# Patient Record
Sex: MALE | Race: WHITE | HISPANIC OR LATINO
[De-identification: names, ages, dates, MRNs, and addresses within clinical notes are randomized per-mention and may not be internally consistent; named-entity substitution may affect disease eponyms.]

---

## 2020-10-23 ENCOUNTER — TRANSCRIPTION ENCOUNTER (OUTPATIENT)
Age: 65
End: 2020-10-23

## 2021-04-21 ENCOUNTER — INPATIENT (INPATIENT)
Facility: HOSPITAL | Age: 66
LOS: 4 days | Discharge: HOME HEALTH | End: 2021-04-26
Attending: HOSPITALIST | Admitting: HOSPITALIST
Payer: MEDICARE

## 2021-04-21 VITALS
DIASTOLIC BLOOD PRESSURE: 75 MMHG | SYSTOLIC BLOOD PRESSURE: 166 MMHG | HEART RATE: 81 BPM | RESPIRATION RATE: 18 BRPM | TEMPERATURE: 98 F | WEIGHT: 149.91 LBS | OXYGEN SATURATION: 100 %

## 2021-04-21 DIAGNOSIS — Z95.1 PRESENCE OF AORTOCORONARY BYPASS GRAFT: Chronic | ICD-10-CM

## 2021-04-21 DIAGNOSIS — Z96.649 PRESENCE OF UNSPECIFIED ARTIFICIAL HIP JOINT: Chronic | ICD-10-CM

## 2021-04-21 LAB
ALBUMIN SERPL ELPH-MCNC: 4.6 G/DL — SIGNIFICANT CHANGE UP (ref 3.5–5.2)
ALP SERPL-CCNC: 71 U/L — SIGNIFICANT CHANGE UP (ref 30–115)
ALT FLD-CCNC: 13 U/L — SIGNIFICANT CHANGE UP (ref 0–41)
ANION GAP SERPL CALC-SCNC: 13 MMOL/L — SIGNIFICANT CHANGE UP (ref 7–14)
APPEARANCE UR: CLEAR — SIGNIFICANT CHANGE UP
APTT BLD: 28.3 SEC — SIGNIFICANT CHANGE UP (ref 27–39.2)
AST SERPL-CCNC: 20 U/L — SIGNIFICANT CHANGE UP (ref 0–41)
BACTERIA # UR AUTO: NEGATIVE — SIGNIFICANT CHANGE UP
BASOPHILS # BLD AUTO: 0.02 K/UL — SIGNIFICANT CHANGE UP (ref 0–0.2)
BASOPHILS NFR BLD AUTO: 0.4 % — SIGNIFICANT CHANGE UP (ref 0–1)
BILIRUB SERPL-MCNC: 0.5 MG/DL — SIGNIFICANT CHANGE UP (ref 0.2–1.2)
BILIRUB UR-MCNC: NEGATIVE — SIGNIFICANT CHANGE UP
BUN SERPL-MCNC: 27 MG/DL — HIGH (ref 10–20)
CALCIUM SERPL-MCNC: 9.6 MG/DL — SIGNIFICANT CHANGE UP (ref 8.5–10.1)
CHLORIDE SERPL-SCNC: 100 MMOL/L — SIGNIFICANT CHANGE UP (ref 98–110)
CO2 SERPL-SCNC: 25 MMOL/L — SIGNIFICANT CHANGE UP (ref 17–32)
COD CRY URNS QL: NEGATIVE — SIGNIFICANT CHANGE UP
COLOR SPEC: YELLOW — SIGNIFICANT CHANGE UP
CREAT SERPL-MCNC: 1.4 MG/DL — SIGNIFICANT CHANGE UP (ref 0.7–1.5)
D DIMER BLD IA.RAPID-MCNC: 280 NG/ML DDU — HIGH (ref 0–230)
DIFF PNL FLD: ABNORMAL
EOSINOPHIL # BLD AUTO: 0.16 K/UL — SIGNIFICANT CHANGE UP (ref 0–0.7)
EOSINOPHIL NFR BLD AUTO: 3.6 % — SIGNIFICANT CHANGE UP (ref 0–8)
EPI CELLS # UR: NEGATIVE — SIGNIFICANT CHANGE UP
GLUCOSE SERPL-MCNC: 84 MG/DL — SIGNIFICANT CHANGE UP (ref 70–99)
GLUCOSE UR QL: NEGATIVE MG/DL — SIGNIFICANT CHANGE UP
GRAN CASTS # UR COMP ASSIST: NEGATIVE — SIGNIFICANT CHANGE UP
HCT VFR BLD CALC: 44.6 % — SIGNIFICANT CHANGE UP (ref 42–52)
HGB BLD-MCNC: 14.6 G/DL — SIGNIFICANT CHANGE UP (ref 14–18)
HYALINE CASTS # UR AUTO: NEGATIVE — SIGNIFICANT CHANGE UP
IMM GRANULOCYTES NFR BLD AUTO: 0.4 % — HIGH (ref 0.1–0.3)
INR BLD: 1.01 RATIO — SIGNIFICANT CHANGE UP (ref 0.65–1.3)
KETONES UR-MCNC: NEGATIVE — SIGNIFICANT CHANGE UP
LEUKOCYTE ESTERASE UR-ACNC: NEGATIVE — SIGNIFICANT CHANGE UP
LYMPHOCYTES # BLD AUTO: 0.91 K/UL — LOW (ref 1.2–3.4)
LYMPHOCYTES # BLD AUTO: 20.4 % — LOW (ref 20.5–51.1)
MAGNESIUM SERPL-MCNC: 1.8 MG/DL — SIGNIFICANT CHANGE UP (ref 1.8–2.4)
MCHC RBC-ENTMCNC: 29.8 PG — SIGNIFICANT CHANGE UP (ref 27–31)
MCHC RBC-ENTMCNC: 32.7 G/DL — SIGNIFICANT CHANGE UP (ref 32–37)
MCV RBC AUTO: 91 FL — SIGNIFICANT CHANGE UP (ref 80–94)
MONOCYTES # BLD AUTO: 0.44 K/UL — SIGNIFICANT CHANGE UP (ref 0.1–0.6)
MONOCYTES NFR BLD AUTO: 9.9 % — HIGH (ref 1.7–9.3)
NEUTROPHILS # BLD AUTO: 2.91 K/UL — SIGNIFICANT CHANGE UP (ref 1.4–6.5)
NEUTROPHILS NFR BLD AUTO: 65.3 % — SIGNIFICANT CHANGE UP (ref 42.2–75.2)
NITRITE UR-MCNC: NEGATIVE — SIGNIFICANT CHANGE UP
NRBC # BLD: 0 /100 WBCS — SIGNIFICANT CHANGE UP (ref 0–0)
PH UR: 7 — SIGNIFICANT CHANGE UP (ref 5–8)
PLATELET # BLD AUTO: 125 K/UL — LOW (ref 130–400)
POTASSIUM SERPL-MCNC: 3.8 MMOL/L — SIGNIFICANT CHANGE UP (ref 3.5–5)
POTASSIUM SERPL-SCNC: 3.8 MMOL/L — SIGNIFICANT CHANGE UP (ref 3.5–5)
PROT SERPL-MCNC: 8.5 G/DL — HIGH (ref 6–8)
PROT UR-MCNC: 100 MG/DL
PROTHROM AB SERPL-ACNC: 11.6 SEC — SIGNIFICANT CHANGE UP (ref 9.95–12.87)
RAPID RVP RESULT: SIGNIFICANT CHANGE UP
RBC # BLD: 4.9 M/UL — SIGNIFICANT CHANGE UP (ref 4.7–6.1)
RBC # FLD: 13.3 % — SIGNIFICANT CHANGE UP (ref 11.5–14.5)
RBC CASTS # UR COMP ASSIST: SIGNIFICANT CHANGE UP /HPF
SARS-COV-2 RNA SPEC QL NAA+PROBE: SIGNIFICANT CHANGE UP
SODIUM SERPL-SCNC: 138 MMOL/L — SIGNIFICANT CHANGE UP (ref 135–146)
SP GR SPEC: 1.02 — SIGNIFICANT CHANGE UP (ref 1.01–1.03)
TRI-PHOS CRY UR QL COMP ASSIST: NEGATIVE — SIGNIFICANT CHANGE UP
TROPONIN T SERPL-MCNC: <0.01 NG/ML — SIGNIFICANT CHANGE UP
URATE CRY FLD QL MICRO: NEGATIVE — SIGNIFICANT CHANGE UP
UROBILINOGEN FLD QL: 0.2 MG/DL — SIGNIFICANT CHANGE UP (ref 0.2–0.2)
WBC # BLD: 4.46 K/UL — LOW (ref 4.8–10.8)
WBC # FLD AUTO: 4.46 K/UL — LOW (ref 4.8–10.8)
WBC UR QL: SIGNIFICANT CHANGE UP /HPF

## 2021-04-21 PROCEDURE — 71275 CT ANGIOGRAPHY CHEST: CPT | Mod: 26,MA

## 2021-04-21 PROCEDURE — 99223 1ST HOSP IP/OBS HIGH 75: CPT

## 2021-04-21 PROCEDURE — 71045 X-RAY EXAM CHEST 1 VIEW: CPT | Mod: 26

## 2021-04-21 PROCEDURE — 99285 EMERGENCY DEPT VISIT HI MDM: CPT

## 2021-04-21 RX ORDER — ALBUTEROL 90 UG/1
2 AEROSOL, METERED ORAL
Qty: 0 | Refills: 0 | DISCHARGE

## 2021-04-21 RX ORDER — ENOXAPARIN SODIUM 100 MG/ML
40 INJECTION SUBCUTANEOUS AT BEDTIME
Refills: 0 | Status: DISCONTINUED | OUTPATIENT
Start: 2021-04-21 | End: 2021-04-26

## 2021-04-21 RX ORDER — FOLIC ACID 0.8 MG
1 TABLET ORAL DAILY
Refills: 0 | Status: DISCONTINUED | OUTPATIENT
Start: 2021-04-21 | End: 2021-04-26

## 2021-04-21 RX ORDER — EZETIMIBE 10 MG/1
1 TABLET ORAL
Qty: 0 | Refills: 0 | DISCHARGE

## 2021-04-21 RX ORDER — METOPROLOL TARTRATE 50 MG
25 TABLET ORAL
Refills: 0 | Status: DISCONTINUED | OUTPATIENT
Start: 2021-04-21 | End: 2021-04-26

## 2021-04-21 RX ORDER — ASPIRIN/CALCIUM CARB/MAGNESIUM 324 MG
325 TABLET ORAL DAILY
Refills: 0 | Status: DISCONTINUED | OUTPATIENT
Start: 2021-04-21 | End: 2021-04-26

## 2021-04-21 RX ORDER — TAMSULOSIN HYDROCHLORIDE 0.4 MG/1
1 CAPSULE ORAL
Qty: 0 | Refills: 0 | DISCHARGE

## 2021-04-21 RX ORDER — TAMSULOSIN HYDROCHLORIDE 0.4 MG/1
0.4 CAPSULE ORAL AT BEDTIME
Refills: 0 | Status: DISCONTINUED | OUTPATIENT
Start: 2021-04-21 | End: 2021-04-26

## 2021-04-21 RX ORDER — BUDESONIDE AND FORMOTEROL FUMARATE DIHYDRATE 160; 4.5 UG/1; UG/1
2 AEROSOL RESPIRATORY (INHALATION)
Qty: 0 | Refills: 0 | DISCHARGE

## 2021-04-21 RX ORDER — FINASTERIDE 5 MG/1
1 TABLET, FILM COATED ORAL
Qty: 0 | Refills: 0 | DISCHARGE

## 2021-04-21 RX ORDER — BUDESONIDE AND FORMOTEROL FUMARATE DIHYDRATE 160; 4.5 UG/1; UG/1
2 AEROSOL RESPIRATORY (INHALATION)
Refills: 0 | Status: DISCONTINUED | OUTPATIENT
Start: 2021-04-21 | End: 2021-04-26

## 2021-04-21 RX ORDER — ALBUTEROL 90 UG/1
2.5 AEROSOL, METERED ORAL ONCE
Refills: 0 | Status: COMPLETED | OUTPATIENT
Start: 2021-04-21 | End: 2021-04-21

## 2021-04-21 RX ORDER — FOLIC ACID 0.8 MG
1 TABLET ORAL
Qty: 0 | Refills: 0 | DISCHARGE

## 2021-04-21 RX ORDER — ASPIRIN/CALCIUM CARB/MAGNESIUM 324 MG
1 TABLET ORAL
Qty: 0 | Refills: 0 | DISCHARGE

## 2021-04-21 RX ORDER — SODIUM CHLORIDE 9 MG/ML
1000 INJECTION INTRAMUSCULAR; INTRAVENOUS; SUBCUTANEOUS
Refills: 0 | Status: COMPLETED | OUTPATIENT
Start: 2021-04-21 | End: 2021-04-22

## 2021-04-21 RX ORDER — ALBUTEROL 90 UG/1
2 AEROSOL, METERED ORAL EVERY 6 HOURS
Refills: 0 | Status: DISCONTINUED | OUTPATIENT
Start: 2021-04-21 | End: 2021-04-23

## 2021-04-21 RX ORDER — SIMVASTATIN 20 MG/1
10 TABLET, FILM COATED ORAL AT BEDTIME
Refills: 0 | Status: DISCONTINUED | OUTPATIENT
Start: 2021-04-21 | End: 2021-04-26

## 2021-04-21 RX ORDER — HYDRALAZINE HCL 50 MG
10 TABLET ORAL ONCE
Refills: 0 | Status: COMPLETED | OUTPATIENT
Start: 2021-04-21 | End: 2021-04-21

## 2021-04-21 RX ORDER — METOPROLOL TARTRATE 50 MG
1 TABLET ORAL
Qty: 0 | Refills: 0 | DISCHARGE

## 2021-04-21 RX ORDER — AMLODIPINE BESYLATE 2.5 MG/1
10 TABLET ORAL DAILY
Refills: 0 | Status: DISCONTINUED | OUTPATIENT
Start: 2021-04-21 | End: 2021-04-26

## 2021-04-21 RX ORDER — FINASTERIDE 5 MG/1
5 TABLET, FILM COATED ORAL DAILY
Refills: 0 | Status: DISCONTINUED | OUTPATIENT
Start: 2021-04-21 | End: 2021-04-26

## 2021-04-21 RX ADMIN — ALBUTEROL 2.5 MILLIGRAM(S): 90 AEROSOL, METERED ORAL at 19:22

## 2021-04-21 RX ADMIN — Medication 25 MILLIGRAM(S): at 23:48

## 2021-04-21 RX ADMIN — TAMSULOSIN HYDROCHLORIDE 0.4 MILLIGRAM(S): 0.4 CAPSULE ORAL at 23:47

## 2021-04-21 RX ADMIN — ENOXAPARIN SODIUM 40 MILLIGRAM(S): 100 INJECTION SUBCUTANEOUS at 23:48

## 2021-04-21 RX ADMIN — SODIUM CHLORIDE 75 MILLILITER(S): 9 INJECTION INTRAMUSCULAR; INTRAVENOUS; SUBCUTANEOUS at 23:48
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
## 2021-04-21 NOTE — ED PROVIDER NOTE - OBJECTIVE STATEMENT
Patient BIBA, C/o intermittent SOB with back pain today , H/o CAD, PE, CVA in past, no fever, + cough, no abdominal pain,

## 2021-04-21 NOTE — ED PROVIDER NOTE - CARE PLAN
Principal Discharge DX:	Shortness of breath  Secondary Diagnosis:	Chest pain  Secondary Diagnosis:	Back pain

## 2021-04-21 NOTE — ED ADULT NURSE NOTE - NSIMPLEMENTINTERV_GEN_ALL_ED
Implemented All Fall Risk Interventions:  [place] to call system. Call bell, personal items and telephone within reach. Instruct patient to call for assistance. Room bathroom lighting operational. Non-slip footwear when patient is off stretcher. Physically safe environment: no spills, clutter or unnecessary equipment. Stretcher in lowest position, wheels locked, appropriate side rails in place. Provide visual cue, wrist band, yellow gown, etc. Monitor gait and stability. Monitor for mental status changes and reorient to person, place, and time. Review medications for side effects contributing to fall risk. Reinforce activity limits and safety measures with patient and family.

## 2021-04-21 NOTE — H&P ADULT - NSHPSOCIALHISTORY_GEN_ALL_CORE
Substance Use (street drugs): ( x ) never used  (  ) other:  Tobacco Usage:  ( x  ) never smoked   (   ) former smoker   (   ) current smoker  (     ) pack year  Alcohol Usage: None Substance Use (street drugs): ( x ) never used  (  ) other:  Tobacco Usage:  (  ) never smoked   ( x  ) former smoker- quit 23 years ago  (   ) current smoker  (     ) pack year  Alcohol Usage: socially Lives home with GF. Ambulates with cane.    Substance Use (street drugs): ( x ) never used  (  ) other:  Tobacco Usage:  (  ) never smoked   ( x  ) former smoker- quit 23 years ago  (   ) current smoker  (     ) pack year  Alcohol Usage: socially
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
## 2021-04-21 NOTE — ED PROVIDER NOTE - PROGRESS NOTE DETAILS
Dr Wolfe accepts to non CCU tele pt here with shortness of breath, pulze ox normal, ct angio wnl. initial trop negative. due to patients extensive comorbidities admitted to tele for further eval pt here with shortness of breath, pulse ox normal, ct angio wnl. initial trop negative. due to patients extensive comorbidities admitted to tele for further eval

## 2021-04-21 NOTE — ED ADULT TRIAGE NOTE - CHIEF COMPLAINT QUOTE
pt reports feeling pain in his back and then began have shortness of breath on exertion that began today. denies chest pain

## 2021-04-21 NOTE — H&P ADULT - NSHPPHYSICALEXAM_GEN_ALL_CORE
T(C): 36.4 (04-21-21 @ 16:33), Max: 36.4 (04-21-21 @ 16:33)  HR: 75 (04-21-21 @ 20:29) (75 - 81)  BP: 151/83 (04-21-21 @ 20:29) (151/83 - 166/75)  RR: 18 (04-21-21 @ 20:29) (18 - 18)  SpO2: 98% (04-21-21 @ 20:29) (98% - 100%)        GENERAL: NAD, well-developed  HEAD:  Atraumatic, Normocephalic  EYES: EOMI, PERRLA, conjunctiva and sclera clear  ENT: Normal tympanic membrane. No nasal obstruction or discharge. No tonsillar exudate, swelling or erythema.  NECK: Supple, No JVD  CHEST/LUNG: Clear to auscultation bilaterally; No wheeze  HEART: Regular rate and rhythm; No murmurs, rubs, or gallops  ABDOMEN: Soft, Nontender, Nondistended; Bowel sounds present  EXTREMITIES:  2+ Peripheral Pulses, No clubbing, cyanosis, or edema  PSYCH: AAOx3  NEUROLOGY: non-focal  SKIN: No rashes or lesions T(C): 36.4 (04-21-21 @ 16:33), Max: 36.4 (04-21-21 @ 16:33)  HR: 75 (04-21-21 @ 20:29) (75 - 81)  BP: 151/83 (04-21-21 @ 20:29) (151/83 - 166/75)  RR: 18 (04-21-21 @ 20:29) (18 - 18)  SpO2: 98% (04-21-21 @ 20:29) (98% - 100%)    GENERAL: NAD, well-developed  HEAD:  Atraumatic, Normocephalic  EYES: EOMI, PERRLA, conjunctiva and sclera clear  ENT: Normal tympanic membrane. No nasal obstruction or discharge. No tonsillar exudate, swelling or erythema.  NECK: Supple, No JVD  CHEST/LUNG: Clear to auscultation bilaterally; No wheeze  HEART: Regular rate and rhythm; No murmurs, rubs, or gallops  ABDOMEN: Soft, Nontender, Nondistended; Bowel sounds present  EXTREMITIES:  2+ Peripheral Pulses, No clubbing, cyanosis, or edema  PSYCH: AAOx3  NEUROLOGY: non-focal, left side 4/5 strength, dec sensation on the left   SKIN: No rashes or lesions

## 2021-04-21 NOTE — ED PROVIDER NOTE - CLINICAL SUMMARY MEDICAL DECISION MAKING FREE TEXT BOX
pt here with shortness of breath, pulse ox normal, ct angio wnl. initial trop negative. due to patients extensive comorbidities admitted to tele for further eval

## 2021-04-21 NOTE — ED PROVIDER NOTE - ATTENDING CONTRIBUTION TO CARE
66yr old male hx of previous stroke, cad, single vessel bypass, lung ca, PE 20 years ago, Today here c/o of episode of sob, that is worse with exertion. pt has no chest pain, does endorse left sided neck pain. The patient also has noleg swelling, fever, chills, cough. received covid vaccine. CON: ao x 3, HENMT:  neck supple,  CV: rrr, equal pulses b/l, RESP: cta b/l, GI:  soft, nontender, no rebound, no guarding, SKIN: no rash, MSK: no calf swelling  Psychiatric: appropriate mood, appropriate affect

## 2021-04-21 NOTE — H&P ADULT - NSICDXPASTSURGICALHX_GEN_ALL_CORE_FT
PAST SURGICAL HISTORY:  No significant past surgical history      PAST SURGICAL HISTORY:  History of coronary artery bypass, single     History of hip replacement

## 2021-04-21 NOTE — H&P ADULT - NSICDXPASTMEDICALHX_GEN_ALL_CORE_FT
PAST MEDICAL HISTORY:  CAD (coronary artery disease)     Pulmonary embolism     Stroke      PAST MEDICAL HISTORY:  CAD (coronary artery disease)     COPD, mild     Degeneration of spine     History of BPH     HTN (hypertension)     Kidney stone     Pulmonary embolism     Stroke

## 2021-04-22 LAB
ANION GAP SERPL CALC-SCNC: 7 MMOL/L — SIGNIFICANT CHANGE UP (ref 7–14)
BASOPHILS # BLD AUTO: 0.03 K/UL — SIGNIFICANT CHANGE UP (ref 0–0.2)
BASOPHILS NFR BLD AUTO: 0.8 % — SIGNIFICANT CHANGE UP (ref 0–1)
BUN SERPL-MCNC: 21 MG/DL — HIGH (ref 10–20)
CALCIUM SERPL-MCNC: 9.5 MG/DL — SIGNIFICANT CHANGE UP (ref 8.5–10.1)
CHLORIDE SERPL-SCNC: 105 MMOL/L — SIGNIFICANT CHANGE UP (ref 98–110)
CO2 SERPL-SCNC: 29 MMOL/L — SIGNIFICANT CHANGE UP (ref 17–32)
COVID-19 SPIKE DOMAIN AB INTERP: POSITIVE
COVID-19 SPIKE DOMAIN ANTIBODY RESULT: >250 U/ML — HIGH
CREAT SERPL-MCNC: 1.5 MG/DL — SIGNIFICANT CHANGE UP (ref 0.7–1.5)
EOSINOPHIL # BLD AUTO: 0.22 K/UL — SIGNIFICANT CHANGE UP (ref 0–0.7)
EOSINOPHIL NFR BLD AUTO: 5.8 % — SIGNIFICANT CHANGE UP (ref 0–8)
GLUCOSE SERPL-MCNC: 82 MG/DL — SIGNIFICANT CHANGE UP (ref 70–99)
HCT VFR BLD CALC: 41.5 % — LOW (ref 42–52)
HCV AB S/CO SERPL IA: 0.04 COI — SIGNIFICANT CHANGE UP
HCV AB SERPL-IMP: SIGNIFICANT CHANGE UP
HGB BLD-MCNC: 13.5 G/DL — LOW (ref 14–18)
IMM GRANULOCYTES NFR BLD AUTO: 0.5 % — HIGH (ref 0.1–0.3)
LYMPHOCYTES # BLD AUTO: 1.06 K/UL — LOW (ref 1.2–3.4)
LYMPHOCYTES # BLD AUTO: 27.8 % — SIGNIFICANT CHANGE UP (ref 20.5–51.1)
MAGNESIUM SERPL-MCNC: 1.8 MG/DL — SIGNIFICANT CHANGE UP (ref 1.8–2.4)
MCHC RBC-ENTMCNC: 30.1 PG — SIGNIFICANT CHANGE UP (ref 27–31)
MCHC RBC-ENTMCNC: 32.5 G/DL — SIGNIFICANT CHANGE UP (ref 32–37)
MCV RBC AUTO: 92.4 FL — SIGNIFICANT CHANGE UP (ref 80–94)
MONOCYTES # BLD AUTO: 0.54 K/UL — SIGNIFICANT CHANGE UP (ref 0.1–0.6)
MONOCYTES NFR BLD AUTO: 14.2 % — HIGH (ref 1.7–9.3)
NEUTROPHILS # BLD AUTO: 1.94 K/UL — SIGNIFICANT CHANGE UP (ref 1.4–6.5)
NEUTROPHILS NFR BLD AUTO: 50.9 % — SIGNIFICANT CHANGE UP (ref 42.2–75.2)
NRBC # BLD: 0 /100 WBCS — SIGNIFICANT CHANGE UP (ref 0–0)
PLATELET # BLD AUTO: 108 K/UL — LOW (ref 130–400)
POTASSIUM SERPL-MCNC: 4.9 MMOL/L — SIGNIFICANT CHANGE UP (ref 3.5–5)
POTASSIUM SERPL-SCNC: 4.9 MMOL/L — SIGNIFICANT CHANGE UP (ref 3.5–5)
RBC # BLD: 4.49 M/UL — LOW (ref 4.7–6.1)
RBC # FLD: 13.4 % — SIGNIFICANT CHANGE UP (ref 11.5–14.5)
SARS-COV-2 IGG+IGM SERPL QL IA: >250 U/ML — HIGH
SARS-COV-2 IGG+IGM SERPL QL IA: POSITIVE
SODIUM SERPL-SCNC: 141 MMOL/L — SIGNIFICANT CHANGE UP (ref 135–146)
TROPONIN T SERPL-MCNC: 0.01 NG/ML — SIGNIFICANT CHANGE UP
TROPONIN T SERPL-MCNC: <0.01 NG/ML — SIGNIFICANT CHANGE UP
WBC # BLD: 3.81 K/UL — LOW (ref 4.8–10.8)
WBC # FLD AUTO: 3.81 K/UL — LOW (ref 4.8–10.8)

## 2021-04-22 PROCEDURE — 99232 SBSQ HOSP IP/OBS MODERATE 35: CPT

## 2021-04-22 RX ADMIN — BUDESONIDE AND FORMOTEROL FUMARATE DIHYDRATE 2 PUFF(S): 160; 4.5 AEROSOL RESPIRATORY (INHALATION) at 08:38

## 2021-04-22 RX ADMIN — SODIUM CHLORIDE 75 MILLILITER(S): 9 INJECTION INTRAMUSCULAR; INTRAVENOUS; SUBCUTANEOUS at 05:40

## 2021-04-22 RX ADMIN — ENOXAPARIN SODIUM 40 MILLIGRAM(S): 100 INJECTION SUBCUTANEOUS at 21:24

## 2021-04-22 RX ADMIN — ALBUTEROL 2 PUFF(S): 90 AEROSOL, METERED ORAL at 13:14

## 2021-04-22 RX ADMIN — SIMVASTATIN 10 MILLIGRAM(S): 20 TABLET, FILM COATED ORAL at 21:24

## 2021-04-22 RX ADMIN — Medication 325 MILLIGRAM(S): at 12:26

## 2021-04-22 RX ADMIN — AMLODIPINE BESYLATE 10 MILLIGRAM(S): 2.5 TABLET ORAL at 05:41

## 2021-04-22 RX ADMIN — Medication 1 MILLIGRAM(S): at 12:26

## 2021-04-22 RX ADMIN — TAMSULOSIN HYDROCHLORIDE 0.4 MILLIGRAM(S): 0.4 CAPSULE ORAL at 21:24

## 2021-04-22 RX ADMIN — ALBUTEROL 2 PUFF(S): 90 AEROSOL, METERED ORAL at 08:38

## 2021-04-22 RX ADMIN — FINASTERIDE 5 MILLIGRAM(S): 5 TABLET, FILM COATED ORAL at 12:26

## 2021-04-22 RX ADMIN — Medication 25 MILLIGRAM(S): at 05:41

## 2021-04-22 NOTE — PHYSICAL THERAPY INITIAL EVALUATION ADULT - SPECIFY REASON(S)
Will hold PT eval pending MRI of Lumbar and Thoracic Spine for  acute back pain. Will follow up and complete eval when indicated.

## 2021-04-23 LAB
ANION GAP SERPL CALC-SCNC: 8 MMOL/L — SIGNIFICANT CHANGE UP (ref 7–14)
BUN SERPL-MCNC: 23 MG/DL — HIGH (ref 10–20)
CALCIUM SERPL-MCNC: 9.2 MG/DL — SIGNIFICANT CHANGE UP (ref 8.5–10.1)
CHLORIDE SERPL-SCNC: 105 MMOL/L — SIGNIFICANT CHANGE UP (ref 98–110)
CO2 SERPL-SCNC: 27 MMOL/L — SIGNIFICANT CHANGE UP (ref 17–32)
CREAT SERPL-MCNC: 1.7 MG/DL — HIGH (ref 0.7–1.5)
GLUCOSE BLDC GLUCOMTR-MCNC: 114 MG/DL — HIGH (ref 70–99)
GLUCOSE BLDC GLUCOMTR-MCNC: 71 MG/DL — SIGNIFICANT CHANGE UP (ref 70–99)
GLUCOSE BLDC GLUCOMTR-MCNC: 73 MG/DL — SIGNIFICANT CHANGE UP (ref 70–99)
GLUCOSE BLDC GLUCOMTR-MCNC: 81 MG/DL — SIGNIFICANT CHANGE UP (ref 70–99)
GLUCOSE SERPL-MCNC: 81 MG/DL — SIGNIFICANT CHANGE UP (ref 70–99)
HCT VFR BLD CALC: 42.1 % — SIGNIFICANT CHANGE UP (ref 42–52)
HGB BLD-MCNC: 13.6 G/DL — LOW (ref 14–18)
MCHC RBC-ENTMCNC: 29.8 PG — SIGNIFICANT CHANGE UP (ref 27–31)
MCHC RBC-ENTMCNC: 32.3 G/DL — SIGNIFICANT CHANGE UP (ref 32–37)
MCV RBC AUTO: 92.3 FL — SIGNIFICANT CHANGE UP (ref 80–94)
NRBC # BLD: 0 /100 WBCS — SIGNIFICANT CHANGE UP (ref 0–0)
PLATELET # BLD AUTO: 105 K/UL — LOW (ref 130–400)
POTASSIUM SERPL-MCNC: 4.3 MMOL/L — SIGNIFICANT CHANGE UP (ref 3.5–5)
POTASSIUM SERPL-SCNC: 4.3 MMOL/L — SIGNIFICANT CHANGE UP (ref 3.5–5)
RBC # BLD: 4.56 M/UL — LOW (ref 4.7–6.1)
RBC # FLD: 13.4 % — SIGNIFICANT CHANGE UP (ref 11.5–14.5)
SODIUM SERPL-SCNC: 140 MMOL/L — SIGNIFICANT CHANGE UP (ref 135–146)
WBC # BLD: 3.5 K/UL — LOW (ref 4.8–10.8)
WBC # FLD AUTO: 3.5 K/UL — LOW (ref 4.8–10.8)

## 2021-04-23 PROCEDURE — 72148 MRI LUMBAR SPINE W/O DYE: CPT | Mod: 26

## 2021-04-23 PROCEDURE — 72146 MRI CHEST SPINE W/O DYE: CPT | Mod: 26

## 2021-04-23 PROCEDURE — 99232 SBSQ HOSP IP/OBS MODERATE 35: CPT

## 2021-04-23 RX ORDER — IPRATROPIUM/ALBUTEROL SULFATE 18-103MCG
3 AEROSOL WITH ADAPTER (GRAM) INHALATION EVERY 4 HOURS
Refills: 0 | Status: DISCONTINUED | OUTPATIENT
Start: 2021-04-23 | End: 2021-04-26

## 2021-04-23 RX ADMIN — Medication 1 MILLIGRAM(S): at 11:09

## 2021-04-23 RX ADMIN — BUDESONIDE AND FORMOTEROL FUMARATE DIHYDRATE 2 PUFF(S): 160; 4.5 AEROSOL RESPIRATORY (INHALATION) at 08:17

## 2021-04-23 RX ADMIN — Medication 40 MILLIGRAM(S): at 17:11

## 2021-04-23 RX ADMIN — Medication 25 MILLIGRAM(S): at 17:11

## 2021-04-23 RX ADMIN — Medication 325 MILLIGRAM(S): at 11:09

## 2021-04-23 RX ADMIN — BUDESONIDE AND FORMOTEROL FUMARATE DIHYDRATE 2 PUFF(S): 160; 4.5 AEROSOL RESPIRATORY (INHALATION) at 17:11

## 2021-04-23 RX ADMIN — SIMVASTATIN 10 MILLIGRAM(S): 20 TABLET, FILM COATED ORAL at 21:27

## 2021-04-23 RX ADMIN — FINASTERIDE 5 MILLIGRAM(S): 5 TABLET, FILM COATED ORAL at 11:09

## 2021-04-23 RX ADMIN — AMLODIPINE BESYLATE 10 MILLIGRAM(S): 2.5 TABLET ORAL at 05:52

## 2021-04-23 RX ADMIN — TAMSULOSIN HYDROCHLORIDE 0.4 MILLIGRAM(S): 0.4 CAPSULE ORAL at 21:27

## 2021-04-23 NOTE — PHYSICAL THERAPY INITIAL EVALUATION ADULT - SPECIFY REASON(S)
Chart reviewed. Continue to hold PT evaluation at this time pending MRI of thoracic and lumbar spine. Will follow up as appropriate.

## 2021-04-24 ENCOUNTER — TRANSCRIPTION ENCOUNTER (OUTPATIENT)
Age: 66
End: 2021-04-24

## 2021-04-24 LAB
ANION GAP SERPL CALC-SCNC: 10 MMOL/L — SIGNIFICANT CHANGE UP (ref 7–14)
BUN SERPL-MCNC: 27 MG/DL — HIGH (ref 10–20)
CALCIUM SERPL-MCNC: 9 MG/DL — SIGNIFICANT CHANGE UP (ref 8.5–10.1)
CHLORIDE SERPL-SCNC: 104 MMOL/L — SIGNIFICANT CHANGE UP (ref 98–110)
CO2 SERPL-SCNC: 24 MMOL/L — SIGNIFICANT CHANGE UP (ref 17–32)
CREAT SERPL-MCNC: 1.8 MG/DL — HIGH (ref 0.7–1.5)
GLUCOSE BLDC GLUCOMTR-MCNC: 138 MG/DL — HIGH (ref 70–99)
GLUCOSE BLDC GLUCOMTR-MCNC: 155 MG/DL — HIGH (ref 70–99)
GLUCOSE SERPL-MCNC: 106 MG/DL — HIGH (ref 70–99)
HCT VFR BLD CALC: 42.9 % — SIGNIFICANT CHANGE UP (ref 42–52)
HGB BLD-MCNC: 14.2 G/DL — SIGNIFICANT CHANGE UP (ref 14–18)
MCHC RBC-ENTMCNC: 30 PG — SIGNIFICANT CHANGE UP (ref 27–31)
MCHC RBC-ENTMCNC: 33.1 G/DL — SIGNIFICANT CHANGE UP (ref 32–37)
MCV RBC AUTO: 90.7 FL — SIGNIFICANT CHANGE UP (ref 80–94)
NRBC # BLD: 0 /100 WBCS — SIGNIFICANT CHANGE UP (ref 0–0)
PLATELET # BLD AUTO: 129 K/UL — LOW (ref 130–400)
POTASSIUM SERPL-MCNC: 5.2 MMOL/L — HIGH (ref 3.5–5)
POTASSIUM SERPL-SCNC: 5.2 MMOL/L — HIGH (ref 3.5–5)
RBC # BLD: 4.73 M/UL — SIGNIFICANT CHANGE UP (ref 4.7–6.1)
RBC # FLD: 12.9 % — SIGNIFICANT CHANGE UP (ref 11.5–14.5)
SODIUM SERPL-SCNC: 138 MMOL/L — SIGNIFICANT CHANGE UP (ref 135–146)
WBC # BLD: 6.53 K/UL — SIGNIFICANT CHANGE UP (ref 4.8–10.8)
WBC # FLD AUTO: 6.53 K/UL — SIGNIFICANT CHANGE UP (ref 4.8–10.8)

## 2021-04-24 PROCEDURE — 99232 SBSQ HOSP IP/OBS MODERATE 35: CPT

## 2021-04-24 PROCEDURE — 76770 US EXAM ABDO BACK WALL COMP: CPT | Mod: 26

## 2021-04-24 RX ORDER — SIMVASTATIN 20 MG/1
1 TABLET, FILM COATED ORAL
Qty: 0 | Refills: 0 | DISCHARGE
Start: 2021-04-24

## 2021-04-24 RX ADMIN — Medication 1 MILLIGRAM(S): at 10:54

## 2021-04-24 RX ADMIN — Medication 325 MILLIGRAM(S): at 10:54

## 2021-04-24 RX ADMIN — BUDESONIDE AND FORMOTEROL FUMARATE DIHYDRATE 2 PUFF(S): 160; 4.5 AEROSOL RESPIRATORY (INHALATION) at 10:59

## 2021-04-24 RX ADMIN — Medication 25 MILLIGRAM(S): at 17:06

## 2021-04-24 RX ADMIN — Medication 40 MILLIGRAM(S): at 10:54

## 2021-04-24 RX ADMIN — Medication 25 MILLIGRAM(S): at 05:12

## 2021-04-24 RX ADMIN — FINASTERIDE 5 MILLIGRAM(S): 5 TABLET, FILM COATED ORAL at 10:54

## 2021-04-24 RX ADMIN — SIMVASTATIN 10 MILLIGRAM(S): 20 TABLET, FILM COATED ORAL at 21:07

## 2021-04-24 RX ADMIN — TAMSULOSIN HYDROCHLORIDE 0.4 MILLIGRAM(S): 0.4 CAPSULE ORAL at 21:07

## 2021-04-24 RX ADMIN — AMLODIPINE BESYLATE 10 MILLIGRAM(S): 2.5 TABLET ORAL at 05:12

## 2021-04-24 NOTE — PHYSICAL THERAPY INITIAL EVALUATION ADULT - ADDITIONAL COMMENTS
Pt says he lives with girlfriend in [facility], 1 step to enter, and 14 steps to bedroom with rail on right when ascending. Pt uses st cane for ambulation PTA.

## 2021-04-24 NOTE — DISCHARGE NOTE PROVIDER - CARE PROVIDER_API CALL
Pritesh Childers  INTERNAL MEDICINE  [address] [place] [address]  Phone: [phone]  Fax: [phone]  Follow Up Time: 1 week
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
## 2021-04-24 NOTE — DISCHARGE NOTE PROVIDER - NSDCCPCAREPLAN_GEN_ALL_CORE_FT
PRINCIPAL DISCHARGE DIAGNOSIS  Diagnosis: Shortness of breath  Assessment and Plan of Treatment: Felt to be due to underlying COPD. Given nebulizer treatment here. Also given Prenisone. Now shortness of breath is better and lungs clear to auscultation. Can complete prednisone taper on discharge.      SECONDARY DISCHARGE DIAGNOSES  Diagnosis: Chest pain  Assessment and Plan of Treatment:     Diagnosis: Back pain  Assessment and Plan of Treatment: Patient with no new focal neurological deficits, just residual deficits from prior hemorrhagic CVA. He ambulated well with PT. MRI done showing disc bulging in thoracic spine but no spincal cord signal abnormality. Will need follow-up with his spinal surgeon upon discharge within a week.

## 2021-04-24 NOTE — DISCHARGE NOTE PROVIDER - NSDCFUADDINST_GEN_ALL_CORE_FT
Repeat CBC and BMP within a week. Follow-up with your spinal surgeon who performed your L4-L5 surgery within a week. Repeat CBC and BMP within a week. Follow-up with your spinal surgeon who performed your L4-L5 surgery within a week. Discuss nephrology referral with your PCP given creatinine of 1.8 for long term management.

## 2021-04-25 LAB
ANION GAP SERPL CALC-SCNC: 10 MMOL/L — SIGNIFICANT CHANGE UP (ref 7–14)
BUN SERPL-MCNC: 39 MG/DL — HIGH (ref 10–20)
CALCIUM SERPL-MCNC: 9.1 MG/DL — SIGNIFICANT CHANGE UP (ref 8.5–10.1)
CHLORIDE SERPL-SCNC: 103 MMOL/L — SIGNIFICANT CHANGE UP (ref 98–110)
CO2 SERPL-SCNC: 25 MMOL/L — SIGNIFICANT CHANGE UP (ref 17–32)
CREAT SERPL-MCNC: 1.8 MG/DL — HIGH (ref 0.7–1.5)
GLUCOSE BLDC GLUCOMTR-MCNC: 104 MG/DL — HIGH (ref 70–99)
GLUCOSE BLDC GLUCOMTR-MCNC: 78 MG/DL — SIGNIFICANT CHANGE UP (ref 70–99)
GLUCOSE SERPL-MCNC: 82 MG/DL — SIGNIFICANT CHANGE UP (ref 70–99)
HCT VFR BLD CALC: 43.5 % — SIGNIFICANT CHANGE UP (ref 42–52)
HGB BLD-MCNC: 14.3 G/DL — SIGNIFICANT CHANGE UP (ref 14–18)
MCHC RBC-ENTMCNC: 30 PG — SIGNIFICANT CHANGE UP (ref 27–31)
MCHC RBC-ENTMCNC: 32.9 G/DL — SIGNIFICANT CHANGE UP (ref 32–37)
MCV RBC AUTO: 91.2 FL — SIGNIFICANT CHANGE UP (ref 80–94)
NRBC # BLD: 0 /100 WBCS — SIGNIFICANT CHANGE UP (ref 0–0)
PLATELET # BLD AUTO: 137 K/UL — SIGNIFICANT CHANGE UP (ref 130–400)
POTASSIUM SERPL-MCNC: 4.7 MMOL/L — SIGNIFICANT CHANGE UP (ref 3.5–5)
POTASSIUM SERPL-SCNC: 4.7 MMOL/L — SIGNIFICANT CHANGE UP (ref 3.5–5)
RBC # BLD: 4.77 M/UL — SIGNIFICANT CHANGE UP (ref 4.7–6.1)
RBC # FLD: 13 % — SIGNIFICANT CHANGE UP (ref 11.5–14.5)
SODIUM SERPL-SCNC: 138 MMOL/L — SIGNIFICANT CHANGE UP (ref 135–146)
WBC # BLD: 9.96 K/UL — SIGNIFICANT CHANGE UP (ref 4.8–10.8)
WBC # FLD AUTO: 9.96 K/UL — SIGNIFICANT CHANGE UP (ref 4.8–10.8)

## 2021-04-25 PROCEDURE — 99232 SBSQ HOSP IP/OBS MODERATE 35: CPT

## 2021-04-25 RX ADMIN — TAMSULOSIN HYDROCHLORIDE 0.4 MILLIGRAM(S): 0.4 CAPSULE ORAL at 21:31

## 2021-04-25 RX ADMIN — Medication 1 MILLIGRAM(S): at 10:29

## 2021-04-25 RX ADMIN — AMLODIPINE BESYLATE 10 MILLIGRAM(S): 2.5 TABLET ORAL at 05:17

## 2021-04-25 RX ADMIN — FINASTERIDE 5 MILLIGRAM(S): 5 TABLET, FILM COATED ORAL at 10:29

## 2021-04-25 RX ADMIN — BUDESONIDE AND FORMOTEROL FUMARATE DIHYDRATE 2 PUFF(S): 160; 4.5 AEROSOL RESPIRATORY (INHALATION) at 07:52

## 2021-04-25 RX ADMIN — Medication 25 MILLIGRAM(S): at 05:17

## 2021-04-25 RX ADMIN — Medication 25 MILLIGRAM(S): at 17:12

## 2021-04-25 RX ADMIN — Medication 30 MILLIGRAM(S): at 05:17

## 2021-04-25 RX ADMIN — Medication 325 MILLIGRAM(S): at 10:29

## 2021-04-25 RX ADMIN — SIMVASTATIN 10 MILLIGRAM(S): 20 TABLET, FILM COATED ORAL at 21:30

## 2021-04-25 NOTE — PROGRESS NOTE ADULT - REASON FOR ADMISSION
Dyspnea, Low back pain
sob, DARYN, low back pain
COPD, DARYN, back pain
Dyspnea, acute low back pain

## 2021-04-25 NOTE — PROGRESS NOTE ADULT - SUBJECTIVE AND OBJECTIVE BOX
Medicine Progress Note    SUBJECTIVE / OVERNIGHT EVENTS:    Patient states that yesterday he was leaning over to clean something off the floor when he felt a sharp, acute pinching pain in his lower back. He states that he has a "plate" at his L5 area and that he has RA that he states has affected his spine. The patient has chronic decreased strength and decreased sensation due to prior stroke.  He states that after the back pain started he started to feel short of breath. States that the dyspnea improved after receiving an albuterol nebulizer treatment. He denies any chest pain or palpitations to me. States he hasn't been up to ambulate yet.     ADDITIONAL REVIEW OF SYSTEMS:    GEN: Denies fevers or chills.  NEURO: Denies headaches. Denies vertigo. States he has chronic LLE weakness and diminished sensation due to prior hemorrhagic CVA  PULM: Denies cough this morning. States dyspnea improved after nebulizer treatment in ER  ABD: Denies abdominal pain. Denies nausea, vomiting. Denies BPR  : Denies dysuria or hematuria  CV: Denies chest pain. Denies palpitations. Denies chest pressure.    MEDICATIONS  (STANDING):  ALBUTerol    90 MICROgram(s) HFA Inhaler 2 Puff(s) Inhalation every 6 hours  amLODIPine   Tablet 10 milliGRAM(s) Oral daily  aspirin enteric coated 325 milliGRAM(s) Oral daily  budesonide 160 MICROgram(s)/formoterol 4.5 MICROgram(s) Inhaler 2 Puff(s) Inhalation two times a day  enoxaparin Injectable 40 milliGRAM(s) SubCutaneous at bedtime  finasteride 5 milliGRAM(s) Oral daily  folic acid 1 milliGRAM(s) Oral daily  metoprolol tartrate 25 milliGRAM(s) Oral two times a day  simvastatin 10 milliGRAM(s) Oral at bedtime  tamsulosin 0.4 milliGRAM(s) Oral at bedtime      PHYSICAL EXAM:  Vital Signs Last 24 Hrs  T(C): 35.3 (2021 05:50), Max: 36.4 (2021 16:33)  T(F): 95.6 (2021 05:50), Max: 97.5 (2021 16:33)  HR: 52 (2021 05:50) (52 - 81)  BP: 132/67 (2021 05:50) (128/76 - 169/88)  BP(mean): --  RR: 16 (2021 05:50) (16 - 18)  SpO2: 98% (2021 20:29) (98% - 100%)  CONSTITUTIONAL: Alert, in no acute distress  ENMT: Moist oral mucosa, no pharyngeal injection or exudates;  RESPIRATORY: Normal respiratory effort; lungs are clear to auscultation bilaterally  CARDIOVASCULAR: Regular rate and rhythm, normal S1 and S2, no murmur/rub/gallop; No lower extremity edema; Peripheral pulses are 2+ bilaterally  ABDOMEN: Nontender to palpation, normoactive bowel sounds, no rebound/guarding; No hepatosplenomegaly  PSYCH: A+O to person, place, and time; affect appropriate  NEUROLOGY: CN 2-12 are intact and symmetric; strength in LLE is 4/5. Decreased sensation in LLE to light touch compared to right.  SKIN: No rashes; no palpable lesions    LABS:                        13.5   3.81  )-----------( 108      ( 2021 07:22 )             41.5     04-    141  |  105  |  21<H>  ----------------------------<  82  4.9   |  29  |  1.5    Ca    9.5      2021 07:22  Mg     1.8         TPro  8.5<H>  /  Alb  4.6  /  TBili  0.5  /  DBili  x   /  AST  20  /  ALT  13  /  AlkPhos  71  04-21    PT/INR - ( 2021 17:10 )   PT: 11.60 sec;   INR: 1.01 ratio         PTT - ( 2021 17:10 )  PTT:28.3 sec  CARDIAC MARKERS ( 2021 07:22 )  x     / 0.01 ng/mL / x     / x     / x      CARDIAC MARKERS ( 2021 17:10 )  x     / <0.01 ng/mL / x     / x     / x          Urinalysis Basic - ( 2021 17:10 )    Color: Yellow / Appearance: Clear / S.020 / pH: x  Gluc: x / Ketone: Negative  / Bili: Negative / Urobili: 0.2 mg/dL   Blood: x / Protein: 100 mg/dL / Nitrite: Negative   Leuk Esterase: Negative / RBC: 1-2 /HPF / WBC 3-5 /HPF   Sq Epi: x / Non Sq Epi: Negative / Bacteria: Negative        SARS-CoV-2: NotDetec (2021 17:30)      RADIOLOGY & ADDITIONAL TESTS:  Imaging from Last 24 Hours:    Electrocardiogram/QTc Interval:    COORDINATION OF CARE:  Care Discussed with Consultants/Other Providers:  
Medicine Progress Note    SUBJECTIVE / OVERNIGHT EVENTS:    Had renal US done yesterday; results still pending. States he's trying to drink a little more water as he admits he didn't drink much water the last couple days. Back pain improved though he reports occasional spasms with movement.    ADDITIONAL REVIEW OF SYSTEMS:    GEN: no fevers/chills  CV: Denies chest pain or palpitations  RESP: Dyspnea better. Wheezes subsided  GI: No vomiting or nausea or abdominal pain. back pain better  : denies hematuria    MEDICATIONS  (STANDING):  amLODIPine   Tablet 10 milliGRAM(s) Oral daily  aspirin enteric coated 325 milliGRAM(s) Oral daily  budesonide 160 MICROgram(s)/formoterol 4.5 MICROgram(s) Inhaler 2 Puff(s) Inhalation two times a day  enoxaparin Injectable 40 milliGRAM(s) SubCutaneous at bedtime  finasteride 5 milliGRAM(s) Oral daily  folic acid 1 milliGRAM(s) Oral daily  metoprolol tartrate 25 milliGRAM(s) Oral two times a day  predniSONE   Tablet 30 milliGRAM(s) Oral daily  simvastatin 10 milliGRAM(s) Oral at bedtime  tamsulosin 0.4 milliGRAM(s) Oral at bedtime    MEDICATIONS  (PRN):  albuterol/ipratropium for Nebulization 3 milliLiter(s) Nebulizer every 4 hours PRN Bronchospasm    CAPILLARY BLOOD GLUCOSE  POCT Blood Glucose.: 78 mg/dL (25 Apr 2021 07:34)  POCT Blood Glucose.: 138 mg/dL (24 Apr 2021 21:02)  POCT Blood Glucose.: 155 mg/dL (24 Apr 2021 17:09)    PHYSICAL EXAM:  Vital Signs Last 24 Hrs  T(C): 36 (25 Apr 2021 05:00), Max: 36.4 (24 Apr 2021 20:57)  T(F): 96.8 (25 Apr 2021 05:00), Max: 97.5 (24 Apr 2021 20:57)  HR: 60 (24 Apr 2021 20:57) (60 - 64)  BP: 132/67 (24 Apr 2021 20:57) (132/67 - 136/80)  BP(mean): --  RR: 18 (25 Apr 2021 05:00) (16 - 18)  SpO2: --  CONSTITUTIONAL: Alert, in no acute distress. Receiving a nebulizer treatment during encounter.  ENMT: Moist oral mucosa, no pharyngeal injection or exudates;  RESPIRATORY: Normal respiratory effort; lungs with slight bilateral wheezes.  CARDIOVASCULAR: Regular rate and rhythm, normal S1 and S2, no murmur/rub/gallop; No lower extremity edema; Peripheral pulses are 2+ bilaterally  ABDOMEN: Nontender to palpation, normoactive bowel sounds, no rebound/guarding; No hepatosplenomegaly  PSYCH: A+O to person, place, and time; affect appropriate  NEUROLOGY: CN 2-12 are intact and symmetric; strength in LLE is 4/5. Decreased sensation in LLE to light touch compared to right.  SKIN: No rashes; no palpable lesions      LABS:                        14.3   9.96  )-----------( 137      ( 25 Apr 2021 05:43 )             43.5     04-25    138  |  103  |  39<H>  ----------------------------<  82  4.7   |  25  |  1.8<H>    Ca    9.1      25 Apr 2021 05:43                SARS-CoV-2: NotDetec (21 Apr 2021 17:30)      RADIOLOGY & ADDITIONAL TESTS:  Imaging from Last 24 Hours:    Electrocardiogram/QTc Interval:    COORDINATION OF CARE:  Care Discussed with Consultants/Other Providers:  
Medicine Progress Note    SUBJECTIVE / OVERNIGHT EVENTS:    Patient states that he felt more short of breath this morning. He also states he felt some wheezes this morning as well. Denies chest pain. Did report a mild cough with no sputum production. He is scheduled to get MRI today. Troponins negative and telemetry can be discontinued.     ADDITIONAL REVIEW OF SYSTEMS:    GEN: Denies fevers or chills.  NEURO: Denies headaches. Denies vertigo. States he has chronic LLE weakness and diminished sensation due to prior hemorrhagic CVA  PULM: Reports cough, slight wheezes, and mild dyspnea this morning.  ABD: Denies abdominal pain. Denies nausea, vomiting. Denies BPR  : Denies dysuria or hematuria  CV: Denies chest pain. Denies palpitations. Denies chest pressure.    MEDICATIONS  (STANDING):  amLODIPine   Tablet 10 milliGRAM(s) Oral daily  aspirin enteric coated 325 milliGRAM(s) Oral daily  budesonide 160 MICROgram(s)/formoterol 4.5 MICROgram(s) Inhaler 2 Puff(s) Inhalation two times a day  enoxaparin Injectable 40 milliGRAM(s) SubCutaneous at bedtime  finasteride 5 milliGRAM(s) Oral daily  folic acid 1 milliGRAM(s) Oral daily  metoprolol tartrate 25 milliGRAM(s) Oral two times a day  simvastatin 10 milliGRAM(s) Oral at bedtime  tamsulosin 0.4 milliGRAM(s) Oral at bedtime    MEDICATIONS  (PRN):  albuterol/ipratropium for Nebulization 3 milliLiter(s) Nebulizer every 4 hours PRN Bronchospasm    CAPILLARY BLOOD GLUCOSE      POCT Blood Glucose.: 81 mg/dL (2021 07:32)    I&O's Summary    2021 07:01  -  2021 07:00  --------------------------------------------------------  IN: 0 mL / OUT: 300 mL / NET: -300 mL    PHYSICAL EXAM:  Vital Signs Last 24 Hrs  T(C): 36.2 (2021 05:25), Max: 36.2 (2021 05:25)  T(F): 97.2 (2021 05:25), Max: 97.2 (2021 05:25)  HR: 96 (2021 05:25) (54 - 96)  BP: 124/69 (2021 05:25) (115/73 - 124/69)  BP(mean): --  RR: 18 (2021 05:25) (18 - 18)  SpO2: --  CONSTITUTIONAL: Alert, in no acute distress. Receiving a nebulizer treatment during encounter.  ENMT: Moist oral mucosa, no pharyngeal injection or exudates;  RESPIRATORY: Normal respiratory effort; lungs with slight bilateral wheezes.  CARDIOVASCULAR: Regular rate and rhythm, normal S1 and S2, no murmur/rub/gallop; No lower extremity edema; Peripheral pulses are 2+ bilaterally  ABDOMEN: Nontender to palpation, normoactive bowel sounds, no rebound/guarding; No hepatosplenomegaly  PSYCH: A+O to person, place, and time; affect appropriate  NEUROLOGY: CN 2-12 are intact and symmetric; strength in LLE is 4/5. Decreased sensation in LLE to light touch compared to right.  SKIN: No rashes; no palpable lesions    LABS:                        13.6   3.50  )-----------( 105      ( 2021 07:22 )             42.1     -    140  |  105  |  23<H>  ----------------------------<  81  4.3   |  27  |  1.7<H>    Ca    9.2      2021 07:22  Mg     1.8         TPro  8.5<H>  /  Alb  4.6  /  TBili  0.5  /  DBili  x   /  AST  20  /  ALT  13  /  AlkPhos  71  04-21    PT/INR - ( 2021 17:10 )   PT: 11.60 sec;   INR: 1.01 ratio         PTT - ( 2021 17:10 )  PTT:28.3 sec  CARDIAC MARKERS ( 2021 15:31 )  x     / <0.01 ng/mL / x     / x     / x      CARDIAC MARKERS ( 2021 07:22 )  x     / 0.01 ng/mL / x     / x     / x      CARDIAC MARKERS ( 2021 17:10 )  x     / <0.01 ng/mL / x     / x     / x          Urinalysis Basic - ( 2021 17:10 )    Color: Yellow / Appearance: Clear / S.020 / pH: x  Gluc: x / Ketone: Negative  / Bili: Negative / Urobili: 0.2 mg/dL   Blood: x / Protein: 100 mg/dL / Nitrite: Negative   Leuk Esterase: Negative / RBC: 1-2 /HPF / WBC 3-5 /HPF   Sq Epi: x / Non Sq Epi: Negative / Bacteria: Negative        SARS-CoV-2: NotDetec (2021 17:30)      RADIOLOGY & ADDITIONAL TESTS:  Imaging from Last 24 Hours:    Electrocardiogram/QTc Interval:    COORDINATION OF CARE:  Care Discussed with Consultants/Other Providers:  
Medicine Progress Note    SUBJECTIVE / OVERNIGHT EVENTS:    Creatinine noted to have gone up from 1.4 to 1.8. Back pain better. Dyspnea better.    ADDITIONAL REVIEW OF SYSTEMS:    GEN: no fevers/chills  CV: Denies chest pain or palpitations  RESP: Dyspnea better. Wheezes subsided  GI: No vomiting or nausea or abdominal pain. back pain better  : denies hematuria    MEDICATIONS  (STANDING):  amLODIPine   Tablet 10 milliGRAM(s) Oral daily  aspirin enteric coated 325 milliGRAM(s) Oral daily  budesonide 160 MICROgram(s)/formoterol 4.5 MICROgram(s) Inhaler 2 Puff(s) Inhalation two times a day  enoxaparin Injectable 40 milliGRAM(s) SubCutaneous at bedtime  finasteride 5 milliGRAM(s) Oral daily  folic acid 1 milliGRAM(s) Oral daily  metoprolol tartrate 25 milliGRAM(s) Oral two times a day  predniSONE   Tablet 30 milliGRAM(s) Oral daily  simvastatin 10 milliGRAM(s) Oral at bedtime  tamsulosin 0.4 milliGRAM(s) Oral at bedtime    MEDICATIONS  (PRN):  albuterol/ipratropium for Nebulization 3 milliLiter(s) Nebulizer every 4 hours PRN Bronchospasm    CAPILLARY BLOOD GLUCOSE      POCT Blood Glucose.: 114 mg/dL (23 Apr 2021 20:29)  POCT Blood Glucose.: 73 mg/dL (23 Apr 2021 16:24)    I&O's Summary      PHYSICAL EXAM:  Vital Signs Last 24 Hrs  T(C): 36.2 (24 Apr 2021 14:09), Max: 36.5 (23 Apr 2021 21:00)  T(F): 97.2 (24 Apr 2021 14:09), Max: 97.7 (23 Apr 2021 21:00)  HR: 64 (24 Apr 2021 14:09) (59 - 90)  BP: 136/80 (24 Apr 2021 14:09) (133/80 - 153/83)  BP(mean): --  RR: 16 (24 Apr 2021 14:09) (16 - 16)  SpO2: --  CONSTITUTIONAL: Alert, in no acute distress. Receiving a nebulizer treatment during encounter.  ENMT: Moist oral mucosa, no pharyngeal injection or exudates;  RESPIRATORY: Normal respiratory effort; lungs with slight bilateral wheezes.  CARDIOVASCULAR: Regular rate and rhythm, normal S1 and S2, no murmur/rub/gallop; No lower extremity edema; Peripheral pulses are 2+ bilaterally  ABDOMEN: Nontender to palpation, normoactive bowel sounds, no rebound/guarding; No hepatosplenomegaly  PSYCH: A+O to person, place, and time; affect appropriate  NEUROLOGY: CN 2-12 are intact and symmetric; strength in LLE is 4/5. Decreased sensation in LLE to light touch compared to right.  SKIN: No rashes; no palpable lesions    LABS:                        14.2   6.53  )-----------( 129      ( 24 Apr 2021 05:51 )             42.9     04-24    138  |  104  |  27<H>  ----------------------------<  106<H>  5.2<H>   |  24  |  1.8<H>    Ca    9.0      24 Apr 2021 05:51        CARDIAC MARKERS ( 22 Apr 2021 15:31 )  x     / <0.01 ng/mL / x     / x     / x              SARS-CoV-2: NotDetec (21 Apr 2021 17:30)      RADIOLOGY & ADDITIONAL TESTS:  Imaging from Last 24 Hours:    Electrocardiogram/QTc Interval:    COORDINATION OF CARE:  Care Discussed with Consultants/Other Providers:
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
## 2021-04-26 ENCOUNTER — TRANSCRIPTION ENCOUNTER (OUTPATIENT)
Age: 66
End: 2021-04-26

## 2021-04-26 VITALS
HEART RATE: 54 BPM | TEMPERATURE: 96 F | RESPIRATION RATE: 16 BRPM | SYSTOLIC BLOOD PRESSURE: 137 MMHG | DIASTOLIC BLOOD PRESSURE: 69 MMHG

## 2021-04-26 LAB
ANION GAP SERPL CALC-SCNC: 9 MMOL/L — SIGNIFICANT CHANGE UP (ref 7–14)
BUN SERPL-MCNC: 42 MG/DL — HIGH (ref 10–20)
CALCIUM SERPL-MCNC: 9 MG/DL — SIGNIFICANT CHANGE UP (ref 8.5–10.1)
CHLORIDE SERPL-SCNC: 107 MMOL/L — SIGNIFICANT CHANGE UP (ref 98–110)
CO2 SERPL-SCNC: 25 MMOL/L — SIGNIFICANT CHANGE UP (ref 17–32)
CREAT SERPL-MCNC: 1.8 MG/DL — HIGH (ref 0.7–1.5)
GLUCOSE BLDC GLUCOMTR-MCNC: 145 MG/DL — HIGH (ref 70–99)
GLUCOSE BLDC GLUCOMTR-MCNC: 85 MG/DL — SIGNIFICANT CHANGE UP (ref 70–99)
GLUCOSE SERPL-MCNC: 78 MG/DL — SIGNIFICANT CHANGE UP (ref 70–99)
HCT VFR BLD CALC: 42.5 % — SIGNIFICANT CHANGE UP (ref 42–52)
HGB BLD-MCNC: 13.7 G/DL — LOW (ref 14–18)
MCHC RBC-ENTMCNC: 29.7 PG — SIGNIFICANT CHANGE UP (ref 27–31)
MCHC RBC-ENTMCNC: 32.2 G/DL — SIGNIFICANT CHANGE UP (ref 32–37)
MCV RBC AUTO: 92.2 FL — SIGNIFICANT CHANGE UP (ref 80–94)
NRBC # BLD: 0 /100 WBCS — SIGNIFICANT CHANGE UP (ref 0–0)
PLATELET # BLD AUTO: 124 K/UL — LOW (ref 130–400)
POTASSIUM SERPL-MCNC: 4.9 MMOL/L — SIGNIFICANT CHANGE UP (ref 3.5–5)
POTASSIUM SERPL-SCNC: 4.9 MMOL/L — SIGNIFICANT CHANGE UP (ref 3.5–5)
RBC # BLD: 4.61 M/UL — LOW (ref 4.7–6.1)
RBC # FLD: 13.1 % — SIGNIFICANT CHANGE UP (ref 11.5–14.5)
SODIUM SERPL-SCNC: 141 MMOL/L — SIGNIFICANT CHANGE UP (ref 135–146)
WBC # BLD: 7.86 K/UL — SIGNIFICANT CHANGE UP (ref 4.8–10.8)
WBC # FLD AUTO: 7.86 K/UL — SIGNIFICANT CHANGE UP (ref 4.8–10.8)

## 2021-04-26 PROCEDURE — 99238 HOSP IP/OBS DSCHRG MGMT 30/<: CPT

## 2021-04-26 RX ADMIN — Medication 325 MILLIGRAM(S): at 12:04

## 2021-04-26 RX ADMIN — Medication 1 MILLIGRAM(S): at 12:04

## 2021-04-26 RX ADMIN — AMLODIPINE BESYLATE 10 MILLIGRAM(S): 2.5 TABLET ORAL at 05:39

## 2021-04-26 RX ADMIN — FINASTERIDE 5 MILLIGRAM(S): 5 TABLET, FILM COATED ORAL at 12:04

## 2021-04-26 RX ADMIN — Medication 30 MILLIGRAM(S): at 05:39

## 2021-04-26 RX ADMIN — Medication 25 MILLIGRAM(S): at 05:39

## 2021-04-26 NOTE — DISCHARGE NOTE NURSING/CASE MANAGEMENT/SOCIAL WORK - PATIENT PORTAL LINK FT
You can access the FollowMyHealth Patient Portal offered by [facility] by registering at the following website: [email or web address]. By joining [facility]’s FollowMyHealth portal, you will also be able to view your health information using other applications (apps) compatible with our system.

## 2021-04-27 ENCOUNTER — APPOINTMENT (OUTPATIENT)
Dept: CARE COORDINATION | Facility: HOME HEALTH | Age: 66
End: 2021-04-27
Payer: MEDICARE

## 2021-04-27 DIAGNOSIS — I25.10 ATHEROSCLEROTIC HEART DISEASE OF NATIVE CORONARY ARTERY W/OUT ANGINA PECTORIS: ICD-10-CM

## 2021-04-27 DIAGNOSIS — N40.0 BENIGN PROSTATIC HYPERPLASIA WITHOUT LOWER URINARY TRACT SYMPMS: ICD-10-CM

## 2021-04-27 PROBLEM — M47.9 SPONDYLOSIS, UNSPECIFIED: Chronic | Status: ACTIVE | Noted: 2021-04-21

## 2021-04-27 PROBLEM — I63.9 CEREBRAL INFARCTION, UNSPECIFIED: Chronic | Status: ACTIVE | Noted: 2021-04-21

## 2021-04-27 PROBLEM — N20.0 CALCULUS OF KIDNEY: Chronic | Status: ACTIVE | Noted: 2021-04-21

## 2021-04-27 PROBLEM — I26.99 OTHER PULMONARY EMBOLISM WITHOUT ACUTE COR PULMONALE: Chronic | Status: ACTIVE | Noted: 2021-04-21

## 2021-04-27 PROBLEM — I10 ESSENTIAL (PRIMARY) HYPERTENSION: Chronic | Status: ACTIVE | Noted: 2021-04-21

## 2021-04-27 PROBLEM — Z87.438 PERSONAL HISTORY OF OTHER DISEASES OF MALE GENITAL ORGANS: Chronic | Status: ACTIVE | Noted: 2021-04-21

## 2021-04-27 PROBLEM — J44.9 CHRONIC OBSTRUCTIVE PULMONARY DISEASE, UNSPECIFIED: Chronic | Status: ACTIVE | Noted: 2021-04-21

## 2021-04-27 PROBLEM — Z00.00 ENCOUNTER FOR PREVENTIVE HEALTH EXAMINATION: Status: ACTIVE | Noted: 2021-04-27

## 2021-04-27 PROCEDURE — 99496 TRANSJ CARE MGMT HIGH F2F 7D: CPT | Mod: 95

## 2021-04-27 RX ORDER — AMLODIPINE BESYLATE 10 MG/1
10 TABLET ORAL DAILY
Qty: 30 | Refills: 3 | Status: ACTIVE | COMMUNITY

## 2021-04-27 RX ORDER — METOPROLOL TARTRATE 25 MG/1
25 TABLET, FILM COATED ORAL TWICE DAILY
Qty: 120 | Refills: 3 | Status: ACTIVE | COMMUNITY

## 2021-04-27 RX ORDER — SIMVASTATIN 10 MG/1
10 TABLET, FILM COATED ORAL DAILY
Refills: 0 | Status: ACTIVE | COMMUNITY

## 2021-04-27 RX ORDER — PREDNISONE 10 MG/1
10 TABLET ORAL
Refills: 0 | Status: ACTIVE | COMMUNITY

## 2021-04-27 RX ORDER — TAMSULOSIN HYDROCHLORIDE 0.4 MG/1
0.4 CAPSULE ORAL
Refills: 0 | Status: ACTIVE | COMMUNITY

## 2021-04-27 NOTE — PLAN
[FreeTextEntry1] : COPD-c/w Symbicort , Albuterol , prednisone, pulmonary follow up\par CAD- c/w ASA, statin and BB\par BPH- c/w flomax , finasteride\par HTN- c/w home regimen\par PCP/ pulmonary follow up

## 2021-04-27 NOTE — HISTORY OF PRESENT ILLNESS
[Home] : at home, [unfilled] , at the time of the visit. [Other Location: e.g. Home (Enter Location, City,State)___] : at [unfilled] [Verbal consent obtained from patient] : the patient, [unfilled] [FreeTextEntry1] : follow up discharge copd [de-identified] : The patient is a 66 yr old male enrolled with the HF TCM  program  observed via tele health with past medical history of CAD , CABG , COPD (no 02) , CVA with residual left sided deficits, HTN, Iatrial fibrillation s/p ablation,  and  spinal surgery  L4-L5. patient presented with SOB admitted for COPD exacerbation 4/21/21 - 4/26 , treated with steroid and duonebs , improved clinically Patient was found stable for dc home with NW. Patient observed via tele health alert in NAD denies c/p, sob, cough, fever, c/p and palpitations. Patient was contacted by RN from TCM and medication reconciliation was done with in 48 hours of discharge\par

## 2021-04-27 NOTE — COUNSELING
[de-identified] : Pt was informed about CN’s role/ STARS program and overview of transitional care reviewed with patient. Pt educated on topics of importance such as compliance with prescribed medication regimen, COPD action plan and escalation process, adequate hydration, and proper diet. Pt encouraged calling CN with any issues, concerns or questions, also educated to notify CN if experiencing CP, SOB, cough, increased mucus production, increased use of rescue inhaler, fever, chills, fatigue, “chest cold symptoms” dizziness, lightheadedness, n/v/d/c, swelling to extremities and/or any signs of COPD exacerbation/flare as reviewed. Reassurance provided. Will continue to monitor\par \par

## 2021-04-29 DIAGNOSIS — I69.398 OTHER SEQUELAE OF CEREBRAL INFARCTION: ICD-10-CM

## 2021-04-29 DIAGNOSIS — R73.03 PREDIABETES: ICD-10-CM

## 2021-04-29 DIAGNOSIS — Z98.890 OTHER SPECIFIED POSTPROCEDURAL STATES: ICD-10-CM

## 2021-04-29 DIAGNOSIS — I12.9 HYPERTENSIVE CHRONIC KIDNEY DISEASE WITH STAGE 1 THROUGH STAGE 4 CHRONIC KIDNEY DISEASE, OR UNSPECIFIED CHRONIC KIDNEY DISEASE: ICD-10-CM

## 2021-04-29 DIAGNOSIS — Z90.2 ACQUIRED ABSENCE OF LUNG [PART OF]: ICD-10-CM

## 2021-04-29 DIAGNOSIS — M51.86 OTHER INTERVERTEBRAL DISC DISORDERS, LUMBAR REGION: ICD-10-CM

## 2021-04-29 DIAGNOSIS — N40.0 BENIGN PROSTATIC HYPERPLASIA WITHOUT LOWER URINARY TRACT SYMPTOMS: ICD-10-CM

## 2021-04-29 DIAGNOSIS — Z95.1 PRESENCE OF AORTOCORONARY BYPASS GRAFT: ICD-10-CM

## 2021-04-29 DIAGNOSIS — J44.1 CHRONIC OBSTRUCTIVE PULMONARY DISEASE WITH (ACUTE) EXACERBATION: ICD-10-CM

## 2021-04-29 DIAGNOSIS — I25.10 ATHEROSCLEROTIC HEART DISEASE OF NATIVE CORONARY ARTERY WITHOUT ANGINA PECTORIS: ICD-10-CM

## 2021-04-29 DIAGNOSIS — Z85.118 PERSONAL HISTORY OF OTHER MALIGNANT NEOPLASM OF BRONCHUS AND LUNG: ICD-10-CM

## 2021-04-29 DIAGNOSIS — N17.9 ACUTE KIDNEY FAILURE, UNSPECIFIED: ICD-10-CM

## 2021-04-29 DIAGNOSIS — M54.5 LOW BACK PAIN: ICD-10-CM

## 2021-04-29 DIAGNOSIS — Z86.711 PERSONAL HISTORY OF PULMONARY EMBOLISM: ICD-10-CM

## 2021-04-29 DIAGNOSIS — M51.84 OTHER INTERVERTEBRAL DISC DISORDERS, THORACIC REGION: ICD-10-CM

## 2021-04-29 DIAGNOSIS — Z96.649 PRESENCE OF UNSPECIFIED ARTIFICIAL HIP JOINT: ICD-10-CM

## 2021-04-29 DIAGNOSIS — N18.30 CHRONIC KIDNEY DISEASE, STAGE 3 UNSPECIFIED: ICD-10-CM

## 2021-04-29 DIAGNOSIS — Z87.891 PERSONAL HISTORY OF NICOTINE DEPENDENCE: ICD-10-CM

## 2021-06-11 ENCOUNTER — TRANSCRIPTION ENCOUNTER (OUTPATIENT)
Age: 66
End: 2021-06-11

## 2021-10-06 ENCOUNTER — OUTPATIENT (OUTPATIENT)
Dept: OUTPATIENT SERVICES | Facility: HOSPITAL | Age: 66
LOS: 1 days | Discharge: HOME | End: 2021-10-06
Payer: MEDICARE

## 2021-10-06 DIAGNOSIS — Z95.1 PRESENCE OF AORTOCORONARY BYPASS GRAFT: Chronic | ICD-10-CM

## 2021-10-06 DIAGNOSIS — K56.609 UNSPECIFIED INTESTINAL OBSTRUCTION, UNSPECIFIED AS TO PARTIAL VERSUS COMPLETE OBSTRUCTION: ICD-10-CM

## 2021-10-06 DIAGNOSIS — Z96.649 PRESENCE OF UNSPECIFIED ARTIFICIAL HIP JOINT: Chronic | ICD-10-CM

## 2021-10-06 PROCEDURE — 76770 US EXAM ABDO BACK WALL COMP: CPT | Mod: 26

## 2021-10-23 NOTE — ED PROVIDER NOTE - WR ORDER DATE AND TIME 1
21-Apr-2021 16:57 Vascular & Interventional Radiology Brief Consult Note    Evaluate for Procedure: Percutaneous drainage of abdominal wall collection    HPI: 72 yo Mandarin speaking female with metastatic ovarian cancer now with infiltrative pelvic mass and enterovesical fistula    Allergies: carboplatin (Flushing; Rash)  penicillin (Unknown)    Medications (Abx/Cardiac/Anticoagulation/Blood Products)  enoxaparin Injectable: 40 milliGRAM(s) SubCutaneous (10-23 @ 11:18)  meropenem  IVPB: 100 mL/Hr IV Intermittent (10-23 @ 06:24)    Data:    T(C): 36.8  HR: 69  BP: 105/65  RR: 17  SpO2: 100%    -WBC 21.14 / HgB 7.7 / Hct 23.9 / Plt 309  -Na 138 / Cl 101 / BUN 16 / Glucose 109  -K 4.2 / CO2 25 / Cr 0.41  -ALT -- / Alk Phos -- / T.Bili --  -INR1.25    Imaging: Reviewed    Assessment/Plan:   -72 yo Mandarin speaking female with metastatic ovarian cancer now with infiltrative pelvic mass and enterovesical fistula  -Percutaneous drainage is not recommended at this time. Superficial collection likely communicates with internal fistula and percutaneous drainage will cause fistulization to skin and external drainage which will worsen QOL.  -Agree with surgery and urology recommendations for goals of care discussion.  -Questions or concerns do not hesitate to reach out to IR. [phone]   Vascular & Interventional Radiology Brief Consult Note    Evaluate for Procedure: Percutaneous drainage of abdominal wall collection    HPI: 72 yo Mandarin speaking female with metastatic ovarian cancer now with infiltrative pelvic mass and enterovesical fistula    Allergies: carboplatin (Flushing; Rash)  penicillin (Unknown)    Medications (Abx/Cardiac/Anticoagulation/Blood Products)  enoxaparin Injectable: 40 milliGRAM(s) SubCutaneous (10-23 @ 11:18)  meropenem  IVPB: 100 mL/Hr IV Intermittent (10-23 @ 06:24)    Data:    T(C): 36.8  HR: 69  BP: 105/65  RR: 17  SpO2: 100%    -WBC 21.14 / HgB 7.7 / Hct 23.9 / Plt 309  -Na 138 / Cl 101 / BUN 16 / Glucose 109  -K 4.2 / CO2 25 / Cr 0.41  -ALT -- / Alk Phos -- / T.Bili --  -INR1.25    Imaging: Reviewed    Assessment/Plan:   -72 yo Mandarin speaking female with metastatic ovarian cancer now with infiltrative pelvic mass and enterovesical fistula, with extension of mass into anterior abdominal wall.  -Percutaneous drainage is not recommended at this time. Superficial collection likely communicates with internal fistula and percutaneous drainage will cause fistulization to skin and external drainage which will worsen QOL.  -Given collection is immediately under the skin, consider surgical evaluation for possible I&D.  -Agree with surgery and urology recommendations for goals of care discussion.  -Please call IR with any questions/concerns. [phone]

## 2022-07-20 NOTE — PHYSICAL THERAPY INITIAL EVALUATION ADULT - GENERAL OBSERVATIONS, REHAB EVAL
Andrew Hooker was seen and treated in our emergency department on 7/20/2022. She may return to work on 07/21/2022. If you have any questions or concerns, please don't hesitate to call.       Henry Milton MD 9:10-9:35 Chart reviewed. Patient available to be seen for physical therapy, denies pain, confirmed with RN.  Pt rec'd sitting at [facility]

## 2022-08-14 NOTE — DISCHARGE NOTE PROVIDER - NSDCMRMEDTOKEN_GEN_ALL_CORE_FT
Albuterol (Eqv-ProAir HFA) 90 mcg/inh inhalation aerosol: 2 puff(s) inhaled every 6 hours  amLODIPine 10 mg oral tablet: 1 tab(s) orally once a day  aspirin 325 mg oral delayed release tablet: 1 tab(s) orally once a day  finasteride 5 mg oral tablet: 1 tab(s) orally once a day  Flomax 0.4 mg oral capsule: 1 cap(s) orally once a day  folic acid 1 mg oral tablet: 1 tab(s) orally once a day  metoprolol tartrate 25 mg oral tablet: 1 tab(s) orally 2 times a day  predniSONE 10 mg oral tablet: 3 tab(s) orally once a day for 2 days, then take 2 tabs once a day for 2 days, then take 1 tab orally once a day for 2 days, Total: 6 days  simvastatin 10 mg oral tablet: 1 tab(s) orally once a day (at bedtime)  Symbicort 160 mcg-4.5 mcg/inh inhalation aerosol: 2 puff(s) inhaled 2 times a day  Zetia 10 mg oral tablet: 1 tab(s) orally once a day  
Oriented - self; Oriented - place; Oriented - time
Yes

## 2022-12-02 ENCOUNTER — APPOINTMENT (OUTPATIENT)
Age: 67
End: 2022-12-02

## 2022-12-02 VITALS
HEIGHT: 69 IN | HEART RATE: 77 BPM | DIASTOLIC BLOOD PRESSURE: 80 MMHG | BODY MASS INDEX: 20.73 KG/M2 | WEIGHT: 140 LBS | OXYGEN SATURATION: 99 % | RESPIRATION RATE: 14 BRPM | SYSTOLIC BLOOD PRESSURE: 122 MMHG

## 2022-12-02 DIAGNOSIS — Z86.79 PERSONAL HISTORY OF OTHER DISEASES OF THE CIRCULATORY SYSTEM: ICD-10-CM

## 2022-12-02 DIAGNOSIS — Z86.39 PERSONAL HISTORY OF OTHER ENDOCRINE, NUTRITIONAL AND METABOLIC DISEASE: ICD-10-CM

## 2022-12-02 PROCEDURE — 99203 OFFICE O/P NEW LOW 30 MIN: CPT

## 2022-12-02 NOTE — PHYSICAL EXAM
[No Acute Distress] : no acute distress [Normal Oropharynx] : normal oropharynx [Normal Appearance] : normal appearance [No Neck Mass] : no neck mass [Normal Rate/Rhythm] : normal rate/rhythm [Normal S1, S2] : normal s1, s2 [No Murmurs] : no murmurs [No Resp Distress] : no resp distress [Clear to Auscultation Bilaterally] : clear to auscultation bilaterally [No Abnormalities] : no abnormalities [Benign] : benign [Normal Gait] : normal gait [No Clubbing] : no clubbing [No Cyanosis] : no cyanosis [No Edema] : no edema [FROM] : FROM [Normal Color/ Pigmentation] : normal color/ pigmentation [No Focal Deficits] : no focal deficits [Oriented x3] : oriented x3 [Normal Affect] : normal affect [TextBox_68] : Moderate decreased breath sounds

## 2022-12-02 NOTE — ASSESSMENT
[FreeTextEntry1] : Assessment:\par COPD \par hx  lung cancer with right upper lobe resection\par \par plan:\par Stress compliance with inhalers. Renewed today.\par cont PRN albuterol\par cont ICS/LABA\par needs PFT \par \par CT chest follow-up will be continued as per their program\par Next visit\par F/U 6 months\par

## 2022-12-02 NOTE — REASON FOR VISIT
[Initial] : an initial visit [Lung Cancer] : lung cancer [COPD] : COPD [TextBox_44] : The patient presents for the long-term follow-up of severe COPD.  Patient was followed at another facility for many years.  He had been lost to follow-up.  He had a right upper lobe lung resection for cancer.  This was conducted at [facility] in 2019.  He has been followed there every 6 months with CAT scans since then.  He has plans to continue with ongoing monitoring at that facility.\par \par The patient was recently admitted to the hospital.  He states that he always has shortness of breath but it dramatically worsened and he started developing significant congestion.  He had a chest x-ray at the hospital and he was told that nothing was going on.  He was discharged.  He states that his breathing got worse and he went to his primary doctor who put him on Augmentin.  He soon started to feel better.  Currently he feels he is back to normal.

## 2023-03-06 NOTE — PATIENT PROFILE ADULT - HAVE YOU EXPERIENCED VIOLENCE OR A TRAUMATIC EVENT?
Azelaic Acid Counseling: Patient counseled that medicine may cause skin irritation and to avoid applying near the eyes.  In the event of skin irritation, the patient was advised to reduce the amount of the drug applied or use it less frequently.   The patient verbalized understanding of the proper use and possible adverse effects of azelaic acid.  All of the patient's questions and concerns were addressed. no

## 2023-04-04 ENCOUNTER — APPOINTMENT (OUTPATIENT)
Dept: VASCULAR SURGERY | Facility: CLINIC | Age: 68
End: 2023-04-04
Payer: MEDICARE

## 2023-04-04 VITALS — DIASTOLIC BLOOD PRESSURE: 79 MMHG | HEART RATE: 64 BPM | SYSTOLIC BLOOD PRESSURE: 135 MMHG

## 2023-04-04 VITALS — BODY MASS INDEX: 21.62 KG/M2 | WEIGHT: 146 LBS | HEIGHT: 69 IN

## 2023-04-04 DIAGNOSIS — Z86.79 PERSONAL HISTORY OF OTHER DISEASES OF THE CIRCULATORY SYSTEM: ICD-10-CM

## 2023-04-04 DIAGNOSIS — M06.9 RHEUMATOID ARTHRITIS, UNSPECIFIED: ICD-10-CM

## 2023-04-04 DIAGNOSIS — K21.9 GASTRO-ESOPHAGEAL REFLUX DISEASE W/OUT ESOPHAGITIS: ICD-10-CM

## 2023-04-04 DIAGNOSIS — K92.2 GASTROINTESTINAL HEMORRHAGE, UNSPECIFIED: ICD-10-CM

## 2023-04-04 DIAGNOSIS — Z87.891 PERSONAL HISTORY OF NICOTINE DEPENDENCE: ICD-10-CM

## 2023-04-04 PROCEDURE — 99203 OFFICE O/P NEW LOW 30 MIN: CPT

## 2023-04-04 PROCEDURE — 93978 VASCULAR STUDY: CPT

## 2023-04-04 NOTE — HISTORY OF PRESENT ILLNESS
Refills were sent yesteday at 1:30 pm.   
vm-needs a refill (?med) approved. Rod contacted our office.   
[FreeTextEntry1] : 67 y/o gentleman who was admitted to [facility] in March 2023 for abdominal pain, had CT scan that showed colitis and incidental finding of infrarenal AAA 3.4 cm. Has h/o intracranial bleed in the past with some residual weakness on left side. Has chronic wounds on bilateral feet for which he follows up at [facility].

## 2023-04-04 NOTE — ASSESSMENT
[FreeTextEntry1] : 69 y/o gentleman who was admitted to [facility] in March 2023 for abdominal pain, had CT scan that showed colitis and incidental finding of infrarenal AAA 3.4 cm.\par \par He is asymptomatic. Duplex in the office confirmed the size. He will follow in 1 year for repeat duplex. I advised him that if he should have abdominal pain or back pain then he should go to the ED for evaluation of possible rupture.

## 2023-04-04 NOTE — DATA REVIEWED
[FreeTextEntry1] : I performed an aortic duplex which was medically necessary to evaluate for AAA. It showed AAA measuring 3.4 cm with dissection.\par

## 2023-04-20 ENCOUNTER — INPATIENT (INPATIENT)
Facility: HOSPITAL | Age: 68
LOS: 3 days | Discharge: HOME HEALTH | DRG: 563 | End: 2023-04-24
Attending: FAMILY MEDICINE | Admitting: FAMILY MEDICINE
Payer: MEDICARE

## 2023-04-20 VITALS
WEIGHT: 149.91 LBS | SYSTOLIC BLOOD PRESSURE: 150 MMHG | OXYGEN SATURATION: 99 % | HEART RATE: 78 BPM | DIASTOLIC BLOOD PRESSURE: 79 MMHG | RESPIRATION RATE: 18 BRPM | TEMPERATURE: 98 F

## 2023-04-20 DIAGNOSIS — M54.9 DORSALGIA, UNSPECIFIED: ICD-10-CM

## 2023-04-20 DIAGNOSIS — Z95.1 PRESENCE OF AORTOCORONARY BYPASS GRAFT: Chronic | ICD-10-CM

## 2023-04-20 DIAGNOSIS — Z96.649 PRESENCE OF UNSPECIFIED ARTIFICIAL HIP JOINT: Chronic | ICD-10-CM

## 2023-04-20 LAB
ALBUMIN SERPL ELPH-MCNC: 3.6 G/DL — SIGNIFICANT CHANGE UP (ref 3.5–5.2)
ALP SERPL-CCNC: 58 U/L — SIGNIFICANT CHANGE UP (ref 30–115)
ALT FLD-CCNC: 6 U/L — SIGNIFICANT CHANGE UP (ref 0–41)
ANION GAP SERPL CALC-SCNC: 11 MMOL/L — SIGNIFICANT CHANGE UP (ref 7–14)
AST SERPL-CCNC: 12 U/L — SIGNIFICANT CHANGE UP (ref 0–41)
BASOPHILS # BLD AUTO: 0.04 K/UL — SIGNIFICANT CHANGE UP (ref 0–0.2)
BASOPHILS NFR BLD AUTO: 0.5 % — SIGNIFICANT CHANGE UP (ref 0–1)
BILIRUB SERPL-MCNC: 0.5 MG/DL — SIGNIFICANT CHANGE UP (ref 0.2–1.2)
BUN SERPL-MCNC: 28 MG/DL — HIGH (ref 10–20)
CALCIUM SERPL-MCNC: 9.1 MG/DL — SIGNIFICANT CHANGE UP (ref 8.4–10.5)
CHLORIDE SERPL-SCNC: 97 MMOL/L — LOW (ref 98–110)
CO2 SERPL-SCNC: 29 MMOL/L — SIGNIFICANT CHANGE UP (ref 17–32)
CREAT SERPL-MCNC: 1.5 MG/DL — SIGNIFICANT CHANGE UP (ref 0.7–1.5)
CRP SERPL-MCNC: 115.7 MG/L — HIGH
EGFR: 50 ML/MIN/1.73M2 — LOW
EOSINOPHIL # BLD AUTO: 0.03 K/UL — SIGNIFICANT CHANGE UP (ref 0–0.7)
EOSINOPHIL NFR BLD AUTO: 0.4 % — SIGNIFICANT CHANGE UP (ref 0–8)
ERYTHROCYTE [SEDIMENTATION RATE] IN BLOOD: 69 MM/HR — HIGH (ref 0–10)
GLUCOSE SERPL-MCNC: 94 MG/DL — SIGNIFICANT CHANGE UP (ref 70–99)
HCT VFR BLD CALC: 35.2 % — LOW (ref 42–52)
HGB BLD-MCNC: 11.9 G/DL — LOW (ref 14–18)
IMM GRANULOCYTES NFR BLD AUTO: 1.5 % — HIGH (ref 0.1–0.3)
LYMPHOCYTES # BLD AUTO: 0.91 K/UL — LOW (ref 1.2–3.4)
LYMPHOCYTES # BLD AUTO: 11.2 % — LOW (ref 20.5–51.1)
MCHC RBC-ENTMCNC: 32.1 PG — HIGH (ref 27–31)
MCHC RBC-ENTMCNC: 33.8 G/DL — SIGNIFICANT CHANGE UP (ref 32–37)
MCV RBC AUTO: 94.9 FL — HIGH (ref 80–94)
MONOCYTES # BLD AUTO: 0.92 K/UL — HIGH (ref 0.1–0.6)
MONOCYTES NFR BLD AUTO: 11.4 % — HIGH (ref 1.7–9.3)
NEUTROPHILS # BLD AUTO: 6.07 K/UL — SIGNIFICANT CHANGE UP (ref 1.4–6.5)
NEUTROPHILS NFR BLD AUTO: 75 % — SIGNIFICANT CHANGE UP (ref 42.2–75.2)
NRBC # BLD: 0 /100 WBCS — SIGNIFICANT CHANGE UP (ref 0–0)
PLATELET # BLD AUTO: 110 K/UL — LOW (ref 130–400)
PMV BLD: 9.4 FL — SIGNIFICANT CHANGE UP (ref 7.4–10.4)
POTASSIUM SERPL-MCNC: 4.3 MMOL/L — SIGNIFICANT CHANGE UP (ref 3.5–5)
POTASSIUM SERPL-SCNC: 4.3 MMOL/L — SIGNIFICANT CHANGE UP (ref 3.5–5)
PROT SERPL-MCNC: 6.6 G/DL — SIGNIFICANT CHANGE UP (ref 6–8)
RBC # BLD: 3.71 M/UL — LOW (ref 4.7–6.1)
RBC # FLD: 13.2 % — SIGNIFICANT CHANGE UP (ref 11.5–14.5)
SODIUM SERPL-SCNC: 137 MMOL/L — SIGNIFICANT CHANGE UP (ref 135–146)
WBC # BLD: 8.09 K/UL — SIGNIFICANT CHANGE UP (ref 4.8–10.8)
WBC # FLD AUTO: 8.09 K/UL — SIGNIFICANT CHANGE UP (ref 4.8–10.8)

## 2023-04-20 PROCEDURE — 72131 CT LUMBAR SPINE W/O DYE: CPT

## 2023-04-20 PROCEDURE — 97162 PT EVAL MOD COMPLEX 30 MIN: CPT | Mod: GP

## 2023-04-20 PROCEDURE — 73700 CT LOWER EXTREMITY W/O DYE: CPT | Mod: 26,LT

## 2023-04-20 PROCEDURE — 73562 X-RAY EXAM OF KNEE 3: CPT | Mod: LT

## 2023-04-20 PROCEDURE — 73721 MRI JNT OF LWR EXTRE W/O DYE: CPT | Mod: LT

## 2023-04-20 PROCEDURE — 99285 EMERGENCY DEPT VISIT HI MDM: CPT

## 2023-04-20 PROCEDURE — 80053 COMPREHEN METABOLIC PANEL: CPT

## 2023-04-20 PROCEDURE — 74177 CT ABD & PELVIS W/CONTRAST: CPT | Mod: 26,MA

## 2023-04-20 PROCEDURE — 36415 COLL VENOUS BLD VENIPUNCTURE: CPT

## 2023-04-20 PROCEDURE — 93010 ELECTROCARDIOGRAM REPORT: CPT

## 2023-04-20 PROCEDURE — 72131 CT LUMBAR SPINE W/O DYE: CPT | Mod: 26

## 2023-04-20 PROCEDURE — 73700 CT LOWER EXTREMITY W/O DYE: CPT | Mod: LT

## 2023-04-20 PROCEDURE — 99221 1ST HOSP IP/OBS SF/LOW 40: CPT | Mod: GC

## 2023-04-20 PROCEDURE — 85027 COMPLETE CBC AUTOMATED: CPT

## 2023-04-20 PROCEDURE — 99222 1ST HOSP IP/OBS MODERATE 55: CPT

## 2023-04-20 RX ORDER — ENOXAPARIN SODIUM 100 MG/ML
40 INJECTION SUBCUTANEOUS EVERY 24 HOURS
Refills: 0 | Status: DISCONTINUED | OUTPATIENT
Start: 2023-04-20 | End: 2023-04-24

## 2023-04-20 RX ORDER — AMLODIPINE BESYLATE 2.5 MG/1
10 TABLET ORAL DAILY
Refills: 0 | Status: DISCONTINUED | OUTPATIENT
Start: 2023-04-20 | End: 2023-04-24

## 2023-04-20 RX ORDER — SULFASALAZINE 500 MG
500 TABLET ORAL
Refills: 0 | Status: DISCONTINUED | OUTPATIENT
Start: 2023-04-20 | End: 2023-04-20

## 2023-04-20 RX ORDER — ASPIRIN/CALCIUM CARB/MAGNESIUM 324 MG
325 TABLET ORAL DAILY
Refills: 0 | Status: DISCONTINUED | OUTPATIENT
Start: 2023-04-20 | End: 2023-04-24

## 2023-04-20 RX ORDER — MORPHINE SULFATE 50 MG/1
4 CAPSULE, EXTENDED RELEASE ORAL ONCE
Refills: 0 | Status: DISCONTINUED | OUTPATIENT
Start: 2023-04-20 | End: 2023-04-20

## 2023-04-20 RX ORDER — SODIUM CHLORIDE 9 MG/ML
1000 INJECTION INTRAMUSCULAR; INTRAVENOUS; SUBCUTANEOUS ONCE
Refills: 0 | Status: COMPLETED | OUTPATIENT
Start: 2023-04-20 | End: 2023-04-20

## 2023-04-20 RX ORDER — BUDESONIDE AND FORMOTEROL FUMARATE DIHYDRATE 160; 4.5 UG/1; UG/1
2 AEROSOL RESPIRATORY (INHALATION)
Refills: 0 | Status: DISCONTINUED | OUTPATIENT
Start: 2023-04-20 | End: 2023-04-24

## 2023-04-20 RX ORDER — TAMSULOSIN HYDROCHLORIDE 0.4 MG/1
0.4 CAPSULE ORAL AT BEDTIME
Refills: 0 | Status: DISCONTINUED | OUTPATIENT
Start: 2023-04-20 | End: 2023-04-24

## 2023-04-20 RX ORDER — SULFASALAZINE 500 MG
1 TABLET ORAL
Refills: 0 | DISCHARGE

## 2023-04-20 RX ORDER — GABAPENTIN 400 MG/1
300 CAPSULE ORAL
Refills: 0 | Status: DISCONTINUED | OUTPATIENT
Start: 2023-04-20 | End: 2023-04-22

## 2023-04-20 RX ORDER — METOPROLOL TARTRATE 50 MG
25 TABLET ORAL
Refills: 0 | Status: DISCONTINUED | OUTPATIENT
Start: 2023-04-20 | End: 2023-04-24

## 2023-04-20 RX ORDER — SULFASALAZINE 500 MG
500 TABLET ORAL EVERY 12 HOURS
Refills: 0 | Status: DISCONTINUED | OUTPATIENT
Start: 2023-04-20 | End: 2023-04-24

## 2023-04-20 RX ORDER — SENNA PLUS 8.6 MG/1
2 TABLET ORAL AT BEDTIME
Refills: 0 | Status: DISCONTINUED | OUTPATIENT
Start: 2023-04-20 | End: 2023-04-24

## 2023-04-20 RX ORDER — ACETAMINOPHEN 500 MG
650 TABLET ORAL EVERY 6 HOURS
Refills: 0 | Status: DISCONTINUED | OUTPATIENT
Start: 2023-04-20 | End: 2023-04-24

## 2023-04-20 RX ORDER — ONDANSETRON 8 MG/1
4 TABLET, FILM COATED ORAL EVERY 8 HOURS
Refills: 0 | Status: DISCONTINUED | OUTPATIENT
Start: 2023-04-20 | End: 2023-04-24

## 2023-04-20 RX ORDER — ALBUTEROL 90 UG/1
2 AEROSOL, METERED ORAL EVERY 8 HOURS
Refills: 0 | Status: DISCONTINUED | OUTPATIENT
Start: 2023-04-20 | End: 2023-04-24

## 2023-04-20 RX ORDER — FOLIC ACID 0.8 MG
1 TABLET ORAL DAILY
Refills: 0 | Status: DISCONTINUED | OUTPATIENT
Start: 2023-04-20 | End: 2023-04-24

## 2023-04-20 RX ORDER — FINASTERIDE 5 MG/1
5 TABLET, FILM COATED ORAL DAILY
Refills: 0 | Status: DISCONTINUED | OUTPATIENT
Start: 2023-04-20 | End: 2023-04-24

## 2023-04-20 RX ORDER — PANTOPRAZOLE SODIUM 20 MG/1
40 TABLET, DELAYED RELEASE ORAL
Refills: 0 | Status: DISCONTINUED | OUTPATIENT
Start: 2023-04-20 | End: 2023-04-24

## 2023-04-20 RX ORDER — AMLODIPINE BESYLATE 2.5 MG/1
1 TABLET ORAL
Qty: 0 | Refills: 0 | DISCHARGE

## 2023-04-20 RX ADMIN — Medication 325 MILLIGRAM(S): at 12:16

## 2023-04-20 RX ADMIN — Medication 1 MILLIGRAM(S): at 12:12

## 2023-04-20 RX ADMIN — MORPHINE SULFATE 4 MILLIGRAM(S): 50 CAPSULE, EXTENDED RELEASE ORAL at 02:34

## 2023-04-20 RX ADMIN — GABAPENTIN 300 MILLIGRAM(S): 400 CAPSULE ORAL at 18:11

## 2023-04-20 RX ADMIN — MORPHINE SULFATE 4 MILLIGRAM(S): 50 CAPSULE, EXTENDED RELEASE ORAL at 07:00

## 2023-04-20 RX ADMIN — Medication 5 MILLIGRAM(S): at 18:11

## 2023-04-20 RX ADMIN — Medication 25 MILLIGRAM(S): at 18:11

## 2023-04-20 RX ADMIN — BUDESONIDE AND FORMOTEROL FUMARATE DIHYDRATE 2 PUFF(S): 160; 4.5 AEROSOL RESPIRATORY (INHALATION) at 18:16

## 2023-04-20 RX ADMIN — TAMSULOSIN HYDROCHLORIDE 0.4 MILLIGRAM(S): 0.4 CAPSULE ORAL at 21:45

## 2023-04-20 RX ADMIN — Medication 15 MILLIGRAM(S): at 18:11

## 2023-04-20 RX ADMIN — SENNA PLUS 2 TABLET(S): 8.6 TABLET ORAL at 21:45

## 2023-04-20 RX ADMIN — ENOXAPARIN SODIUM 40 MILLIGRAM(S): 100 INJECTION SUBCUTANEOUS at 18:27

## 2023-04-20 RX ADMIN — Medication 500 MILLIGRAM(S): at 18:28

## 2023-04-20 RX ADMIN — SODIUM CHLORIDE 2000 MILLILITER(S): 9 INJECTION INTRAMUSCULAR; INTRAVENOUS; SUBCUTANEOUS at 02:36

## 2023-04-20 RX ADMIN — FINASTERIDE 5 MILLIGRAM(S): 5 TABLET, FILM COATED ORAL at 12:12

## 2023-04-20 NOTE — ED ADULT NURSE NOTE - NSICDXPASTSURGICALHX_GEN_ALL_CORE_FT
PAST SURGICAL HISTORY:  History of coronary artery bypass, single     History of hip replacement

## 2023-04-20 NOTE — ED PROVIDER NOTE - PHYSICAL EXAMINATION
Physical Exam    Vital Signs: I have reviewed the initial vital signs.  Constitutional: no acute distress  Eyes: Conjunctiva pink, Sclera clear, PERRLA, EOMI.  Cardiovascular: S1 and S2, regular rate, regular rhythm, well-perfused extremities, radial pulses equal and 2+  Respiratory: unlabored respiratory effort, clear to auscultation bilaterally no wheezing, rales and rhonchi  Gastrointestinal: soft, non-tender abdomen, no pulsatile mass, normal bowl sounds  Musculoskeletal: supple neck, no lower extremity edema, no midline tenderness + left para lumbar tenderness extending down to left hip. no knee or distal femur tenderness, PT with FROM of left hip with pain. unable to stand due to pain.   Integumentary: warm, dry, no rash  Neurologic: awake, alert, cranial nerves II-XII grossly intact, extremities’ motor and sensory functions grossly intact  Psychiatric: appropriate mood, appropriate affect

## 2023-04-20 NOTE — ED PROVIDER NOTE - OBJECTIVE STATEMENT
68 year old male past medical history of Coronary Artery Disease, COPD, Stroke, AAA, PE, b/l hip replacements comes to emergency room for left lower back going down his left hip and cannot walk due to pain and called 911. patient denies trauma. no fever/chills.

## 2023-04-20 NOTE — H&P ADULT - ASSESSMENT
68 year old male past medical history of Coronary Artery Disease, COPD, Stroke, AAA, PE, b/l hip replacements comes to emergency room for left lower back going down his left hip and cannot walk due to pain  68 year old male past medical history of Coronary Artery Disease, COPD, Stroke, AAA, PE, b/l hip replacements comes to emergency room for left lower back going down his left hip and cannot walk due to pain     # LEFT HIP PAIN / weakness ? nerve pain   # CVA with left side deficits   unlikely septic joint, elevated esr    CT scan reviewed with no fx   - ortho eval ,   - CT hip left side no contrast   - pain control with perc, no IV pains   - bowl regimen with senna and dulcolax    - PT eval   - initiate SIENNA   - dvt / gi ppx  - labs in am      # ulceration of left foot toes   - podiatry eval   - LWC and dsd  68 year old male past medical history of Coronary Artery Disease, COPD, Stroke, AAA, PE, b/l hip replacements comes to emergency room for left lower back going down his left hip and cannot walk due to pain     # ACUTE ON CHRONIC LEFT HIP PAIN / weakness ? nerve pain   # CVA with left side deficits   unlikely septic joint, elevated esr    CT scan reviewed with no fx   - ortho eval ,   - CT hip left side no contrast   - pain control with perc, no IV pains   - bowl regimen with senna and dulcolax    - PT eval   - initiate SIENNA   - dvt / gi ppx  - labs in am      # ulceration of left foot toes   - podiatry eval   - LWC and dsd     # DARYN on CKD   -creatinine 1.5 and BUN 28  - IVF given in er   - labs in am     # lung cancer s/p partial right lung resection  # PE (not anticoagulated due to history of ICH and GIB)  - on ASA    # CAD with cabg  # HTN  -continue ASA, CCB, and beta-blocker  - zetia non-form , may c/w upon d/c     # BPH:  -continue finasteride and Flomax    # COPD   - c/w symbicort and albuterol     d/w DR TORRES       68 year old male past medical history of Coronary Artery Disease, COPD, Stroke, AAA, PE, b/l hip replacements comes to emergency room for left lower back going down his left hip and cannot walk due to pain     # ACUTE ON CHRONIC LEFT HIP PAIN / weakness ? nerve pain   # CVA with left side deficits   unlikely septic joint, elevated esr    CT scan reviewed with no fx   - ortho eval pending  - CT hip left side no contrast pending  - pain control with percocet   - bowl regimen with senna and dulcolax    - PT eval   - Start SIENNA     # ulceration of left foot toes   - podiatry eval   - LWC and dsd     # DARYN on CKD IIIa  -creatinine 1.5 and BUN 28  - S/p IVF    # lung cancer s/p partial right lung resection  # PE (not anticoagulated due to history of ICH and GIB)  - on ASA    # CAD with single vessel CABG after failed stent placement  # HTN  -continue ASA, CCB, and beta-blocker  - zetia non-form , may c/w upon d/c     # BPH:  -continue finasteride and Flomax    # COPD   - c/w symbicort and albuterol     d/w DR TORRES

## 2023-04-20 NOTE — ED ADULT NURSE NOTE - NSICDXPASTMEDICALHX_GEN_ALL_CORE_FT
PAST MEDICAL HISTORY:  CAD (coronary artery disease)     COPD, mild     Degeneration of spine     History of BPH     HTN (hypertension)     Kidney stone     Pulmonary embolism     Stroke

## 2023-04-20 NOTE — H&P ADULT - HISTORY OF PRESENT ILLNESS
68 year old male past medical history of Coronary Artery Disease, COPD, Stroke, AAA, PE, b/l hip replacements comes to emergency room for left lower back going down his left hip and cannot walk due to pain and called 911. patient denies trauma. no fever/chills.    pt with CVA history  with residual L sided deficit, facial droop, ambulates with cane, rollator at baseline here for assessment of L hip pain -- pain begins in L hip, radiates down leg, only with ambulating/ROM of leg. No fever, chills. 68 year old male past medical history of Coronary Artery Disease, COPD, Stroke, AAA, PE, b/l hip replacements comes to emergency room for left lower back going down his left hip and cannot walk due to pain and called 911. patient denies trauma. no fever/chills.    pt with CVA history  with residual L sided deficit, facial droop, ambulates with cane, rollator at baseline here for assessment of L hip pain -- pain begins in L hip, radiates down leg, only with ambulating/ROM of leg. No fever, chills.    left hip replacement : approx 10 years ago   right hip replacement : approx 3 years ago   cva with left residual weakness: 2017 68 year old male past medical history of Coronary Artery Disease, COPD, Stroke with residual L sided deficit, facial droop, AAA, PE, b/l hip replacements comes to emergency room for left lower back going down his left hip and cannot walk due to pain and called 911. He states that the pain started yesterday without any enticing trauma. No fever/chills. He ambulates with a cane and rollator at baseline. The pain radiates down L leg down to knee. ROM is limited to his baseline weakness as well as pain.    left hip replacement : approx 10 years ago   right hip replacement : approx 3 years ago   cva with left residual weakness: 2017

## 2023-04-20 NOTE — ED PROVIDER NOTE - CLINICAL SUMMARY MEDICAL DECISION MAKING FREE TEXT BOX
69 yo M, hx of CAD, COPD, CVA with residual L sided deficit, facial droop, ambulates with cane, rollator at baseline here for assessment of L hip pain -- pain begins in L hip, radiates down leg, only with ambulating/ROM of leg. No fever, chills.    On arrival VS notable for mildly elevated BP -- patient with normal appearing hip joint, no warmth or redness but significant pain with rom actively and passively, good pulses.    Initially considered septic joint given prosthesis and pain with ROM however after analgesia pain improved that patient is able to range but not bear weight.    CT scan and labs were done to assess for alternative etiology such as intra-abdominal pathology. WBC is normal, ESR equivocally elevated. CT without acute findings.     Patient unable to ambulate due to pain, given previous CVA ambulatory dysfunction,  may benefit from admission for PT eval.

## 2023-04-20 NOTE — CONSULT NOTE ADULT - SUBJECTIVE AND OBJECTIVE BOX
Podiatry Consult Note    Subjective:  TIERRA BAKER is a 68y old  Male who presents with a chief complaint of left hip pain (20 Apr 2023 08:14)    HPI:  68 year old male past medical history of Coronary Artery Disease, COPD, Stroke, AAA, PE, b/l hip replacements comes to emergency room for left lower back going down his left hip and cannot walk due to pain and called 911. patient denies trauma. no fever/chills.    pt with CVA history  with residual L sided deficit, facial droop, ambulates with cane, rollator at baseline here for assessment of L hip pain -- pain begins in L hip, radiates down leg, only with ambulating/ROM of leg. No fever, chills.    left hip replacement : approx 10 years ago   right hip replacement : approx 3 years ago   cva with left residual weakness: 2017 (20 Apr 2023 08:14)    Seen by ED bedside; pt came into hospital due to pain on L hip; seeing Dr. Herrera in [place] for podiatric foot care, today was sx date for BL feet debridement for hyperkeratotic soft tissue on R foot possible osteomyelitis; pt wants to continue to be cared by Dr. Herrera once discharge; eval BL feet;     PAST MEDICAL & SURGICAL HISTORY:  Stroke  CAD (coronary artery disease)  Pulmonary embolism  COPD, mild  History of BPH  HTN (hypertension)  Degeneration of spine  Kidney stone  History of coronary artery bypass, single  History of hip replacement    Objective:  Vital Signs Last 24 Hrs  T(C): 35.8 (20 Apr 2023 06:38), Max: 36.7 (20 Apr 2023 00:21)  T(F): 96.5 (20 Apr 2023 06:38), Max: 98 (20 Apr 2023 00:21)  HR: 64 (20 Apr 2023 06:38) (64 - 78)  BP: 120/75 (20 Apr 2023 06:38) (120/75 - 150/79)  BP(mean): --  RR: 18 (20 Apr 2023 06:38) (18 - 18)  SpO2: 98% (20 Apr 2023 06:38) (98% - 99%)    Parameters below as of 20 Apr 2023 00:21  Patient On (Oxygen Delivery Method): room air                        11.9   8.09  )-----------( 110      ( 20 Apr 2023 01:50 )             35.2                 04-20    137  |  97<L>  |  28<H>  ----------------------------<  94  4.3   |  29  |  1.5    Ca    9.1      20 Apr 2023 01:50    TPro  6.6  /  Alb  3.6  /  TBili  0.5  /  DBili  x   /  AST  12  /  ALT  6   /  AlkPhos  58  04-20    Physical Exam - Lower Extremity Focused:   Derm:   L dorsum 4th toe superficial wound; stable, no pus noted;  R lateral midfoot bony prominence w/ callus noted; no pus, no sign of infection;   Vascular: DP and PT Pulses Diminished;   Neuro: Protective Sensation Diminished;  MSK: bony prominence on R lateral midfoot;     Assessment:  Stable superficial ulcer on BL feet;   Possible osteomyelitis on right foot;     Plan:  Chart reviewed and Patient evaluated. All Questions and Concerns Addressed and Answered  Discussed diagnosis and treatment with patient  BL feet dressed w/ betadine / DSD / Kerlix; q24  No sx intervention on this admission; pt has scheduled sx with Dr. Herrera in [place];   No urgent sign of infection on BL feet; pt can follow up with Dr. Herrera for scheduled sx;   Weight bearing status; WBAT BL feet;   Discussed Plan w/ Dr. Alfonso;     Podiatry [id number]       Podiatry Consult Note    Subjective:  TIERRA BAKER is a 68y old  Male who presents with a chief complaint of left hip pain (20 Apr 2023 08:14)    HPI:  68 year old male past medical history of Coronary Artery Disease, COPD, Stroke, AAA, PE, b/l hip replacements comes to emergency room for left lower back going down his left hip and cannot walk due to pain and called 911. patient denies trauma. no fever/chills.    pt with CVA history  with residual L sided deficit, facial droop, ambulates with cane, rollator at baseline here for assessment of L hip pain -- pain begins in L hip, radiates down leg, only with ambulating/ROM of leg. No fever, chills.    left hip replacement : approx 10 years ago   right hip replacement : approx 3 years ago   cva with left residual weakness: 2017 (20 Apr 2023 08:14)    Seen by ED bedside; pt came into hospital due to pain on L hip; seeing Dr. Herrera in [place] for podiatric foot care, today was sx date for BL feet debridement for hyperkeratotic soft tissue on R foot possible osteomyelitis; pt wants to continue to be cared by Dr. Herrera once discharge; eval BL feet;     PAST MEDICAL & SURGICAL HISTORY:  Stroke  CAD (coronary artery disease)  Pulmonary embolism  COPD, mild  History of BPH  HTN (hypertension)  Degeneration of spine  Kidney stone  History of coronary artery bypass, single  History of hip replacement    Objective:  Vital Signs Last 24 Hrs  T(C): 35.8 (20 Apr 2023 06:38), Max: 36.7 (20 Apr 2023 00:21)  T(F): 96.5 (20 Apr 2023 06:38), Max: 98 (20 Apr 2023 00:21)  HR: 64 (20 Apr 2023 06:38) (64 - 78)  BP: 120/75 (20 Apr 2023 06:38) (120/75 - 150/79)  BP(mean): --  RR: 18 (20 Apr 2023 06:38) (18 - 18)  SpO2: 98% (20 Apr 2023 06:38) (98% - 99%)    Parameters below as of 20 Apr 2023 00:21  Patient On (Oxygen Delivery Method): room air                        11.9   8.09  )-----------( 110      ( 20 Apr 2023 01:50 )             35.2                 04-20    137  |  97<L>  |  28<H>  ----------------------------<  94  4.3   |  29  |  1.5    Ca    9.1      20 Apr 2023 01:50    TPro  6.6  /  Alb  3.6  /  TBili  0.5  /  DBili  x   /  AST  12  /  ALT  6   /  AlkPhos  58  04-20    Physical Exam - Lower Extremity Focused:   Derm:   L dorsum 4th toe superficial wound; stable, no pus noted;  R lateral plantar midfoot bony prominence w/ callus noted; no pus, no sign of infection;   Vascular: DP and PT Pulses Diminished;   Neuro: Protective Sensation Diminished;  MSK: bony prominence on R lateral midfoot;     Assessment:  Stable superficial ulcer on BL feet;     Plan:  Chart reviewed and Patient evaluated. All Questions and Concerns Addressed and Answered  Discussed diagnosis and treatment with patient  BL feet dressed w/ betadine / DSD / Kerlix; q24  No sx intervention on this admission; pt has scheduled sx with Dr. Herrera in [place];   No urgent sign of infection on BL feet; pt can follow up with Dr. Herrera for scheduled sx;   Weight bearing status; WBAT BL feet;   Discussed Plan w/ Dr. Alfonso;     Podiatry

## 2023-04-20 NOTE — PATIENT PROFILE ADULT - FALL HARM RISK - HARM RISK INTERVENTIONS

## 2023-04-20 NOTE — ED PROVIDER NOTE - ATTENDING APP SHARED VISIT CONTRIBUTION OF CARE
69 yo M, hx of CAD, COPD, CVA with residual L sided deficit, facial droop, ambulates with cane, rollator at baseline here for assessment of L hip pain -- pain begins in L hip, radiates down leg, only with ambulating/ROM of leg. No fever, chills.    On arrival VS notable for mildly elevated BP -- patient with normal appearing see MDM

## 2023-04-20 NOTE — CONSULT NOTE ADULT - ATTENDING COMMENTS
Agree with above plan  Stable calluses and superficial wounds  No podiatry intervention   F/u as o/p

## 2023-04-20 NOTE — H&P ADULT - NSHPPHYSICALEXAM_GEN_ALL_CORE
Vital Signs Last 24 Hrs  T(C): 35.8 (20 Apr 2023 06:38), Max: 36.7 (20 Apr 2023 00:21)  T(F): 96.5 (20 Apr 2023 06:38), Max: 98 (20 Apr 2023 00:21)  HR: 64 (20 Apr 2023 06:38) (64 - 78)  BP: 120/75 (20 Apr 2023 06:38) (120/75 - 150/79)  RR: 18 (20 Apr 2023 06:38) (18 - 18)  SpO2: 98% (20 Apr 2023 06:38) (98% - 99%)    Parameters below as of 20 Apr 2023 00:21  Patient On (Oxygen Delivery Method): room air        constitutional: no acute distress  Eyes: Conjunctiva pink, Sclera clear,  EOMI.  Cardiovascular: S1 and S2, regular rate, regular rhythm, well-perfused extremities, radial pulses equal and 2+  Respiratory: unlabored respiratory effort, clear to auscultation bilaterally no wheezing, rales and rhonchi  Gastrointestinal: soft, non-tender abdomen, no pulsatile mass, normal bowl sounds  Musculoskeletal: supple neck, no lower extremity edema, no midline tenderness + left para lumbar tenderness extending down to left hip. no knee or distal femur tenderness, PT with FROM of left hip with pain. unable to stand due to pain.   Integumentary: warm, dry, no rash  Neurologic: awake, alert, cranial nerves II-XII grossly intact, extremities’ motor and sensory functions grossly intact  Psychiatric: appropriate mood, appropriate affect Vital Signs Last 24 Hrs  T(C): 35.8 (20 Apr 2023 06:38), Max: 36.7 (20 Apr 2023 00:21)  T(F): 96.5 (20 Apr 2023 06:38), Max: 98 (20 Apr 2023 00:21)  HR: 64 (20 Apr 2023 06:38) (64 - 78)  BP: 120/75 (20 Apr 2023 06:38) (120/75 - 150/79)  RR: 18 (20 Apr 2023 06:38) (18 - 18)  SpO2: 98% (20 Apr 2023 06:38) (98% - 99%)    Parameters below as of 20 Apr 2023 00:21  Patient On (Oxygen Delivery Method): room air    constitutional: no acute distress  Eyes: Conjunctiva pink, Sclera clear,  EOMI.  Cardiovascular: S1 and S2, regular rate, regular rhythm, well-perfused extremities, radial pulses equal and 2+  Respiratory: unlabored respiratory effort, clear to auscultation bilaterally no wheezing, rales and rhonchi  Gastrointestinal: soft, non-tender abdomen, no pulsatile mass, normal bowl sounds  Musculoskeletal: supple neck, no lower extremity edema, no midline tenderness + left para lumbar tenderness . no knee or distal femur tenderness, PT with FROM of left hip with pain. unable to stand due to pain.   Integumentary: warm, dry, no rash left foot , toes with ulceration , right foot base callus   Neurologic: awake, alert, cranial nerves II-XII grossly intact, extremities’ motor and sensory functions grossly intact  Psychiatric: appropriate mood, appropriate affect

## 2023-04-20 NOTE — H&P ADULT - NSHPLABSRESULTS_GEN_ALL_CORE
04-20    137  |  97<L>  |  28<H>  ----------------------------<  94  4.3   |  29  |  1.5    Ca    9.1      20 Apr 2023 01:50    TPro  6.6  /  Alb  3.6  /  TBili  0.5  /  DBili  x   /  AST  12  /  ALT  6   /  AlkPhos  58  04-20                            11.9   8.09  )-----------( 110      ( 20 Apr 2023 01:50 )             35.2       < from: CT Abdomen and Pelvis w/ IV Cont (04.20.23 @ 03:31) >      IMPRESSION: No evidence of acute abdominal pelvic pathology.    < end of copied text >    < from: Xray Pelvis AP only (04.20.23 @ 00:59) >    IMPRESSION:    No acute fracture or dislocation.    Status post total bilateral hip arthroplasty and partially visualized   lumbar spinal fixation with interbody disc spacer.    Degenerative changes.    < end of copied text >

## 2023-04-21 LAB
ALBUMIN SERPL ELPH-MCNC: 3.3 G/DL — LOW (ref 3.5–5.2)
ALP SERPL-CCNC: 63 U/L — SIGNIFICANT CHANGE UP (ref 30–115)
ALT FLD-CCNC: 7 U/L — SIGNIFICANT CHANGE UP (ref 0–41)
ANION GAP SERPL CALC-SCNC: 13 MMOL/L — SIGNIFICANT CHANGE UP (ref 7–14)
AST SERPL-CCNC: 11 U/L — SIGNIFICANT CHANGE UP (ref 0–41)
BILIRUB SERPL-MCNC: 0.9 MG/DL — SIGNIFICANT CHANGE UP (ref 0.2–1.2)
BUN SERPL-MCNC: 25 MG/DL — HIGH (ref 10–20)
CALCIUM SERPL-MCNC: 8.9 MG/DL — SIGNIFICANT CHANGE UP (ref 8.4–10.5)
CHLORIDE SERPL-SCNC: 99 MMOL/L — SIGNIFICANT CHANGE UP (ref 98–110)
CO2 SERPL-SCNC: 26 MMOL/L — SIGNIFICANT CHANGE UP (ref 17–32)
CREAT SERPL-MCNC: 1.5 MG/DL — SIGNIFICANT CHANGE UP (ref 0.7–1.5)
EGFR: 50 ML/MIN/1.73M2 — LOW
GLUCOSE SERPL-MCNC: 97 MG/DL — SIGNIFICANT CHANGE UP (ref 70–99)
HCT VFR BLD CALC: 36.6 % — LOW (ref 42–52)
HGB BLD-MCNC: 12.2 G/DL — LOW (ref 14–18)
MCHC RBC-ENTMCNC: 31.6 PG — HIGH (ref 27–31)
MCHC RBC-ENTMCNC: 33.3 G/DL — SIGNIFICANT CHANGE UP (ref 32–37)
MCV RBC AUTO: 94.8 FL — HIGH (ref 80–94)
NRBC # BLD: 0 /100 WBCS — SIGNIFICANT CHANGE UP (ref 0–0)
PLATELET # BLD AUTO: 118 K/UL — LOW (ref 130–400)
PMV BLD: 9.8 FL — SIGNIFICANT CHANGE UP (ref 7.4–10.4)
POTASSIUM SERPL-MCNC: 5 MMOL/L — SIGNIFICANT CHANGE UP (ref 3.5–5)
POTASSIUM SERPL-SCNC: 5 MMOL/L — SIGNIFICANT CHANGE UP (ref 3.5–5)
PROT SERPL-MCNC: 6.5 G/DL — SIGNIFICANT CHANGE UP (ref 6–8)
RBC # BLD: 3.86 M/UL — LOW (ref 4.7–6.1)
RBC # FLD: 13.2 % — SIGNIFICANT CHANGE UP (ref 11.5–14.5)
SODIUM SERPL-SCNC: 138 MMOL/L — SIGNIFICANT CHANGE UP (ref 135–146)
WBC # BLD: 7.91 K/UL — SIGNIFICANT CHANGE UP (ref 4.8–10.8)
WBC # FLD AUTO: 7.91 K/UL — SIGNIFICANT CHANGE UP (ref 4.8–10.8)

## 2023-04-21 PROCEDURE — 99231 SBSQ HOSP IP/OBS SF/LOW 25: CPT | Mod: 25

## 2023-04-21 PROCEDURE — 11721 DEBRIDE NAIL 6 OR MORE: CPT

## 2023-04-21 PROCEDURE — 73562 X-RAY EXAM OF KNEE 3: CPT | Mod: 26,LT

## 2023-04-21 PROCEDURE — 99232 SBSQ HOSP IP/OBS MODERATE 35: CPT

## 2023-04-21 PROCEDURE — 99222 1ST HOSP IP/OBS MODERATE 55: CPT

## 2023-04-21 RX ORDER — GUAIFENESIN/DEXTROMETHORPHAN 600MG-30MG
10 TABLET, EXTENDED RELEASE 12 HR ORAL EVERY 6 HOURS
Refills: 0 | Status: DISCONTINUED | OUTPATIENT
Start: 2023-04-21 | End: 2023-04-24

## 2023-04-21 RX ADMIN — Medication 500 MILLIGRAM(S): at 17:38

## 2023-04-21 RX ADMIN — GABAPENTIN 300 MILLIGRAM(S): 400 CAPSULE ORAL at 05:14

## 2023-04-21 RX ADMIN — BUDESONIDE AND FORMOTEROL FUMARATE DIHYDRATE 2 PUFF(S): 160; 4.5 AEROSOL RESPIRATORY (INHALATION) at 21:09

## 2023-04-21 RX ADMIN — Medication 25 MILLIGRAM(S): at 05:12

## 2023-04-21 RX ADMIN — PANTOPRAZOLE SODIUM 40 MILLIGRAM(S): 20 TABLET, DELAYED RELEASE ORAL at 05:15

## 2023-04-21 RX ADMIN — Medication 500 MILLIGRAM(S): at 05:16

## 2023-04-21 RX ADMIN — Medication 15 MILLIGRAM(S): at 05:16

## 2023-04-21 RX ADMIN — GABAPENTIN 300 MILLIGRAM(S): 400 CAPSULE ORAL at 17:22

## 2023-04-21 RX ADMIN — AMLODIPINE BESYLATE 10 MILLIGRAM(S): 2.5 TABLET ORAL at 05:16

## 2023-04-21 RX ADMIN — Medication 5 MILLIGRAM(S): at 17:21

## 2023-04-21 RX ADMIN — Medication 5 MILLIGRAM(S): at 05:16

## 2023-04-21 RX ADMIN — Medication 1 MILLIGRAM(S): at 11:41

## 2023-04-21 RX ADMIN — FINASTERIDE 5 MILLIGRAM(S): 5 TABLET, FILM COATED ORAL at 11:41

## 2023-04-21 RX ADMIN — Medication 325 MILLIGRAM(S): at 11:41

## 2023-04-21 RX ADMIN — ENOXAPARIN SODIUM 40 MILLIGRAM(S): 100 INJECTION SUBCUTANEOUS at 17:38

## 2023-04-21 RX ADMIN — Medication 25 MILLIGRAM(S): at 17:38

## 2023-04-21 RX ADMIN — BUDESONIDE AND FORMOTEROL FUMARATE DIHYDRATE 2 PUFF(S): 160; 4.5 AEROSOL RESPIRATORY (INHALATION) at 07:45

## 2023-04-21 RX ADMIN — Medication 10 MILLILITER(S): at 23:43

## 2023-04-21 RX ADMIN — TAMSULOSIN HYDROCHLORIDE 0.4 MILLIGRAM(S): 0.4 CAPSULE ORAL at 21:09

## 2023-04-21 NOTE — PROGRESS NOTE ADULT - SUBJECTIVE AND OBJECTIVE BOX
Progress note    INTERVAL HPI/OVERNIGHT EVENTS:    Patient seen and examined at bedside. He admits to L sided knee and hip pain Similar to yesterday. Pain is worse with ROM. He is able to bend leg ~45 degree but does have some weakness from residual CVA.      REVIEW OF SYSTEMS:  All other 13 Review of systems were reviewed and are negative    FAMILY HISTORY:  FH: breast cancer    FH: lupus    FH: heart disease      T(C): 36.5 (04-21-23 @ 14:39), Max: 37.5 (04-20-23 @ 20:49)  HR: 70 (04-21-23 @ 14:39) (70 - 78)  BP: 118/60 (04-21-23 @ 14:39) (112/70 - 118/60)  RR: 18 (04-21-23 @ 14:39) (18 - 18)  SpO2: 99% (04-20-23 @ 20:49) (99% - 99%)  Wt(kg): --Vital Signs Last 24 Hrs  T(C): 36.5 (21 Apr 2023 14:39), Max: 37.5 (20 Apr 2023 20:49)  T(F): 97.7 (21 Apr 2023 14:39), Max: 99.5 (20 Apr 2023 20:49)  HR: 70 (21 Apr 2023 14:39) (70 - 78)  BP: 118/60 (21 Apr 2023 14:39) (112/70 - 118/60)  BP(mean): --  RR: 18 (21 Apr 2023 14:39) (18 - 18)  SpO2: 99% (20 Apr 2023 20:49) (99% - 99%)      lactose (Diarrhea)  baclofen (Unknown)  enalapril (Unknown)      PHYSICAL EXAM:  GENERAL: NAD, well-groomed, well-developed  HEAD:  Atraumatic, Normocephalic  EYES: EOMI, PERRLA, conjunctiva and sclera clear  ENMT: No tonsillar erythema, exudates, or enlargement; Moist mucous membranes, Good dentition, No lesions  NECK: Supple, No JVD, Normal thyroid  NERVOUS SYSTEM:  Alert & Oriented X3, Good concentration; Motor Strength 5/5 B/L upper and lower extremities; DTRs 2+ intact and symmetric  CHEST/LUNG: Clear to percussion bilaterally; No rales, rhonchi, wheezing, or rubs  HEART: Regular rate and rhythm; No murmurs, rubs, or gallops  ABDOMEN: Soft, Nontender, Nondistended; Bowel sounds present  EXTREMITIES:  L knee is not edematous nor erythematous. Beds ~45 degrees. L hip flexion ~ 20degrees. no TTP.  LYMPH: No lymphadenopathy noted  SKIN: No rashes or lesions    Consultant(s) Notes Reviewed:  [x ] YES  [ ] NO  Care Discussed with Consultants/Other Providers [ x] YES  [ ] NO    LABS:      RADIOLOGY & ADDITIONAL TESTS:    Imaging Personally Reviewed:  [ ] YES  [ ] NO  acetaminophen     Tablet .. 650 milliGRAM(s) Oral every 6 hours PRN  albuterol    90 MICROgram(s) HFA Inhaler 2 Puff(s) Inhalation every 8 hours PRN  amLODIPine   Tablet 10 milliGRAM(s) Oral daily  aspirin enteric coated 325 milliGRAM(s) Oral daily  bisacodyl 5 milliGRAM(s) Oral every 12 hours  budesonide 160 MICROgram(s)/formoterol 4.5 MICROgram(s) Inhaler 2 Puff(s) Inhalation two times a day  enoxaparin Injectable 40 milliGRAM(s) SubCutaneous every 24 hours  finasteride 5 milliGRAM(s) Oral daily  folic acid 1 milliGRAM(s) Oral daily  gabapentin 300 milliGRAM(s) Oral two times a day  metoprolol tartrate 25 milliGRAM(s) Oral two times a day  ondansetron Injectable 4 milliGRAM(s) IV Push every 8 hours PRN  oxycodone    5 mG/acetaminophen 325 mG 2 Tablet(s) Oral every 6 hours PRN  pantoprazole    Tablet 40 milliGRAM(s) Oral before breakfast  predniSONE   Tablet 15 milliGRAM(s) Oral daily  senna 2 Tablet(s) Oral at bedtime  sulfaSALAzine 500 milliGRAM(s) Oral every 12 hours  tamsulosin 0.4 milliGRAM(s) Oral at bedtime      HEALTH ISSUES - PROBLEM Dx:    68 year old male past medical history of Coronary Artery Disease, COPD, Stroke, AAA, PE, b/l hip replacements comes to emergency room for left lower back going down his left hip and cannot walk due to pain     # ACUTE ON CHRONIC LEFT HIP PAIN / weakness ? nerve pain   # CVA with left side deficits   unlikely septic joint, elevated esr    - ortho eval recs appreciated  - CT hip left side no contrast neg for fx  - pain control with percocet   - bowl regimen with senna and dulcolax    - PT eval   - Start SIENNA     # ulceration of left foot toes   - podiatry eval   - LWC and dsd     # CKD IIIa  -creatinine 1.5 appears at baseline  - S/p IVF    # lung cancer s/p partial right lung resection  # PE (not anticoagulated due to history of ICH and GIB)  - on ASA    # CAD with single vessel CABG after failed stent placement  # HTN  - continue ASA, CCB, and beta-blocker  - zetia non-form , may c/w upon d/c     # BPH:  -continue finasteride and Flomax    # COPD   - c/w symbicort and albuterol     Labs reviewed  D/w ortho    Total time spent to complete patient's bedside assessment, review medical chart, discuss medical plan of care with covering medical team was ____35____ with > 50% of time spent face to face w/ patient, discussion with patient/family and/or coordination of care

## 2023-04-21 NOTE — CONSULT NOTE ADULT - ASSESSMENT
68 y o M with left hip , left knee pain radiating to left hip, occasional locking of left knee, left LB pain. S/P b/l joy. Left hip CT - neg for fx.    - Please obtain left knee Xray   - Pt may need left knee MRI if X ray is negative  - pain control

## 2023-04-21 NOTE — DIETITIAN INITIAL EVALUATION ADULT - ORAL INTAKE PTA/DIET HISTORY
as per pt consumes a lactose free heart healthy diet PTA with good po intake, Pt denies any recent weight changes, as per EMR weight has been stable since 4/2021. NKFA or intolerances.     presently on a DASH/TLC diet tolerating well consumes >50% of meals

## 2023-04-21 NOTE — DIETITIAN INITIAL EVALUATION ADULT - PERTINENT MEDS FT
MEDICATIONS  (STANDING):  amLODIPine   Tablet 10 milliGRAM(s) Oral daily  aspirin enteric coated 325 milliGRAM(s) Oral daily  bisacodyl 5 milliGRAM(s) Oral every 12 hours  budesonide 160 MICROgram(s)/formoterol 4.5 MICROgram(s) Inhaler 2 Puff(s) Inhalation two times a day  enoxaparin Injectable 40 milliGRAM(s) SubCutaneous every 24 hours  finasteride 5 milliGRAM(s) Oral daily  folic acid 1 milliGRAM(s) Oral daily  gabapentin 300 milliGRAM(s) Oral two times a day  metoprolol tartrate 25 milliGRAM(s) Oral two times a day  pantoprazole    Tablet 40 milliGRAM(s) Oral before breakfast  predniSONE   Tablet 15 milliGRAM(s) Oral daily  senna 2 Tablet(s) Oral at bedtime  sulfaSALAzine 500 milliGRAM(s) Oral every 12 hours  tamsulosin 0.4 milliGRAM(s) Oral at bedtime    MEDICATIONS  (PRN):  acetaminophen     Tablet .. 650 milliGRAM(s) Oral every 6 hours PRN Temp greater or equal to 38C (100.4F), Mild Pain (1 - 3)  albuterol    90 MICROgram(s) HFA Inhaler 2 Puff(s) Inhalation every 8 hours PRN Bronchospasm  ondansetron Injectable 4 milliGRAM(s) IV Push every 8 hours PRN Nausea and/or Vomiting  oxycodone    5 mG/acetaminophen 325 mG 2 Tablet(s) Oral every 6 hours PRN Severe Pain (7 - 10)

## 2023-04-21 NOTE — PROGRESS NOTE ADULT - SUBJECTIVE AND OBJECTIVE BOX
PROGRESS NOTE   Pt seen @ bedside during AM rounds.      Vital Signs Last 24 Hrs  T(C): 36.8 (21 Apr 2023 04:30), Max: 37.5 (20 Apr 2023 20:49)  T(F): 98.2 (21 Apr 2023 04:30), Max: 99.5 (20 Apr 2023 20:49)  HR: 73 (21 Apr 2023 04:30) (57 - 78)  BP: 115/72 (21 Apr 2023 04:30) (112/70 - 120/77)  BP(mean): --  RR: 18 (21 Apr 2023 04:30) (18 - 18)  SpO2: 99% (20 Apr 2023 20:49) (99% - 100%)    Parameters below as of 20 Apr 2023 14:45  Patient On (Oxygen Delivery Method): nasal cannula                              12.2   7.91  )-----------( 118      ( 21 Apr 2023 05:51 )             36.6               04-21    138  |  99  |  25<H>  ----------------------------<  97  5.0   |  26  |  1.5    Ca    8.9      21 Apr 2023 05:51    TPro  6.5  /  Alb  3.3<L>  /  TBili  0.9  /  DBili  x   /  AST  11  /  ALT  7   /  AlkPhos  63  04-21        Physical Exam - Lower Extremity Focused:   Derm:   L dorsum 4th toe superficial wound with callus present ; stable, no pus noted;  R lateral plantar midfoot bony prominence w/ callus noted; no pus, no sign of infection;   Elongated thick painful nails x10   Vascular: DP and PT Pulses Diminished;   Neuro: Protective Sensation Diminished;  MSK: bony prominence on R lateral midfoot;     Assessment:  Stable superficial ulcer on BL feet with callus present   Elongated mycotic nails x10, painful  Plan:  Chart reviewed and Patient evaluated. All Questions and Concerns Addressed and Answered  Discussed diagnosis and treatment with patient  Dbx of nails x10   No intervention from podiatry   F/u as o/p

## 2023-04-21 NOTE — DIETITIAN INITIAL EVALUATION ADULT - OTHER INFO
pt is 68 year old male with hx of lung CA s/p partial lung resection, CAD, COPD, stroke with L sided deficit + facial droop, AAA, PE, B/L hip replacements, presents with radiating LL back pain to LE and inability to ambulate. pt admitted with chronic hip pain.

## 2023-04-21 NOTE — DIETITIAN INITIAL EVALUATION ADULT - PERTINENT LABORATORY DATA
04-21    138  |  99  |  25<H>  ----------------------------<  97  5.0   |  26  |  1.5    Ca    8.9      21 Apr 2023 05:51    TPro  6.5  /  Alb  3.3<L>  /  TBili  0.9  /  DBili  x   /  AST  11  /  ALT  7   /  AlkPhos  63  04-21                          12.2   7.91  )-----------( 118      ( 21 Apr 2023 05:51 )             36.6
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
## 2023-04-22 PROCEDURE — 99233 SBSQ HOSP IP/OBS HIGH 50: CPT

## 2023-04-22 RX ORDER — PSEUDOEPHEDRINE HCL 30 MG
30 TABLET ORAL DAILY
Refills: 0 | Status: DISCONTINUED | OUTPATIENT
Start: 2023-04-22 | End: 2023-04-24

## 2023-04-22 RX ORDER — LORATADINE 10 MG/1
10 TABLET ORAL DAILY
Refills: 0 | Status: DISCONTINUED | OUTPATIENT
Start: 2023-04-22 | End: 2023-04-24

## 2023-04-22 RX ORDER — GABAPENTIN 400 MG/1
300 CAPSULE ORAL THREE TIMES A DAY
Refills: 0 | Status: DISCONTINUED | OUTPATIENT
Start: 2023-04-23 | End: 2023-04-24

## 2023-04-22 RX ADMIN — Medication 500 MILLIGRAM(S): at 06:24

## 2023-04-22 RX ADMIN — ENOXAPARIN SODIUM 40 MILLIGRAM(S): 100 INJECTION SUBCUTANEOUS at 17:27

## 2023-04-22 RX ADMIN — FINASTERIDE 5 MILLIGRAM(S): 5 TABLET, FILM COATED ORAL at 11:32

## 2023-04-22 RX ADMIN — Medication 500 MILLIGRAM(S): at 17:19

## 2023-04-22 RX ADMIN — PANTOPRAZOLE SODIUM 40 MILLIGRAM(S): 20 TABLET, DELAYED RELEASE ORAL at 06:24

## 2023-04-22 RX ADMIN — Medication 5 MILLIGRAM(S): at 17:18

## 2023-04-22 RX ADMIN — TAMSULOSIN HYDROCHLORIDE 0.4 MILLIGRAM(S): 0.4 CAPSULE ORAL at 21:31

## 2023-04-22 RX ADMIN — Medication 15 MILLIGRAM(S): at 06:23

## 2023-04-22 RX ADMIN — LORATADINE 10 MILLIGRAM(S): 10 TABLET ORAL at 11:33

## 2023-04-22 RX ADMIN — Medication 1 MILLIGRAM(S): at 11:33

## 2023-04-22 RX ADMIN — GABAPENTIN 300 MILLIGRAM(S): 400 CAPSULE ORAL at 06:24

## 2023-04-22 RX ADMIN — Medication 30 MILLIGRAM(S): at 11:33

## 2023-04-22 RX ADMIN — BUDESONIDE AND FORMOTEROL FUMARATE DIHYDRATE 2 PUFF(S): 160; 4.5 AEROSOL RESPIRATORY (INHALATION) at 08:13

## 2023-04-22 RX ADMIN — Medication 25 MILLIGRAM(S): at 06:24

## 2023-04-22 RX ADMIN — Medication 25 MILLIGRAM(S): at 17:18

## 2023-04-22 RX ADMIN — Medication 10 MILLILITER(S): at 06:23

## 2023-04-22 RX ADMIN — AMLODIPINE BESYLATE 10 MILLIGRAM(S): 2.5 TABLET ORAL at 06:24

## 2023-04-22 RX ADMIN — SENNA PLUS 2 TABLET(S): 8.6 TABLET ORAL at 21:31

## 2023-04-22 RX ADMIN — Medication 10 MILLILITER(S): at 11:32

## 2023-04-22 RX ADMIN — Medication 10 MILLILITER(S): at 23:26

## 2023-04-22 RX ADMIN — Medication 10 MILLILITER(S): at 17:18

## 2023-04-22 RX ADMIN — Medication 325 MILLIGRAM(S): at 11:32

## 2023-04-22 NOTE — PHYSICAL THERAPY INITIAL EVALUATION ADULT - SPECIFY REASON(S)
Chart Reviewed , Attempted to see the PT this Am for Dieudonne PTDENISE notified to defer the PT at this time , pending Ortho consult, Will F/u after ortho consult and as appropriated.
Attempted to see patient for bedside PT, as per Dr. Pryor, hold PT at this time, running more tests.  Will continue to follow.

## 2023-04-22 NOTE — PROGRESS NOTE ADULT - SUBJECTIVE AND OBJECTIVE BOX
Progress note    INTERVAL HPI/OVERNIGHT EVENTS:    Patient seen and examined at bedside. Patient admits to L knee pain, admits to chronic back pain and continuous L hip pain.      REVIEW OF SYSTEMS:  All other 13 Review of systems were reviewed and are negative    FAMILY HISTORY:  FH: breast cancer    FH: lupus    FH: heart disease      T(C): 36 (04-22-23 @ 13:06), Max: 36.5 (04-21-23 @ 14:39)  HR: 65 (04-22-23 @ 13:06) (59 - 70)  BP: 109/76 (04-22-23 @ 13:06) (109/76 - 137/64)  RR: 16 (04-22-23 @ 13:06) (16 - 18)  SpO2: --  Wt(kg): --Vital Signs Last 24 Hrs  T(C): 36 (22 Apr 2023 13:06), Max: 36.5 (21 Apr 2023 14:39)  T(F): 96.8 (22 Apr 2023 13:06), Max: 97.7 (21 Apr 2023 14:39)  HR: 65 (22 Apr 2023 13:06) (59 - 70)  BP: 109/76 (22 Apr 2023 13:06) (109/76 - 137/64)  BP(mean): --  RR: 16 (22 Apr 2023 13:06) (16 - 18)  SpO2: --      lactose (Diarrhea)  baclofen (Unknown)  enalapril (Unknown)      PHYSICAL EXAM:  GENERAL: NAD, well-groomed, well-developed  HEAD:  Atraumatic, Normocephalic  EYES: EOMI, PERRLA, conjunctiva and sclera clear  ENMT: No tonsillar erythema, exudates, or enlargement; Moist mucous membranes, Good dentition, No lesions  NECK: Supple, No JVD, Normal thyroid  NERVOUS SYSTEM:  Alert & Oriented X3, Good concentration; Motor Strength 5/5 B/L upper and lower extremities; DTRs 2+ intact and symmetric  CHEST/LUNG: Clear to percussion bilaterally; No rales, rhonchi, wheezing, or rubs  HEART: Regular rate and rhythm; No murmurs, rubs, or gallops  ABDOMEN: Soft, Nontender, Nondistended; Bowel sounds present  EXTREMITIES:  2+ Peripheral Pulses, No clubbing, cyanosis, or edema  LYMPH: No lymphadenopathy noted  SKIN: No rashes or lesions    Consultant(s) Notes Reviewed:  [x ] YES  [ ] NO  Care Discussed with Consultants/Other Providers [ x] YES  [ ] NO    LABS:      RADIOLOGY & ADDITIONAL TESTS:    Imaging Personally Reviewed:  [ ] YES  [ ] NO  acetaminophen     Tablet .. 650 milliGRAM(s) Oral every 6 hours PRN  albuterol    90 MICROgram(s) HFA Inhaler 2 Puff(s) Inhalation every 8 hours PRN  amLODIPine   Tablet 10 milliGRAM(s) Oral daily  aspirin enteric coated 325 milliGRAM(s) Oral daily  bisacodyl 5 milliGRAM(s) Oral every 12 hours  budesonide 160 MICROgram(s)/formoterol 4.5 MICROgram(s) Inhaler 2 Puff(s) Inhalation two times a day  enoxaparin Injectable 40 milliGRAM(s) SubCutaneous every 24 hours  finasteride 5 milliGRAM(s) Oral daily  folic acid 1 milliGRAM(s) Oral daily  gabapentin 300 milliGRAM(s) Oral two times a day  guaifenesin/dextromethorphan Oral Liquid 10 milliLiter(s) Oral every 6 hours  loratadine 10 milliGRAM(s) Oral daily  metoprolol tartrate 25 milliGRAM(s) Oral two times a day  ondansetron Injectable 4 milliGRAM(s) IV Push every 8 hours PRN  oxycodone    5 mG/acetaminophen 325 mG 2 Tablet(s) Oral every 6 hours PRN  pantoprazole    Tablet 40 milliGRAM(s) Oral before breakfast  predniSONE   Tablet 15 milliGRAM(s) Oral daily  pseudoephedrine 30 milliGRAM(s) Oral daily  senna 2 Tablet(s) Oral at bedtime  sulfaSALAzine 500 milliGRAM(s) Oral every 12 hours  tamsulosin 0.4 milliGRAM(s) Oral at bedtime      HEALTH ISSUES - PROBLEM Dx:    68 year old male past medical history of Coronary Artery Disease, COPD, Stroke, AAA, PE, b/l hip replacements comes to emergency room for left lower back going down his left hip and cannot walk due to pain     # ACUTE ON CHRONIC LEFT HIP PAIN / weakness ? nerve pain   # CVA with left side deficits   unlikely septic joint, elevated esr    - ortho eval recs appreciated  - CT hip left side non contrast neg for fx  - pain control with percocet   - bowl regimen with senna and dulcolax    - PT eval   - Attempted to obtain records for hip replacement to determine if prosthesis is MRI safe, medical records office is closed at [facility] ([phone]) where patient got the hip replacement.   - Also attempted to obtain records from [facility] for lumbar prosthesis w/ Dr. Jacques >5 years ago but unable MR is closed  - Increase GABApentin to TID    # ulceration of left foot toes   - podiatry eval   - LWC and dsd     # CKD IIIa  -creatinine 1.5 appears at baseline  - S/p IVF    # lung cancer s/p partial right lung resection  # PE (not anticoagulated due to history of ICH and GIB)  - on ASA    # CAD with single vessel CABG after failed stent placement  # HTN  - continue ASA, CCB, and beta-blocker  - zetia non-form , may c/w upon d/c     # BPH:  -continue finasteride and Flomax    # COPD   - c/w symbicort and albuterol     Dispo: obtain records from [facility] for prosthesis.      Total time spent to complete patient's bedside assessment, review medical chart, discuss medical plan of care with covering medical team was ____55____ with > 50% of time spent face to face w/ patient, discussion with patient/family and/or coordination of care

## 2023-04-23 PROCEDURE — 73721 MRI JNT OF LWR EXTRE W/O DYE: CPT | Mod: 26,LT,76

## 2023-04-23 PROCEDURE — 99232 SBSQ HOSP IP/OBS MODERATE 35: CPT

## 2023-04-23 RX ADMIN — Medication 325 MILLIGRAM(S): at 11:12

## 2023-04-23 RX ADMIN — Medication 10 MILLILITER(S): at 05:30

## 2023-04-23 RX ADMIN — TAMSULOSIN HYDROCHLORIDE 0.4 MILLIGRAM(S): 0.4 CAPSULE ORAL at 22:17

## 2023-04-23 RX ADMIN — Medication 500 MILLIGRAM(S): at 18:46

## 2023-04-23 RX ADMIN — Medication 30 MILLIGRAM(S): at 11:13

## 2023-04-23 RX ADMIN — Medication 500 MILLIGRAM(S): at 05:30

## 2023-04-23 RX ADMIN — Medication 25 MILLIGRAM(S): at 18:45

## 2023-04-23 RX ADMIN — ENOXAPARIN SODIUM 40 MILLIGRAM(S): 100 INJECTION SUBCUTANEOUS at 18:46

## 2023-04-23 RX ADMIN — Medication 5 MILLIGRAM(S): at 18:45

## 2023-04-23 RX ADMIN — GABAPENTIN 300 MILLIGRAM(S): 400 CAPSULE ORAL at 05:27

## 2023-04-23 RX ADMIN — AMLODIPINE BESYLATE 10 MILLIGRAM(S): 2.5 TABLET ORAL at 05:25

## 2023-04-23 RX ADMIN — Medication 15 MILLIGRAM(S): at 05:28

## 2023-04-23 RX ADMIN — Medication 10 MILLILITER(S): at 18:45

## 2023-04-23 RX ADMIN — LORATADINE 10 MILLIGRAM(S): 10 TABLET ORAL at 11:12

## 2023-04-23 RX ADMIN — FINASTERIDE 5 MILLIGRAM(S): 5 TABLET, FILM COATED ORAL at 11:12

## 2023-04-23 RX ADMIN — Medication 5 MILLIGRAM(S): at 05:26

## 2023-04-23 RX ADMIN — PANTOPRAZOLE SODIUM 40 MILLIGRAM(S): 20 TABLET, DELAYED RELEASE ORAL at 08:03

## 2023-04-23 RX ADMIN — GABAPENTIN 300 MILLIGRAM(S): 400 CAPSULE ORAL at 13:40

## 2023-04-23 RX ADMIN — Medication 25 MILLIGRAM(S): at 05:27

## 2023-04-23 RX ADMIN — Medication 10 MILLILITER(S): at 11:12

## 2023-04-23 RX ADMIN — Medication 1 MILLIGRAM(S): at 11:12

## 2023-04-23 RX ADMIN — SENNA PLUS 2 TABLET(S): 8.6 TABLET ORAL at 22:13

## 2023-04-23 RX ADMIN — GABAPENTIN 300 MILLIGRAM(S): 400 CAPSULE ORAL at 22:13

## 2023-04-23 RX ADMIN — BUDESONIDE AND FORMOTEROL FUMARATE DIHYDRATE 2 PUFF(S): 160; 4.5 AEROSOL RESPIRATORY (INHALATION) at 08:06

## 2023-04-23 NOTE — PHYSICAL THERAPY INITIAL EVALUATION ADULT - GENERAL OBSERVATIONS, REHAB EVAL
8:45-9:10 Chart reviewed. Patient available to be seen for physical therapy, denies pain, confirmed with RN.  Pt rec'd in bed in NAD.

## 2023-04-23 NOTE — PHYSICAL THERAPY INITIAL EVALUATION ADULT - PERTINENT HX OF CURRENT PROBLEM, REHAB EVAL
Reason for Admission: left hip pain  History of Present Illness:   68 year old male past medical history of Coronary Artery Disease, COPD, Stroke with residual L sided deficit, facial droop, AAA, PE, b/l hip replacements comes to emergency room for left lower back going down his left hip and cannot walk due to pain and called 911. He states that the pain started yesterday without any enticing trauma. No fever/chills. He ambulates with a cane and rollator at baseline. The pain radiates down L leg down to knee. ROM is limited to his baseline weakness as well as pain.

## 2023-04-23 NOTE — PROGRESS NOTE ADULT - SUBJECTIVE AND OBJECTIVE BOX
Progress note    INTERVAL HPI/OVERNIGHT EVENTS:    Patient seen and examined at bedside. He states his pain is slowly improving.      REVIEW OF SYSTEMS:  All other 13 Review of systems were reviewed and are negative    FAMILY HISTORY:  FH: breast cancer    FH: lupus    FH: heart disease      T(C): 36 (04-23-23 @ 13:38), Max: 36.4 (04-23-23 @ 05:00)  HR: 66 (04-23-23 @ 13:38) (66 - 76)  BP: 101/58 (04-23-23 @ 13:38) (101/58 - 147/71)  RR: 16 (04-23-23 @ 13:38) (16 - 18)  SpO2: 100% (04-23-23 @ 05:00) (100% - 100%)  Wt(kg): --Vital Signs Last 24 Hrs  T(C): 36 (23 Apr 2023 13:38), Max: 36.4 (23 Apr 2023 05:00)  T(F): 96.8 (23 Apr 2023 13:38), Max: 97.6 (23 Apr 2023 05:00)  HR: 66 (23 Apr 2023 13:38) (66 - 76)  BP: 101/58 (23 Apr 2023 13:38) (101/58 - 147/71)  BP(mean): --  RR: 16 (23 Apr 2023 13:38) (16 - 18)  SpO2: 100% (23 Apr 2023 05:00) (100% - 100%)      lactose (Diarrhea)  baclofen (Unknown)  enalapril (Unknown)      PHYSICAL EXAM:  GENERAL: NAD, well-groomed, well-developed  HEAD:  Atraumatic, Normocephalic  EYES: EOMI, PERRLA, conjunctiva and sclera clear  ENMT: No tonsillar erythema, exudates, or enlargement; Moist mucous membranes, Good dentition, No lesions  NECK: Supple, No JVD, Normal thyroid  NERVOUS SYSTEM:  Alert & Oriented X3, Good concentration; Motor Strength 5/5 B/L upper and lower extremities; DTRs 2+ intact and symmetric  CHEST/LUNG: Clear to percussion bilaterally; No rales, rhonchi, wheezing, or rubs  HEART: Regular rate and rhythm; No murmurs, rubs, or gallops  ABDOMEN: Soft, Nontender, Nondistended; Bowel sounds present  EXTREMITIES:  Knee flexion ~ 60degrees  LYMPH: No lymphadenopathy noted  SKIN: No rashes or lesions    Consultant(s) Notes Reviewed:  [x ] YES  [ ] NO  Care Discussed with Consultants/Other Providers [ x] YES  [ ] NO    LABS:      RADIOLOGY & ADDITIONAL TESTS:    Imaging Personally Reviewed:  [ ] YES  [ ] NO  acetaminophen     Tablet .. 650 milliGRAM(s) Oral every 6 hours PRN  albuterol    90 MICROgram(s) HFA Inhaler 2 Puff(s) Inhalation every 8 hours PRN  amLODIPine   Tablet 10 milliGRAM(s) Oral daily  aspirin enteric coated 325 milliGRAM(s) Oral daily  bisacodyl 5 milliGRAM(s) Oral every 12 hours  budesonide 160 MICROgram(s)/formoterol 4.5 MICROgram(s) Inhaler 2 Puff(s) Inhalation two times a day  enoxaparin Injectable 40 milliGRAM(s) SubCutaneous every 24 hours  finasteride 5 milliGRAM(s) Oral daily  folic acid 1 milliGRAM(s) Oral daily  gabapentin 300 milliGRAM(s) Oral three times a day  guaifenesin/dextromethorphan Oral Liquid 10 milliLiter(s) Oral every 6 hours  loratadine 10 milliGRAM(s) Oral daily  metoprolol tartrate 25 milliGRAM(s) Oral two times a day  ondansetron Injectable 4 milliGRAM(s) IV Push every 8 hours PRN  oxycodone    5 mG/acetaminophen 325 mG 2 Tablet(s) Oral every 6 hours PRN  pantoprazole    Tablet 40 milliGRAM(s) Oral before breakfast  predniSONE   Tablet 15 milliGRAM(s) Oral daily  pseudoephedrine 30 milliGRAM(s) Oral daily  senna 2 Tablet(s) Oral at bedtime  sulfaSALAzine 500 milliGRAM(s) Oral every 12 hours  tamsulosin 0.4 milliGRAM(s) Oral at bedtime      HEALTH ISSUES - PROBLEM Dx:      68 year old male past medical history of Coronary Artery Disease, COPD, Stroke, AAA, PE, b/l hip replacements comes to emergency room for left lower back going down his left hip and cannot walk due to pain     # ACUTE ON CHRONIC LEFT HIP PAIN / weakness ? nerve pain   # CVA with left side deficits   unlikely septic joint, elevated esr    - ortho eval recs appreciated  - CT hip left side non contrast neg for fx  - pain control with percocet   - bowl regimen with senna and dulcolax    - PT eval   - D/w ortho and neurosurgery. Hip replacements are MRI compatible. d/w Radiology dept. Per pt, rods in spine are titanium which are also MRI compatible per radiology department  - MRI L knee and hip pending  - C/w GABApentin to TID    # ulceration of left foot toes   - podiatry eval   - LWC and dsd     # CKD IIIa  -creatinine 1.5 appears at baseline  - S/p IVF    # lung cancer s/p partial right lung resection  # PE (not anticoagulated due to history of ICH and GIB)  - on ASA    # CAD with single vessel CABG after failed stent placement  # HTN  - continue ASA, CCB, and beta-blocker  - zetia non-form , may c/w upon d/c     # BPH:  -continue finasteride and Flomax    # COPD   - c/w symbicort and albuterol     Dispo: Acute pending MRI    Total time spent to complete patient's bedside assessment, review medical chart, discuss medical plan of care with covering medical team was ____35____ with > 50% of time spent face to face w/ patient, discussion with patient/family and/or coordination of care

## 2023-04-24 ENCOUNTER — TRANSCRIPTION ENCOUNTER (OUTPATIENT)
Age: 68
End: 2023-04-24

## 2023-04-24 VITALS
SYSTOLIC BLOOD PRESSURE: 122 MMHG | DIASTOLIC BLOOD PRESSURE: 65 MMHG | TEMPERATURE: 98 F | RESPIRATION RATE: 18 BRPM | HEART RATE: 60 BPM

## 2023-04-24 PROCEDURE — 99239 HOSP IP/OBS DSCHRG MGMT >30: CPT

## 2023-04-24 RX ORDER — OXYCODONE HYDROCHLORIDE 5 MG/1
1 TABLET ORAL
Qty: 10 | Refills: 0
Start: 2023-04-24 | End: 2023-04-28

## 2023-04-24 RX ORDER — AMLODIPINE BESYLATE 2.5 MG/1
1 TABLET ORAL
Qty: 0 | Refills: 0 | DISCHARGE
Start: 2023-04-24

## 2023-04-24 RX ORDER — GABAPENTIN 400 MG/1
1 CAPSULE ORAL
Qty: 90 | Refills: 0
Start: 2023-04-24 | End: 2023-05-23

## 2023-04-24 RX ORDER — ACETAMINOPHEN 500 MG
2 TABLET ORAL
Qty: 60 | Refills: 0
Start: 2023-04-24 | End: 2023-05-03

## 2023-04-24 RX ADMIN — Medication 10 MILLILITER(S): at 01:09

## 2023-04-24 RX ADMIN — AMLODIPINE BESYLATE 10 MILLIGRAM(S): 2.5 TABLET ORAL at 06:15

## 2023-04-24 RX ADMIN — Medication 10 MILLILITER(S): at 11:07

## 2023-04-24 RX ADMIN — FINASTERIDE 5 MILLIGRAM(S): 5 TABLET, FILM COATED ORAL at 11:07

## 2023-04-24 RX ADMIN — PANTOPRAZOLE SODIUM 40 MILLIGRAM(S): 20 TABLET, DELAYED RELEASE ORAL at 06:15

## 2023-04-24 RX ADMIN — Medication 25 MILLIGRAM(S): at 06:15

## 2023-04-24 RX ADMIN — Medication 30 MILLIGRAM(S): at 11:07

## 2023-04-24 RX ADMIN — Medication 10 MILLILITER(S): at 06:13

## 2023-04-24 RX ADMIN — LORATADINE 10 MILLIGRAM(S): 10 TABLET ORAL at 11:07

## 2023-04-24 RX ADMIN — Medication 500 MILLIGRAM(S): at 06:20

## 2023-04-24 RX ADMIN — Medication 5 MILLIGRAM(S): at 06:15

## 2023-04-24 RX ADMIN — Medication 15 MILLIGRAM(S): at 06:14

## 2023-04-24 RX ADMIN — Medication 1 MILLIGRAM(S): at 11:07

## 2023-04-24 RX ADMIN — GABAPENTIN 300 MILLIGRAM(S): 400 CAPSULE ORAL at 06:16

## 2023-04-24 RX ADMIN — Medication 325 MILLIGRAM(S): at 11:07

## 2023-04-24 RX ADMIN — BUDESONIDE AND FORMOTEROL FUMARATE DIHYDRATE 2 PUFF(S): 160; 4.5 AEROSOL RESPIRATORY (INHALATION) at 11:08

## 2023-04-24 NOTE — DISCHARGE NOTE NURSING/CASE MANAGEMENT/SOCIAL WORK - NSDCPEFALRISK_GEN_ALL_CORE
For information on Fall & Injury Prevention, visit: [email or web address] OR  [email or web address] OR  https://www.cdc.gov/steadi/patient.html

## 2023-04-24 NOTE — DISCHARGE NOTE PROVIDER - NSDCMRMEDTOKEN_GEN_ALL_CORE_FT
acetaminophen 325 mg oral tablet: 2 tab(s) orally every 8 hours Maximum daily dose 1,950mg  Albuterol (Eqv-ProAir HFA) 90 mcg/inh inhalation aerosol: 2 puff(s) inhaled every 6 hours  amLODIPine 10 mg oral tablet: 1 tab(s) orally once a day  aspirin 325 mg oral delayed release tablet: 1 tab(s) orally once a day  finasteride 5 mg oral tablet: 1 tab(s) orally once a day  Flomax 0.4 mg oral capsule: 1 cap(s) orally once a day  folic acid 1 mg oral tablet: 1 tab(s) orally once a day  gabapentin 300 mg oral capsule: 1 cap(s) orally 3 times a day  metoprolol tartrate 25 mg oral tablet: 1 tab(s) orally 2 times a day  oxyCODONE 5 mg oral tablet: 1 tab(s) orally every 12 hours as needed for  severe pain MDD: 10mg  predniSONE 10 mg oral tablet: 3 tab(s) orally once a day for 2 days, then take 2 tabs once a day for 2 days, then take 1 tab orally once a day for 2 days, Total: 6 days  sulfaSALAzine 500 mg oral tablet: 1 orally 2 times a day  Symbicort 160 mcg-4.5 mcg/inh inhalation aerosol: 2 puff(s) inhaled 2 times a day  Zetia 10 mg oral tablet: 1 tab(s) orally once a day

## 2023-04-24 NOTE — DISCHARGE NOTE PROVIDER - NSDCFUSCHEDAPPT_GEN_ALL_CORE_FT
Unknown, Doctor  [facility] PreAdmits  Scheduled Appointment: 05/16/2023    [facility]  DIAGR SI [address]  Scheduled Appointment: 05/16/2023    [facility]  PULED [address]  Scheduled Appointment: 06/13/2023    Darren Murphy  [facility]  PULED [address]  Scheduled Appointment: 06/13/2023

## 2023-04-24 NOTE — DISCHARGE NOTE PROVIDER - CARE PROVIDER_API CALL
Dusty Mack [facility]  [address]  [place] [address]  Phone: [phone]  Fax: [phone]  Follow Up Time:     Maco Prieto)  Orthopaedic Surgery  [address] [place] [address]  Phone: [phone]  Fax: [phone]  Follow Up Time:

## 2023-04-24 NOTE — DISCHARGE NOTE PROVIDER - HOSPITAL COURSE
68 year old male past medical history of Coronary Artery Disease, COPD, Stroke with residual L sided deficit, facial droop, AAA, PE, b/l hip replacements comes to emergency room for left lower back going down his left hip and cannot walk due to pain and called 911. He states that the pain started yesterday without any enticing trauma. No fever/chills. He ambulates with a cane and rollator at baseline. The pain radiates down L leg down to knee. ROM is limited to his baseline weakness as well as pain. MRI showed Hip showed nonspecific fluid around hip joint. MRI knee showed Questionable nondisplaced medial meniscal tear w/ Mild cartilage loss in the medial and patellofemoral compartments. A 2 cm ganglion cyst near the medial gastrocnemius origin and Trace muscular edema of the flexor hallucis longus and medial   gastrocnemius, of uncertain significance. D/w ortho, outpatient management. C/w Gabapentin for pain management. All other PMHx were stable.

## 2023-04-24 NOTE — DISCHARGE NOTE NURSING/CASE MANAGEMENT/SOCIAL WORK - PATIENT PORTAL LINK FT
You can access the FollowMyHealth Patient Portal offered by [facility] by registering at the following website: [email or web address]. By joining [employer]’s FollowMyHealth portal, you will also be able to view your health information using other applications (apps) compatible with our system.

## 2023-04-24 NOTE — DISCHARGE NOTE PROVIDER - NSDCCPCAREPLAN_GEN_ALL_CORE_FT
PRINCIPAL DISCHARGE DIAGNOSIS  Diagnosis: Left knee pain  Assessment and Plan of Treatment: While you were here, you were MRI showed you had no acute fractures in your knee however, you had a possible nondisplaced medial meniscal tear. Other findings included mild cartilage loss in the medial and patellofemoral compartments as well as a 2 cm ganglion cyst near the medial gastrocnemius origin. Lastly there was trace muscular edema of the flexor hallucis longus and medial gastrocnemius, of uncertain significance. Please follow up with your PCP regarding this hospitalization. Please follow up with orthopedics, a referral has been given to you. Please continue w/ gabapentin, tylenol and oxycodone for pain control. Please do not take more than 2 tablets of oxycodone per day.

## 2023-04-25 LAB
CULTURE RESULTS: SIGNIFICANT CHANGE UP
CULTURE RESULTS: SIGNIFICANT CHANGE UP
SPECIMEN SOURCE: SIGNIFICANT CHANGE UP
SPECIMEN SOURCE: SIGNIFICANT CHANGE UP

## 2023-04-27 DIAGNOSIS — J44.9 CHRONIC OBSTRUCTIVE PULMONARY DISEASE, UNSPECIFIED: ICD-10-CM

## 2023-04-27 DIAGNOSIS — R60.0 LOCALIZED EDEMA: ICD-10-CM

## 2023-04-27 DIAGNOSIS — R26.0 ATAXIC GAIT: ICD-10-CM

## 2023-04-27 DIAGNOSIS — S83.242A OTHER TEAR OF MEDIAL MENISCUS, CURRENT INJURY, LEFT KNEE, INITIAL ENCOUNTER: ICD-10-CM

## 2023-04-27 DIAGNOSIS — Z95.1 PRESENCE OF AORTOCORONARY BYPASS GRAFT: ICD-10-CM

## 2023-04-27 DIAGNOSIS — L97.529 NON-PRESSURE CHRONIC ULCER OF OTHER PART OF LEFT FOOT WITH UNSPECIFIED SEVERITY: ICD-10-CM

## 2023-04-27 DIAGNOSIS — C34.91 MALIGNANT NEOPLASM OF UNSPECIFIED PART OF RIGHT BRONCHUS OR LUNG: ICD-10-CM

## 2023-04-27 DIAGNOSIS — L84 CORNS AND CALLOSITIES: ICD-10-CM

## 2023-04-27 DIAGNOSIS — I71.40 ABDOMINAL AORTIC ANEURYSM, WITHOUT RUPTURE, UNSPECIFIED: ICD-10-CM

## 2023-04-27 DIAGNOSIS — Z88.8 ALLERGY STATUS TO OTHER DRUGS, MEDICAMENTS AND BIOLOGICAL SUBSTANCES: ICD-10-CM
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
## 2023-04-27 DIAGNOSIS — Z79.52 LONG TERM (CURRENT) USE OF SYSTEMIC STEROIDS: ICD-10-CM

## 2023-04-27 DIAGNOSIS — Z79.51 LONG TERM (CURRENT) USE OF INHALED STEROIDS: ICD-10-CM

## 2023-04-27 DIAGNOSIS — M25.452 EFFUSION, LEFT HIP: ICD-10-CM

## 2023-04-27 DIAGNOSIS — I69.354 HEMIPLEGIA AND HEMIPARESIS FOLLOWING CEREBRAL INFARCTION AFFECTING LEFT NON-DOMINANT SIDE: ICD-10-CM

## 2023-04-27 DIAGNOSIS — M25.552 PAIN IN LEFT HIP: ICD-10-CM

## 2023-04-27 DIAGNOSIS — L97.519 NON-PRESSURE CHRONIC ULCER OF OTHER PART OF RIGHT FOOT WITH UNSPECIFIED SEVERITY: ICD-10-CM

## 2023-04-27 DIAGNOSIS — M94.8X8 OTHER SPECIFIED DISORDERS OF CARTILAGE, OTHER SITE: ICD-10-CM

## 2023-04-27 DIAGNOSIS — I12.9 HYPERTENSIVE CHRONIC KIDNEY DISEASE WITH STAGE 1 THROUGH STAGE 4 CHRONIC KIDNEY DISEASE, OR UNSPECIFIED CHRONIC KIDNEY DISEASE: ICD-10-CM

## 2023-04-27 DIAGNOSIS — Z79.82 LONG TERM (CURRENT) USE OF ASPIRIN: ICD-10-CM

## 2023-04-27 DIAGNOSIS — Z79.899 OTHER LONG TERM (CURRENT) DRUG THERAPY: ICD-10-CM

## 2023-04-27 DIAGNOSIS — I25.10 ATHEROSCLEROTIC HEART DISEASE OF NATIVE CORONARY ARTERY WITHOUT ANGINA PECTORIS: ICD-10-CM
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
## 2023-04-27 DIAGNOSIS — Z86.711 PERSONAL HISTORY OF PULMONARY EMBOLISM: ICD-10-CM

## 2023-04-27 DIAGNOSIS — N40.0 BENIGN PROSTATIC HYPERPLASIA WITHOUT LOWER URINARY TRACT SYMPTOMS: ICD-10-CM

## 2023-04-27 DIAGNOSIS — G89.29 OTHER CHRONIC PAIN: ICD-10-CM

## 2023-04-27 DIAGNOSIS — B35.1 TINEA UNGUIUM: ICD-10-CM

## 2023-04-27 DIAGNOSIS — I69.392 FACIAL WEAKNESS FOLLOWING CEREBRAL INFARCTION: ICD-10-CM

## 2023-04-27 DIAGNOSIS — N18.31 CHRONIC KIDNEY DISEASE, STAGE 3A: ICD-10-CM

## 2023-04-27 DIAGNOSIS — N17.9 ACUTE KIDNEY FAILURE, UNSPECIFIED: ICD-10-CM

## 2023-04-27 DIAGNOSIS — M67.462 GANGLION, LEFT KNEE: ICD-10-CM

## 2023-04-27 DIAGNOSIS — M54.50 LOW BACK PAIN, UNSPECIFIED: ICD-10-CM

## 2023-04-27 DIAGNOSIS — Z20.822 CONTACT WITH AND (SUSPECTED) EXPOSURE TO COVID-19: ICD-10-CM

## 2023-04-27 DIAGNOSIS — Z96.643 PRESENCE OF ARTIFICIAL HIP JOINT, BILATERAL: ICD-10-CM

## 2023-05-09 ENCOUNTER — APPOINTMENT (OUTPATIENT)
Dept: ORTHOPEDIC SURGERY | Facility: CLINIC | Age: 68
End: 2023-05-09
Payer: MEDICARE

## 2023-05-09 DIAGNOSIS — M25.562 PAIN IN LEFT KNEE: ICD-10-CM

## 2023-05-09 PROCEDURE — 99203 OFFICE O/P NEW LOW 30 MIN: CPT

## 2023-05-09 NOTE — HISTORY OF PRESENT ILLNESS
[de-identified] : Patient is here for evaluation of left shoulder pain and weakness.  He has a history of stroke which has left him with left-sided weakness.  He has a walker.  He does home exercises..\par Denies any mechanical symptoms.\par \par NAD\par Left knee:\par No skin breakdown\par Tricompartmental TTP\par Positive patella grind\par PF crepitus\par rom 0-90\par Pain with forced extension and flexion\par NVI\par Compartments soft and NT\par \par Xray reviewed and significant for left knee arthritis, mild, from [facility]\par \par mri left knee.\par IMPRESSION:\par 1. No acute fracture.\par 2. Question nondisplaced medial meniscal tear.\par 3. Mild cartilage loss in the medial and patellofemoral compartments.\par 4. 2 cm ganglion cyst near the medial gastrocnemius origin.\par 5. Trace muscular edema of the flexor hallucis longus and medial gastrocnemius, of uncertain significance.\par \par plan\par I went over fines with the patient.  I explained to him that his weakness is likely secondary to his stroke.  I explained that his knee is mechanically stable.  I am recommending physical therapy but patient denies and states that he will do his home home exercises.  I recommended a hinged knee brace for stability.  Also recommending him to follow-up with his neurologist who he has not seen in a while.

## 2023-06-29 ENCOUNTER — APPOINTMENT (OUTPATIENT)
Dept: PULMONOLOGY | Facility: CLINIC | Age: 68
End: 2023-06-29

## 2023-09-05 ENCOUNTER — EMERGENCY (EMERGENCY)
Facility: HOSPITAL | Age: 68
LOS: 0 days | Discharge: HOME | End: 2023-09-06
Attending: EMERGENCY MEDICINE
Payer: MEDICARE

## 2023-09-05 VITALS
DIASTOLIC BLOOD PRESSURE: 94 MMHG | WEIGHT: 149.91 LBS | HEART RATE: 72 BPM | TEMPERATURE: 97 F | SYSTOLIC BLOOD PRESSURE: 176 MMHG | OXYGEN SATURATION: 95 % | RESPIRATION RATE: 20 BRPM

## 2023-09-05 VITALS
OXYGEN SATURATION: 98 % | HEART RATE: 70 BPM | DIASTOLIC BLOOD PRESSURE: 89 MMHG | SYSTOLIC BLOOD PRESSURE: 155 MMHG | RESPIRATION RATE: 18 BRPM

## 2023-09-05 DIAGNOSIS — K57.90 DIVERTICULOSIS OF INTESTINE, PART UNSPECIFIED, WITHOUT PERFORATION OR ABSCESS WITHOUT BLEEDING: ICD-10-CM

## 2023-09-05 DIAGNOSIS — Z85.118 PERSONAL HISTORY OF OTHER MALIGNANT NEOPLASM OF BRONCHUS AND LUNG: ICD-10-CM

## 2023-09-05 DIAGNOSIS — Z86.73 PERSONAL HISTORY OF TRANSIENT ISCHEMIC ATTACK (TIA), AND CEREBRAL INFARCTION WITHOUT RESIDUAL DEFICITS: ICD-10-CM

## 2023-09-05 DIAGNOSIS — K59.00 CONSTIPATION, UNSPECIFIED: ICD-10-CM

## 2023-09-05 DIAGNOSIS — E73.9 LACTOSE INTOLERANCE, UNSPECIFIED: ICD-10-CM

## 2023-09-05 DIAGNOSIS — Z87.891 PERSONAL HISTORY OF NICOTINE DEPENDENCE: ICD-10-CM

## 2023-09-05 DIAGNOSIS — I25.10 ATHEROSCLEROTIC HEART DISEASE OF NATIVE CORONARY ARTERY WITHOUT ANGINA PECTORIS: ICD-10-CM

## 2023-09-05 DIAGNOSIS — I10 ESSENTIAL (PRIMARY) HYPERTENSION: ICD-10-CM

## 2023-09-05 DIAGNOSIS — J44.9 CHRONIC OBSTRUCTIVE PULMONARY DISEASE, UNSPECIFIED: ICD-10-CM

## 2023-09-05 DIAGNOSIS — R10.32 LEFT LOWER QUADRANT PAIN: ICD-10-CM

## 2023-09-05 DIAGNOSIS — Z79.82 LONG TERM (CURRENT) USE OF ASPIRIN: ICD-10-CM

## 2023-09-05 DIAGNOSIS — Z87.438 PERSONAL HISTORY OF OTHER DISEASES OF MALE GENITAL ORGANS: ICD-10-CM

## 2023-09-05 DIAGNOSIS — Z86.711 PERSONAL HISTORY OF PULMONARY EMBOLISM: ICD-10-CM

## 2023-09-05 DIAGNOSIS — Z88.8 ALLERGY STATUS TO OTHER DRUGS, MEDICAMENTS AND BIOLOGICAL SUBSTANCES: ICD-10-CM

## 2023-09-05 DIAGNOSIS — Z90.2 ACQUIRED ABSENCE OF LUNG [PART OF]: ICD-10-CM

## 2023-09-05 DIAGNOSIS — M06.9 RHEUMATOID ARTHRITIS, UNSPECIFIED: ICD-10-CM

## 2023-09-05 DIAGNOSIS — E78.00 PURE HYPERCHOLESTEROLEMIA, UNSPECIFIED: ICD-10-CM

## 2023-09-05 DIAGNOSIS — Z96.649 PRESENCE OF UNSPECIFIED ARTIFICIAL HIP JOINT: Chronic | ICD-10-CM

## 2023-09-05 DIAGNOSIS — Z95.1 PRESENCE OF AORTOCORONARY BYPASS GRAFT: ICD-10-CM

## 2023-09-05 DIAGNOSIS — Z90.2 ACQUIRED ABSENCE OF LUNG [PART OF]: Chronic | ICD-10-CM

## 2023-09-05 DIAGNOSIS — Z95.1 PRESENCE OF AORTOCORONARY BYPASS GRAFT: Chronic | ICD-10-CM

## 2023-09-05 LAB
ALBUMIN SERPL ELPH-MCNC: 4.2 G/DL — SIGNIFICANT CHANGE UP (ref 3.5–5.2)
ALP SERPL-CCNC: 59 U/L — SIGNIFICANT CHANGE UP (ref 30–115)
ALT FLD-CCNC: 13 U/L — SIGNIFICANT CHANGE UP (ref 0–41)
ANION GAP SERPL CALC-SCNC: 13 MMOL/L — SIGNIFICANT CHANGE UP (ref 7–14)
AST SERPL-CCNC: 21 U/L — SIGNIFICANT CHANGE UP (ref 0–41)
BASOPHILS # BLD AUTO: 0.05 K/UL — SIGNIFICANT CHANGE UP (ref 0–0.2)
BASOPHILS NFR BLD AUTO: 0.8 % — SIGNIFICANT CHANGE UP (ref 0–1)
BILIRUB SERPL-MCNC: 0.4 MG/DL — SIGNIFICANT CHANGE UP (ref 0.2–1.2)
BUN SERPL-MCNC: 36 MG/DL — HIGH (ref 10–20)
CALCIUM SERPL-MCNC: 9 MG/DL — SIGNIFICANT CHANGE UP (ref 8.4–10.5)
CHLORIDE SERPL-SCNC: 103 MMOL/L — SIGNIFICANT CHANGE UP (ref 98–110)
CO2 SERPL-SCNC: 22 MMOL/L — SIGNIFICANT CHANGE UP (ref 17–32)
CREAT SERPL-MCNC: 1.2 MG/DL — SIGNIFICANT CHANGE UP (ref 0.7–1.5)
EGFR: 66 ML/MIN/1.73M2 — SIGNIFICANT CHANGE UP
EOSINOPHIL # BLD AUTO: 0.02 K/UL — SIGNIFICANT CHANGE UP (ref 0–0.7)
EOSINOPHIL NFR BLD AUTO: 0.3 % — SIGNIFICANT CHANGE UP (ref 0–8)
GLUCOSE SERPL-MCNC: 107 MG/DL — HIGH (ref 70–99)
HCT VFR BLD CALC: 35.7 % — LOW (ref 42–52)
HGB BLD-MCNC: 11.4 G/DL — LOW (ref 14–18)
IMM GRANULOCYTES NFR BLD AUTO: 1.3 % — HIGH (ref 0.1–0.3)
LACTATE SERPL-SCNC: 1.2 MMOL/L — SIGNIFICANT CHANGE UP (ref 0.7–2)
LYMPHOCYTES # BLD AUTO: 0.77 K/UL — LOW (ref 1.2–3.4)
LYMPHOCYTES # BLD AUTO: 12.2 % — LOW (ref 20.5–51.1)
MCHC RBC-ENTMCNC: 28.9 PG — SIGNIFICANT CHANGE UP (ref 27–31)
MCHC RBC-ENTMCNC: 31.9 G/DL — LOW (ref 32–37)
MCV RBC AUTO: 90.4 FL — SIGNIFICANT CHANGE UP (ref 80–94)
MONOCYTES # BLD AUTO: 0.64 K/UL — HIGH (ref 0.1–0.6)
MONOCYTES NFR BLD AUTO: 10.1 % — HIGH (ref 1.7–9.3)
NEUTROPHILS # BLD AUTO: 4.75 K/UL — SIGNIFICANT CHANGE UP (ref 1.4–6.5)
NEUTROPHILS NFR BLD AUTO: 75.3 % — HIGH (ref 42.2–75.2)
NRBC # BLD: 0 /100 WBCS — SIGNIFICANT CHANGE UP (ref 0–0)
PLATELET # BLD AUTO: 106 K/UL — LOW (ref 130–400)
PMV BLD: 10.8 FL — HIGH (ref 7.4–10.4)
POTASSIUM SERPL-MCNC: 5 MMOL/L — SIGNIFICANT CHANGE UP (ref 3.5–5)
POTASSIUM SERPL-SCNC: 5 MMOL/L — SIGNIFICANT CHANGE UP (ref 3.5–5)
PROT SERPL-MCNC: 8.1 G/DL — HIGH (ref 6–8)
RBC # BLD: 3.95 M/UL — LOW (ref 4.7–6.1)
RBC # FLD: 14.4 % — SIGNIFICANT CHANGE UP (ref 11.5–14.5)
SODIUM SERPL-SCNC: 138 MMOL/L — SIGNIFICANT CHANGE UP (ref 135–146)
WBC # BLD: 6.31 K/UL — SIGNIFICANT CHANGE UP (ref 4.8–10.8)
WBC # FLD AUTO: 6.31 K/UL — SIGNIFICANT CHANGE UP (ref 4.8–10.8)

## 2023-09-05 PROCEDURE — 99285 EMERGENCY DEPT VISIT HI MDM: CPT

## 2023-09-05 PROCEDURE — 85025 COMPLETE CBC W/AUTO DIFF WBC: CPT

## 2023-09-05 PROCEDURE — 87389 HIV-1 AG W/HIV-1&-2 AB AG IA: CPT

## 2023-09-05 PROCEDURE — 93010 ELECTROCARDIOGRAM REPORT: CPT

## 2023-09-05 PROCEDURE — 74176 CT ABD & PELVIS W/O CONTRAST: CPT | Mod: MA

## 2023-09-05 PROCEDURE — 80053 COMPREHEN METABOLIC PANEL: CPT

## 2023-09-05 PROCEDURE — 74176 CT ABD & PELVIS W/O CONTRAST: CPT | Mod: 26,MA

## 2023-09-05 PROCEDURE — 36415 COLL VENOUS BLD VENIPUNCTURE: CPT

## 2023-09-05 PROCEDURE — 83605 ASSAY OF LACTIC ACID: CPT

## 2023-09-05 PROCEDURE — 93005 ELECTROCARDIOGRAM TRACING: CPT

## 2023-09-05 PROCEDURE — 99285 EMERGENCY DEPT VISIT HI MDM: CPT | Mod: 25

## 2023-09-05 RX ORDER — SODIUM CHLORIDE 9 MG/ML
1000 INJECTION, SOLUTION INTRAVENOUS ONCE
Refills: 0 | Status: COMPLETED | OUTPATIENT
Start: 2023-09-05 | End: 2023-09-05

## 2023-09-05 RX ORDER — MULTIVIT WITH MIN/MFOLATE/K2 340-15/3 G
296 POWDER (GRAM) ORAL ONCE
Refills: 0 | Status: COMPLETED | OUTPATIENT
Start: 2023-09-05 | End: 2023-09-05

## 2023-09-05 RX ORDER — DIATRIZOATE MEGLUMINE 180 MG/ML
30 INJECTION, SOLUTION INTRAVESICAL ONCE
Refills: 0 | Status: COMPLETED | OUTPATIENT
Start: 2023-09-05 | End: 2023-09-05

## 2023-09-05 RX ORDER — IOHEXOL 300 MG/ML
30 INJECTION, SOLUTION INTRAVENOUS ONCE
Refills: 0 | Status: COMPLETED | OUTPATIENT
Start: 2023-09-05 | End: 2023-09-05

## 2023-09-05 RX ADMIN — SODIUM CHLORIDE 1000 MILLILITER(S): 9 INJECTION, SOLUTION INTRAVENOUS at 14:05

## 2023-09-05 RX ADMIN — DIATRIZOATE MEGLUMINE 30 MILLILITER(S): 180 INJECTION, SOLUTION INTRAVESICAL at 14:05

## 2023-09-05 RX ADMIN — Medication 296 MILLILITER(S): at 19:35

## 2023-09-05 RX ADMIN — Medication 1 ENEMA: at 19:35

## 2023-09-05 NOTE — ED PROVIDER NOTE - NSFOLLOWUPINSTRUCTIONS_ED_ALL_ED_FT
Follow up with your primary care doctor and your GI doctor in 1-2 days    Acute Abdominal Pain    WHAT YOU NEED TO KNOW:    The cause of your abdominal pain may not be found. If a cause is found, treatment will depend on what the cause is.     DISCHARGE INSTRUCTIONS:    Return to the emergency department if:     You vomit blood or cannot stop vomiting.      You have blood in your bowel movement or it looks like tar.       You have bleeding from your rectum.       Your abdomen is larger than usual, more painful, and hard.       You have severe pain in your abdomen.       You stop passing gas and having bowel movements.       You feel weak, dizzy, or faint.    Contact your healthcare provider if:     You have a fever.      You have new signs and symptoms.      Your symptoms do not get better with treatment.       You have questions or concerns about your condition or care.    Medicines may be given to decrease pain, treat an infection, and manage your symptoms. Take your medicine as directed. Call your healthcare provider if you think your medicine is not helping or if you have side effects. Tell him if you are allergic to any medicine. Keep a list of the medicines, vitamins, and herbs you take. Include the amounts, and when and why you take them. Bring the list or the pill bottles to follow-up visits. Carry your medicine list with you in case of an emergency.    Manage your symptoms:     Apply heat on your abdomen for 20 to 30 minutes every 2 hours for as many days as directed. Heat helps decrease pain and muscle spasms.       Manage your stress. Stress may cause abdominal pain. Your healthcare provider may recommend relaxation techniques and deep breathing exercises to help decrease your stress. Your healthcare provider may recommend you talk to someone about your stress or anxiety, such as a counselor or a trusted friend. Get plenty of sleep and exercise regularly.       Limit or do not drink alcohol. Alcohol can make your abdominal pain worse. Ask your healthcare provider if it is safe for you to drink alcohol. Also ask how much is safe for you to drink.       Do not smoke. Nicotine and other chemicals in cigarettes can damage your esophagus and stomach. Ask your healthcare provider for information if you currently smoke and need help to quit. E-cigarettes or smokeless tobacco still contain nicotine. Talk to your healthcare provider before you use these products.     Make changes to the food you eat as directed: Do not eat foods that cause abdominal pain or other symptoms. Eat small meals more often.     Eat more high-fiber foods if you are constipated. High-fiber foods include fruits, vegetables, whole-grain foods, and legumes.       Do not eat foods that cause gas if you have bloating. Examples include broccoli, cabbage, and cauliflower. Do not drink soda or carbonated drinks, because these may also cause gas.       Do not eat foods or drinks that contain sorbitol or fructose if you have diarrhea and bloating. Some examples are fruit juices, candy, jelly, and sugar-free gum.       Do not eat high-fat foods, such as fried foods, cheeseburgers, hot dogs, and desserts.      Limit or do not drink caffeine. Caffeine may make symptoms, such as heart burn or nausea, worse.       Drink plenty of liquids to prevent dehydration from diarrhea or vomiting. Ask your healthcare provider how much liquid to drink each day and which liquids are best for you.     Follow up with your healthcare provider as directed: Write down your questions so you remember to ask them during your visits.       © Copyright [employer] 2019 All illustrations and images included in CareNotes are the copyrighted property of [employer] or [employer]       Constipation    WHAT YOU NEED TO KNOW:    Constipation is when you have hard, dry bowel movements, or you go longer than usual between bowel movements.     DISCHARGE INSTRUCTIONS:    Call your doctor if:     You have blood in your bowel movements.      You have a fever and abdominal pain with the constipation.      Your constipation gets worse.       You start to vomit.      You have questions or concerns about your condition or care.    Medicines:     Medicine such as a laxative may help relax and loosen your intestines to help you have a bowel movement. Your provider may recommend you only use laxatives for a short time. Long-term use may make your bowels dependent on the medicine.      Take your medicine as directed. Contact your healthcare provider if you think your medicine is not helping or if you have side effects. Tell him of her if you are allergic to any medicine. Keep a list of the medicines, vitamins, and herbs you take. Include the amounts, and when and why you take them. Bring the list or the pill bottles to follow-up visits. Carry your medicine list with you in case of an emergency.    Relieve constipation:     A suppository may be used to help soften your bowel movements. This may make them easier to pass. A suppository is guided into your rectum through your anus.Suppository for Constipation           An enema is liquid medicine used to clear bowel movement from your rectum. The medicine is put into your rectum through your anus.Enemas         Prevent constipation:     Drink liquids as directed. You may need to drink extra liquids to help soften and move your bowels. Ask how much liquid to drink each day and which liquids are best for you.       Eat high-fiber foods. This may help decrease constipation by adding bulk to your bowel movements. High-fiber foods include fruit, vegetables, whole-grain breads and cereals, and beans. Your healthcare provider or dietitian can help you create a high-fiber meal plan. Your provider may also recommend a fiber supplement if you cannot get enough fiber from food.            Exercise regularly. Regular physical activity can help stimulate your intestines. Walking is a good exercise to prevent or relieve constipation. Ask which exercises are best for you.Walking for Exercise           Schedule a time each day to have a bowel movement. This may help train your body to have regular bowel movements. Bend forward while you are on the toilet to help move the bowel movement out. Sit on the toilet for at least 10 minutes, even if you do not have a bowel movement.       Talk to your healthcare provider about your medicines. Certain medicines, such as opioids, can cause constipation. Your provider may be able to make medicine changes. For example, he or she may change the kind of medicine, or change when you take it.    Follow up with your healthcare provider as directed: Write down your questions so you remember to ask them during your visits.        © Copyright [employer] 2019 All illustrations and images included in CareNotes are the copyrighted property of [employer] or [employer].

## 2023-09-05 NOTE — ED PROVIDER NOTE - NSICDXPASTSURGICALHX_GEN_ALL_CORE_FT
PAST SURGICAL HISTORY:  History of coronary artery bypass, single     History of hip replacement     S/P lobectomy of lung

## 2023-09-05 NOTE — ED ADULT NURSE NOTE - NSICDXPASTMEDICALHX_GEN_ALL_CORE_FT
PAST MEDICAL HISTORY:  CAD (coronary artery disease)     COPD, mild     Degeneration of spine     High cholesterol     History of BPH     HTN (hypertension)     Kidney stone     Pulmonary embolism     RA (rheumatoid arthritis)     Stroke

## 2023-09-05 NOTE — ED PROVIDER NOTE - NSICDXPASTMEDICALHX_GEN_ALL_CORE_FT
PAST MEDICAL HISTORY:  CAD (coronary artery disease)     Colon polyp     COPD, mild     Degeneration of spine     Diverticulosis     High cholesterol     History of BPH     History of GI bleed     HTN (hypertension)     Infrarenal abdominal aortic aneurysm (AAA) without rupture     Kidney stone     Lung cancer     Pulmonary embolism     RA (rheumatoid arthritis)     Stroke

## 2023-09-05 NOTE — ED PROVIDER NOTE - OBJECTIVE STATEMENT
68-year-old male w/ HTN, BPH, DL, COPD, AAA 3.4 cm infrarenal, CAD s/p CABG, lung cancer s/p RUL lobectomy in remission, RA (on 7.5 mg QD prednisone), hx intracranial hemorrhage w/ residual mild left UE & LE weakness and numbness (uses stroller), hx GI bleed, diverticulosis, colon polyps. Patient is presenting w/ 2 weeks of constipation, was initially passing small amount of watery BMs w/ inadequate relief. He hasn't been passing flatus since yesterday. He triad taking colace and suppositories w/o relief. He complains of dull LLQ & lower abdominal pain. Recent surgery 2 weeks ago for release of hammer toe in LLE & debridement of RLE (heel). Patient reports he received morphine during his hospital stay, but no opioids afterwards. He took Antibiotics during his hospital stay as post op prophylaxis (unsure of name, possibly Linezolid based on SureScripts) 68-year-old male w/ HTN, BPH, DL, COPD, AAA 3.4 cm infrarenal, CAD s/p CABG, lung cancer s/p RUL lobectomy in remission, RA (on 7.5 mg QD prednisone), hx intracranial hemorrhage w/ residual mild left UE & LE weakness and numbness (uses stroller), hx GI bleed, diverticulosis, colon polyps. Patient is presenting w/ 2 weeks of constipation, was initially passing small amount of watery BMs w/ inadequate relief. He hasn't been passing flatus since yesterday. He triad taking colace and suppositories w/o relief. He complains of dull LLQ & lower abdominal pain. Recent surgery 2 weeks ago for release of hammer toe in LLE & debridement of RLE (heel). Patient reports he received morphine during his hospital stay, but no opioids afterwards. He took Antibiotics during his hospital stay as post op prophylaxis (unsure of name, possibly Linezolid based on SureScripts)    BP was 176/94, HR 72. Denies headache, chest pain, decreased urine output, blurry vision. Took Amlodipine 10 mg & metoprolol 25 mg in am

## 2023-09-05 NOTE — ED PROVIDER NOTE - CLINICAL SUMMARY MEDICAL DECISION MAKING FREE TEXT BOX
done patient has improved now asymptomatic at this time he has had multiple bowel movements here in the ED.  I will discharge patient is aware of known aneurysm.

## 2023-09-05 NOTE — ED PROVIDER NOTE - PHYSICAL EXAMINATION
VITAL SIGNS: AFebrile, vital signs stable  CONSTITUTIONAL: thin; in no acute distress.  SKIN: Skin exam is dry, no acute rash  HEAD: Normocephalic; atraumatic.  EYES: Pupils equal round reactive to light, Extraocular movements intact; conjunctiva and sclera clear.  ENT: No nasal discharge; airway clear. mildly dry mucus membranes.  NECK: Supple; non tender. No rigidity  CARD: Regular rate and rhythm. Normal S1, S2;  RESP: no respiratory distress  ABD: Abdomen soft; non-tender; non-distended;  No costovertebral angle tenderness.   EXT: Normal ROM. No clubbing, cyanosis or edema. No calf tenderness to palpation.  NEURO: Alert and oriented x 3. Mild weakness of LUE & LLE  PSYCH: Cooperative, appropriate.   SHEA: empty rectal vault, Chaperone Dr Brooke

## 2023-09-05 NOTE — ED PROVIDER NOTE - PATIENT PORTAL LINK FT
You can access the FollowMyHealth Patient Portal offered by [facility] by registering at the following website: [email or web address]. By joining [employer]’s FollowMyHealth portal, you will also be able to view your health information using other applications (apps) compatible with our system.

## 2023-09-06 LAB — HIV 1+2 AB+HIV1 P24 AG SERPL QL IA: SIGNIFICANT CHANGE UP

## 2023-11-28 ENCOUNTER — APPOINTMENT (OUTPATIENT)
Dept: PULMONOLOGY | Facility: CLINIC | Age: 68
End: 2023-11-28
Payer: MEDICARE

## 2023-11-28 VITALS
HEIGHT: 69 IN | WEIGHT: 145 LBS | HEART RATE: 68 BPM | BODY MASS INDEX: 21.48 KG/M2 | DIASTOLIC BLOOD PRESSURE: 80 MMHG | SYSTOLIC BLOOD PRESSURE: 130 MMHG | OXYGEN SATURATION: 96 % | RESPIRATION RATE: 12 BRPM

## 2023-11-28 DIAGNOSIS — Z85.118 PERSONAL HISTORY OF OTHER MALIGNANT NEOPLASM OF BRONCHUS AND LUNG: ICD-10-CM

## 2023-11-28 DIAGNOSIS — J44.9 CHRONIC OBSTRUCTIVE PULMONARY DISEASE, UNSPECIFIED: ICD-10-CM

## 2023-11-28 PROBLEM — K57.90 DIVERTICULOSIS OF INTESTINE, PART UNSPECIFIED, WITHOUT PERFORATION OR ABSCESS WITHOUT BLEEDING: Chronic | Status: ACTIVE | Noted: 2023-09-05

## 2023-11-28 PROBLEM — Z87.19 PERSONAL HISTORY OF OTHER DISEASES OF THE DIGESTIVE SYSTEM: Chronic | Status: ACTIVE | Noted: 2023-09-05

## 2023-11-28 PROBLEM — I71.43 INFRARENAL ABDOMINAL AORTIC ANEURYSM, WITHOUT RUPTURE: Chronic | Status: ACTIVE | Noted: 2023-09-05

## 2023-11-28 PROBLEM — M06.9 RHEUMATOID ARTHRITIS, UNSPECIFIED: Chronic | Status: ACTIVE | Noted: 2023-09-05

## 2023-11-28 PROBLEM — E78.00 PURE HYPERCHOLESTEROLEMIA, UNSPECIFIED: Chronic | Status: ACTIVE | Noted: 2023-09-05

## 2023-11-28 PROBLEM — C34.90 MALIGNANT NEOPLASM OF UNSPECIFIED PART OF UNSPECIFIED BRONCHUS OR LUNG: Chronic | Status: ACTIVE | Noted: 2023-09-05

## 2023-11-28 PROBLEM — K63.5 POLYP OF COLON: Chronic | Status: ACTIVE | Noted: 2023-09-05

## 2023-11-28 PROCEDURE — 99213 OFFICE O/P EST LOW 20 MIN: CPT

## 2023-11-28 RX ORDER — ALBUTEROL SULFATE 90 UG/1
108 (90 BASE) INHALANT RESPIRATORY (INHALATION)
Qty: 1 | Refills: 3 | Status: ACTIVE | COMMUNITY
Start: 1900-01-01 | End: 1900-01-01

## 2023-11-28 RX ORDER — BUDESONIDE AND FORMOTEROL FUMARATE DIHYDRATE 160; 4.5 UG/1; UG/1
160-4.5 AEROSOL RESPIRATORY (INHALATION) TWICE DAILY
Qty: 1 | Refills: 5 | Status: ACTIVE | COMMUNITY
Start: 1900-01-01 | End: 1900-01-01

## 2024-01-23 ENCOUNTER — OUTPATIENT (OUTPATIENT)
Dept: OUTPATIENT SERVICES | Facility: HOSPITAL | Age: 69
LOS: 1 days | End: 2024-01-23
Payer: MEDICARE

## 2024-01-23 DIAGNOSIS — Z00.8 ENCOUNTER FOR OTHER GENERAL EXAMINATION: ICD-10-CM

## 2024-01-23 DIAGNOSIS — Z96.649 PRESENCE OF UNSPECIFIED ARTIFICIAL HIP JOINT: Chronic | ICD-10-CM

## 2024-01-23 DIAGNOSIS — Z95.1 PRESENCE OF AORTOCORONARY BYPASS GRAFT: Chronic | ICD-10-CM

## 2024-01-23 DIAGNOSIS — N20.0 CALCULUS OF KIDNEY: ICD-10-CM

## 2024-01-23 DIAGNOSIS — Z90.2 ACQUIRED ABSENCE OF LUNG [PART OF]: Chronic | ICD-10-CM

## 2024-01-23 PROCEDURE — 76770 US EXAM ABDO BACK WALL COMP: CPT

## 2024-01-23 PROCEDURE — 76770 US EXAM ABDO BACK WALL COMP: CPT | Mod: 26

## 2024-01-24 DIAGNOSIS — N20.0 CALCULUS OF KIDNEY: ICD-10-CM

## 2024-02-04 ENCOUNTER — INPATIENT (INPATIENT)
Facility: HOSPITAL | Age: 69
LOS: 2 days | Discharge: HOME | DRG: 313 | End: 2024-02-07
Attending: STUDENT IN AN ORGANIZED HEALTH CARE EDUCATION/TRAINING PROGRAM | Admitting: INTERNAL MEDICINE
Payer: MEDICARE

## 2024-02-04 VITALS
OXYGEN SATURATION: 99 % | HEART RATE: 62 BPM | SYSTOLIC BLOOD PRESSURE: 170 MMHG | WEIGHT: 152.12 LBS | DIASTOLIC BLOOD PRESSURE: 80 MMHG | RESPIRATION RATE: 18 BRPM | TEMPERATURE: 98 F

## 2024-02-04 DIAGNOSIS — R07.9 CHEST PAIN, UNSPECIFIED: ICD-10-CM

## 2024-02-04 DIAGNOSIS — Z96.649 PRESENCE OF UNSPECIFIED ARTIFICIAL HIP JOINT: Chronic | ICD-10-CM

## 2024-02-04 DIAGNOSIS — Z95.1 PRESENCE OF AORTOCORONARY BYPASS GRAFT: Chronic | ICD-10-CM

## 2024-02-04 DIAGNOSIS — Z90.2 ACQUIRED ABSENCE OF LUNG [PART OF]: Chronic | ICD-10-CM

## 2024-02-04 LAB
ALBUMIN SERPL ELPH-MCNC: 3.6 G/DL — SIGNIFICANT CHANGE UP (ref 3.5–5.2)
ALBUMIN SERPL ELPH-MCNC: 3.8 G/DL — SIGNIFICANT CHANGE UP (ref 3.5–5.2)
ALP SERPL-CCNC: 64 U/L — SIGNIFICANT CHANGE UP (ref 30–115)
ALP SERPL-CCNC: 65 U/L — SIGNIFICANT CHANGE UP (ref 30–115)
ALT FLD-CCNC: 14 U/L — SIGNIFICANT CHANGE UP (ref 0–41)
ALT FLD-CCNC: 15 U/L — SIGNIFICANT CHANGE UP (ref 0–41)
ANION GAP SERPL CALC-SCNC: 12 MMOL/L — SIGNIFICANT CHANGE UP (ref 7–14)
ANION GAP SERPL CALC-SCNC: 9 MMOL/L — SIGNIFICANT CHANGE UP (ref 7–14)
AST SERPL-CCNC: 21 U/L — SIGNIFICANT CHANGE UP (ref 0–41)
AST SERPL-CCNC: 24 U/L — SIGNIFICANT CHANGE UP (ref 0–41)
BASOPHILS # BLD AUTO: 0.05 K/UL — SIGNIFICANT CHANGE UP (ref 0–0.2)
BASOPHILS NFR BLD AUTO: 1 % — SIGNIFICANT CHANGE UP (ref 0–1)
BILIRUB SERPL-MCNC: 0.3 MG/DL — SIGNIFICANT CHANGE UP (ref 0.2–1.2)
BILIRUB SERPL-MCNC: 0.4 MG/DL — SIGNIFICANT CHANGE UP (ref 0.2–1.2)
BUN SERPL-MCNC: 24 MG/DL — HIGH (ref 10–20)
BUN SERPL-MCNC: 29 MG/DL — HIGH (ref 10–20)
CALCIUM SERPL-MCNC: 8.8 MG/DL — SIGNIFICANT CHANGE UP (ref 8.4–10.5)
CALCIUM SERPL-MCNC: 9 MG/DL — SIGNIFICANT CHANGE UP (ref 8.4–10.5)
CHLORIDE SERPL-SCNC: 100 MMOL/L — SIGNIFICANT CHANGE UP (ref 98–110)
CHLORIDE SERPL-SCNC: 101 MMOL/L — SIGNIFICANT CHANGE UP (ref 98–110)
CO2 SERPL-SCNC: 22 MMOL/L — SIGNIFICANT CHANGE UP (ref 17–32)
CO2 SERPL-SCNC: 27 MMOL/L — SIGNIFICANT CHANGE UP (ref 17–32)
CREAT SERPL-MCNC: 1.2 MG/DL — SIGNIFICANT CHANGE UP (ref 0.7–1.5)
CREAT SERPL-MCNC: 1.3 MG/DL — SIGNIFICANT CHANGE UP (ref 0.7–1.5)
D DIMER BLD IA.RAPID-MCNC: 472 NG/ML DDU — HIGH
EGFR: 59 ML/MIN/1.73M2 — LOW
EGFR: 65 ML/MIN/1.73M2 — SIGNIFICANT CHANGE UP
EOSINOPHIL # BLD AUTO: 0.08 K/UL — SIGNIFICANT CHANGE UP (ref 0–0.7)
EOSINOPHIL NFR BLD AUTO: 1.6 % — SIGNIFICANT CHANGE UP (ref 0–8)
GLUCOSE SERPL-MCNC: 103 MG/DL — HIGH (ref 70–99)
GLUCOSE SERPL-MCNC: 94 MG/DL — SIGNIFICANT CHANGE UP (ref 70–99)
HCT VFR BLD CALC: 37.3 % — LOW (ref 42–52)
HCT VFR BLD CALC: 38.3 % — LOW (ref 42–52)
HGB BLD-MCNC: 12.5 G/DL — LOW (ref 14–18)
HGB BLD-MCNC: 13 G/DL — LOW (ref 14–18)
IMM GRANULOCYTES NFR BLD AUTO: 1 % — HIGH (ref 0.1–0.3)
LYMPHOCYTES # BLD AUTO: 1.04 K/UL — LOW (ref 1.2–3.4)
LYMPHOCYTES # BLD AUTO: 21 % — SIGNIFICANT CHANGE UP (ref 20.5–51.1)
MCHC RBC-ENTMCNC: 31.7 PG — HIGH (ref 27–31)
MCHC RBC-ENTMCNC: 32.1 PG — HIGH (ref 27–31)
MCHC RBC-ENTMCNC: 33.5 G/DL — SIGNIFICANT CHANGE UP (ref 32–37)
MCHC RBC-ENTMCNC: 33.9 G/DL — SIGNIFICANT CHANGE UP (ref 32–37)
MCV RBC AUTO: 94.6 FL — HIGH (ref 80–94)
MCV RBC AUTO: 94.7 FL — HIGH (ref 80–94)
MONOCYTES # BLD AUTO: 0.66 K/UL — HIGH (ref 0.1–0.6)
MONOCYTES NFR BLD AUTO: 13.3 % — HIGH (ref 1.7–9.3)
NEUTROPHILS # BLD AUTO: 3.08 K/UL — SIGNIFICANT CHANGE UP (ref 1.4–6.5)
NEUTROPHILS NFR BLD AUTO: 62.1 % — SIGNIFICANT CHANGE UP (ref 42.2–75.2)
NRBC # BLD: 0 /100 WBCS — SIGNIFICANT CHANGE UP (ref 0–0)
NRBC # BLD: 0 /100 WBCS — SIGNIFICANT CHANGE UP (ref 0–0)
NT-PROBNP SERPL-SCNC: 1486 PG/ML — HIGH (ref 0–300)
NT-PROBNP SERPL-SCNC: 962 PG/ML — HIGH (ref 0–300)
PLATELET # BLD AUTO: 119 K/UL — LOW (ref 130–400)
PLATELET # BLD AUTO: 131 K/UL — SIGNIFICANT CHANGE UP (ref 130–400)
PMV BLD: 10.1 FL — SIGNIFICANT CHANGE UP (ref 7.4–10.4)
PMV BLD: 10.2 FL — SIGNIFICANT CHANGE UP (ref 7.4–10.4)
POTASSIUM SERPL-MCNC: 4 MMOL/L — SIGNIFICANT CHANGE UP (ref 3.5–5)
POTASSIUM SERPL-MCNC: 4.3 MMOL/L — SIGNIFICANT CHANGE UP (ref 3.5–5)
POTASSIUM SERPL-SCNC: 4 MMOL/L — SIGNIFICANT CHANGE UP (ref 3.5–5)
POTASSIUM SERPL-SCNC: 4.3 MMOL/L — SIGNIFICANT CHANGE UP (ref 3.5–5)
PROT SERPL-MCNC: 7.1 G/DL — SIGNIFICANT CHANGE UP (ref 6–8)
PROT SERPL-MCNC: 7.4 G/DL — SIGNIFICANT CHANGE UP (ref 6–8)
RBC # BLD: 3.94 M/UL — LOW (ref 4.7–6.1)
RBC # BLD: 4.05 M/UL — LOW (ref 4.7–6.1)
RBC # FLD: 12.5 % — SIGNIFICANT CHANGE UP (ref 11.5–14.5)
RBC # FLD: 12.6 % — SIGNIFICANT CHANGE UP (ref 11.5–14.5)
SODIUM SERPL-SCNC: 135 MMOL/L — SIGNIFICANT CHANGE UP (ref 135–146)
SODIUM SERPL-SCNC: 136 MMOL/L — SIGNIFICANT CHANGE UP (ref 135–146)
TROPONIN SAMPLING TIME: SIGNIFICANT CHANGE UP
TROPONIN SAMPLING TIME: SIGNIFICANT CHANGE UP
TROPONIN T, HIGH SENSITIVITY RESULT: 28 NG/L — HIGH (ref 6–21)
TROPONIN T, HIGH SENSITIVITY RESULT: 29 NG/L — HIGH (ref 6–21)
WBC # BLD: 3.43 K/UL — LOW (ref 4.8–10.8)
WBC # BLD: 4.96 K/UL — SIGNIFICANT CHANGE UP (ref 4.8–10.8)
WBC # FLD AUTO: 3.43 K/UL — LOW (ref 4.8–10.8)
WBC # FLD AUTO: 4.96 K/UL — SIGNIFICANT CHANGE UP (ref 4.8–10.8)

## 2024-02-04 PROCEDURE — 99221 1ST HOSP IP/OBS SF/LOW 40: CPT

## 2024-02-04 PROCEDURE — 80053 COMPREHEN METABOLIC PANEL: CPT

## 2024-02-04 PROCEDURE — 71045 X-RAY EXAM CHEST 1 VIEW: CPT | Mod: 26

## 2024-02-04 PROCEDURE — 97110 THERAPEUTIC EXERCISES: CPT | Mod: GP

## 2024-02-04 PROCEDURE — 83880 ASSAY OF NATRIURETIC PEPTIDE: CPT

## 2024-02-04 PROCEDURE — 97116 GAIT TRAINING THERAPY: CPT | Mod: GP

## 2024-02-04 PROCEDURE — 93005 ELECTROCARDIOGRAM TRACING: CPT

## 2024-02-04 PROCEDURE — 83735 ASSAY OF MAGNESIUM: CPT

## 2024-02-04 PROCEDURE — 36415 COLL VENOUS BLD VENIPUNCTURE: CPT

## 2024-02-04 PROCEDURE — 71275 CT ANGIOGRAPHY CHEST: CPT | Mod: 26,MA

## 2024-02-04 PROCEDURE — 78452 HT MUSCLE IMAGE SPECT MULT: CPT

## 2024-02-04 PROCEDURE — 93010 ELECTROCARDIOGRAM REPORT: CPT | Mod: 76

## 2024-02-04 PROCEDURE — 85379 FIBRIN DEGRADATION QUANT: CPT

## 2024-02-04 PROCEDURE — 94760 N-INVAS EAR/PLS OXIMETRY 1: CPT

## 2024-02-04 PROCEDURE — 85027 COMPLETE CBC AUTOMATED: CPT

## 2024-02-04 PROCEDURE — 93017 CV STRESS TEST TRACING ONLY: CPT

## 2024-02-04 PROCEDURE — 99285 EMERGENCY DEPT VISIT HI MDM: CPT | Mod: 25

## 2024-02-04 PROCEDURE — 71275 CT ANGIOGRAPHY CHEST: CPT

## 2024-02-04 PROCEDURE — 97162 PT EVAL MOD COMPLEX 30 MIN: CPT | Mod: GP

## 2024-02-04 PROCEDURE — 74174 CTA ABD&PLVS W/CONTRAST: CPT | Mod: 26,MA

## 2024-02-04 PROCEDURE — A9500: CPT

## 2024-02-04 PROCEDURE — 93306 TTE W/DOPPLER COMPLETE: CPT

## 2024-02-04 PROCEDURE — C9113: CPT

## 2024-02-04 PROCEDURE — 99222 1ST HOSP IP/OBS MODERATE 55: CPT

## 2024-02-04 PROCEDURE — 85025 COMPLETE CBC W/AUTO DIFF WBC: CPT

## 2024-02-04 PROCEDURE — G0378: CPT

## 2024-02-04 RX ORDER — ACETAMINOPHEN 500 MG
975 TABLET ORAL ONCE
Refills: 0 | Status: COMPLETED | OUTPATIENT
Start: 2024-02-04 | End: 2024-02-04

## 2024-02-04 RX ORDER — GABAPENTIN 400 MG/1
300 CAPSULE ORAL THREE TIMES A DAY
Refills: 0 | Status: DISCONTINUED | OUTPATIENT
Start: 2024-02-04 | End: 2024-02-07

## 2024-02-04 RX ORDER — PANTOPRAZOLE SODIUM 20 MG/1
40 TABLET, DELAYED RELEASE ORAL ONCE
Refills: 0 | Status: COMPLETED | OUTPATIENT
Start: 2024-02-04 | End: 2024-02-04

## 2024-02-04 RX ORDER — BUDESONIDE AND FORMOTEROL FUMARATE DIHYDRATE 160; 4.5 UG/1; UG/1
2 AEROSOL RESPIRATORY (INHALATION)
Refills: 0 | Status: DISCONTINUED | OUTPATIENT
Start: 2024-02-04 | End: 2024-02-07

## 2024-02-04 RX ORDER — ASPIRIN/CALCIUM CARB/MAGNESIUM 324 MG
325 TABLET ORAL DAILY
Refills: 0 | Status: DISCONTINUED | OUTPATIENT
Start: 2024-02-04 | End: 2024-02-07

## 2024-02-04 RX ORDER — ACETAMINOPHEN 500 MG
650 TABLET ORAL EVERY 6 HOURS
Refills: 0 | Status: DISCONTINUED | OUTPATIENT
Start: 2024-02-04 | End: 2024-02-07

## 2024-02-04 RX ORDER — AMLODIPINE BESYLATE 2.5 MG/1
10 TABLET ORAL DAILY
Refills: 0 | Status: DISCONTINUED | OUTPATIENT
Start: 2024-02-04 | End: 2024-02-07

## 2024-02-04 RX ORDER — FOLIC ACID 0.8 MG
1 TABLET ORAL DAILY
Refills: 0 | Status: DISCONTINUED | OUTPATIENT
Start: 2024-02-04 | End: 2024-02-07

## 2024-02-04 RX ORDER — SULFASALAZINE 500 MG
500 TABLET ORAL
Refills: 0 | Status: DISCONTINUED | OUTPATIENT
Start: 2024-02-04 | End: 2024-02-05

## 2024-02-04 RX ORDER — FINASTERIDE 5 MG/1
5 TABLET, FILM COATED ORAL DAILY
Refills: 0 | Status: DISCONTINUED | OUTPATIENT
Start: 2024-02-04 | End: 2024-02-07

## 2024-02-04 RX ORDER — METOPROLOL TARTRATE 50 MG
25 TABLET ORAL
Refills: 0 | Status: DISCONTINUED | OUTPATIENT
Start: 2024-02-04 | End: 2024-02-07

## 2024-02-04 RX ORDER — TAMSULOSIN HYDROCHLORIDE 0.4 MG/1
0.4 CAPSULE ORAL AT BEDTIME
Refills: 0 | Status: DISCONTINUED | OUTPATIENT
Start: 2024-02-04 | End: 2024-02-07

## 2024-02-04 RX ORDER — REGADENOSON 0.08 MG/ML
0.4 INJECTION, SOLUTION INTRAVENOUS ONCE
Refills: 0 | Status: DISCONTINUED | OUTPATIENT
Start: 2024-02-04 | End: 2024-02-07

## 2024-02-04 RX ORDER — ALBUTEROL 90 UG/1
1 AEROSOL, METERED ORAL EVERY 6 HOURS
Refills: 0 | Status: DISCONTINUED | OUTPATIENT
Start: 2024-02-04 | End: 2024-02-07

## 2024-02-04 RX ORDER — ONDANSETRON 8 MG/1
4 TABLET, FILM COATED ORAL EVERY 8 HOURS
Refills: 0 | Status: DISCONTINUED | OUTPATIENT
Start: 2024-02-04 | End: 2024-02-07

## 2024-02-04 RX ADMIN — BUDESONIDE AND FORMOTEROL FUMARATE DIHYDRATE 2 PUFF(S): 160; 4.5 AEROSOL RESPIRATORY (INHALATION) at 09:31

## 2024-02-04 RX ADMIN — TAMSULOSIN HYDROCHLORIDE 0.4 MILLIGRAM(S): 0.4 CAPSULE ORAL at 21:36

## 2024-02-04 RX ADMIN — Medication 975 MILLIGRAM(S): at 04:04

## 2024-02-04 RX ADMIN — ALBUTEROL 1 PUFF(S): 90 AEROSOL, METERED ORAL at 09:30

## 2024-02-04 RX ADMIN — Medication 30 MILLILITER(S): at 12:57

## 2024-02-04 RX ADMIN — Medication 25 MILLIGRAM(S): at 18:12

## 2024-02-04 RX ADMIN — ALBUTEROL 1 PUFF(S): 90 AEROSOL, METERED ORAL at 13:43

## 2024-02-04 RX ADMIN — Medication 1 MILLIGRAM(S): at 13:42

## 2024-02-04 RX ADMIN — Medication 500 MILLIGRAM(S): at 18:12

## 2024-02-04 RX ADMIN — Medication 325 MILLIGRAM(S): at 13:42

## 2024-02-04 RX ADMIN — PANTOPRAZOLE SODIUM 40 MILLIGRAM(S): 20 TABLET, DELAYED RELEASE ORAL at 12:57

## 2024-02-04 NOTE — H&P ADULT - NSHPLABSRESULTS_GEN_ALL_CORE
LABS:  cret                        13.0   4.96  )-----------( 131      ( 04 Feb 2024 03:53 )             38.3     02-04    135  |  101  |  29<H>  ----------------------------<  94  4.0   |  22  |  1.3    Ca    9.0      04 Feb 2024 03:53    TPro  7.4  /  Alb  3.8  /  TBili  0.3  /  DBili  x   /  AST  24  /  ALT  14  /  AlkPhos  65  02-04      RADIOLOGY:    CT Angio Abdomen and Pelvis w/ IV Cont (02.04.24 @ 04:52)     IMPRESSION:  1.  No evidence ofacute aortic dissection or acute aortic syndrome.  2.  No definite evidence of thoracic or abdominopelvic pathology given   limitations of streak artifact from lumbar and bilateral hip prosthesis   artifact noted.  3.  Stable abdominal aortic aneurysm measuring up to 3.2 cm

## 2024-02-04 NOTE — ED ADULT NURSE NOTE - NSFALLHARMRISKINTERV_ED_ALL_ED
Assistance OOB with selected safe patient handling equipment if applicable/Assistance with ambulation/Communicate risk of Fall with Harm to all staff, patient, and family/Monitor gait and stability/Provide visual cue: red socks, yellow wristband, yellow gown, etc/Reinforce activity limits and safety measures with patient and family/Bed in lowest position, wheels locked, appropriate side rails in place/Call bell, personal items and telephone in reach/Instruct patient to call for assistance before getting out of bed/chair/stretcher/Non-slip footwear applied when patient is off stretcher/[place] to call system/Physically safe environment - no spills, clutter or unnecessary equipment/Purposeful Proactive Rounding/Room/bathroom lighting operational, light cord in reach

## 2024-02-04 NOTE — H&P ADULT - HISTORY OF PRESENT ILLNESS
68-year-old male, history of HTN, BPH, COPD, AAA, CAD s/p CABG x1, intracranial hemorrhage with residual left hemiparesis states he was woken up around 1am out of his sleep with left-sided chest and left arm pain. States since then, he has not been able to make a fist with his left hand which he can normally do. Denies any trauma to hand or left chest wall. Denies mid sternal CP< SOB, HA, dizziness, diaphoresis abd pain, n/v/d.

## 2024-02-04 NOTE — ED PROVIDER NOTE - OBJECTIVE STATEMENT
69-year-old male with a past medical history of CAD with 1 stent and single cardiac bypass, COPD, history of hemorrhagic stroke with left-sided deficits, history of abdominal aortic aneurysm, hypertension, hyperlipidemia, BPH history of lung cancer status post partial right lung resection, and atrial fibrillation status post resection presents to the ED for evaluation of left rib pain radiating to the left upper back that woke him up out of his sleep.  Patient reports left arm pain and feeling like he cannot move his left fingers.  Patient denies headache, dizziness, visual changes, neck pain, shortness of breath, abdominal pain, nausea, vomiting, diarrhea, constipation, chills or recent trauma.

## 2024-02-04 NOTE — H&P ADULT - NSHPPHYSICALEXAM_GEN_ALL_CORE
VITALS:   T(C): 36.6 (02-04-24 @ 03:40), Max: 36.6 (02-04-24 @ 03:40)  HR: 62 (02-04-24 @ 03:40) (62 - 62)  BP: 170/80 (02-04-24 @ 03:40) (170/80 - 170/80)  RR: 18 (02-04-24 @ 03:40) (18 - 18)  SpO2: 99% (02-04-24 @ 03:40) (99% - 99%)    GENERAL: NAD, lying in bed comfortably  HEAD:  Atraumatic, Normocephalic  EYES: EOMI, conjunctiva and sclera clear  ENT: Moist mucous membranes  NECK: Supple, No JVD  CHEST/LUNG: CTA b/l, . Unlabored respirations, reproducible left lateral chest wall ttp over all left lateral ribs  HEART: Regular rate and rhythm; No murmurs, rubs, or gallops  ABDOMEN: Bowel sounds present; Soft, Nontender, Nondistended.   EXTREMITIES:  No clubbing, cyanosis, or edema  NERVOUS SYSTEM:  Alert & Oriented X3, speech clear. No deficits   MSK: FROM all 4 extremities, full and equal strength  SKIN: No rashes or lesions

## 2024-02-04 NOTE — ED ADULT NURSE NOTE - CHPI ED NUR SYMPTOMS POS
Pt BIBA for left side pain, radiating from under his arm. Pt states pain woke him from his sleep/PAIN

## 2024-02-04 NOTE — PROGRESS NOTE ADULT - ASSESSMENT
68-year-old male, history of HTN, BPH, COPD, AAA, CAD s/p CABG x1, intracranial hemorrhage with residual left hemiparesis states he was woken up around 1am out of his sleep with left-sided chest and left arm pain, pain has improved    Assessment    ·	Nonspecific chest pain in patient with CAD with CABG  ·	AAA, CTA showing mural thrombus, d/w vascular surgery and no intervention and outpatient follow up  ·	Hemorrhagic stroke with residual left hemiparesis  ·	COPD  ·	BPH    Plan     68-year-old male, history of HTN, BPH, COPD, AAA, CAD s/p CABG x1, intracranial hemorrhage with residual left hemiparesis states he was woken up around 1am out of his sleep with left-sided chest and left arm pain, pain has improved    Assessment    ·	Nonspecific chest pain in patient with CAD with CABG  ·	AAA, CTA showing mural thrombus, d/w vascular surgery and no intervention and outpatient follow up  ·	Hemorrhagic stroke with residual left hemiparesis  ·	HTN  ·	COPD  ·	BPH    Plan    - c/w home aspirin lopressor 25 bid, stress test as requested per cardio f/u d-dimer  - AAA same size as past, d/w vascular in detail regarding mural thrombus and AAA, no intervention here and continue home mgmt with outpatient follow up  - c/w amlodipine 10 qd  - symbicort  - tamsulosin / finasteride  - c/w home sulfasalazine    Pending: stress test, d-dimer    # DVT PPX: will do SCDs as high risk due to past hemorrhagic stroke

## 2024-02-04 NOTE — ED PROVIDER NOTE - CLINICAL SUMMARY MEDICAL DECISION MAKING FREE TEXT BOX
68-year-old male, history of HTN, BPH, COPD, AAA, CABG, intracranial hemorrhage with residual right hemiparesis woke up with left-sided chest pain and left arm pain.  Labs noted for WBC 4.9, hemoglobin 13, troponin 28, 29, .  Chest x-ray no acute disease CTA chest and abdomen shows no dissection, stable AAA.  Will admit to telemetry for chest pain.

## 2024-02-04 NOTE — ED ADULT NURSE NOTE - NSICDXPASTSURGICALHX_GEN_ALL_CORE_FT
PAST SURGICAL HISTORY:  History of coronary artery bypass, single     History of hip replacement     S/P lobectomy of lung     
09-Apr-2019 08:03

## 2024-02-04 NOTE — PATIENT PROFILE ADULT - FALL HARM RISK - UNIVERSAL INTERVENTIONS
Bed in lowest position, wheels locked, appropriate side rails in place/Call bell, personal items and telephone in reach/Instruct patient to call for assistance before getting out of bed or chair/Non-slip footwear when patient is out of bed/[place] to call system/Physically safe environment - no spills, clutter or unnecessary equipment/Purposeful Proactive Rounding/Room/bathroom lighting operational, light cord in reach

## 2024-02-04 NOTE — ED PROVIDER NOTE - PHYSICAL EXAMINATION
Physical Exam    Vital Signs: I have reviewed the initial vital signs.  Constitutional: well-nourished, appears stated age, no acute distress  Eyes: Conjunctiva pink, Sclera clear, PERRLA, EOMI without pain.  Cardiovascular: S1 and S2, regular rate, regular rhythm, well-perfused extremities, radial pulses equal and 2+ b/l.   Respiratory: unlabored respiratory effort, clear to auscultation bilaterally no wheezing, rales and rhonchi. pt is speaking full sentences. no accessory muscle use. (+) left chest midaxillary and left upper thoracic paraspinal tenderness.   Gastrointestinal: soft, non-tender, nondistended abdomen, no pulsatile mass, no rebound, no guarding  Musculoskeletal: (+) pt reports pain with lifting the left arm. FROM of b/l lower extremities and right upper extremity.   Integumentary: warm, dry, no rash  Neurologic: awake, alert, cranial nerves II-XII grossly intact, extremities’ motor and sensory functions grossly intact. finger to nose intact. negative pronator drift. pt has 4/5 strength in the left upper and lower extremities which he reports is chronic since his previous stroke and 5/5 strength in the right upper and lower extremities.   Psychiatric: appropriate mood, appropriate affect

## 2024-02-04 NOTE — CONSULT NOTE ADULT - ASSESSMENT
68-year-old male, history of HTN, BPH, COPD, AAA, CAD s/p CABG x1, intracranial hemorrhage with residual left hemiparesis states he was woken up around 1am out of his sleep with left-sided chest and left arm pain. States since then, he has not been able to make a fist with his left hand which he can normally do. History RA. Lower chest tender palpation. Followed by Dr Santiago. He claims stress test about i year ago. Symptoms atypical . R/O mi. Hx PE. Check D-dimer. Will need adenocard thallium. Posssibly outpatient. F/U Dr Santiago.

## 2024-02-04 NOTE — CONSULT NOTE ADULT - SUBJECTIVE AND OBJECTIVE BOX
CARDIOLOGY CONSULT NOTE     CHIEF COMPLAINT/REASON FOR CONSULT:    HPI:  68-year-old male, history of HTN, BPH, COPD, AAA, CAD s/p CABG x1, intracranial hemorrhage with residual left hemiparesis states he was woken up around 1am out of his sleep with left-sided chest and left arm pain. States since then, he has not been able to make a fist with his left hand which he can normally do. Denies any trauma to hand or left chest wall. Denies mid sternal CP< SOB, HA, dizziness, diaphoresis abd pain, n/v/d.  (04 Feb 2024 06:40)      PAST MEDICAL & SURGICAL HISTORY:  Stroke      CAD (coronary artery disease)      Pulmonary embolism      COPD, mild      History of BPH      HTN (hypertension)      Degeneration of spine      Kidney stone      RA (rheumatoid arthritis)      High cholesterol      Infrarenal abdominal aortic aneurysm (AAA) without rupture      History of GI bleed      Lung cancer      Diverticulosis      Colon polyp      History of coronary artery bypass, single      History of hip replacement      S/P lobectomy of lung          Cardiac Risks:   [x ]HTN, [ ] DM, [ ] Smoking, x ] FH,  [x ] Lipids        MEDICATIONS:  MEDICATIONS  (STANDING):  albuterol    90 MICROgram(s) HFA Inhaler 1 Puff(s) Inhalation every 6 hours  amLODIPine   Tablet 10 milliGRAM(s) Oral daily  aspirin enteric coated 325 milliGRAM(s) Oral daily  budesonide 160 MICROgram(s)/formoterol 4.5 MICROgram(s) Inhaler 2 Puff(s) Inhalation two times a day  finasteride 5 milliGRAM(s) Oral daily  folic acid 1 milliGRAM(s) Oral daily  gabapentin 300 milliGRAM(s) Oral three times a day  metoprolol tartrate 25 milliGRAM(s) Oral two times a day  sulfaSALAzine 500 milliGRAM(s) Oral two times a day  tamsulosin 0.4 milliGRAM(s) Oral at bedtime      FAMILY HISTORY:  FH: breast cancer    FH: lupus    FH: heart disease        SOCIAL HISTORY:        Allergies    atorvastatin (Unknown)  baclofen (Unknown)  enalapril (Unknown)        lactose (Diarrhea)  	    REVIEW OF SYSTEMS:  CONSTITUTIONAL: No fever, weight loss, or fatigue  EYES: No eye pain, visual disturbances, or discharge  ENMT:  No difficulty hearing, tinnitus, vertigo; No sinus or throat pain  NECK: No pain or stiffness  RESPIRATORY: No cough, wheezing, chills or hemoptysis; No Shortness of Breath  CARDIOVASCULAR: See above  GASTROINTESTINAL: No abdominal or epigastric pain. No nausea, vomiting, or hematemesis; No diarrhea or constipation. No melena or hematochezia.  GENITOURINARY: No dysuria, frequency, hematuria, or incontinence  NEUROLOGICAL: No headaches, memory loss, loss of strength, numbness, or tremors  SKIN: No itching, burning, rashes, or lesions   	      PHYSICAL EXAM:  T(C): 36.6 (02-04-24 @ 03:40), Max: 36.6 (02-04-24 @ 03:40)  HR: 62 (02-04-24 @ 03:40) (62 - 62)  BP: 170/80 (02-04-24 @ 03:40) (170/80 - 170/80)  RR: 18 (02-04-24 @ 03:40) (18 - 18)  SpO2: 99% (02-04-24 @ 03:40) (99% - 99%)  Wt(kg): --  I&O's Summary      Appearance: Normal	  Psychiatry: A & O x 3, Mood & affect appropriate  HEENT:   Normal oral mucosa, PERRL, EOMI	  Lymphatic: No lymphadenopathy  Cardiovascular: Normal S1 S2,RRR, No JVD, No murmurs  Respiratory: Lungs clear to auscultation	  Gastrointestinal:  Soft, Non-tender, + BS	  Skin: No rashes, No ecchymoses, No cyanosis	  Neurologic: Non-focal  Extremities: Normal range of motion, No clubbing, cyanosis or edema  Vascular: Peripheral pulses palpable 2+ bilaterally      ECG:  	not available    	  LABS:	 	    CARDIAC MARKERS:                                    13.0   4.96  )-----------( 131      ( 04 Feb 2024 03:53 )             38.3     02-04    135  |  101  |  29<H>  ----------------------------<  94  4.0   |  22  |  1.3    Ca    9.0      04 Feb 2024 03:53    TPro  7.4  /  Alb  3.8  /  TBili  0.3  /  DBili  x   /  AST  24  /  ALT  14  /  AlkPhos  65  02-04

## 2024-02-04 NOTE — H&P ADULT - ASSESSMENT
68-year-old male, history of HTN, BPH, COPD, AAA, CAD s/p CABG x1, intracranial hemorrhage with residual left hemiparesis states he was woken up around 1am out of his sleep with left-sided chest and left arm pain.    # CP  - troponin done  - cardiology consult  - repeat RKG  - TTE  - pt on ASA    # AAA  - vascular consult    # HTN  - c/w amlodipine, metoprolol    # BPH  - c/w flomax    # COPD  - c/w inhalers    # CAD  - c/w ASA    # HDL  - atorvastatin for zetia  68-year-old male, history of HTN, BPH, COPD, AAA, CAD s/p CABG x1, intracranial hemorrhage with residual left hemiparesis states he was woken up around 1am out of his sleep with left-sided chest and left arm pain.    # CP  - troponin done  - cardiology consult  - repeat RKG  - TTE  - pt on ASA    # AAA  - vascular consult    # HTN  - c/w amlodipine, metoprolol    # BPH  - c/w flomax    # COPD  - c/w inhalers    # CAD  - c/w ASA    # HDL  - zetia is non formulary, would resume upon discharge

## 2024-02-04 NOTE — ED PROVIDER NOTE - ATTENDING APP SHARED VISIT CONTRIBUTION OF CARE
68-year-old male, history of HTN, BPH, COPD, AAA, CABG, intracranial hemorrhage with residual left hemiparesis, woke up with left-sided chest and left arm pain tonight.  No fever, cough or shortness of breath.  Exam shows alert patient in no distress, HEENT NCAT PERRL, neck supple, lungs clear, RR S1S2, abdomen soft NT +BS, no CCE, neuro A&OX3 chronic left hemiparesis.

## 2024-02-05 LAB
ALBUMIN SERPL ELPH-MCNC: 3.3 G/DL — LOW (ref 3.5–5.2)
ALP SERPL-CCNC: 60 U/L — SIGNIFICANT CHANGE UP (ref 30–115)
ALT FLD-CCNC: 14 U/L — SIGNIFICANT CHANGE UP (ref 0–41)
ANION GAP SERPL CALC-SCNC: 8 MMOL/L — SIGNIFICANT CHANGE UP (ref 7–14)
AST SERPL-CCNC: 18 U/L — SIGNIFICANT CHANGE UP (ref 0–41)
BASOPHILS # BLD AUTO: 0.01 K/UL — SIGNIFICANT CHANGE UP (ref 0–0.2)
BASOPHILS NFR BLD AUTO: 0.2 % — SIGNIFICANT CHANGE UP (ref 0–1)
BILIRUB SERPL-MCNC: 0.2 MG/DL — SIGNIFICANT CHANGE UP (ref 0.2–1.2)
BUN SERPL-MCNC: 22 MG/DL — HIGH (ref 10–20)
CALCIUM SERPL-MCNC: 8.5 MG/DL — SIGNIFICANT CHANGE UP (ref 8.4–10.5)
CHLORIDE SERPL-SCNC: 102 MMOL/L — SIGNIFICANT CHANGE UP (ref 98–110)
CO2 SERPL-SCNC: 26 MMOL/L — SIGNIFICANT CHANGE UP (ref 17–32)
CREAT SERPL-MCNC: 1.3 MG/DL — SIGNIFICANT CHANGE UP (ref 0.7–1.5)
EGFR: 59 ML/MIN/1.73M2 — LOW
EOSINOPHIL # BLD AUTO: 0.07 K/UL — SIGNIFICANT CHANGE UP (ref 0–0.7)
EOSINOPHIL NFR BLD AUTO: 1.5 % — SIGNIFICANT CHANGE UP (ref 0–8)
GLUCOSE SERPL-MCNC: 86 MG/DL — SIGNIFICANT CHANGE UP (ref 70–99)
HCT VFR BLD CALC: 36.3 % — LOW (ref 42–52)
HGB BLD-MCNC: 12.1 G/DL — LOW (ref 14–18)
IMM GRANULOCYTES NFR BLD AUTO: 0.6 % — HIGH (ref 0.1–0.3)
LYMPHOCYTES # BLD AUTO: 0.67 K/UL — LOW (ref 1.2–3.4)
LYMPHOCYTES # BLD AUTO: 14.3 % — LOW (ref 20.5–51.1)
MAGNESIUM SERPL-MCNC: 1.8 MG/DL — SIGNIFICANT CHANGE UP (ref 1.8–2.4)
MCHC RBC-ENTMCNC: 31.8 PG — HIGH (ref 27–31)
MCHC RBC-ENTMCNC: 33.3 G/DL — SIGNIFICANT CHANGE UP (ref 32–37)
MCV RBC AUTO: 95.3 FL — HIGH (ref 80–94)
MONOCYTES # BLD AUTO: 0.62 K/UL — HIGH (ref 0.1–0.6)
MONOCYTES NFR BLD AUTO: 13.2 % — HIGH (ref 1.7–9.3)
NEUTROPHILS # BLD AUTO: 3.29 K/UL — SIGNIFICANT CHANGE UP (ref 1.4–6.5)
NEUTROPHILS NFR BLD AUTO: 70.2 % — SIGNIFICANT CHANGE UP (ref 42.2–75.2)
NRBC # BLD: 0 /100 WBCS — SIGNIFICANT CHANGE UP (ref 0–0)
PLATELET # BLD AUTO: 106 K/UL — LOW (ref 130–400)
PMV BLD: 10.3 FL — SIGNIFICANT CHANGE UP (ref 7.4–10.4)
POTASSIUM SERPL-MCNC: 3.9 MMOL/L — SIGNIFICANT CHANGE UP (ref 3.5–5)
POTASSIUM SERPL-SCNC: 3.9 MMOL/L — SIGNIFICANT CHANGE UP (ref 3.5–5)
PROT SERPL-MCNC: 6.7 G/DL — SIGNIFICANT CHANGE UP (ref 6–8)
RBC # BLD: 3.81 M/UL — LOW (ref 4.7–6.1)
RBC # FLD: 12.7 % — SIGNIFICANT CHANGE UP (ref 11.5–14.5)
SODIUM SERPL-SCNC: 136 MMOL/L — SIGNIFICANT CHANGE UP (ref 135–146)
WBC # BLD: 4.69 K/UL — LOW (ref 4.8–10.8)
WBC # FLD AUTO: 4.69 K/UL — LOW (ref 4.8–10.8)

## 2024-02-05 PROCEDURE — 93306 TTE W/DOPPLER COMPLETE: CPT | Mod: 26

## 2024-02-05 PROCEDURE — 99232 SBSQ HOSP IP/OBS MODERATE 35: CPT

## 2024-02-05 PROCEDURE — 93018 CV STRESS TEST I&R ONLY: CPT | Mod: 26,1L

## 2024-02-05 PROCEDURE — 78452 HT MUSCLE IMAGE SPECT MULT: CPT | Mod: 26

## 2024-02-05 PROCEDURE — 93016 CV STRESS TEST SUPVJ ONLY: CPT | Mod: 26,1L

## 2024-02-05 RX ORDER — SULFASALAZINE 500 MG
1500 TABLET ORAL
Refills: 0 | Status: DISCONTINUED | OUTPATIENT
Start: 2024-02-05 | End: 2024-02-07

## 2024-02-05 RX ORDER — HEPARIN SODIUM 5000 [USP'U]/ML
5000 INJECTION INTRAVENOUS; SUBCUTANEOUS EVERY 12 HOURS
Refills: 0 | Status: DISCONTINUED | OUTPATIENT
Start: 2024-02-05 | End: 2024-02-07

## 2024-02-05 RX ORDER — SULFASALAZINE 500 MG
1000 TABLET ORAL
Refills: 0 | Status: DISCONTINUED | OUTPATIENT
Start: 2024-02-05 | End: 2024-02-07

## 2024-02-05 RX ADMIN — Medication 500 MILLIGRAM(S): at 06:02

## 2024-02-05 RX ADMIN — TAMSULOSIN HYDROCHLORIDE 0.4 MILLIGRAM(S): 0.4 CAPSULE ORAL at 21:14

## 2024-02-05 RX ADMIN — Medication 1000 MILLIGRAM(S): at 18:41

## 2024-02-05 RX ADMIN — FINASTERIDE 5 MILLIGRAM(S): 5 TABLET, FILM COATED ORAL at 14:03

## 2024-02-05 RX ADMIN — GABAPENTIN 300 MILLIGRAM(S): 400 CAPSULE ORAL at 14:03

## 2024-02-05 RX ADMIN — BUDESONIDE AND FORMOTEROL FUMARATE DIHYDRATE 2 PUFF(S): 160; 4.5 AEROSOL RESPIRATORY (INHALATION) at 06:53

## 2024-02-05 RX ADMIN — ALBUTEROL 1 PUFF(S): 90 AEROSOL, METERED ORAL at 06:53

## 2024-02-05 RX ADMIN — AMLODIPINE BESYLATE 10 MILLIGRAM(S): 2.5 TABLET ORAL at 06:02

## 2024-02-05 RX ADMIN — Medication 25 MILLIGRAM(S): at 18:41

## 2024-02-05 RX ADMIN — ALBUTEROL 1 PUFF(S): 90 AEROSOL, METERED ORAL at 15:04

## 2024-02-05 RX ADMIN — ALBUTEROL 1 PUFF(S): 90 AEROSOL, METERED ORAL at 20:40

## 2024-02-05 RX ADMIN — Medication 1 MILLIGRAM(S): at 14:07

## 2024-02-05 RX ADMIN — HEPARIN SODIUM 5000 UNIT(S): 5000 INJECTION INTRAVENOUS; SUBCUTANEOUS at 18:45

## 2024-02-05 RX ADMIN — Medication 325 MILLIGRAM(S): at 14:03

## 2024-02-05 NOTE — PHYSICAL THERAPY INITIAL EVALUATION ADULT - LIVES WITH, PROFILE
Pt lives with his landlord in a private home with 1 small step to enter and 1 flight of stairs inside with R handrail going up./other relative

## 2024-02-05 NOTE — PROGRESS NOTE ADULT - ASSESSMENT
68-year-old male, history of HTN, BPH, COPD, AAA, CAD s/p CABG x1, intracranial hemorrhage with residual left hemiparesis states he was woken up around 1am out of his sleep with left-sided chest and left arm pain, pain has improved    Assessment    ·	Nonspecific chest pain in patient with CAD with CABG  ·	AAA, CTA showing mural thrombus, d/w vascular surgery and no intervention and outpatient follow up  ·	Hemorrhagic stroke with residual left hemiparesis  ·	HTN  ·	COPD  ·	BPH    Plan    - c/w home aspirin lopressor 25 bid, stress test as requested per cardio, d-dimer 472 and age adjusted cut-off is 345 / CT chest dissection protocol was done and negative  - AAA same size as past, d/w vascular in detail regarding mural thrombus and AAA, no intervention here and continue home mgmt with outpatient follow up  - c/w amlodipine 10 qd  - symbicort  - tamsulosin / finasteride  - c/w home sulfasalazine    Pending: stress test, d-dimer    # DVT PPX: heparin sc

## 2024-02-05 NOTE — PHYSICAL THERAPY INITIAL EVALUATION ADULT - GENERAL OBSERVATIONS, REHAB EVAL
15:30-15:55. Chart reviewed; confirmed with RN to see the pt for PT. Pt ready for PT; received in bed with no complain of pain and in NAD. +hep lock, +tele. Agreeable for PT evaluation.

## 2024-02-06 LAB
ALBUMIN SERPL ELPH-MCNC: 3.4 G/DL — LOW (ref 3.5–5.2)
ALP SERPL-CCNC: 62 U/L — SIGNIFICANT CHANGE UP (ref 30–115)
ALT FLD-CCNC: 11 U/L — SIGNIFICANT CHANGE UP (ref 0–41)
ANION GAP SERPL CALC-SCNC: 11 MMOL/L — SIGNIFICANT CHANGE UP (ref 7–14)
AST SERPL-CCNC: 15 U/L — SIGNIFICANT CHANGE UP (ref 0–41)
BASOPHILS # BLD AUTO: 0.03 K/UL — SIGNIFICANT CHANGE UP (ref 0–0.2)
BASOPHILS NFR BLD AUTO: 0.7 % — SIGNIFICANT CHANGE UP (ref 0–1)
BILIRUB SERPL-MCNC: 0.4 MG/DL — SIGNIFICANT CHANGE UP (ref 0.2–1.2)
BUN SERPL-MCNC: 22 MG/DL — HIGH (ref 10–20)
CALCIUM SERPL-MCNC: 8.7 MG/DL — SIGNIFICANT CHANGE UP (ref 8.4–10.5)
CHLORIDE SERPL-SCNC: 100 MMOL/L — SIGNIFICANT CHANGE UP (ref 98–110)
CO2 SERPL-SCNC: 24 MMOL/L — SIGNIFICANT CHANGE UP (ref 17–32)
CREAT SERPL-MCNC: 1.4 MG/DL — SIGNIFICANT CHANGE UP (ref 0.7–1.5)
EGFR: 54 ML/MIN/1.73M2 — LOW
EOSINOPHIL # BLD AUTO: 0.06 K/UL — SIGNIFICANT CHANGE UP (ref 0–0.7)
EOSINOPHIL NFR BLD AUTO: 1.4 % — SIGNIFICANT CHANGE UP (ref 0–8)
GLUCOSE SERPL-MCNC: 77 MG/DL — SIGNIFICANT CHANGE UP (ref 70–99)
HCT VFR BLD CALC: 35.6 % — LOW (ref 42–52)
HGB BLD-MCNC: 12.2 G/DL — LOW (ref 14–18)
IMM GRANULOCYTES NFR BLD AUTO: 0.7 % — HIGH (ref 0.1–0.3)
LYMPHOCYTES # BLD AUTO: 0.71 K/UL — LOW (ref 1.2–3.4)
LYMPHOCYTES # BLD AUTO: 16.4 % — LOW (ref 20.5–51.1)
MAGNESIUM SERPL-MCNC: 1.8 MG/DL — SIGNIFICANT CHANGE UP (ref 1.8–2.4)
MCHC RBC-ENTMCNC: 32.1 PG — HIGH (ref 27–31)
MCHC RBC-ENTMCNC: 34.3 G/DL — SIGNIFICANT CHANGE UP (ref 32–37)
MCV RBC AUTO: 93.7 FL — SIGNIFICANT CHANGE UP (ref 80–94)
MONOCYTES # BLD AUTO: 0.69 K/UL — HIGH (ref 0.1–0.6)
MONOCYTES NFR BLD AUTO: 16 % — HIGH (ref 1.7–9.3)
NEUTROPHILS # BLD AUTO: 2.8 K/UL — SIGNIFICANT CHANGE UP (ref 1.4–6.5)
NEUTROPHILS NFR BLD AUTO: 64.8 % — SIGNIFICANT CHANGE UP (ref 42.2–75.2)
NRBC # BLD: 0 /100 WBCS — SIGNIFICANT CHANGE UP (ref 0–0)
PLATELET # BLD AUTO: 111 K/UL — LOW (ref 130–400)
PMV BLD: 10.8 FL — HIGH (ref 7.4–10.4)
POTASSIUM SERPL-MCNC: 4.4 MMOL/L — SIGNIFICANT CHANGE UP (ref 3.5–5)
POTASSIUM SERPL-SCNC: 4.4 MMOL/L — SIGNIFICANT CHANGE UP (ref 3.5–5)
PROT SERPL-MCNC: 6.6 G/DL — SIGNIFICANT CHANGE UP (ref 6–8)
RBC # BLD: 3.8 M/UL — LOW (ref 4.7–6.1)
RBC # FLD: 12.5 % — SIGNIFICANT CHANGE UP (ref 11.5–14.5)
SODIUM SERPL-SCNC: 135 MMOL/L — SIGNIFICANT CHANGE UP (ref 135–146)
WBC # BLD: 4.32 K/UL — LOW (ref 4.8–10.8)
WBC # FLD AUTO: 4.32 K/UL — LOW (ref 4.8–10.8)

## 2024-02-06 PROCEDURE — 71275 CT ANGIOGRAPHY CHEST: CPT | Mod: 26

## 2024-02-06 PROCEDURE — 99232 SBSQ HOSP IP/OBS MODERATE 35: CPT

## 2024-02-06 RX ORDER — LANOLIN ALCOHOL/MO/W.PET/CERES
5 CREAM (GRAM) TOPICAL AT BEDTIME
Refills: 0 | Status: DISCONTINUED | OUTPATIENT
Start: 2024-02-06 | End: 2024-02-07

## 2024-02-06 RX ORDER — SODIUM CHLORIDE 9 MG/ML
1000 INJECTION, SOLUTION INTRAVENOUS
Refills: 0 | Status: DISCONTINUED | OUTPATIENT
Start: 2024-02-06 | End: 2024-02-07

## 2024-02-06 RX ADMIN — ALBUTEROL 1 PUFF(S): 90 AEROSOL, METERED ORAL at 08:11

## 2024-02-06 RX ADMIN — FINASTERIDE 5 MILLIGRAM(S): 5 TABLET, FILM COATED ORAL at 11:48

## 2024-02-06 RX ADMIN — ALBUTEROL 1 PUFF(S): 90 AEROSOL, METERED ORAL at 14:49

## 2024-02-06 RX ADMIN — Medication 650 MILLIGRAM(S): at 02:37

## 2024-02-06 RX ADMIN — Medication 1000 MILLIGRAM(S): at 18:00

## 2024-02-06 RX ADMIN — Medication 1 MILLIGRAM(S): at 11:48

## 2024-02-06 RX ADMIN — SODIUM CHLORIDE 60 MILLILITER(S): 9 INJECTION, SOLUTION INTRAVENOUS at 22:18

## 2024-02-06 RX ADMIN — HEPARIN SODIUM 5000 UNIT(S): 5000 INJECTION INTRAVENOUS; SUBCUTANEOUS at 06:05

## 2024-02-06 RX ADMIN — TAMSULOSIN HYDROCHLORIDE 0.4 MILLIGRAM(S): 0.4 CAPSULE ORAL at 22:17

## 2024-02-06 RX ADMIN — BUDESONIDE AND FORMOTEROL FUMARATE DIHYDRATE 2 PUFF(S): 160; 4.5 AEROSOL RESPIRATORY (INHALATION) at 08:12

## 2024-02-06 RX ADMIN — Medication 25 MILLIGRAM(S): at 18:00

## 2024-02-06 RX ADMIN — HEPARIN SODIUM 5000 UNIT(S): 5000 INJECTION INTRAVENOUS; SUBCUTANEOUS at 18:00

## 2024-02-06 RX ADMIN — Medication 25 MILLIGRAM(S): at 06:03

## 2024-02-06 RX ADMIN — BUDESONIDE AND FORMOTEROL FUMARATE DIHYDRATE 2 PUFF(S): 160; 4.5 AEROSOL RESPIRATORY (INHALATION) at 20:37

## 2024-02-06 RX ADMIN — AMLODIPINE BESYLATE 10 MILLIGRAM(S): 2.5 TABLET ORAL at 06:03

## 2024-02-06 RX ADMIN — Medication 325 MILLIGRAM(S): at 11:48

## 2024-02-06 RX ADMIN — ALBUTEROL 1 PUFF(S): 90 AEROSOL, METERED ORAL at 20:36

## 2024-02-06 RX ADMIN — Medication 1500 MILLIGRAM(S): at 06:04

## 2024-02-06 RX ADMIN — Medication 5 MILLIGRAM(S): at 02:37

## 2024-02-06 NOTE — PROGRESS NOTE ADULT - SUBJECTIVE AND OBJECTIVE BOX
SUBJECTIVE:    Patient is a 69y old Male who presents with a chief complaint of     HPI:  68-year-old male, history of HTN, BPH, COPD, AAA, CAD s/p CABG x1, intracranial hemorrhage with residual left hemiparesis states he was woken up around 1am out of his sleep with left-sided chest and left arm pain. States since then, he has not been able to make a fist with his left hand which he can normally do. Denies any trauma to hand or left chest wall. Denies mid sternal CP< SOB, HA, dizziness, diaphoresis abd pain, n/v/d.  (04 Feb 2024 06:40)      Currently admitted to medicine with the primary diagnosis of Chest pain    pain is better, hand is able to squeeze now but has known deficiet from past stroke, pain is now lower chest seems musculoskeletal    Besides the pertinent positives and negatives described above, the ROS was within normal limits.    PAST MEDICAL & SURGICAL HISTORY  Stroke    CAD (coronary artery disease)    Pulmonary embolism    COPD, mild    History of BPH    HTN (hypertension)    Degeneration of spine    Kidney stone    RA (rheumatoid arthritis)    High cholesterol    Infrarenal abdominal aortic aneurysm (AAA) without rupture    History of GI bleed    Lung cancer    Diverticulosis    Colon polyp    History of coronary artery bypass, single    History of hip replacement    S/P lobectomy of lung      SOCIAL HISTORY:    ALLERGIES:  atorvastatin (Unknown)  baclofen (Unknown)  enalapril (Unknown)    MEDICATIONS:  STANDING MEDICATIONS  albuterol    90 MICROgram(s) HFA Inhaler 1 Puff(s) Inhalation every 6 hours  amLODIPine   Tablet 10 milliGRAM(s) Oral daily  aspirin enteric coated 325 milliGRAM(s) Oral daily  budesonide 160 MICROgram(s)/formoterol 4.5 MICROgram(s) Inhaler 2 Puff(s) Inhalation two times a day  finasteride 5 milliGRAM(s) Oral daily  folic acid 1 milliGRAM(s) Oral daily  gabapentin 300 milliGRAM(s) Oral three times a day  metoprolol tartrate 25 milliGRAM(s) Oral two times a day  regadenoson Injectable 0.4 milliGRAM(s) IV Push once  sulfaSALAzine 500 milliGRAM(s) Oral two times a day  tamsulosin 0.4 milliGRAM(s) Oral at bedtime    PRN MEDICATIONS  acetaminophen     Tablet .. 650 milliGRAM(s) Oral every 6 hours PRN  aluminum hydroxide/magnesium hydroxide/simethicone Suspension 30 milliLiter(s) Oral every 4 hours PRN  ondansetron Injectable 4 milliGRAM(s) IV Push every 8 hours PRN    VITALS:   T(F): 96.1  HR: 57  BP: 131/70  RR: 16  SpO2: 99%    LABS:                        12.5   3.43  )-----------( 119      ( 04 Feb 2024 11:19 )             37.3     02-04    136  |  100  |  24<H>  ----------------------------<  103<H>  4.3   |  27  |  1.2    Ca    8.8      04 Feb 2024 11:19    TPro  7.1  /  Alb  3.6  /  TBili  0.4  /  DBili  x   /  AST  21  /  ALT  15  /  AlkPhos  64  02-04      Urinalysis Basic - ( 04 Feb 2024 11:19 )    Color: x / Appearance: x / SG: x / pH: x  Gluc: 103 mg/dL / Ketone: x  / Bili: x / Urobili: x   Blood: x / Protein: x / Nitrite: x   Leuk Esterase: x / RBC: x / WBC x   Sq Epi: x / Non Sq Epi: x / Bacteria: x                Chest pain      RADIOLOGY:    PHYSICAL EXAM:  General: WN/WD NAD  Neurology: A&Ox3, nonfocal, JARRELL x 4, left arm muscle spasms  Head:  Normocephalic, atraumatic  ENT:  Mucosa moist, no ulcerations  Neck:  Supple, no sinuses or palpable masses  Lymphatic:  No palpable cervical, supraclavicular, axillary or inguinal adenopathy  Respiratory: CTA B/L  CV: RRR, S1S2, no murmur  Abdominal: Soft, NT, ND no palpable mass  MSK: No edema, + peripheral pulses  Incisions: intact, no erythema or drainage    Intravenous access: yes  NG tube: no  Cole Catheter: no       
SUBJECTIVE:    Patient is a 69y old Male who presents with a chief complaint of     HPI:  68-year-old male, history of HTN, BPH, COPD, AAA, CAD s/p CABG x1, intracranial hemorrhage with residual left hemiparesis states he was woken up around 1am out of his sleep with left-sided chest and left arm pain. States since then, he has not been able to make a fist with his left hand which he can normally do. Denies any trauma to hand or left chest wall. Denies mid sternal CP< SOB, HA, dizziness, diaphoresis abd pain, n/v/d.  (04 Feb 2024 06:40)      Currently admitted to medicine with the primary diagnosis of Chest pain     stated he had an episode of shortness of breath this AM that passed    Besides the pertinent positives and negatives described above, the ROS was within normal limits.    PAST MEDICAL & SURGICAL HISTORY  Stroke    CAD (coronary artery disease)    Pulmonary embolism    COPD, mild    History of BPH    HTN (hypertension)    Degeneration of spine    Kidney stone    RA (rheumatoid arthritis)    High cholesterol    Infrarenal abdominal aortic aneurysm (AAA) without rupture    History of GI bleed    Lung cancer    Diverticulosis    Colon polyp    History of coronary artery bypass, single    History of hip replacement    S/P lobectomy of lung      SOCIAL HISTORY:    ALLERGIES:  atorvastatin (Unknown)  baclofen (Unknown)  enalapril (Unknown)    MEDICATIONS:  STANDING MEDICATIONS  albuterol    90 MICROgram(s) HFA Inhaler 1 Puff(s) Inhalation every 6 hours  amLODIPine   Tablet 10 milliGRAM(s) Oral daily  aspirin enteric coated 325 milliGRAM(s) Oral daily  budesonide 160 MICROgram(s)/formoterol 4.5 MICROgram(s) Inhaler 2 Puff(s) Inhalation two times a day  finasteride 5 milliGRAM(s) Oral daily  folic acid 1 milliGRAM(s) Oral daily  gabapentin 300 milliGRAM(s) Oral three times a day  heparin   Injectable 5000 Unit(s) SubCutaneous every 12 hours  metoprolol tartrate 25 milliGRAM(s) Oral two times a day  regadenoson Injectable 0.4 milliGRAM(s) IV Push once  sulfaSALAzine 1500 milliGRAM(s) Oral <User Schedule>  sulfaSALAzine 1000 milliGRAM(s) Oral <User Schedule>  tamsulosin 0.4 milliGRAM(s) Oral at bedtime    PRN MEDICATIONS  acetaminophen     Tablet .. 650 milliGRAM(s) Oral every 6 hours PRN  aluminum hydroxide/magnesium hydroxide/simethicone Suspension 30 milliLiter(s) Oral every 4 hours PRN  ondansetron Injectable 4 milliGRAM(s) IV Push every 8 hours PRN    VITALS:   T(F): 96.6  HR: 54  BP: 140/70  RR: 18  SpO2: 97%    LABS:                        12.1   4.69  )-----------( 106      ( 05 Feb 2024 08:18 )             36.3     02-05    136  |  102  |  22<H>  ----------------------------<  86  3.9   |  26  |  1.3    Ca    8.5      05 Feb 2024 08:18  Mg     1.8     02-05    TPro  6.7  /  Alb  3.3<L>  /  TBili  0.2  /  DBili  x   /  AST  18  /  ALT  14  /  AlkPhos  60  02-05      Urinalysis Basic - ( 05 Feb 2024 08:18 )    Color: x / Appearance: x / SG: x / pH: x  Gluc: 86 mg/dL / Ketone: x  / Bili: x / Urobili: x   Blood: x / Protein: x / Nitrite: x   Leuk Esterase: x / RBC: x / WBC x   Sq Epi: x / Non Sq Epi: x / Bacteria: x                Chest pain      RADIOLOGY:    PHYSICAL EXAM:  General: WN/WD NAD  Neurology: A&Ox3, nonfocal, JARRELL x 4, known contracture in left hand  Head:  Normocephalic, atraumatic  ENT:  Mucosa moist, no ulcerations  Neck:  Supple, no sinuses or palpable masses  Lymphatic:  No palpable cervical, supraclavicular, axillary or inguinal adenopathy  Respiratory: CTA B/L  CV: RRR, S1S2, no murmur  Abdominal: Soft, NT, ND no palpable mass  MSK: No edema, + peripheral pulses  Incisions: intact, no erythema or drainage    Intravenous access: yes  NG tube: no  Cole Catheter: no       
SUBJECTIVE:    Patient is a 69y old Male who presents with a chief complaint of     HPI:  68-year-old male, history of HTN, BPH, COPD, AAA, CAD s/p CABG x1, intracranial hemorrhage with residual left hemiparesis states he was woken up around 1am out of his sleep with left-sided chest and left arm pain. States since then, he has not been able to make a fist with his left hand which he can normally do. Denies any trauma to hand or left chest wall. Denies mid sternal CP< SOB, HA, dizziness, diaphoresis abd pain, n/v/d.  (04 Feb 2024 06:40)      Currently admitted to medicine with the primary diagnosis of Chest pain     stated he's having some intermittent left side chest pain that could be pleuritic    Besides the pertinent positives and negatives described above, the ROS was within normal limits.    PAST MEDICAL & SURGICAL HISTORY  Stroke    CAD (coronary artery disease)    Pulmonary embolism    COPD, mild    History of BPH    HTN (hypertension)    Degeneration of spine    Kidney stone    RA (rheumatoid arthritis)    High cholesterol    Infrarenal abdominal aortic aneurysm (AAA) without rupture    History of GI bleed    Lung cancer    Diverticulosis    Colon polyp    History of coronary artery bypass, single    History of hip replacement    S/P lobectomy of lung      SOCIAL HISTORY:    ALLERGIES:  atorvastatin (Unknown)  baclofen (Unknown)  enalapril (Unknown)    MEDICATIONS:  STANDING MEDICATIONS  albuterol    90 MICROgram(s) HFA Inhaler 1 Puff(s) Inhalation every 6 hours  amLODIPine   Tablet 10 milliGRAM(s) Oral daily  aspirin enteric coated 325 milliGRAM(s) Oral daily  budesonide 160 MICROgram(s)/formoterol 4.5 MICROgram(s) Inhaler 2 Puff(s) Inhalation two times a day  finasteride 5 milliGRAM(s) Oral daily  folic acid 1 milliGRAM(s) Oral daily  gabapentin 300 milliGRAM(s) Oral three times a day  heparin   Injectable 5000 Unit(s) SubCutaneous every 12 hours  lactated ringers. 1000 milliLiter(s) IV Continuous <Continuous>  metoprolol tartrate 25 milliGRAM(s) Oral two times a day  regadenoson Injectable 0.4 milliGRAM(s) IV Push once  sulfaSALAzine 1500 milliGRAM(s) Oral <User Schedule>  sulfaSALAzine 1000 milliGRAM(s) Oral <User Schedule>  tamsulosin 0.4 milliGRAM(s) Oral at bedtime    PRN MEDICATIONS  acetaminophen     Tablet .. 650 milliGRAM(s) Oral every 6 hours PRN  aluminum hydroxide/magnesium hydroxide/simethicone Suspension 30 milliLiter(s) Oral every 4 hours PRN  melatonin 5 milliGRAM(s) Oral at bedtime PRN  ondansetron Injectable 4 milliGRAM(s) IV Push every 8 hours PRN    VITALS:   T(F): 98.2  HR: 69  BP: 105/59  RR: 18  SpO2: 100%    LABS:                        12.2   4.32  )-----------( 111      ( 06 Feb 2024 07:35 )             35.6     02-06    135  |  100  |  22<H>  ----------------------------<  77  4.4   |  24  |  1.4    Ca    8.7      06 Feb 2024 07:35  Mg     1.8     02-06    TPro  6.6  /  Alb  3.4<L>  /  TBili  0.4  /  DBili  x   /  AST  15  /  ALT  11  /  AlkPhos  62  02-06      Urinalysis Basic - ( 06 Feb 2024 07:35 )    Color: x / Appearance: x / SG: x / pH: x  Gluc: 77 mg/dL / Ketone: x  / Bili: x / Urobili: x   Blood: x / Protein: x / Nitrite: x   Leuk Esterase: x / RBC: x / WBC x   Sq Epi: x / Non Sq Epi: x / Bacteria: x                Chest pain      RADIOLOGY:    PHYSICAL EXAM:  General: WN/WD NAD  Neurology: A&Ox3, nonfocal, JARRELL x 4, left arm contracture  Head:  Normocephalic, atraumatic  ENT:  Mucosa moist, no ulcerations  Neck:  Supple, no sinuses or palpable masses  Lymphatic:  No palpable cervical, supraclavicular, axillary or inguinal adenopathy  Respiratory: CTA B/L  CV: RRR, S1S2, no murmur  Abdominal: Soft, NT, ND no palpable mass  MSK: No edema, + peripheral pulses  Incisions: intact, no erythema or drainage    Intravenous access: yes  NG tube: no  Cole Catheter: no

## 2024-02-06 NOTE — PROGRESS NOTE ADULT - ASSESSMENT
68-year-old male, history of HTN, BPH, COPD, AAA, CAD s/p CABG x1, intracranial hemorrhage with residual left hemiparesis states he was woken up around 1am out of his sleep with left-sided chest and left arm pain, pain has improved    Assessment    ·	Nonspecific chest pain in patient with CAD with CABG  ·	AAA, CTA showing mural thrombus, d/w vascular surgery and no intervention and outpatient follow up  ·	Hemorrhagic stroke with residual left hemiparesis  ·	HTN  ·	COPD  ·	BPH    Plan    - c/w home aspirin lopressor 25 bid, stress test negative, d-dimer 472 and age adjusted cut-off is 345 / CT chest dissection protocol was done and negative / patient is higher risk with cancer hx / relative immobility / already has mural thrombus / will do CTA PE protocol as well  - AAA same size as past, d/w vascular in detail regarding mural thrombus and AAA, no intervention here and continue home mgmt with outpatient follow up  - c/w amlodipine 10 qd  - symbicort  - tamsulosin / finasteride  - c/w home sulfasalazine    Pending: CTA chest PE protocol, likely home if negative, CM aware    # DVT PPX: heparin sc

## 2024-02-07 ENCOUNTER — TRANSCRIPTION ENCOUNTER (OUTPATIENT)
Age: 69
End: 2024-02-07

## 2024-02-07 VITALS
RESPIRATION RATE: 18 BRPM | OXYGEN SATURATION: 97 % | SYSTOLIC BLOOD PRESSURE: 129 MMHG | TEMPERATURE: 97 F | HEART RATE: 67 BPM | DIASTOLIC BLOOD PRESSURE: 62 MMHG

## 2024-02-07 LAB
ALBUMIN SERPL ELPH-MCNC: 3.3 G/DL — LOW (ref 3.5–5.2)
ALP SERPL-CCNC: 59 U/L — SIGNIFICANT CHANGE UP (ref 30–115)
ALT FLD-CCNC: 9 U/L — SIGNIFICANT CHANGE UP (ref 0–41)
ANION GAP SERPL CALC-SCNC: 12 MMOL/L — SIGNIFICANT CHANGE UP (ref 7–14)
AST SERPL-CCNC: 13 U/L — SIGNIFICANT CHANGE UP (ref 0–41)
BASOPHILS # BLD AUTO: 0 K/UL — SIGNIFICANT CHANGE UP (ref 0–0.2)
BASOPHILS NFR BLD AUTO: 0 % — SIGNIFICANT CHANGE UP (ref 0–1)
BILIRUB SERPL-MCNC: 0.3 MG/DL — SIGNIFICANT CHANGE UP (ref 0.2–1.2)
BUN SERPL-MCNC: 28 MG/DL — HIGH (ref 10–20)
CALCIUM SERPL-MCNC: 8.6 MG/DL — SIGNIFICANT CHANGE UP (ref 8.4–10.5)
CHLORIDE SERPL-SCNC: 101 MMOL/L — SIGNIFICANT CHANGE UP (ref 98–110)
CO2 SERPL-SCNC: 23 MMOL/L — SIGNIFICANT CHANGE UP (ref 17–32)
CREAT SERPL-MCNC: 1.4 MG/DL — SIGNIFICANT CHANGE UP (ref 0.7–1.5)
EGFR: 54 ML/MIN/1.73M2 — LOW
EOSINOPHIL NFR BLD AUTO: 0 % — SIGNIFICANT CHANGE UP (ref 0–8)
GLUCOSE SERPL-MCNC: 78 MG/DL — SIGNIFICANT CHANGE UP (ref 70–99)
HCT VFR BLD CALC: 34.9 % — LOW (ref 42–52)
HGB BLD-MCNC: 12.2 G/DL — LOW (ref 14–18)
LYMPHOCYTES # BLD AUTO: 0.63 K/UL — LOW (ref 1.2–3.4)
LYMPHOCYTES # BLD AUTO: 14 % — LOW (ref 20.5–51.1)
MAGNESIUM SERPL-MCNC: 1.8 MG/DL — SIGNIFICANT CHANGE UP (ref 1.8–2.4)
MANUAL SMEAR VERIFICATION: SIGNIFICANT CHANGE UP
MCHC RBC-ENTMCNC: 32.2 PG — HIGH (ref 27–31)
MCHC RBC-ENTMCNC: 35 G/DL — SIGNIFICANT CHANGE UP (ref 32–37)
MCV RBC AUTO: 92.1 FL — SIGNIFICANT CHANGE UP (ref 80–94)
MONOCYTES # BLD AUTO: 0.72 K/UL — HIGH (ref 0.1–0.6)
MONOCYTES NFR BLD AUTO: 16 % — HIGH (ref 1.7–9.3)
NEUTROPHILS # BLD AUTO: 3.14 K/UL — SIGNIFICANT CHANGE UP (ref 1.4–6.5)
NEUTROPHILS NFR BLD AUTO: 69 % — SIGNIFICANT CHANGE UP (ref 42.2–75.2)
NEUTS BAND # BLD: 1 % — SIGNIFICANT CHANGE UP (ref 0–6)
NRBC # BLD: 0 /100 WBCS — SIGNIFICANT CHANGE UP (ref 0–0)
NRBC # BLD: SIGNIFICANT CHANGE UP /100 WBCS (ref 0–0)
PLAT MORPH BLD: NORMAL — SIGNIFICANT CHANGE UP
PLATELET # BLD AUTO: 102 K/UL — LOW (ref 130–400)
PLATELET COUNT - ESTIMATE: ABNORMAL
PMV BLD: 10.6 FL — HIGH (ref 7.4–10.4)
POTASSIUM SERPL-MCNC: 4.3 MMOL/L — SIGNIFICANT CHANGE UP (ref 3.5–5)
POTASSIUM SERPL-SCNC: 4.3 MMOL/L — SIGNIFICANT CHANGE UP (ref 3.5–5)
PROT SERPL-MCNC: 6.5 G/DL — SIGNIFICANT CHANGE UP (ref 6–8)
RBC # BLD: 3.79 M/UL — LOW (ref 4.7–6.1)
RBC # FLD: 12.2 % — SIGNIFICANT CHANGE UP (ref 11.5–14.5)
RBC BLD AUTO: NORMAL — SIGNIFICANT CHANGE UP
SODIUM SERPL-SCNC: 136 MMOL/L — SIGNIFICANT CHANGE UP (ref 135–146)
WBC # BLD: 4.48 K/UL — LOW (ref 4.8–10.8)
WBC # FLD AUTO: 4.48 K/UL — LOW (ref 4.8–10.8)

## 2024-02-07 PROCEDURE — 99239 HOSP IP/OBS DSCHRG MGMT >30: CPT

## 2024-02-07 RX ADMIN — Medication 1 MILLIGRAM(S): at 12:30

## 2024-02-07 RX ADMIN — HEPARIN SODIUM 5000 UNIT(S): 5000 INJECTION INTRAVENOUS; SUBCUTANEOUS at 05:47

## 2024-02-07 RX ADMIN — Medication 325 MILLIGRAM(S): at 12:30

## 2024-02-07 RX ADMIN — AMLODIPINE BESYLATE 10 MILLIGRAM(S): 2.5 TABLET ORAL at 05:47

## 2024-02-07 RX ADMIN — Medication 25 MILLIGRAM(S): at 05:47

## 2024-02-07 RX ADMIN — BUDESONIDE AND FORMOTEROL FUMARATE DIHYDRATE 2 PUFF(S): 160; 4.5 AEROSOL RESPIRATORY (INHALATION) at 09:40

## 2024-02-07 RX ADMIN — ALBUTEROL 1 PUFF(S): 90 AEROSOL, METERED ORAL at 09:39

## 2024-02-07 RX ADMIN — FINASTERIDE 5 MILLIGRAM(S): 5 TABLET, FILM COATED ORAL at 12:30

## 2024-02-07 RX ADMIN — Medication 1500 MILLIGRAM(S): at 05:46

## 2024-02-07 NOTE — DISCHARGE NOTE PROVIDER - NSDCCPCAREPLAN_GEN_ALL_CORE_FT
PRINCIPAL DISCHARGE DIAGNOSIS  Diagnosis: Atypical chest pain  Assessment and Plan of Treatment: HS Troponin was negative, EKG: Sinus Bradycaria with Old Right bundle branch block, Stress test negative. CT chest dissection protocol was done and negative . Pulmonary Embolism was also ruled out on CT Angio. Follow-up with your Cardiolgist Dr Lim in 1 week      SECONDARY DISCHARGE DIAGNOSES  Diagnosis: AAA (abdominal aortic aneurysm)  Assessment and Plan of Treatment: CT Abdomen and pelvis revealed Stable abdominal aortic aneurysm   measuring up to 3.2 cm with asymmetric mural thrombus. No evidence of acute aortic dissection. Aortic arch vessels are patent. Aberrant right   subclavian artery noted with this typical retroesophageal course. Celiac   axis, SMA, PETR and bilateral renal arteries are patent. Scattered soft   and calcified atherosclerotic plaque without significant stenosis.  F/up with Vasular Surgeon

## 2024-02-07 NOTE — DISCHARGE NOTE NURSING/CASE MANAGEMENT/SOCIAL WORK - PATIENT PORTAL LINK FT
You can access the FollowMyHealth Patient Portal offered by [facility] by registering at the following website: [email or web address]. By joining [employer]’s FollowMyHealth portal, you will also be able to view your health information using other applications (apps) compatible with our system.

## 2024-02-07 NOTE — DISCHARGE NOTE PROVIDER - NSDCMRMEDTOKEN_GEN_ALL_CORE_FT
acetaminophen 325 mg oral tablet: 2 tab(s) orally every 8 hours Maximum daily dose 1,950mg  Albuterol (Eqv-ProAir HFA) 90 mcg/inh inhalation aerosol: 2 puff(s) inhaled every 6 hours  amLODIPine 10 mg oral tablet: 1 tab(s) orally once a day  aspirin 325 mg oral delayed release tablet: 1 tab(s) orally once a day  finasteride 5 mg oral tablet: 1 tab(s) orally once a day  Flomax 0.4 mg oral capsule: 1 cap(s) orally once a day  folic acid 1 mg oral tablet: 1 tab(s) orally once a day  gabapentin 300 mg oral capsule: 1 cap(s) orally 3 times a day  metoprolol tartrate 25 mg oral tablet: 1 tab(s) orally 2 times a day  sulfaSALAzine 500 mg oral tablet: 1 orally 2 times a day  Symbicort 160 mcg-4.5 mcg/inh inhalation aerosol: 2 puff(s) inhaled 2 times a day  Zetia 10 mg oral tablet: 1 tab(s) orally once a day

## 2024-02-07 NOTE — DISCHARGE NOTE PROVIDER - CARE PROVIDERS DIRECT ADDRESSES
[email or web address]

## 2024-02-07 NOTE — DISCHARGE NOTE PROVIDER - HOSPITAL COURSE
68-year-old male, history of HTN, BPH, COPD, AAA, CAD s/p CABG x1, intracranial hemorrhage with residual left hemiparesis states he was woken up around 1am out of his sleep with left-sided chest and left arm pain, pain has improved    Assessment  BPH    - c/w home aspirin lopressor 25 bid, stress test negative, d-dimer 472 and age adjusted cut-off is 345 / CT chest dissection protocol was done and negative / patient is higher risk with cancer hx / relative immobility / already has mural thrombus / will do CTA PE protocol as well  - AAA same size as past, d/w vascular in detail regarding mural thrombus and AAA, no intervention here and continue home mgmt with outpatient follow up  - c/w amlodipine 10 qd  - symbicort  - tamsulosin / finasteride  - c/w home sulfasalazine    Pending: CTA chest PE protocol, likely home if negative, CM aware    # DVT PPX: heparin sc

## 2024-02-07 NOTE — DISCHARGE NOTE PROVIDER - ATTENDING DISCHARGE PHYSICAL EXAMINATION:
General: WN/WD NAD  Neurology: A&Ox3, nonfocal, JARRELL x 4, left arm contracture  Head:  Normocephalic, atraumatic  ENT:  Mucosa moist, no ulcerations  Neck:  Supple, no sinuses or palpable masses  Lymphatic:  No palpable cervical, supraclavicular, axillary or inguinal adenopathy  Respiratory: CTA B/L  CV: RRR, S1S2, no murmur  Abdominal: Soft, NT, ND no palpable mass  MSK: No edema, + peripheral pulses  Incisions: intact, no erythema or drainage

## 2024-02-07 NOTE — DISCHARGE NOTE PROVIDER - CARE PROVIDER_API CALL
Dusty Mack  Internal Medicine  [address] [address]  Phone: [phone]  Fax: [phone]  Follow Up Time:

## 2024-02-08 ENCOUNTER — APPOINTMENT (OUTPATIENT)
Dept: VASCULAR SURGERY | Facility: CLINIC | Age: 69
End: 2024-02-08

## 2024-02-14 DIAGNOSIS — I48.91 UNSPECIFIED ATRIAL FIBRILLATION: ICD-10-CM

## 2024-02-14 DIAGNOSIS — Z88.8 ALLERGY STATUS TO OTHER DRUGS, MEDICAMENTS AND BIOLOGICAL SUBSTANCES: ICD-10-CM

## 2024-02-14 DIAGNOSIS — I10 ESSENTIAL (PRIMARY) HYPERTENSION: ICD-10-CM

## 2024-02-14 DIAGNOSIS — Z96.649 PRESENCE OF UNSPECIFIED ARTIFICIAL HIP JOINT: ICD-10-CM

## 2024-02-14 DIAGNOSIS — M47.9 SPONDYLOSIS, UNSPECIFIED: ICD-10-CM

## 2024-02-14 DIAGNOSIS — M06.9 RHEUMATOID ARTHRITIS, UNSPECIFIED: ICD-10-CM

## 2024-02-14 DIAGNOSIS — Z86.711 PERSONAL HISTORY OF PULMONARY EMBOLISM: ICD-10-CM

## 2024-02-14 DIAGNOSIS — E78.00 PURE HYPERCHOLESTEROLEMIA, UNSPECIFIED: ICD-10-CM

## 2024-02-14 DIAGNOSIS — Z87.891 PERSONAL HISTORY OF NICOTINE DEPENDENCE: ICD-10-CM

## 2024-02-14 DIAGNOSIS — J44.9 CHRONIC OBSTRUCTIVE PULMONARY DISEASE, UNSPECIFIED: ICD-10-CM

## 2024-02-14 DIAGNOSIS — N40.0 BENIGN PROSTATIC HYPERPLASIA WITHOUT LOWER URINARY TRACT SYMPTOMS: ICD-10-CM

## 2024-02-14 DIAGNOSIS — I71.40 ABDOMINAL AORTIC ANEURYSM, WITHOUT RUPTURE, UNSPECIFIED: ICD-10-CM

## 2024-02-14 DIAGNOSIS — Z95.1 PRESENCE OF AORTOCORONARY BYPASS GRAFT: ICD-10-CM

## 2024-02-14 DIAGNOSIS — R07.89 OTHER CHEST PAIN: ICD-10-CM

## 2024-02-14 DIAGNOSIS — I45.10 UNSPECIFIED RIGHT BUNDLE-BRANCH BLOCK: ICD-10-CM

## 2024-02-14 DIAGNOSIS — I25.10 ATHEROSCLEROTIC HEART DISEASE OF NATIVE CORONARY ARTERY WITHOUT ANGINA PECTORIS: ICD-10-CM

## 2024-02-14 DIAGNOSIS — Z90.2 ACQUIRED ABSENCE OF LUNG [PART OF]: ICD-10-CM

## 2024-02-14 DIAGNOSIS — Z85.118 PERSONAL HISTORY OF OTHER MALIGNANT NEOPLASM OF BRONCHUS AND LUNG: ICD-10-CM

## 2024-02-14 DIAGNOSIS — Z95.5 PRESENCE OF CORONARY ANGIOPLASTY IMPLANT AND GRAFT: ICD-10-CM

## 2024-02-14 DIAGNOSIS — I69.954 HEMIPLEGIA AND HEMIPARESIS FOLLOWING UNSPECIFIED CEREBROVASCULAR DISEASE AFFECTING LEFT NON-DOMINANT SIDE: ICD-10-CM

## 2024-02-14 DIAGNOSIS — E78.5 HYPERLIPIDEMIA, UNSPECIFIED: ICD-10-CM

## 2024-03-07 ENCOUNTER — APPOINTMENT (OUTPATIENT)
Dept: VASCULAR SURGERY | Facility: CLINIC | Age: 69
End: 2024-03-07
Payer: MEDICARE

## 2024-03-07 VITALS — SYSTOLIC BLOOD PRESSURE: 131 MMHG | DIASTOLIC BLOOD PRESSURE: 77 MMHG

## 2024-03-07 VITALS — HEIGHT: 69 IN | BODY MASS INDEX: 21.48 KG/M2 | WEIGHT: 145 LBS

## 2024-03-07 DIAGNOSIS — I71.40 ABDOMINAL AORTIC ANEURYSM, WITHOUT RUPTURE, UNSPECIFIED: ICD-10-CM

## 2024-03-07 PROCEDURE — 93978 VASCULAR STUDY: CPT

## 2024-03-07 PROCEDURE — 99212 OFFICE O/P EST SF 10 MIN: CPT | Mod: 25

## 2024-03-07 NOTE — HISTORY OF PRESENT ILLNESS
[FreeTextEntry1] : 68 y/o gentleman who was admitted to [facility] in March 2023 for abdominal pain, had CT scan that showed colitis and incidental finding of infrarenal AAA 3.4 cm. Has h/o intracranial bleed in the past with some residual weakness on left side.

## 2024-03-07 NOTE — ASSESSMENT
[FreeTextEntry1] : 70 y/o gentleman with h/o of infrarenal AAA 3.4 cm.  Duplex showed unchanged AAA measuring 3.4 cm.  He will follow in 1 year for repeat duplex. I advised him that if he should have abdominal pain or back pain then he should go to the ED for evaluation of possible rupture.    I, Dr. Chavez Leiva, personally performed the evaluation and management (E/M) services for this established patient who presents today with (a) new problem(s)/exacerbation of (an) existing condition(s). That E/M includes conducting the clinically appropriate interval history &/or exam, assessing all new/exacerbated conditions, and establishing a new plan of care. Today, my JUANI, Norma Pratt PA-C, was here to observe my evaluation and management service for this new problem/exacerbated condition and follow the plan of care established by me going forward.

## 2024-03-21 ENCOUNTER — APPOINTMENT (OUTPATIENT)
Dept: NEUROLOGY | Facility: CLINIC | Age: 69
End: 2024-03-21
Payer: MEDICARE

## 2024-03-21 VITALS — BODY MASS INDEX: 21.48 KG/M2 | HEIGHT: 69 IN | WEIGHT: 145 LBS

## 2024-03-21 DIAGNOSIS — R51.9 HEADACHE, UNSPECIFIED: ICD-10-CM

## 2024-03-21 DIAGNOSIS — Z86.79 PERSONAL HISTORY OF OTHER DISEASES OF THE CIRCULATORY SYSTEM: ICD-10-CM

## 2024-03-21 PROCEDURE — 99204 OFFICE O/P NEW MOD 45 MIN: CPT

## 2024-03-21 NOTE — PHYSICAL EXAM
[Person] : oriented to person [Place] : oriented to place [Time] : oriented to time [Concentration Intact] : normal concentrating ability [Visual Intact] : visual attention was ~T not ~L decreased [Naming Objects] : no difficulty naming common objects [Repeating Phrases] : no difficulty repeating a phrase [Writing A Sentence] : no difficulty writing a sentence [Fluency] : fluency intact [Comprehension] : comprehension intact [Reading] : reading intact [Cranial Nerves Optic (II)] : visual acuity intact bilaterally,  visual fields full to confrontation, pupils equal round and reactive to light [Past History] : adequate knowledge of personal past history [Cranial Nerves Oculomotor (III)] : extraocular motion intact [Cranial Nerves Trigeminal (V)] : facial sensation intact symmetrically [Cranial Nerves Facial (VII)] : face symmetrical [Cranial Nerves Vestibulocochlear (VIII)] : hearing was intact bilaterally [Cranial Nerves Accessory (XI - Cranial And Spinal)] : head turning and shoulder shrug symmetric [Cranial Nerves Glossopharyngeal (IX)] : tongue and palate midline [Cranial Nerves Hypoglossal (XII)] : there was no tongue deviation with protrusion [Motor Tone] : muscle tone was normal in all four extremities [Motor Strength] : muscle strength was normal in all four extremities [No Muscle Atrophy] : normal bulk in all four extremities [Sensation Tactile Decrease] : light touch was intact [Past-pointing] : there was no past-pointing [Tremor] : no tremor present [Plantar Reflex Right Only] : normal on the right [2+] : Ankle jerk left 2+ [Plantar Reflex Left Only] : normal on the left [FreeTextEntry6] : Spastic hemiparesis on the left [FreeTextEntry8] : Walks with a walker

## 2024-03-21 NOTE — HISTORY OF PRESENT ILLNESS
[FreeTextEntry1] : It's a pleasure to see Mr. TIERRA BAKER In the office today. He is a 69 year -  old man  who presents to the office today for the evaluation of acute onset left eye pressure/pain and headache.  He had a history of cerebral bleed 4 years ago with residual left-sided weakness involving the extremities.  He also had a history of lung cancer and recently found to have abdominal aneurysm.  He has seen an ophthalmologist already who did not find any intrinsic eye issue other than cataracts in the right eye.  There was no mentioning of glaucoma or increased pressure in the left eye.  Patient used to follow-up with a neurologist in [place] after the cerebral hemorrhage but now that he lives on [place] he would like to transfer the care here.  His previous workup is not available to us at this time.

## 2024-03-21 NOTE — ASSESSMENT
[FreeTextEntry1] : Acute onset headache/eye pressure-etiology unclear - With history of cerebral bleed, lung cancer, and recently diagnosed abdominal aortic aneurysm, I would like to send him for repeat MRI and MRA of the brain for further evaluation. - Will contact [facility] to get his prior medical record for review - Follow-up in 2 weeks

## 2024-03-28 ENCOUNTER — OUTPATIENT (OUTPATIENT)
Dept: OUTPATIENT SERVICES | Facility: HOSPITAL | Age: 69
LOS: 1 days | End: 2024-03-28
Payer: MEDICARE

## 2024-03-28 DIAGNOSIS — Z96.649 PRESENCE OF UNSPECIFIED ARTIFICIAL HIP JOINT: Chronic | ICD-10-CM

## 2024-03-28 DIAGNOSIS — Z95.1 PRESENCE OF AORTOCORONARY BYPASS GRAFT: Chronic | ICD-10-CM

## 2024-03-28 DIAGNOSIS — Z90.2 ACQUIRED ABSENCE OF LUNG [PART OF]: Chronic | ICD-10-CM

## 2024-03-28 DIAGNOSIS — N20.0 CALCULUS OF KIDNEY: ICD-10-CM

## 2024-03-28 DIAGNOSIS — Z00.8 ENCOUNTER FOR OTHER GENERAL EXAMINATION: ICD-10-CM

## 2024-03-28 PROCEDURE — 76770 US EXAM ABDO BACK WALL COMP: CPT

## 2024-03-28 PROCEDURE — 76770 US EXAM ABDO BACK WALL COMP: CPT | Mod: 26

## 2024-03-29 DIAGNOSIS — N20.0 CALCULUS OF KIDNEY: ICD-10-CM

## 2024-04-16 ENCOUNTER — APPOINTMENT (OUTPATIENT)
Dept: VASCULAR SURGERY | Facility: CLINIC | Age: 69
End: 2024-04-16

## 2024-04-23 NOTE — PHYSICAL THERAPY INITIAL EVALUATION ADULT - NSPTDMEREC_GEN_A_CORE
You can access the FollowMyHealth Patient Portal offered by [facility] by registering at the following website: [email or web address]. By joining [employer]’s FollowMyHealth portal, you will also be able to view your health information using other applications (apps) compatible with our system.
rolling walker

## 2024-05-05 ENCOUNTER — RESULT REVIEW (OUTPATIENT)
Age: 69
End: 2024-05-05

## 2024-05-05 ENCOUNTER — OUTPATIENT (OUTPATIENT)
Dept: OUTPATIENT SERVICES | Facility: HOSPITAL | Age: 69
LOS: 1 days | End: 2024-05-05
Payer: MEDICARE

## 2024-05-05 DIAGNOSIS — Z90.2 ACQUIRED ABSENCE OF LUNG [PART OF]: Chronic | ICD-10-CM

## 2024-05-05 DIAGNOSIS — Z00.8 ENCOUNTER FOR OTHER GENERAL EXAMINATION: ICD-10-CM

## 2024-05-05 DIAGNOSIS — R51.9 HEADACHE, UNSPECIFIED: ICD-10-CM

## 2024-05-05 DIAGNOSIS — Z96.649 PRESENCE OF UNSPECIFIED ARTIFICIAL HIP JOINT: Chronic | ICD-10-CM

## 2024-05-05 DIAGNOSIS — H57.10 OCULAR PAIN, UNSPECIFIED EYE: ICD-10-CM

## 2024-05-05 DIAGNOSIS — Z95.1 PRESENCE OF AORTOCORONARY BYPASS GRAFT: Chronic | ICD-10-CM

## 2024-05-05 PROCEDURE — 70551 MRI BRAIN STEM W/O DYE: CPT

## 2024-05-05 PROCEDURE — 70544 MR ANGIOGRAPHY HEAD W/O DYE: CPT

## 2024-05-05 PROCEDURE — 70544 MR ANGIOGRAPHY HEAD W/O DYE: CPT | Mod: 26,XU

## 2024-05-05 PROCEDURE — 70551 MRI BRAIN STEM W/O DYE: CPT | Mod: 26

## 2024-05-06 DIAGNOSIS — R51.9 HEADACHE, UNSPECIFIED: ICD-10-CM

## 2024-05-06 DIAGNOSIS — H57.10 OCULAR PAIN, UNSPECIFIED EYE: ICD-10-CM

## 2024-05-07 ENCOUNTER — APPOINTMENT (OUTPATIENT)
Dept: VASCULAR SURGERY | Facility: CLINIC | Age: 69
End: 2024-05-07
Payer: MEDICARE

## 2024-05-07 VITALS — BODY MASS INDEX: 21.33 KG/M2 | WEIGHT: 144 LBS | HEIGHT: 69 IN

## 2024-05-07 DIAGNOSIS — I73.9 PERIPHERAL VASCULAR DISEASE, UNSPECIFIED: ICD-10-CM

## 2024-05-07 PROCEDURE — 93926 LOWER EXTREMITY STUDY: CPT

## 2024-05-07 PROCEDURE — 99213 OFFICE O/P EST LOW 20 MIN: CPT | Mod: 25

## 2024-05-07 NOTE — ASSESSMENT
[FreeTextEntry1] : The patient is a 69-year-old male with a history of small abdominal aortic aneurysm in which is currently being followed by serial duplex exams.  Today presents complaining of left leg discomfort in the popliteal fossa and his knee.  I performed a arterial duplex that showed tibial vessel disease present however the patient has a dorsalis pedis pulse absent posterior tibialis pulse.  No evidence of tissue loss or rest pain present.  The patient ambulates with a walker and his claudication symptoms remain stable.  No vascular surgery intervention warranted at this time.  I will continue to monitor the patient conservatively and see him back in my office in 6 months time for a repeat aortic duplex exam or sooner if he develops any wounds to his feet.    I, Dr. Chavez Leiva, personally performed the evaluation and management (E/M) services for this established patient who presents today with (a) new problem(s)/exacerbation of (an) existing condition(s). That E/M includes conducting the clinically appropriate interval history &/or exam, assessing all new/exacerbated conditions, and establishing a new plan of care. Today, my JUANI, Sara Lujan PA-C, was here to observe my evaluation and management service for this new problem/exacerbated condition and follow the plan of care established by me going forward.

## 2024-05-07 NOTE — REASON FOR VISIT
[Follow-Up: _____] : a [unfilled] follow-up visit [FreeTextEntry1] : The patient was recently seen with a history of a AAA.  Today he presents with left leg pain in the popliteal fossa.

## 2024-05-07 NOTE — HISTORY OF PRESENT ILLNESS
[FreeTextEntry1] : The patient was seen 2 months ago for an aortic duplex with a small abdominal aortic aneurysm at 3.4 cm.  Patient presents today complaining of left leg popliteal fossa discomfort.  He states it occurs at rest as well as when he walks.  He informs me of a history of peripheral vascular disease.

## 2024-05-07 NOTE — DATA REVIEWED
[FreeTextEntry1] : Arterial duplex left there is no evidence of hemodynamically significant disease in the lower extremity from the groin to the knee.  The common and origin of the deep femoral superficial femoral and popliteal arteries are patent.  The patient has tibial disease visualized on duplex exam

## 2024-05-24 ENCOUNTER — APPOINTMENT (OUTPATIENT)
Dept: NEUROLOGY | Facility: CLINIC | Age: 69
End: 2024-05-24
Payer: MEDICARE

## 2024-05-24 VITALS — DIASTOLIC BLOOD PRESSURE: 74 MMHG | HEART RATE: 59 BPM | SYSTOLIC BLOOD PRESSURE: 128 MMHG

## 2024-05-24 VITALS — BODY MASS INDEX: 20.88 KG/M2 | WEIGHT: 141 LBS | HEIGHT: 69 IN

## 2024-05-24 DIAGNOSIS — H57.10 OCULAR PAIN, UNSPECIFIED EYE: ICD-10-CM

## 2024-05-24 PROCEDURE — 99213 OFFICE O/P EST LOW 20 MIN: CPT

## 2024-05-24 NOTE — PHYSICAL EXAM
[Person] : oriented to person [Place] : oriented to place [Time] : oriented to time [Concentration Intact] : normal concentrating ability [Visual Intact] : visual attention was ~T not ~L decreased [Naming Objects] : no difficulty naming common objects [Repeating Phrases] : no difficulty repeating a phrase [Writing A Sentence] : no difficulty writing a sentence [Fluency] : fluency intact [Comprehension] : comprehension intact [Reading] : reading intact [Past History] : adequate knowledge of personal past history [Cranial Nerves Optic (II)] : visual acuity intact bilaterally,  visual fields full to confrontation, pupils equal round and reactive to light [Cranial Nerves Oculomotor (III)] : extraocular motion intact [Cranial Nerves Trigeminal (V)] : facial sensation intact symmetrically [Cranial Nerves Facial (VII)] : face symmetrical [Cranial Nerves Vestibulocochlear (VIII)] : hearing was intact bilaterally [Cranial Nerves Glossopharyngeal (IX)] : tongue and palate midline [Cranial Nerves Accessory (XI - Cranial And Spinal)] : head turning and shoulder shrug symmetric [Cranial Nerves Hypoglossal (XII)] : there was no tongue deviation with protrusion [Motor Tone] : muscle tone was normal in all four extremities [Motor Strength] : muscle strength was normal in all four extremities [No Muscle Atrophy] : normal bulk in all four extremities [Sensation Tactile Decrease] : light touch was intact [Past-pointing] : there was no past-pointing [Tremor] : no tremor present [2+] : Ankle jerk left 2+ [Plantar Reflex Right Only] : normal on the right [Plantar Reflex Left Only] : normal on the left [FreeTextEntry6] : Spastic hemiparesis on the left [FreeTextEntry8] : Walks with a walker

## 2024-05-24 NOTE — HISTORY OF PRESENT ILLNESS
[FreeTextEntry1] : It's a pleasure to see Mr. TIERRA BAKER In the office today. He is a 69 year -  old man  who presents to the office today for the evaluation of left eye pain/pressure.  He was sent for MRI of the brain which did not show any acute intracranial pathology and the contents in the orbit was said to be normal, MRA with the neck was also done which did not show any evidence of aneurysm/dissection.  Even though he said last time his eye doctor did not find anything in the left eye however he reports on examination there may be some bleeding.  Detail Level: Simple Detail Level: Detailed Detail Level: Zone

## 2024-07-30 ENCOUNTER — APPOINTMENT (OUTPATIENT)
Dept: SURGERY | Facility: CLINIC | Age: 69
End: 2024-07-30
Payer: MEDICARE

## 2024-07-30 VITALS — HEIGHT: 69 IN | WEIGHT: 144 LBS | BODY MASS INDEX: 21.33 KG/M2

## 2024-07-30 DIAGNOSIS — R10.33 PERIUMBILICAL PAIN: ICD-10-CM

## 2024-07-30 PROCEDURE — 99203 OFFICE O/P NEW LOW 30 MIN: CPT

## 2024-07-30 NOTE — PHYSICAL EXAM
[JVD] : no jugular venous distention  [Normal Breath Sounds] : Normal breath sounds [No Rash or Lesion] : No rash or lesion [Alert] : alert [Calm] : calm [de-identified] : Healthy [de-identified] : Normal [de-identified] : Soft and flat [de-identified] : normal [de-identified] : No hernias

## 2024-07-30 NOTE — ASSESSMENT
[FreeTextEntry1] : Silvio is a pleasant 69-year-old gentleman with a past medical history significant for hypertension, hypercholesterolemia, coronary artery disease and myocardial infarction along with CABG, atrial fibrillation, multiple stents, a CVA in the past, asthma, COPD, DVT and pulmonary embolism, prostate cancer, rheumatoid arthritis, diverticular disease, chronic renal insufficiency and BPH who is concerned about a bulge at the umbilicus with intermittent discomfort prompting a visit today.  Physical examination demonstrates no evidence of an obvious hernia and I was able to review a CT scan of the abdomen and pelvis done last year which also demonstrates no obvious hernia.  He does have what is called an omphalolith (which is a firm waxy ball involving hair and sebum within the umbilicus) which was removed today by us without difficulty.  The umbilicus was then cleaned and further inspection demonstrated no obvious hernia.  He does have some intermittent discomfort in the right groin area where he had an inguinal hernia repair with mesh by another surgeon many years ago and this was examined with no evidence of a recurrent right inguinal hernia or new left inguinal hernia.  Both testicles are normal.  His current BMI is 21.  Silvio was counseled and reassured.  No surgical intervention is warranted.  Local wound care was outlined to him.  He may return to us if he develops any similar issues in the future, of course.

## 2024-07-30 NOTE — CONSULT LETTER
[Dear  ___] : Dear  [unfilled], [Courtesy Letter:] : I had the pleasure of seeing your patient, [unfilled], in my office today. [Please see my note below.] : Please see my note below. [Consult Closing:] : Thank you very much for allowing me to participate in the care of this patient.  If you have any questions, please do not hesitate to contact me. [FreeTextEntry3] : Respectfully,  Stew Teran M.D., FACS [DrLeola  ___] : Dr. MAGAÑA

## 2024-08-19 ENCOUNTER — APPOINTMENT (OUTPATIENT)
Dept: PULMONOLOGY | Facility: CLINIC | Age: 69
End: 2024-08-19
Payer: MEDICARE

## 2024-08-19 VITALS
BODY MASS INDEX: 21.18 KG/M2 | SYSTOLIC BLOOD PRESSURE: 110 MMHG | RESPIRATION RATE: 12 BRPM | OXYGEN SATURATION: 95 % | HEART RATE: 55 BPM | DIASTOLIC BLOOD PRESSURE: 70 MMHG | HEIGHT: 69 IN | WEIGHT: 143 LBS

## 2024-08-19 DIAGNOSIS — J44.9 CHRONIC OBSTRUCTIVE PULMONARY DISEASE, UNSPECIFIED: ICD-10-CM

## 2024-08-19 DIAGNOSIS — Z85.118 PERSONAL HISTORY OF OTHER MALIGNANT NEOPLASM OF BRONCHUS AND LUNG: ICD-10-CM

## 2024-08-19 PROCEDURE — 99214 OFFICE O/P EST MOD 30 MIN: CPT

## 2024-08-19 PROCEDURE — G2211 COMPLEX E/M VISIT ADD ON: CPT

## 2024-08-19 RX ORDER — FLUTICASONE FUROATE AND VILANTEROL TRIFENATATE 200; 25 UG/1; UG/1
200-25 POWDER RESPIRATORY (INHALATION) DAILY
Qty: 3 | Refills: 3 | Status: ACTIVE | COMMUNITY
Start: 1900-01-01 | End: 1900-01-01

## 2024-08-19 NOTE — ASSESSMENT
[FreeTextEntry1] : - Summary : Mr. Leonardo is a patient with severe COPD and a history of right upper lobe lung resection for lung cancer. He is currently on Brio (a dry powder inhaler) and Ventolin (albuterol) for COPD management. - Problems : - Severe COPD  - Lung cancer (status post right upper lobe resection)  - Differential Diagnosis : Plan: - Summary : The plan includes obtaining a yearly chest CT scan for lung cancer surveillance, a prescription for the CAT scan was given to him today The patient wants a new PAP unit as it is over 5 years in age. optimizing COPD management with medication adjustments, and scheduling a follow-up visit in the spring. - Plan : - Obtain a chest CT scan (no dye or contrast) for lung cancer surveillance  - Adjust COPD medication regimen: Take Ventolin (albuterol) before bedtime to improve nighttime symptoms  - Refill prescriptions for Brio and Ventolin with 3 months' supply and refills  - Schedule a follow-up visit in the spring (March or April)

## 2024-08-19 NOTE — HISTORY OF PRESENT ILLNESS
[TextBox_4] : - Summary : Mr. Leonardo is a patient with severe COPD and a history of right upper lobe lung resection for lung cancer. He presented for a follow-up visit to discuss his symptoms and management.  He never went for his follow-up CAT scans of the chest - Chief Complaint (CC) : Follow-up for COPD and lung cancer surveillance. - History of Present Illness (HPI) : Chief Complaint: Follow-up for COPD and lung cancer surveillance  Patient's Medical Regimen: Currently taking Breo (a dry powder inhaler) and Ventolin (albuterol) for COPD Contextual Factors: Not specified Recent Medical Interventions: Right upper lobe lung resection for lung cancer - Past Medical History : Severe COPD, lung cancer (status post right upper lobe resection) - Past Surgical History : Right upper lobe lung resection for lung cancer

## 2024-10-31 ENCOUNTER — APPOINTMENT (OUTPATIENT)
Dept: ORTHOPEDIC SURGERY | Facility: CLINIC | Age: 69
End: 2024-10-31
Payer: MEDICARE

## 2024-10-31 DIAGNOSIS — M25.511 PAIN IN RIGHT SHOULDER: ICD-10-CM

## 2024-10-31 PROCEDURE — 20610 DRAIN/INJ JOINT/BURSA W/O US: CPT | Mod: RT

## 2024-10-31 PROCEDURE — 73030 X-RAY EXAM OF SHOULDER: CPT | Mod: RT

## 2024-10-31 PROCEDURE — 99203 OFFICE O/P NEW LOW 30 MIN: CPT | Mod: 25

## 2024-11-05 ENCOUNTER — APPOINTMENT (OUTPATIENT)
Dept: VASCULAR SURGERY | Facility: CLINIC | Age: 69
End: 2024-11-05
Payer: MEDICARE

## 2024-11-05 VITALS — HEIGHT: 69 IN

## 2024-11-05 VITALS
SYSTOLIC BLOOD PRESSURE: 144 MMHG | WEIGHT: 142 LBS | DIASTOLIC BLOOD PRESSURE: 84 MMHG | HEART RATE: 64 BPM | BODY MASS INDEX: 20.97 KG/M2

## 2024-11-05 DIAGNOSIS — I71.40 ABDOMINAL AORTIC ANEURYSM, WITHOUT RUPTURE, UNSPECIFIED: ICD-10-CM

## 2024-11-05 PROCEDURE — 93978 VASCULAR STUDY: CPT

## 2024-11-05 PROCEDURE — 99212 OFFICE O/P EST SF 10 MIN: CPT

## 2024-11-06 NOTE — CONSULT LETTER
[Dear  ___] : Dear  [unfilled], [Consult Letter:] : I had the pleasure of evaluating your patient, [unfilled]. [Please see my note below.] : Please see my note below. 06-Nov-2024

## 2024-11-14 NOTE — PHYSICAL THERAPY INITIAL EVALUATION ADULT - DISCHARGE PLANNER MADE AWARE
Patient Seen in: [facility] Emergency Department      History     Chief Complaint   Patient presents with    Confusion     Stated Complaint: feeling \"worthless\", feeling confused since yesterday    Subjective:   HPI      74 male presents to the emergency department with confusion.  Patient states it was worse yesterday.  He is depressed and has had issues with insomnia.  He saw his primary care physician and was placed on bupropion and Remeron.  He had been placed on Remeron because he had been on it before and tolerated it well.  Patient states that he did sleep better but then yesterday he could not remember where he was and had all types of confusion related to the day of the week.  He states he still feels tired and off.  Patient also has a history of urinary tract infections and urinary retention he is scheduled to have surgery and does have a catheter placed the catheter was just changed out on Tuesday.  So there is concern that he could have an underlying infection as well.  No fevers.  No nausea vomiting or diarrhea    Objective:     Past Medical History:    HTN (hypertension)    Nonspecific elevation of levels of transaminase or lactic acid dehydrogenase (LDH)    s/p liver biopsy normal              Past Surgical History:   Procedure Laterality Date    Colonoscopy & polypectomy  5/9/16    diverticulosis and a small polyp was removed; ASC    Colonoscopy,biopsy N/A 5/9/2016    Procedure: COLONOSCOPY, POSSIBLE BIOPSY, POSSIBLE POLYPECTOMY 83065;  Surgeon: Shaun Mock MD;  Location: [facility]    Other surgical history      scope knee    Patient documented not to have experienced any of the following events N/A 5/9/2016    Procedure: COLONOSCOPY, POSSIBLE BIOPSY, POSSIBLE POLYPECTOMY 20769;  Surgeon: Shaun Mock MD;  Location: [facility]    Patient withough preoperative order for iv antibiotic surgical site infection prophylaxis. N/A 5/9/2016    Procedure:  COLONOSCOPY, POSSIBLE BIOPSY, POSSIBLE POLYPECTOMY 39338;  Surgeon: Shaun Mock MD;  [facility]    Special service or report  1990s    R knee arthroscopy lat meniscus                Social History     Socioeconomic History    Marital status:    Tobacco Use    Smoking status: Never    Smokeless tobacco: Never   Substance and Sexual Activity    Alcohol use: No     Alcohol/week: 0.0 - 1.0 standard drinks of alcohol     Comment: rare    Drug use: No    Sexual activity: Yes   Social History Narrative    marital status:     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[employer] [address]    caffeine: few cups    blood tranfusion: no    hiv exposure: no    travel: domestic    exercise: health club 2 x week    mammo:      dexa:      pap:      colonoscopy: 2006 ok    lab: 2012, 2013,2014    prostate: 2013,2014    testicles: 2013,2013    influenza: 2012,2013    bdt: yes    pneumovax: no    zoster: 2013                                      Physical Exam     ED Triage Vitals [11/14/24 1058]   /78   Pulse 70   Resp 14   Temp 98 °F (36.7 °C)   Temp src Oral   SpO2 97 %   O2 Device None (Room air)       Current Vitals:   Vital Signs  BP: 136/73  Pulse: 67  Resp: 11  Temp: 98 °F (36.7 °C)  Temp src: Oral  MAP (mmHg): 91    Oxygen Therapy  SpO2: 98 %  O2 Device: None (Room air)        Physical Exam  Vitals and nursing note reviewed.   Constitutional:       General: He is not in acute distress.     Appearance: Normal appearance. He is well-developed.   HENT:      Head: Normocephalic and atraumatic.   Cardiovascular:      Rate and Rhythm: Normal rate and regular rhythm.      Pulses: Normal pulses.      Heart sounds: Normal heart sounds.   Pulmonary:      Effort: Pulmonary effort is normal.      Breath sounds: Normal breath sounds. No stridor.   Abdominal:      General: Bowel sounds are normal.      Palpations: Abdomen is soft.   Musculoskeletal:         General: Normal range of motion.      Cervical back: Normal  range of motion and neck supple.   Lymphadenopathy:      Cervical: No cervical adenopathy.   Skin:     General: Skin is warm and dry.   Neurological:      General: No focal deficit present.      Mental Status: He is alert and oriented to person, place, and time.            ED Course     Labs Reviewed   URINALYSIS, ROUTINE - Abnormal; Notable for the following components:       Result Value    Urine Color Colorless (*)     Blood Urine Trace (*)     All other components within normal limits   COMP METABOLIC PANEL (14) - Abnormal; Notable for the following components:    Glucose 137 (*)     eGFR-Cr 58 (*)     All other components within normal limits   CBC WITH DIFFERENTIAL WITH PLATELET - Abnormal; Notable for the following components:    HGB 12.4 (*)     HCT 36.5 (*)     All other components within normal limits   TSH W REFLEX TO FREE T4 - Normal   RAINBOW DRAW LAVENDER   RAINBOW DRAW LIGHT GREEN   RAINBOW DRAW BLUE   RAINBOW DRAW GOLD                   MDM      Patient had IV established and baseline blood work obtained.  Patient's Schmidt bag was changed to get a clean specimen he had just had his Schmidt catheter changed and therefore we did not change it.  Patient's urinalysis did not show any signs of infection.  Patient did not have an elevated white count to be concerned about any type of systemic infection and he is afebrile.  He had normal electrolytes no issues with hypoglycemia or hyponatremia that could give him a change in his mental status.  Patient is able to answer all questions appropriately for us.  I do feel this is most consistent with having a side effect from the Remeron.  This will be discontinued and he will be advised to do melatonin which she states he did try last night and did seem to help but he does not stay asleep with it.  We also discussed doing over-the-counter magnesium.  He will be given Restoril to use sparingly.  We will opt to do a lower dose as he did have the issues with the  Remeron.  I did contact [facility] to see if he could be moved up with his known appointment.  Currently at this time the family very much would prefer to have a psychiatrist versus [facility] and earliest appointment was with they already had established.  Patient was subsequently discharged        Medical Decision Making      Disposition and Plan     Clinical Impression:  1. Medication reaction, initial encounter    2. Insomnia, unspecified type    3. Depression, unspecified depression type         Disposition:  Discharge  11/14/2024  1:36 pm    Follow-up:  Mango Boland MD  [address] [address]  [phone]    Schedule an appointment as soon as possible for a visit            Medications Prescribed:  Discharge Medication List as of 11/14/2024  1:39 PM        START taking these medications    Details   temazepam (RESTORIL) 7.5 MG Oral Cap Take 1 capsule (7.5 mg total) by mouth nightly as needed for Sleep., Normal, Disp-10 capsule, R-0      magnesium oxide 400 MG Oral Tab Take 1 tablet (400 mg total) by mouth at bedtime., Normal, Disp-30 tablet, R-0                 Supplementary Documentation:                                                            yes

## 2024-12-02 ENCOUNTER — APPOINTMENT (OUTPATIENT)
Dept: ORTHOPEDIC SURGERY | Facility: CLINIC | Age: 69
End: 2024-12-02

## 2024-12-12 ENCOUNTER — APPOINTMENT (OUTPATIENT)
Dept: RHEUMATOLOGY | Facility: CLINIC | Age: 69
End: 2024-12-12
Payer: MEDICARE

## 2024-12-12 VITALS
HEART RATE: 71 BPM | DIASTOLIC BLOOD PRESSURE: 85 MMHG | OXYGEN SATURATION: 94 % | BODY MASS INDEX: 20.44 KG/M2 | SYSTOLIC BLOOD PRESSURE: 147 MMHG | HEIGHT: 69 IN | TEMPERATURE: 98 F | WEIGHT: 138 LBS

## 2024-12-12 DIAGNOSIS — Z82.61 FAMILY HISTORY OF ARTHRITIS: ICD-10-CM

## 2024-12-12 DIAGNOSIS — M06.9 RHEUMATOID ARTHRITIS, UNSPECIFIED: ICD-10-CM

## 2024-12-12 DIAGNOSIS — Z80.9 FAMILY HISTORY OF MALIGNANT NEOPLASM, UNSPECIFIED: ICD-10-CM

## 2024-12-12 PROCEDURE — 99204 OFFICE O/P NEW MOD 45 MIN: CPT

## 2024-12-12 RX ORDER — SULFASALAZINE 500 MG/1
500 TABLET ORAL
Qty: 450 | Refills: 0 | Status: ACTIVE | COMMUNITY
Start: 2024-12-12 | End: 1900-01-01

## 2024-12-12 RX ORDER — MIRABEGRON 50 MG/1
50 TABLET, FILM COATED, EXTENDED RELEASE ORAL
Refills: 0 | Status: ACTIVE | COMMUNITY

## 2024-12-12 RX ORDER — LOSARTAN POTASSIUM 100 MG/1
100 TABLET, FILM COATED ORAL
Refills: 0 | Status: ACTIVE | COMMUNITY

## 2024-12-12 RX ORDER — FINASTERIDE 5 MG/1
5 TABLET, FILM COATED ORAL
Refills: 0 | Status: ACTIVE | COMMUNITY

## 2024-12-12 RX ORDER — FOLIC ACID 1 MG/1
1 TABLET ORAL
Refills: 0 | Status: ACTIVE | COMMUNITY

## 2024-12-17 ENCOUNTER — INPATIENT (INPATIENT)
Facility: HOSPITAL | Age: 69
LOS: 6 days | Discharge: HOME HEALTH | DRG: 999 | End: 2024-12-24
Attending: INTERNAL MEDICINE | Admitting: STUDENT IN AN ORGANIZED HEALTH CARE EDUCATION/TRAINING PROGRAM
Payer: MEDICARE

## 2024-12-17 VITALS
RESPIRATION RATE: 18 BRPM | SYSTOLIC BLOOD PRESSURE: 129 MMHG | TEMPERATURE: 97 F | OXYGEN SATURATION: 100 % | DIASTOLIC BLOOD PRESSURE: 83 MMHG | HEART RATE: 98 BPM | WEIGHT: 130.07 LBS

## 2024-12-17 DIAGNOSIS — J44.9 CHRONIC OBSTRUCTIVE PULMONARY DISEASE, UNSPECIFIED: ICD-10-CM

## 2024-12-17 DIAGNOSIS — E83.42 HYPOMAGNESEMIA: ICD-10-CM

## 2024-12-17 DIAGNOSIS — R26.89 OTHER ABNORMALITIES OF GAIT AND MOBILITY: ICD-10-CM

## 2024-12-17 DIAGNOSIS — Z90.2 ACQUIRED ABSENCE OF LUNG [PART OF]: Chronic | ICD-10-CM

## 2024-12-17 DIAGNOSIS — E73.9 LACTOSE INTOLERANCE, UNSPECIFIED: ICD-10-CM

## 2024-12-17 DIAGNOSIS — Z90.2 ACQUIRED ABSENCE OF LUNG [PART OF]: ICD-10-CM

## 2024-12-17 DIAGNOSIS — J43.2 CENTRILOBULAR EMPHYSEMA: ICD-10-CM

## 2024-12-17 DIAGNOSIS — N40.1 BENIGN PROSTATIC HYPERPLASIA WITH LOWER URINARY TRACT SYMPTOMS: ICD-10-CM

## 2024-12-17 DIAGNOSIS — M25.512 PAIN IN LEFT SHOULDER: ICD-10-CM

## 2024-12-17 DIAGNOSIS — Z85.118 PERSONAL HISTORY OF OTHER MALIGNANT NEOPLASM OF BRONCHUS AND LUNG: ICD-10-CM

## 2024-12-17 DIAGNOSIS — F17.210 NICOTINE DEPENDENCE, CIGARETTES, UNCOMPLICATED: ICD-10-CM

## 2024-12-17 DIAGNOSIS — N31.9 NEUROMUSCULAR DYSFUNCTION OF BLADDER, UNSPECIFIED: ICD-10-CM

## 2024-12-17 DIAGNOSIS — I25.10 ATHEROSCLEROTIC HEART DISEASE OF NATIVE CORONARY ARTERY WITHOUT ANGINA PECTORIS: ICD-10-CM

## 2024-12-17 DIAGNOSIS — M25.552 PAIN IN LEFT HIP: ICD-10-CM

## 2024-12-17 DIAGNOSIS — Z96.649 PRESENCE OF UNSPECIFIED ARTIFICIAL HIP JOINT: ICD-10-CM

## 2024-12-17 DIAGNOSIS — I69.398 OTHER SEQUELAE OF CEREBRAL INFARCTION: ICD-10-CM

## 2024-12-17 DIAGNOSIS — Z79.82 LONG TERM (CURRENT) USE OF ASPIRIN: ICD-10-CM

## 2024-12-17 DIAGNOSIS — R29.6 REPEATED FALLS: ICD-10-CM

## 2024-12-17 DIAGNOSIS — I71.43 INFRARENAL ABDOMINAL AORTIC ANEURYSM, WITHOUT RUPTURE: ICD-10-CM

## 2024-12-17 DIAGNOSIS — Z87.442 PERSONAL HISTORY OF URINARY CALCULI: ICD-10-CM

## 2024-12-17 DIAGNOSIS — N17.9 ACUTE KIDNEY FAILURE, UNSPECIFIED: ICD-10-CM

## 2024-12-17 DIAGNOSIS — E87.1 HYPO-OSMOLALITY AND HYPONATREMIA: ICD-10-CM

## 2024-12-17 DIAGNOSIS — D64.9 ANEMIA, UNSPECIFIED: ICD-10-CM

## 2024-12-17 DIAGNOSIS — Z96.649 PRESENCE OF UNSPECIFIED ARTIFICIAL HIP JOINT: Chronic | ICD-10-CM

## 2024-12-17 DIAGNOSIS — M06.9 RHEUMATOID ARTHRITIS, UNSPECIFIED: ICD-10-CM

## 2024-12-17 DIAGNOSIS — Z95.1 PRESENCE OF AORTOCORONARY BYPASS GRAFT: ICD-10-CM

## 2024-12-17 DIAGNOSIS — E78.5 HYPERLIPIDEMIA, UNSPECIFIED: ICD-10-CM

## 2024-12-17 DIAGNOSIS — Z95.1 PRESENCE OF AORTOCORONARY BYPASS GRAFT: Chronic | ICD-10-CM

## 2024-12-17 DIAGNOSIS — I69.254 HEMIPLEGIA AND HEMIPARESIS FOLLOWING OTHER NONTRAUMATIC INTRACRANIAL HEMORRHAGE AFFECTING LEFT NON-DOMINANT SIDE: ICD-10-CM

## 2024-12-17 DIAGNOSIS — I10 ESSENTIAL (PRIMARY) HYPERTENSION: ICD-10-CM

## 2024-12-17 DIAGNOSIS — Z88.8 ALLERGY STATUS TO OTHER DRUGS, MEDICAMENTS AND BIOLOGICAL SUBSTANCES: ICD-10-CM

## 2024-12-17 LAB
ALBUMIN SERPL ELPH-MCNC: 3.4 G/DL — LOW (ref 3.5–5.2)
ALP SERPL-CCNC: 77 U/L — SIGNIFICANT CHANGE UP (ref 30–115)
ALT FLD-CCNC: 7 U/L — SIGNIFICANT CHANGE UP (ref 0–41)
ANION GAP SERPL CALC-SCNC: 11 MMOL/L — SIGNIFICANT CHANGE UP (ref 7–14)
APTT BLD: 31 SEC — SIGNIFICANT CHANGE UP (ref 27–39.2)
AST SERPL-CCNC: 17 U/L — SIGNIFICANT CHANGE UP (ref 0–41)
BASOPHILS # BLD AUTO: 0.04 K/UL — SIGNIFICANT CHANGE UP (ref 0–0.2)
BASOPHILS NFR BLD AUTO: 0.9 % — SIGNIFICANT CHANGE UP (ref 0–1)
BILIRUB SERPL-MCNC: 0.3 MG/DL — SIGNIFICANT CHANGE UP (ref 0.2–1.2)
BUN SERPL-MCNC: 33 MG/DL — HIGH (ref 10–20)
CALCIUM SERPL-MCNC: 8.6 MG/DL — SIGNIFICANT CHANGE UP (ref 8.4–10.4)
CHLORIDE SERPL-SCNC: 103 MMOL/L — SIGNIFICANT CHANGE UP (ref 98–110)
CO2 SERPL-SCNC: 22 MMOL/L — SIGNIFICANT CHANGE UP (ref 17–32)
CREAT SERPL-MCNC: 1 MG/DL — SIGNIFICANT CHANGE UP (ref 0.7–1.5)
EGFR: 81 ML/MIN/1.73M2 — SIGNIFICANT CHANGE UP
EOSINOPHIL # BLD AUTO: 0.02 K/UL — SIGNIFICANT CHANGE UP (ref 0–0.7)
EOSINOPHIL NFR BLD AUTO: 0.4 % — SIGNIFICANT CHANGE UP (ref 0–8)
GLUCOSE SERPL-MCNC: 94 MG/DL — SIGNIFICANT CHANGE UP (ref 70–99)
HCT VFR BLD CALC: 35.7 % — LOW (ref 42–52)
HGB BLD-MCNC: 11.6 G/DL — LOW (ref 14–18)
IMM GRANULOCYTES NFR BLD AUTO: 1.1 % — HIGH (ref 0.1–0.3)
INR BLD: 1.03 RATIO — SIGNIFICANT CHANGE UP (ref 0.65–1.3)
LACTATE SERPL-SCNC: 1.1 MMOL/L — SIGNIFICANT CHANGE UP (ref 0.7–2)
LIDOCAIN IGE QN: 43 U/L — SIGNIFICANT CHANGE UP (ref 7–60)
LYMPHOCYTES # BLD AUTO: 0.45 K/UL — LOW (ref 1.2–3.4)
LYMPHOCYTES # BLD AUTO: 10.1 % — LOW (ref 20.5–51.1)
MCHC RBC-ENTMCNC: 30.6 PG — SIGNIFICANT CHANGE UP (ref 27–31)
MCHC RBC-ENTMCNC: 32.5 G/DL — SIGNIFICANT CHANGE UP (ref 32–37)
MCV RBC AUTO: 94.2 FL — HIGH (ref 80–94)
MONOCYTES # BLD AUTO: 0.74 K/UL — HIGH (ref 0.1–0.6)
MONOCYTES NFR BLD AUTO: 16.6 % — HIGH (ref 1.7–9.3)
NEUTROPHILS # BLD AUTO: 3.17 K/UL — SIGNIFICANT CHANGE UP (ref 1.4–6.5)
NEUTROPHILS NFR BLD AUTO: 70.9 % — SIGNIFICANT CHANGE UP (ref 42.2–75.2)
NRBC # BLD: 0 /100 WBCS — SIGNIFICANT CHANGE UP (ref 0–0)
PLATELET # BLD AUTO: 141 K/UL — SIGNIFICANT CHANGE UP (ref 130–400)
PMV BLD: 10.4 FL — SIGNIFICANT CHANGE UP (ref 7.4–10.4)
POTASSIUM SERPL-MCNC: 4.4 MMOL/L — SIGNIFICANT CHANGE UP (ref 3.5–5)
POTASSIUM SERPL-SCNC: 4.4 MMOL/L — SIGNIFICANT CHANGE UP (ref 3.5–5)
PROT SERPL-MCNC: 7.8 G/DL — SIGNIFICANT CHANGE UP (ref 6–8)
PROTHROM AB SERPL-ACNC: 12.2 SEC — SIGNIFICANT CHANGE UP (ref 9.95–12.87)
RBC # BLD: 3.79 M/UL — LOW (ref 4.7–6.1)
RBC # FLD: 13.3 % — SIGNIFICANT CHANGE UP (ref 11.5–14.5)
SODIUM SERPL-SCNC: 136 MMOL/L — SIGNIFICANT CHANGE UP (ref 135–146)
WBC # BLD: 4.47 K/UL — LOW (ref 4.8–10.8)
WBC # FLD AUTO: 4.47 K/UL — LOW (ref 4.8–10.8)

## 2024-12-17 PROCEDURE — 94640 AIRWAY INHALATION TREATMENT: CPT

## 2024-12-17 PROCEDURE — 99222 1ST HOSP IP/OBS MODERATE 55: CPT

## 2024-12-17 PROCEDURE — 72125 CT NECK SPINE W/O DYE: CPT | Mod: 26,MC

## 2024-12-17 PROCEDURE — 80048 BASIC METABOLIC PNL TOTAL CA: CPT

## 2024-12-17 PROCEDURE — 84300 ASSAY OF URINE SODIUM: CPT

## 2024-12-17 PROCEDURE — 84156 ASSAY OF PROTEIN URINE: CPT

## 2024-12-17 PROCEDURE — 70486 CT MAXILLOFACIAL W/O DYE: CPT | Mod: 26,MC

## 2024-12-17 PROCEDURE — 73552 X-RAY EXAM OF FEMUR 2/>: CPT | Mod: 26,LT

## 2024-12-17 PROCEDURE — 87077 CULTURE AEROBIC IDENTIFY: CPT

## 2024-12-17 PROCEDURE — 97163 PT EVAL HIGH COMPLEX 45 MIN: CPT | Mod: GP

## 2024-12-17 PROCEDURE — 85025 COMPLETE CBC W/AUTO DIFF WBC: CPT

## 2024-12-17 PROCEDURE — 97116 GAIT TRAINING THERAPY: CPT | Mod: GP

## 2024-12-17 PROCEDURE — 83935 ASSAY OF URINE OSMOLALITY: CPT

## 2024-12-17 PROCEDURE — 84100 ASSAY OF PHOSPHORUS: CPT

## 2024-12-17 PROCEDURE — 84540 ASSAY OF URINE/UREA-N: CPT

## 2024-12-17 PROCEDURE — 85730 THROMBOPLASTIN TIME PARTIAL: CPT

## 2024-12-17 PROCEDURE — 74177 CT ABD & PELVIS W/CONTRAST: CPT | Mod: 26,MC

## 2024-12-17 PROCEDURE — 85027 COMPLETE CBC AUTOMATED: CPT

## 2024-12-17 PROCEDURE — 97110 THERAPEUTIC EXERCISES: CPT | Mod: GP

## 2024-12-17 PROCEDURE — 81001 URINALYSIS AUTO W/SCOPE: CPT

## 2024-12-17 PROCEDURE — 71045 X-RAY EXAM CHEST 1 VIEW: CPT | Mod: 26

## 2024-12-17 PROCEDURE — 73562 X-RAY EXAM OF KNEE 3: CPT | Mod: 26,LT

## 2024-12-17 PROCEDURE — 93970 EXTREMITY STUDY: CPT

## 2024-12-17 PROCEDURE — 82570 ASSAY OF URINE CREATININE: CPT

## 2024-12-17 PROCEDURE — 82962 GLUCOSE BLOOD TEST: CPT

## 2024-12-17 PROCEDURE — 70450 CT HEAD/BRAIN W/O DYE: CPT | Mod: 26,MC

## 2024-12-17 PROCEDURE — 97535 SELF CARE MNGMENT TRAINING: CPT | Mod: GO

## 2024-12-17 PROCEDURE — 93010 ELECTROCARDIOGRAM REPORT: CPT

## 2024-12-17 PROCEDURE — 85610 PROTHROMBIN TIME: CPT

## 2024-12-17 PROCEDURE — 97166 OT EVAL MOD COMPLEX 45 MIN: CPT | Mod: GO

## 2024-12-17 PROCEDURE — 83735 ASSAY OF MAGNESIUM: CPT

## 2024-12-17 PROCEDURE — 73502 X-RAY EXAM HIP UNI 2-3 VIEWS: CPT | Mod: 26,LT

## 2024-12-17 PROCEDURE — 87086 URINE CULTURE/COLONY COUNT: CPT

## 2024-12-17 PROCEDURE — 76770 US EXAM ABDO BACK WALL COMP: CPT

## 2024-12-17 PROCEDURE — 36415 COLL VENOUS BLD VENIPUNCTURE: CPT

## 2024-12-17 PROCEDURE — 87186 SC STD MICRODIL/AGAR DIL: CPT

## 2024-12-17 PROCEDURE — 97530 THERAPEUTIC ACTIVITIES: CPT | Mod: GP

## 2024-12-17 PROCEDURE — 73030 X-RAY EXAM OF SHOULDER: CPT | Mod: 26,LT

## 2024-12-17 PROCEDURE — 71260 CT THORAX DX C+: CPT | Mod: 26,MC

## 2024-12-17 PROCEDURE — 99285 EMERGENCY DEPT VISIT HI MDM: CPT

## 2024-12-17 PROCEDURE — 80053 COMPREHEN METABOLIC PANEL: CPT

## 2024-12-17 RX ORDER — ACETAMINOPHEN 80 MG/.8ML
975 SOLUTION/ DROPS ORAL ONCE
Refills: 0 | Status: COMPLETED | OUTPATIENT
Start: 2024-12-17 | End: 2024-12-17

## 2024-12-17 RX ADMIN — ACETAMINOPHEN 975 MILLIGRAM(S): 80 SOLUTION/ DROPS ORAL at 14:30

## 2024-12-17 NOTE — H&P ADULT - ASSESSMENT
Patient is a 69-year-old male with a past medical history of hypertension, hyperlipidemia, CAD,  intracranial hemorrhage with residual left hemiparesis, COPD presents after mechanical fall.     #Mechanical Fall  #Trauma to head  No LOC, No focal neuro deficits   No presyncopal symptoms  CT Chest, Ab, Pelvis: No acute traumatic abnormality in the chest abdomen and pelvis. Chronic incidental findings; mild centrilobular emphysema. Status post right upper lobectomy, with pleural thickening at the right lung apex. Chain sutures are noted in the right hilar region. Left basilar scarring/atelectasis.  CT Maxiofacial- Fracture of the medial left orbital floor, probably chronic(due to   no associated soft tissue swelling). Recommend correlation with point   tenderness.  CT Cervical- Negative  CT Head- Sequela of chronic hematoma within the right thalamus and gliotic   changes within the right periatrial white matter, stable as correlated   with brain MRI 5/5/2024.  X-ray Left Shoulder, Knee, Femur and Hip - No dislocation or fracture  X-ray Chest- Negative  No acute fractures  PT and OT Eval- Pending    #HTN  #HLD  #CAD  #COPD    #Diet: Regular  #DVT pro: None  #GI pro: None  #Pending:   PT OT Eval  Placement     Patient is a 69-year-old male with a past medical history of hypertension, hyperlipidemia, CAD,  intracranial hemorrhage with residual left hemiparesis, COPD presents after mechanical fall.     #Medication Reconciliation  Only partially complete as patient is unable to remember all his medicines and doses.   Does not have a list or copy with him  Reach out to [facility] pharmacy in the AM to confirm      #Mechanical Fall  #Trauma to head  No LOC, No focal neuro deficits   No presyncopal symptoms  CT Chest, Ab, Pelvis: No acute traumatic abnormality in the chest abdomen and pelvis. Chronic incidental findings; mild centrilobular emphysema. Status post right upper lobectomy, with pleural thickening at the right lung apex. Chain sutures are noted in the right hilar region. Left basilar scarring/atelectasis.  CT Maxillofacial Fracture of the medial left orbital floor, probably chronic(due to   no associated soft tissue swelling). Recommend correlation with point   tenderness.  CT Cervical- Negative  CT Head- Sequela of chronic hematoma within the right thalamus and gliotic   changes within the right periatrial white matter, stable as correlated   with brain MRI 5/5/2024.  X-ray Left Shoulder, Knee, Femur and Hip - No dislocation or fracture  X-ray Chest- Negative  Started on Acteaminophen 650 q6   No acute fractures  PT and OT Eval- Pending    #HTN  #HLD  #CAD  #COPD  cw home meds.  Pending confirmation of med recs    #Diet: Regular  #DVT pro: None  #GI pro: None  #Pending:   PT OT Eval  Placement     Patient is a 69-year-old male with a past medical history of hypertension, hyperlipidemia, CAD,  intracranial hemorrhage with residual left hemiparesis, COPD presents after mechanical fall.     #Medication Reconciliation  Only partially complete as patient is unable to remember all his medicines and doses.   Does not have a list or copy with him  Reach out to [facility] pharmacy in the AM to confirm      #Mechanical Fall  #Trauma to head  No LOC, No focal neuro deficits   No presyncopal symptoms  CT Chest, Ab, Pelvis: No acute traumatic abnormality in the chest abdomen and pelvis. Chronic incidental findings; mild centrilobular emphysema. Status post right upper lobectomy, with pleural thickening at the right lung apex. Chain sutures are noted in the right hilar region. Left basilar scarring/atelectasis.  CT Maxillofacial Fracture of the medial left orbital floor, probably chronic(due to   no associated soft tissue swelling). Recommend correlation with point   tenderness.  CT Cervical- Negative  CT Head- Sequela of chronic hematoma within the right thalamus and gliotic   changes within the right periatrial white matter, stable as correlated   with brain MRI 5/5/2024.  X-ray Left Shoulder, Knee, Femur and Hip - No dislocation or fracture  X-ray Chest- Negative  Started on Acteaminophen 650 q6   No acute fractures  PT and OT Eval- Pending    #HTN  #HLD  #CAD  #COPD  cw home meds.  Pending confirmation of med recs    #Diet: Regular  #DVT pro: Lovenox 40mg Subq qd  #GI pro: None  #Pending:   PT OT Eval  Placement

## 2024-12-17 NOTE — H&P ADULT - ATTENDING COMMENTS
HPI:  Patient is a 69-year-old male with a past medical history of hypertension, hyperlipidemia, CAD,  intracranial hemorrhage with residual left hemiparesis, COPD presents after mechanical fall. Patient states he missed the last step and fell onto his left side. Patient admits to head trauma with no LOC.  Patient notes pain to his left shoulder, left hip, and left face. pt denies any other symptoms including fevers, chill, headache, recent illness/travel, cough, abdominal pain, chest pain, or SOB    In the ED:  VITAL SIGNS: Last 24 Hours  T(C): 36.7 (17 Dec 2024 16:12), Max: 36.7 (17 Dec 2024 16:12)  T(F): 98.1 (17 Dec 2024 16:12), Max: 98.1 (17 Dec 2024 16:12)  HR: 73 (17 Dec 2024 16:12) (73 - 98)  BP: 143/89 (17 Dec 2024 16:12) (129/83 - 143/89)  BP(mean): --  RR: 18 (17 Dec 2024 16:12) (18 - 18)  SpO2: 99% (17 Dec 2024 16:12) (99% - 100%)    Received 975mg Acetaminophen PO x1,  CT Chest, Ab, Pelvis: No acute traumatic abnormality in the chest abdomen and pelvis. Chronic incidental findings; mild centrilobular emphysema. Status post right upper lobectomy, with pleural thickening at the right lung apex. Chain sutures are noted in the right hilar region. Left basilar scarring/atelectasis.  Xray Left Knee, Femur and Hip - No dislocation or fracture  Xray Chest- Negative  CT Maxiofacial- Fracture of the medial left orbital floor, probably chronic(due to   no associated soft tissue swelling). Recommend correlation with point   tenderness.  CT Cervical- Negative  CT Head- Sequela of chronic hematoma within the right thalamus and gliotic   changes within the right periatrial white matter, stable as correlated   with brain MRI 5/5/2024.     (17 Dec 2024 22:36)    REVIEW OF SYSTEMS: see cc/HPI   CONSTITUTIONAL: No weakness, fevers or chills  EYES/ENT: No visual changes;  No vertigo or throat pain   NECK: No pain or stiffness  RESPIRATORY: No cough, wheezing, hemoptysis; No shortness of breath  CARDIOVASCULAR: No chest pain or palpitations  GASTROINTESTINAL: No abdominal or epigastric pain. No nausea, vomiting, or hematemesis; No diarrhea or constipation. No melena or hematochezia.  GENITOURINARY: No dysuria, frequency or hematuria  NEUROLOGICAL: No numbness or weakness  SKIN: No itching, rashes  ENDO: No hyperglycemia, No thyroid disorder, No dyslipidemia   HEM: No bleeding, No easy bruising, No anemia   PSYCHE: No psychosis, No mood disorder No hallucinations No delusion   MSK: No deformity, No fracture, No Joint swelling    Physical Exam:  General: WN/WD NAD  Neurology: A&Ox3, nonfocal, follows commands  Eyes: PERRLA/ EOMI  ENT/Neck: Neck supple, trachea midline, No JVD  Respiratory: CTA B/L, No wheezing, rales, rhonchi  CV: Normal rate regular rhythm, S1S2, no murmurs, rubs or gallops  Abdominal: Soft, NT, ND +BS,   Extremities: No edema, + peripheral pulses  Skin: No Rashes, Hematoma, Ecchymosis    A/p HPI:  Patient is a 69-year-old male with a past medical history of hypertension, hyperlipidemia, CAD,  intracranial hemorrhage with residual left hemiparesis, COPD presents after mechanical fall. Patient states he missed the last step and fell onto his left side. Patient admits to head trauma with no LOC.  Patient notes pain to his left shoulder, left hip, and left face. pt denies any other symptoms including fevers, chill, headache, recent illness/travel, cough, abdominal pain, chest pain, or SOB    In the ED:  VITAL SIGNS: Last 24 Hours  T(C): 36.7 (17 Dec 2024 16:12), Max: 36.7 (17 Dec 2024 16:12)  T(F): 98.1 (17 Dec 2024 16:12), Max: 98.1 (17 Dec 2024 16:12)  HR: 73 (17 Dec 2024 16:12) (73 - 98)  BP: 143/89 (17 Dec 2024 16:12) (129/83 - 143/89)  BP(mean): --  RR: 18 (17 Dec 2024 16:12) (18 - 18)  SpO2: 99% (17 Dec 2024 16:12) (99% - 100%)    Received 975mg Acetaminophen PO x1,  CT Chest, Ab, Pelvis: No acute traumatic abnormality in the chest abdomen and pelvis. Chronic incidental findings; mild centrilobular emphysema. Status post right upper lobectomy, with pleural thickening at the right lung apex. Chain sutures are noted in the right hilar region. Left basilar scarring/atelectasis.  Xray Left Knee, Femur and Hip - No dislocation or fracture  Xray Chest- Negative  CT Maxiofacial- Fracture of the medial left orbital floor, probably chronic(due to   no associated soft tissue swelling). Recommend correlation with point   tenderness.  CT Cervical- Negative  CT Head- Sequela of chronic hematoma within the right thalamus and gliotic   changes within the right periatrial white matter, stable as correlated   with brain MRI 5/5/2024.     (17 Dec 2024 22:36)    REVIEW OF SYSTEMS: see cc/HPI   CONSTITUTIONAL: No weakness, fevers or chills  EYES/ENT: No visual changes;  No vertigo or throat pain   NECK: No pain or stiffness  RESPIRATORY: No cough, wheezing, hemoptysis; No shortness of breath  CARDIOVASCULAR: No chest pain or palpitations  GASTROINTESTINAL: No abdominal or epigastric pain. No nausea, vomiting, or hematemesis; No diarrhea or constipation. No melena or hematochezia.  GENITOURINARY: No dysuria, frequency or hematuria  NEUROLOGICAL: No numbness or weakness  SKIN: No itching, rashes  ENDO: No hyperglycemia, No thyroid disorder, No dyslipidemia   HEM: No bleeding, No easy bruising, No anemia   PSYCHE: No psychosis, No mood disorder No hallucinations No delusion   MSK: No deformity, No fracture, No Joint swelling, (+) L sided hip/ shoulder pain, s/p Hip replacements    Physical Exam:  General: WN/WD NAD  Neurology: A&Ox3, L LE paresis ( chronic ), generalized weakness,  follows commands  Eyes: PERRLA/ EOMI  ENT/Neck: Neck supple, trachea midline, No JVD  Respiratory: CTA B/L, No wheezing, rales, rhonchi  CV: Normal rate regular rhythm, S1S2, no murmurs, rubs or gallops  Abdominal: Soft, NT, ND +BS,   Extremities: No edema, + peripheral pulses  Skin: No Rashes, Hematoma, Ecchymosis    A/p  Generalized weakness / Fall  Inability to ambulate 2/2 L hip pain/ L jose at baseline  -Fall precautions   -PT/ rehab eval   -PRN pain Rx     H/o CAD/Dyslipidemia   HTN   -c/w OP     COPD   -c/w OP Rx     DVT prophylaxis   PATIENT SEEN by ATTENDING 12/17/24

## 2024-12-17 NOTE — ED PROVIDER NOTE - OBJECTIVE STATEMENT
69-year-old male with a past medical history of hypertension, hyperlipidemia, CAD,  intracranial hemorrhage with residual left hemiparesis, COPD presents after mechanical fall.  Patient states he missed the last step and fell onto his left side. Patient admits to head trauma with no LOC.  Patient notes pain to his left shoulder, left hip, and left face. pt denies any other symptoms including fevers, chill, headache, recent illness/travel, cough, abdominal pain, chest pain, or SOB.

## 2024-12-17 NOTE — ED PROVIDER NOTE - SECONDARY DIAGNOSIS.
Inability to ambulate due to hip

## 2024-12-17 NOTE — ED PROVIDER NOTE - PHYSICAL EXAMINATION
Gen: NAD, AOx3  Head: NCAT  HEENT: TTP to L orbit, EOMI, PERRL, oral mucosa moist, normal conjunctiva, oropharynx clear without exudate or erythema  Lung: CTAB, no respiratory distress, no wheezing, rales, rhonchi  CV: normal s1/s2, rrr, Normal perfusion, pulses 2+ throughout  Abd: soft, NTND, no CVA tenderness  Genitourinary: no pelvic tenderness  MSK: No edema, no spinal tenderness, L leg shortening with TTP to hip   Neuro: CN II-XII grossly intact, No focal neurologic deficits  Skin: No rash   Psych: normal affect

## 2024-12-17 NOTE — ED PROVIDER NOTE - CLINICAL SUMMARY MEDICAL DECISION MAKING FREE TEXT BOX
ED workup without acute findings.  No evidence of acute traumatic injuries.  Labs without acute findings.  Patient with chronic anemia and similar hemoglobin as compared to February 2024.  Renal function improved from previous and February 2024.  Cervical exam showing persistent pain causing difficulty with ambulation.  Will admit for further workup, physical therapy, pain control

## 2024-12-17 NOTE — H&P ADULT - NSHPLABSRESULTS_GEN_ALL_CORE
- c/w lisinopril 40 QD   11.6   4.47  )-----------( 141      ( 17 Dec 2024 14:43 )             35.7     12-17    136  |  103  |  33[H]  ----------------------------<  94  4.4   |  22  |  1.0    Ca    8.6      17 Dec 2024 14:43    TPro  7.8  /  Alb  3.4[L]  /  TBili  0.3  /  DBili  x   /  AST  17  /  ALT  7   /  AlkPhos  77  12-17    PT/INR - ( 17 Dec 2024 14:43 )   PT: 12.20 sec;   INR: 1.03 ratio         PTT - ( 17 Dec 2024 14:43 )  PTT:31.0 sec  Urinalysis Basic - ( 17 Dec 2024 14:43 )    Color: x / Appearance: x / SG: x / pH: x  Gluc: 94 mg/dL / Ketone: x  / Bili: x / Urobili: x   Blood: x / Protein: x / Nitrite: x   Leuk Esterase: x / RBC: x / WBC x   Sq Epi: x / Non Sq Epi: x / Bacteria: x      POCT Blood Glucose.: 80 mg/dL (12-17-24 @ 14:55)

## 2024-12-17 NOTE — ED ADULT TRIAGE NOTE - CHIEF COMPLAINT QUOTE
Patient bibems a+ox3 from home s/p mechanical fall down 1 step with head injury- pt fell onto left side co pain to left side of head and left arm- denies AC use/LOC.

## 2024-12-17 NOTE — H&P ADULT - NSHPPHYSICALEXAM_GEN_ALL_CORE
T(C): 36.7 (12-17-24 @ 16:12), Max: 36.7 (12-17-24 @ 16:12)  HR: 73 (12-17-24 @ 16:12) (73 - 98)  BP: 143/89 (12-17-24 @ 16:12) (129/83 - 143/89)  RR: 18 (12-17-24 @ 16:12) (18 - 18)  SpO2: 99% (12-17-24 @ 16:12) (99% - 100%)    CONSTITUTIONAL: Well groomed, no apparent distress  EYES: PERRLA and symmetric, EOMI, No conjunctival or scleral injection, non-icteric  ENMT: Oral mucosa with moist membranes. Normal dentition; no pharyngeal injection or exudates             NECK: Supple, symmetric and without tracheal deviation   RESP: No respiratory distress, no use of accessory muscles; CTA b/l, no WRR  CV: RRR, +S1S2, no MRG; no JVD; no peripheral edema  GI: Soft, NT, ND, no rebound, no guarding; no palpable masses; no hepatosplenomegaly; no hernia palpated  LYMPH: No cervical LAD or tenderness; no axillary LAD or tenderness; no inguinal LAD or tenderness  MSK: Normal gait; No digital clubbing or cyanosis; examination of the (head/neck/spine/ribs/pelvis, RUE, LUE, RLE, LLE) without misalignment,            Normal ROM without pain, no spinal tenderness, normal muscle strength/tone  SKIN: No rashes or ulcers noted; no subcutaneous nodules or induration palpable  NEURO: CN II-XII intact; normal reflexes in upper and lower extremities, sensation intact in upper and lower extremities b/l to light touch   PSYCH: Appropriate insight/judgment; A+O x 3, mood and affect appropriate, recent/remote memory intact T(C): 36.7 (12-17-24 @ 16:12), Max: 36.7 (12-17-24 @ 16:12)  HR: 73 (12-17-24 @ 16:12) (73 - 98)  BP: 143/89 (12-17-24 @ 16:12) (129/83 - 143/89)  RR: 18 (12-17-24 @ 16:12) (18 - 18)  SpO2: 99% (12-17-24 @ 16:12) (99% - 100%)    CONSTITUTIONAL: Well groomed, no apparent distress  EYES: PERRLA and symmetric, EOMI, No conjunctival or scleral injection, non-icteric  ENMT: Oral mucosa with moist membranes. Normal dentition; no pharyngeal injection or exudates             NECK: Supple, symmetric and without tracheal deviation   RESP: No respiratory distress, no use of accessory muscles; CTA b/l, no WRR  CV: RRR, +S1S2, no MRG; no JVD; no peripheral edema  GI: Soft, NT, ND, no rebound, no guarding; no palpable masses; no hepatosplenomegaly; no hernia palpated  LYMPH: No cervical LAD or tenderness; no axillary LAD or tenderness; no inguinal LAD or tenderness  MSK: Residual left sided weakness, 1/5 strength on LUE, 1/5 strength LLE. No sensation on left leg. Left sided shoulder tenderness on palpation. Right upper and lower extremity is 4/5.  SKIN: No rashes or ulcers noted; no subcutaneous nodules or induration palpable  NEURO: CN II-XII intact; normal reflexes in upper and lower extremities, sensation intact in upper and lower extremities b/l to light touch   PSYCH: Appropriate insight/judgment; A+O x 3, mood and affect appropriate, recent/remote memory intact

## 2024-12-17 NOTE — H&P ADULT - NSHPREVIEWOFSYSTEMS_GEN_ALL_CORE
CONSTITUTIONAL: No weakness, fevers, or chills  EYES/ENT: No visual changes;  No vertigo or throat pain   NECK: No pain or stiffness  RESPIRATORY: No cough, wheezing, hemoptysis; No shortness of breath  CARDIOVASCULAR: No chest pain or palpitations  GASTROINTESTINAL: No abdominal or epigastric pain; No nausea, vomiting, diarrhea, or constipation; No hematemesis, melena, or hematochezia  GENITOURINARY: No dysuria, frequency, or hematuria  NEUROLOGICAL: No numbness or weakness  SKIN: No itching or new rashes CONSTITUTIONAL: No weakness, fevers, or chills  EYES/ENT: No visual changes;  No vertigo or throat pain   NECK: No pain or stiffness  RESPIRATORY: No cough, wheezing, hemoptysis; No shortness of breath  CARDIOVASCULAR: No chest pain or palpitations  GASTROINTESTINAL: No abdominal or epigastric pain; No nausea, vomiting, diarrhea, or constipation; No hematemesis, melena, or hematochezia  GENITOURINARY: No dysuria, frequency, or hematuria  NEUROLOGICAL: Basline left sided weakness  SKIN: No itching or new rashes

## 2024-12-17 NOTE — ED PROVIDER NOTE - EKG/XRAY ADDITIONAL INFORMATION
See results section for documentation of xray findings See results section for documentation of xray findings  ekg  Sinus with PVCs right bundle branch block left anterior fascicular block

## 2024-12-17 NOTE — ED PROVIDER NOTE - ATTENDING APP SHARED VISIT CONTRIBUTION OF CARE
69-year-old male history hypertension BPH COPD AAA CAD intracranial hemorrhage with residual left hemiparesis transpiration status post mechanical fall.  Patient was putting to get around at home and fell landing as of cycle 1-year-old left-sided head pain shoulder pain and hip pain.  On exam patient no distress no midline spinal tenderness no pain with pression post tenderness over the left para shoulder left lower extremities shortened externally rotated no pain to the left knee mid femur.  Ab soft nontender S1-S2 CTAB

## 2024-12-18 ENCOUNTER — APPOINTMENT (OUTPATIENT)
Age: 69
End: 2024-12-18

## 2024-12-18 LAB
ALBUMIN SERPL ELPH-MCNC: 3.4 G/DL — LOW (ref 3.5–5.2)
ALP SERPL-CCNC: 80 U/L — SIGNIFICANT CHANGE UP (ref 30–115)
ALT FLD-CCNC: 6 U/L — SIGNIFICANT CHANGE UP (ref 0–41)
ANION GAP SERPL CALC-SCNC: 12 MMOL/L — SIGNIFICANT CHANGE UP (ref 7–14)
APPEARANCE UR: CLEAR — SIGNIFICANT CHANGE UP
APTT BLD: 30 SEC — SIGNIFICANT CHANGE UP (ref 27–39.2)
AST SERPL-CCNC: 13 U/L — SIGNIFICANT CHANGE UP (ref 0–41)
BACTERIA # UR AUTO: ABNORMAL /HPF
BILIRUB SERPL-MCNC: 0.4 MG/DL — SIGNIFICANT CHANGE UP (ref 0.2–1.2)
BILIRUB UR-MCNC: NEGATIVE — SIGNIFICANT CHANGE UP
BUN SERPL-MCNC: 21 MG/DL — HIGH (ref 10–20)
CALCIUM SERPL-MCNC: 8.8 MG/DL — SIGNIFICANT CHANGE UP (ref 8.4–10.4)
CAST: 3 /LPF — SIGNIFICANT CHANGE UP (ref 0–4)
CHLORIDE SERPL-SCNC: 102 MMOL/L — SIGNIFICANT CHANGE UP (ref 98–110)
CO2 SERPL-SCNC: 22 MMOL/L — SIGNIFICANT CHANGE UP (ref 17–32)
COLOR SPEC: YELLOW — SIGNIFICANT CHANGE UP
CREAT SERPL-MCNC: 0.8 MG/DL — SIGNIFICANT CHANGE UP (ref 0.7–1.5)
DIFF PNL FLD: ABNORMAL
EGFR: 96 ML/MIN/1.73M2 — SIGNIFICANT CHANGE UP
GLUCOSE SERPL-MCNC: 81 MG/DL — SIGNIFICANT CHANGE UP (ref 70–99)
GLUCOSE UR QL: NEGATIVE MG/DL — SIGNIFICANT CHANGE UP
HCT VFR BLD CALC: 35.5 % — LOW (ref 42–52)
HGB BLD-MCNC: 11.7 G/DL — LOW (ref 14–18)
INR BLD: 0.95 RATIO — SIGNIFICANT CHANGE UP (ref 0.65–1.3)
KETONES UR-MCNC: NEGATIVE MG/DL — SIGNIFICANT CHANGE UP
LEUKOCYTE ESTERASE UR-ACNC: ABNORMAL
MCHC RBC-ENTMCNC: 30.7 PG — SIGNIFICANT CHANGE UP (ref 27–31)
MCHC RBC-ENTMCNC: 33 G/DL — SIGNIFICANT CHANGE UP (ref 32–37)
MCV RBC AUTO: 93.2 FL — SIGNIFICANT CHANGE UP (ref 80–94)
NITRITE UR-MCNC: NEGATIVE — SIGNIFICANT CHANGE UP
NRBC # BLD: 0 /100 WBCS — SIGNIFICANT CHANGE UP (ref 0–0)
PH UR: 6 — SIGNIFICANT CHANGE UP (ref 5–8)
PLATELET # BLD AUTO: 146 K/UL — SIGNIFICANT CHANGE UP (ref 130–400)
PMV BLD: 10.2 FL — SIGNIFICANT CHANGE UP (ref 7.4–10.4)
POTASSIUM SERPL-MCNC: 3.9 MMOL/L — SIGNIFICANT CHANGE UP (ref 3.5–5)
POTASSIUM SERPL-SCNC: 3.9 MMOL/L — SIGNIFICANT CHANGE UP (ref 3.5–5)
PROT SERPL-MCNC: 7.7 G/DL — SIGNIFICANT CHANGE UP (ref 6–8)
PROT UR-MCNC: 300 MG/DL
PROTHROM AB SERPL-ACNC: 11.2 SEC — SIGNIFICANT CHANGE UP (ref 9.95–12.87)
RBC # BLD: 3.81 M/UL — LOW (ref 4.7–6.1)
RBC # FLD: 13.3 % — SIGNIFICANT CHANGE UP (ref 11.5–14.5)
RBC CASTS # UR COMP ASSIST: 4 /HPF — SIGNIFICANT CHANGE UP (ref 0–4)
SODIUM SERPL-SCNC: 136 MMOL/L — SIGNIFICANT CHANGE UP (ref 135–146)
SP GR SPEC: 1.03 — SIGNIFICANT CHANGE UP (ref 1–1.03)
SQUAMOUS # UR AUTO: 0 /HPF — SIGNIFICANT CHANGE UP (ref 0–5)
UROBILINOGEN FLD QL: 0.2 MG/DL — SIGNIFICANT CHANGE UP (ref 0.2–1)
WBC # BLD: 4.22 K/UL — LOW (ref 4.8–10.8)
WBC # FLD AUTO: 4.22 K/UL — LOW (ref 4.8–10.8)
WBC UR QL: 34 /HPF — HIGH (ref 0–5)

## 2024-12-18 PROCEDURE — 99232 SBSQ HOSP IP/OBS MODERATE 35: CPT

## 2024-12-18 RX ORDER — SULFASALAZINE 500 MG/1
1000 TABLET ORAL
Refills: 0 | Status: DISCONTINUED | OUTPATIENT
Start: 2024-12-18 | End: 2024-12-24

## 2024-12-18 RX ORDER — CHLORHEXIDINE GLUCONATE 1.2 MG/ML
1 RINSE ORAL
Refills: 0 | Status: DISCONTINUED | OUTPATIENT
Start: 2024-12-18 | End: 2024-12-24

## 2024-12-18 RX ORDER — EZETIMIBE 10 MG/1
10 TABLET ORAL DAILY
Refills: 0 | Status: DISCONTINUED | OUTPATIENT
Start: 2024-12-18 | End: 2024-12-24

## 2024-12-18 RX ORDER — OXYCODONE AND ACETAMINOPHEN 5; 325 MG/1; MG/1
1 TABLET ORAL ONCE
Refills: 0 | Status: DISCONTINUED | OUTPATIENT
Start: 2024-12-18 | End: 2024-12-18

## 2024-12-18 RX ORDER — METOPROLOL TARTRATE 50 MG
25 TABLET ORAL
Refills: 0 | Status: DISCONTINUED | OUTPATIENT
Start: 2024-12-18 | End: 2024-12-24

## 2024-12-18 RX ORDER — LOSARTAN POTASSIUM 100 MG/1
100 TABLET, FILM COATED ORAL DAILY
Refills: 0 | Status: DISCONTINUED | OUTPATIENT
Start: 2024-12-18 | End: 2024-12-24

## 2024-12-18 RX ORDER — ACETAMINOPHEN 80 MG/.8ML
650 SOLUTION/ DROPS ORAL EVERY 6 HOURS
Refills: 0 | Status: DISCONTINUED | OUTPATIENT
Start: 2024-12-18 | End: 2024-12-24

## 2024-12-18 RX ORDER — FINASTERIDE 5 MG
5 TABLET ORAL DAILY
Refills: 0 | Status: DISCONTINUED | OUTPATIENT
Start: 2024-12-18 | End: 2024-12-24

## 2024-12-18 RX ORDER — ASPIRIN 81 MG
325 TABLET, DELAYED RELEASE (ENTERIC COATED) ORAL DAILY
Refills: 0 | Status: DISCONTINUED | OUTPATIENT
Start: 2024-12-18 | End: 2024-12-18

## 2024-12-18 RX ORDER — ENOXAPARIN SODIUM 60 MG/.6ML
40 INJECTION INTRAVENOUS; SUBCUTANEOUS ONCE
Refills: 0 | Status: COMPLETED | OUTPATIENT
Start: 2024-12-18 | End: 2024-12-18

## 2024-12-18 RX ORDER — FLUTICASONE PROPIONATE AND SALMETEROL 50; 500 UG/1; UG/1
1 POWDER ORAL; RESPIRATORY (INHALATION)
Refills: 0 | Status: DISCONTINUED | OUTPATIENT
Start: 2024-12-18 | End: 2024-12-24

## 2024-12-18 RX ORDER — ASPIRIN 81 MG
325 TABLET, DELAYED RELEASE (ENTERIC COATED) ORAL DAILY
Refills: 0 | Status: DISCONTINUED | OUTPATIENT
Start: 2024-12-18 | End: 2024-12-24

## 2024-12-18 RX ORDER — ENOXAPARIN SODIUM 60 MG/.6ML
40 INJECTION INTRAVENOUS; SUBCUTANEOUS EVERY 24 HOURS
Refills: 0 | Status: DISCONTINUED | OUTPATIENT
Start: 2024-12-19 | End: 2024-12-24

## 2024-12-18 RX ORDER — SULFASALAZINE 500 MG/1
1500 TABLET ORAL AT BEDTIME
Refills: 0 | Status: DISCONTINUED | OUTPATIENT
Start: 2024-12-18 | End: 2024-12-24

## 2024-12-18 RX ORDER — VITAMIN A 10000 UNIT
1 TABLET ORAL DAILY
Refills: 0 | Status: DISCONTINUED | OUTPATIENT
Start: 2024-12-18 | End: 2024-12-24

## 2024-12-18 RX ORDER — PANTOPRAZOLE 40 MG/1
40 TABLET, DELAYED RELEASE ORAL
Refills: 0 | Status: DISCONTINUED | OUTPATIENT
Start: 2024-12-18 | End: 2024-12-24

## 2024-12-18 RX ORDER — ALBUTEROL SULFATE 90 UG/1
2 INHALANT RESPIRATORY (INHALATION) EVERY 6 HOURS
Refills: 0 | Status: DISCONTINUED | OUTPATIENT
Start: 2024-12-18 | End: 2024-12-24

## 2024-12-18 RX ADMIN — Medication 5 MILLIGRAM(S): at 13:52

## 2024-12-18 RX ADMIN — SULFASALAZINE 1500 MILLIGRAM(S): 500 TABLET ORAL at 21:16

## 2024-12-18 RX ADMIN — CHLORHEXIDINE GLUCONATE 1 APPLICATION(S): 1.2 RINSE ORAL at 20:15

## 2024-12-18 RX ADMIN — SULFASALAZINE 1000 MILLIGRAM(S): 500 TABLET ORAL at 12:19

## 2024-12-18 RX ADMIN — Medication 1 MILLIGRAM(S): at 13:54

## 2024-12-18 RX ADMIN — OXYCODONE AND ACETAMINOPHEN 1 TABLET(S): 5; 325 TABLET ORAL at 15:11

## 2024-12-18 RX ADMIN — Medication 25 MILLIGRAM(S): at 17:13

## 2024-12-18 RX ADMIN — OXYCODONE AND ACETAMINOPHEN 1 TABLET(S): 5; 325 TABLET ORAL at 14:16

## 2024-12-18 RX ADMIN — EZETIMIBE 10 MILLIGRAM(S): 10 TABLET ORAL at 13:55

## 2024-12-18 RX ADMIN — Medication 10 MILLIGRAM(S): at 05:17

## 2024-12-18 RX ADMIN — ENOXAPARIN SODIUM 40 MILLIGRAM(S): 60 INJECTION INTRAVENOUS; SUBCUTANEOUS at 06:27

## 2024-12-18 RX ADMIN — Medication 325 MILLIGRAM(S): at 13:54

## 2024-12-18 RX ADMIN — Medication 25 MILLIGRAM(S): at 05:17

## 2024-12-18 RX ADMIN — FLUTICASONE PROPIONATE AND SALMETEROL 1 DOSE(S): 50; 500 POWDER ORAL; RESPIRATORY (INHALATION) at 20:13

## 2024-12-18 NOTE — PHYSICAL THERAPY INITIAL EVALUATION ADULT - RANGE OF MOTION EXAMINATION, REHAB EVAL
L UE and LE AROM decreased by >30%/Right UE ROM was WFL (within functional limits)/Right LE ROM was WFL (within functional limits)

## 2024-12-18 NOTE — ED ADULT NURSE NOTE - NSFALLRISKINTERV_ED_ALL_ED
Assistance OOB with selected safe patient handling equipment if applicable/Assistance with ambulation/Communicate fall risk and risk factors to all staff, patient, and family/Monitor gait and stability/Provide visual cue: yellow wristband, yellow gown, etc/Reinforce activity limits and safety measures with patient and family/Call bell, personal items and telephone in reach/Instruct patient to call for assistance before getting out of bed/chair/stretcher/Non-slip footwear applied when patient is off stretcher/[place] to call system/Physically safe environment - no spills, clutter or unnecessary equipment/Purposeful Proactive Rounding/Room/bathroom lighting operational, light cord in reach

## 2024-12-18 NOTE — PHYSICAL THERAPY INITIAL EVALUATION ADULT - GAIT TRAINING, PT EVAL
Prep Survey      Responses   Religion facility patient discharged from?  [facility]   Is LACE score < 7 ?  Yes   Emergency Room discharge w/ pulse ox?  No   Eligibility  Readm Mgmt   Discharge diagnosis  ESBL E. coli cystitis, asymptomatic COVID-19, hypokalemia   Does the patient have one of the following disease processes/diagnoses(primary or secondary)?  COVID-19   Does the patient have Home health ordered?  Yes   What is the Home health agency?   [facility]   Is there a DME ordered?  No   Prep survey completed?  Yes          Kait Forman RN        
Improve amb to 100' with supervision using RW by d/c.

## 2024-12-18 NOTE — PHYSICAL THERAPY INITIAL EVALUATION ADULT - GAIT DEVIATIONS NOTED, PT EVAL
decreased L knee flexion/increased time in double stance/decreased step length/decreased stride length/decreased weight-shifting ability

## 2024-12-18 NOTE — PHYSICAL THERAPY INITIAL EVALUATION ADULT - PERTINENT HX OF CURRENT PROBLEM, REHAB EVAL
Patient is a 69-year-old male arrived by ambulance with a past medical history of hypertension, hyperlipidemia, s/p CABG, possible neurogenic bladder, BPH, intracranial hemorrhage with residual left hemiparesis, COPD presents after mechanical fall. Patient was attempting go up the stairs from the basement when he slipped on the very top step. He then fell forward onto his left shoulder, admits to head trauma with no LOC.  Patient notes pain to his left shoulder, left hip, and left face. Denies any presyncopal symptoms including chest pain and heart palpitations Denies fever, chills, SOB, cough, wheeze, chest pain, headache, and abdominal pain.

## 2024-12-18 NOTE — PHYSICAL THERAPY INITIAL EVALUATION ADULT - ADDITIONAL COMMENTS
Pt. lives with roommate in a private home with 1 steps to enter and 1 FOS to access bedrooms and shower. Reports use of rollator for ambulation PTA, but admits he has had recent difficulty with standing and walking 2* to L LE pain and numbness.

## 2024-12-18 NOTE — PHYSICAL THERAPY INITIAL EVALUATION ADULT - IMPAIRMENTS CONTRIBUTING TO GAIT DEVIATIONS, PT EVAL
c/o L LE pain and numbness after ~15'/impaired balance/decreased flexibility/pain/decreased ROM/decreased strength

## 2024-12-18 NOTE — PHYSICAL THERAPY INITIAL EVALUATION ADULT - IMPAIRMENTS FOUND, PT EVAL
hx of RA and ICH with residual L hemiparesis/circulation/ergonomics and body mechanics/gait, locomotion, and balance/gross motor/joint integrity and mobility/muscle strength/ROM

## 2024-12-19 ENCOUNTER — TRANSCRIPTION ENCOUNTER (OUTPATIENT)
Age: 69
End: 2024-12-19

## 2024-12-19 LAB
ALBUMIN SERPL ELPH-MCNC: 3.4 G/DL — LOW (ref 3.5–5.2)
ALP SERPL-CCNC: 73 U/L — SIGNIFICANT CHANGE UP (ref 30–115)
ALT FLD-CCNC: <5 U/L — SIGNIFICANT CHANGE UP (ref 0–41)
ANION GAP SERPL CALC-SCNC: 12 MMOL/L — SIGNIFICANT CHANGE UP (ref 7–14)
AST SERPL-CCNC: 11 U/L — SIGNIFICANT CHANGE UP (ref 0–41)
BASOPHILS # BLD AUTO: 0.04 K/UL — SIGNIFICANT CHANGE UP (ref 0–0.2)
BASOPHILS NFR BLD AUTO: 0.9 % — SIGNIFICANT CHANGE UP (ref 0–1)
BILIRUB SERPL-MCNC: 0.2 MG/DL — SIGNIFICANT CHANGE UP (ref 0.2–1.2)
BUN SERPL-MCNC: 36 MG/DL — HIGH (ref 10–20)
CALCIUM SERPL-MCNC: 8.8 MG/DL — SIGNIFICANT CHANGE UP (ref 8.4–10.5)
CHLORIDE SERPL-SCNC: 101 MMOL/L — SIGNIFICANT CHANGE UP (ref 98–110)
CO2 SERPL-SCNC: 20 MMOL/L — SIGNIFICANT CHANGE UP (ref 17–32)
CREAT SERPL-MCNC: 1.5 MG/DL — SIGNIFICANT CHANGE UP (ref 0.7–1.5)
EGFR: 50 ML/MIN/1.73M2 — LOW
EOSINOPHIL # BLD AUTO: 0.09 K/UL — SIGNIFICANT CHANGE UP (ref 0–0.7)
EOSINOPHIL NFR BLD AUTO: 2 % — SIGNIFICANT CHANGE UP (ref 0–8)
GLUCOSE SERPL-MCNC: 89 MG/DL — SIGNIFICANT CHANGE UP (ref 70–99)
HCT VFR BLD CALC: 35 % — LOW (ref 42–52)
HGB BLD-MCNC: 11.3 G/DL — LOW (ref 14–18)
IMM GRANULOCYTES NFR BLD AUTO: 0.9 % — HIGH (ref 0.1–0.3)
LYMPHOCYTES # BLD AUTO: 0.53 K/UL — LOW (ref 1.2–3.4)
LYMPHOCYTES # BLD AUTO: 11.9 % — LOW (ref 20.5–51.1)
MAGNESIUM SERPL-MCNC: 1.7 MG/DL — LOW (ref 1.8–2.4)
MCHC RBC-ENTMCNC: 30.5 PG — SIGNIFICANT CHANGE UP (ref 27–31)
MCHC RBC-ENTMCNC: 32.3 G/DL — SIGNIFICANT CHANGE UP (ref 32–37)
MCV RBC AUTO: 94.3 FL — HIGH (ref 80–94)
MONOCYTES # BLD AUTO: 0.67 K/UL — HIGH (ref 0.1–0.6)
MONOCYTES NFR BLD AUTO: 15.1 % — HIGH (ref 1.7–9.3)
NEUTROPHILS # BLD AUTO: 3.07 K/UL — SIGNIFICANT CHANGE UP (ref 1.4–6.5)
NEUTROPHILS NFR BLD AUTO: 69.2 % — SIGNIFICANT CHANGE UP (ref 42.2–75.2)
NRBC # BLD: 0 /100 WBCS — SIGNIFICANT CHANGE UP (ref 0–0)
PLATELET # BLD AUTO: 146 K/UL — SIGNIFICANT CHANGE UP (ref 130–400)
PMV BLD: 10.2 FL — SIGNIFICANT CHANGE UP (ref 7.4–10.4)
POTASSIUM SERPL-MCNC: 4.2 MMOL/L — SIGNIFICANT CHANGE UP (ref 3.5–5)
POTASSIUM SERPL-SCNC: 4.2 MMOL/L — SIGNIFICANT CHANGE UP (ref 3.5–5)
PROT SERPL-MCNC: 7.2 G/DL — SIGNIFICANT CHANGE UP (ref 6–8)
RBC # BLD: 3.71 M/UL — LOW (ref 4.7–6.1)
RBC # FLD: 13.4 % — SIGNIFICANT CHANGE UP (ref 11.5–14.5)
SODIUM SERPL-SCNC: 133 MMOL/L — LOW (ref 135–146)
WBC # BLD: 4.44 K/UL — LOW (ref 4.8–10.8)
WBC # FLD AUTO: 4.44 K/UL — LOW (ref 4.8–10.8)

## 2024-12-19 PROCEDURE — 99232 SBSQ HOSP IP/OBS MODERATE 35: CPT

## 2024-12-19 PROCEDURE — 93970 EXTREMITY STUDY: CPT | Mod: 26

## 2024-12-19 RX ORDER — MAGNESIUM SULFATE 500 MG/ML
2 INJECTION, SOLUTION INTRAMUSCULAR; INTRAVENOUS ONCE
Refills: 0 | Status: COMPLETED | OUTPATIENT
Start: 2024-12-19 | End: 2024-12-19

## 2024-12-19 RX ORDER — PREDNISONE 5 MG
40 TABLET ORAL DAILY
Refills: 0 | Status: DISCONTINUED | OUTPATIENT
Start: 2024-12-20 | End: 2024-12-24

## 2024-12-19 RX ORDER — METHYLPREDNISOLONE 4 MG/1
40 TABLET ORAL ONCE
Refills: 0 | Status: COMPLETED | OUTPATIENT
Start: 2024-12-19 | End: 2024-12-19

## 2024-12-19 RX ADMIN — SULFASALAZINE 1500 MILLIGRAM(S): 500 TABLET ORAL at 21:59

## 2024-12-19 RX ADMIN — Medication 325 MILLIGRAM(S): at 11:23

## 2024-12-19 RX ADMIN — LOSARTAN POTASSIUM 100 MILLIGRAM(S): 100 TABLET, FILM COATED ORAL at 05:14

## 2024-12-19 RX ADMIN — MAGNESIUM SULFATE 25 GRAM(S): 500 INJECTION, SOLUTION INTRAMUSCULAR; INTRAVENOUS at 09:03

## 2024-12-19 RX ADMIN — Medication 1 MILLIGRAM(S): at 11:23

## 2024-12-19 RX ADMIN — EZETIMIBE 10 MILLIGRAM(S): 10 TABLET ORAL at 11:23

## 2024-12-19 RX ADMIN — Medication 5 MILLIGRAM(S): at 11:23

## 2024-12-19 RX ADMIN — Medication 25 MILLIGRAM(S): at 05:15

## 2024-12-19 RX ADMIN — CHLORHEXIDINE GLUCONATE 1 APPLICATION(S): 1.2 RINSE ORAL at 05:15

## 2024-12-19 RX ADMIN — Medication 25 MILLIGRAM(S): at 17:10

## 2024-12-19 RX ADMIN — Medication 10 MILLIGRAM(S): at 05:15

## 2024-12-19 RX ADMIN — PANTOPRAZOLE 40 MILLIGRAM(S): 40 TABLET, DELAYED RELEASE ORAL at 05:14

## 2024-12-19 RX ADMIN — FLUTICASONE PROPIONATE AND SALMETEROL 1 DOSE(S): 50; 500 POWDER ORAL; RESPIRATORY (INHALATION) at 09:03

## 2024-12-19 RX ADMIN — SULFASALAZINE 1000 MILLIGRAM(S): 500 TABLET ORAL at 05:14

## 2024-12-19 RX ADMIN — ENOXAPARIN SODIUM 40 MILLIGRAM(S): 60 INJECTION INTRAVENOUS; SUBCUTANEOUS at 05:14

## 2024-12-19 RX ADMIN — METHYLPREDNISOLONE 40 MILLIGRAM(S): 4 TABLET ORAL at 12:39

## 2024-12-19 NOTE — DISCHARGE NOTE NURSING/CASE MANAGEMENT/SOCIAL WORK - PATIENT PORTAL LINK FT
You can access the FollowMyHealth Patient Portal offered by [facility] by registering at the following website: [email or web address]. By joining [employer]’s FollowMyHealth portal, you will also be able to view your health information using other applications (apps) compatible with our system.

## 2024-12-19 NOTE — DISCHARGE NOTE NURSING/CASE MANAGEMENT/SOCIAL WORK - NSDCPEFALRISK_GEN_ALL_CORE
For information on Fall & Injury Prevention, visit: [email or web address] OR  [email or web address] OR  https://www.cdc.gov/steadi/patient.html

## 2024-12-19 NOTE — DISCHARGE NOTE NURSING/CASE MANAGEMENT/SOCIAL WORK - FINANCIAL ASSISTANCE
[facility] provides services at a reduced cost to those who are determined to be eligible through [facility]’s financial assistance program. Information regarding [facility]’s financial assistance program can be found by going to [email or web address] or by calling [phone].
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
## 2024-12-19 NOTE — OCCUPATIONAL THERAPY INITIAL EVALUATION ADULT - ADDITIONAL COMMENTS
Pt reports being able to complete adls prior to admission however it was taking more time and effort.

## 2024-12-19 NOTE — OCCUPATIONAL THERAPY INITIAL EVALUATION ADULT - PERTINENT HX OF CURRENT PROBLEM, REHAB EVAL
69-year-old male with a past medical history of hypertension, hyperlipidemia, CAD,  intracranial hemorrhage with residual left hemiparesis, COPD presents after mechanical fall.

## 2024-12-20 ENCOUNTER — TRANSCRIPTION ENCOUNTER (OUTPATIENT)
Age: 69
End: 2024-12-20

## 2024-12-20 LAB
-  AMOXICILLIN/CLAVULANIC ACID: SIGNIFICANT CHANGE UP
-  AMPICILLIN/SULBACTAM: SIGNIFICANT CHANGE UP
-  AMPICILLIN: SIGNIFICANT CHANGE UP
-  AMPICILLIN: SIGNIFICANT CHANGE UP
-  AZTREONAM: SIGNIFICANT CHANGE UP
-  CEFAZOLIN: SIGNIFICANT CHANGE UP
-  CEFEPIME: SIGNIFICANT CHANGE UP
-  CEFOXITIN: SIGNIFICANT CHANGE UP
-  CEFTRIAXONE: SIGNIFICANT CHANGE UP
-  CEFUROXIME: SIGNIFICANT CHANGE UP
-  CIPROFLOXACIN: SIGNIFICANT CHANGE UP
-  CIPROFLOXACIN: SIGNIFICANT CHANGE UP
-  ERTAPENEM: SIGNIFICANT CHANGE UP
-  GENTAMICIN: SIGNIFICANT CHANGE UP
-  IMIPENEM: SIGNIFICANT CHANGE UP
-  LEVOFLOXACIN: SIGNIFICANT CHANGE UP
-  LEVOFLOXACIN: SIGNIFICANT CHANGE UP
-  MEROPENEM: SIGNIFICANT CHANGE UP
-  NITROFURANTOIN: SIGNIFICANT CHANGE UP
-  NITROFURANTOIN: SIGNIFICANT CHANGE UP
-  PIPERACILLIN/TAZOBACTAM: SIGNIFICANT CHANGE UP
-  TETRACYCLINE: SIGNIFICANT CHANGE UP
-  TOBRAMYCIN: SIGNIFICANT CHANGE UP
-  TRIMETHOPRIM/SULFAMETHOXAZOLE: SIGNIFICANT CHANGE UP
-  VANCOMYCIN: SIGNIFICANT CHANGE UP
ALBUMIN SERPL ELPH-MCNC: 3.1 G/DL — LOW (ref 3.5–5.2)
ALP SERPL-CCNC: 63 U/L — SIGNIFICANT CHANGE UP (ref 30–115)
ALT FLD-CCNC: <5 U/L — SIGNIFICANT CHANGE UP (ref 0–41)
ANION GAP SERPL CALC-SCNC: 14 MMOL/L — SIGNIFICANT CHANGE UP (ref 7–14)
AST SERPL-CCNC: 14 U/L — SIGNIFICANT CHANGE UP (ref 0–41)
BASOPHILS # BLD AUTO: 0.03 K/UL — SIGNIFICANT CHANGE UP (ref 0–0.2)
BASOPHILS NFR BLD AUTO: 0.8 % — SIGNIFICANT CHANGE UP (ref 0–1)
BILIRUB SERPL-MCNC: 0.2 MG/DL — SIGNIFICANT CHANGE UP (ref 0.2–1.2)
BUN SERPL-MCNC: 42 MG/DL — HIGH (ref 10–20)
CALCIUM SERPL-MCNC: 8.2 MG/DL — LOW (ref 8.4–10.5)
CHLORIDE SERPL-SCNC: 97 MMOL/L — LOW (ref 98–110)
CO2 SERPL-SCNC: 18 MMOL/L — SIGNIFICANT CHANGE UP (ref 17–32)
CREAT SERPL-MCNC: 1.6 MG/DL — HIGH (ref 0.7–1.5)
CULTURE RESULTS: ABNORMAL
EGFR: 46 ML/MIN/1.73M2 — LOW
EOSINOPHIL # BLD AUTO: 0.03 K/UL — SIGNIFICANT CHANGE UP (ref 0–0.7)
EOSINOPHIL NFR BLD AUTO: 0.8 % — SIGNIFICANT CHANGE UP (ref 0–8)
GLUCOSE BLDC GLUCOMTR-MCNC: 123 MG/DL — HIGH (ref 70–99)
GLUCOSE SERPL-MCNC: 84 MG/DL — SIGNIFICANT CHANGE UP (ref 70–99)
HCT VFR BLD CALC: 34 % — LOW (ref 42–52)
HGB BLD-MCNC: 11 G/DL — LOW (ref 14–18)
IMM GRANULOCYTES NFR BLD AUTO: 1.4 % — HIGH (ref 0.1–0.3)
LYMPHOCYTES # BLD AUTO: 0.67 K/UL — LOW (ref 1.2–3.4)
LYMPHOCYTES # BLD AUTO: 18.7 % — LOW (ref 20.5–51.1)
MAGNESIUM SERPL-MCNC: 2.2 MG/DL — SIGNIFICANT CHANGE UP (ref 1.8–2.4)
MCHC RBC-ENTMCNC: 31 PG — SIGNIFICANT CHANGE UP (ref 27–31)
MCHC RBC-ENTMCNC: 32.4 G/DL — SIGNIFICANT CHANGE UP (ref 32–37)
MCV RBC AUTO: 95.8 FL — HIGH (ref 80–94)
METHOD TYPE: SIGNIFICANT CHANGE UP
METHOD TYPE: SIGNIFICANT CHANGE UP
MONOCYTES # BLD AUTO: 0.58 K/UL — SIGNIFICANT CHANGE UP (ref 0.1–0.6)
MONOCYTES NFR BLD AUTO: 16.2 % — HIGH (ref 1.7–9.3)
NEUTROPHILS # BLD AUTO: 2.22 K/UL — SIGNIFICANT CHANGE UP (ref 1.4–6.5)
NEUTROPHILS NFR BLD AUTO: 62.1 % — SIGNIFICANT CHANGE UP (ref 42.2–75.2)
NRBC # BLD: 0 /100 WBCS — SIGNIFICANT CHANGE UP (ref 0–0)
ORGANISM # SPEC MICROSCOPIC CNT: ABNORMAL
ORGANISM # SPEC MICROSCOPIC CNT: ABNORMAL
ORGANISM # SPEC MICROSCOPIC CNT: SIGNIFICANT CHANGE UP
OSMOLALITY UR: 412 MOS/KG — SIGNIFICANT CHANGE UP (ref 50–1200)
PLATELET # BLD AUTO: 111 K/UL — LOW (ref 130–400)
PMV BLD: 10.8 FL — HIGH (ref 7.4–10.4)
POTASSIUM SERPL-MCNC: 4.5 MMOL/L — SIGNIFICANT CHANGE UP (ref 3.5–5)
POTASSIUM SERPL-SCNC: 4.5 MMOL/L — SIGNIFICANT CHANGE UP (ref 3.5–5)
PROT SERPL-MCNC: 6.8 G/DL — SIGNIFICANT CHANGE UP (ref 6–8)
RBC # BLD: 3.55 M/UL — LOW (ref 4.7–6.1)
RBC # FLD: 13.3 % — SIGNIFICANT CHANGE UP (ref 11.5–14.5)
SODIUM SERPL-SCNC: 129 MMOL/L — LOW (ref 135–146)
SODIUM UR-SCNC: 32 MMOL/L — SIGNIFICANT CHANGE UP
SPECIMEN SOURCE: SIGNIFICANT CHANGE UP
WBC # BLD: 3.58 K/UL — LOW (ref 4.8–10.8)
WBC # FLD AUTO: 3.58 K/UL — LOW (ref 4.8–10.8)

## 2024-12-20 PROCEDURE — 99232 SBSQ HOSP IP/OBS MODERATE 35: CPT

## 2024-12-20 RX ORDER — SODIUM CHLORIDE 9 MG/ML
1000 INJECTION, SOLUTION INTRAVENOUS
Refills: 0 | Status: DISCONTINUED | OUTPATIENT
Start: 2024-12-20 | End: 2024-12-22

## 2024-12-20 RX ORDER — LOSARTAN POTASSIUM 100 MG/1
1 TABLET, FILM COATED ORAL
Qty: 0 | Refills: 0 | DISCHARGE
Start: 2024-12-20

## 2024-12-20 RX ORDER — MIRABEGRON 50 MG/1
1 TABLET, FILM COATED, EXTENDED RELEASE ORAL
Refills: 0 | DISCHARGE

## 2024-12-20 RX ORDER — PREDNISONE 5 MG
2 TABLET ORAL
Qty: 28 | Refills: 0
Start: 2024-12-20 | End: 2025-01-02

## 2024-12-20 RX ORDER — ACETAMINOPHEN 80 MG/.8ML
2 SOLUTION/ DROPS ORAL
Qty: 0 | Refills: 0 | DISCHARGE
Start: 2024-12-20

## 2024-12-20 RX ORDER — OXYCODONE AND ACETAMINOPHEN 5; 325 MG/1; MG/1
1 TABLET ORAL
Qty: 28 | Refills: 0
Start: 2024-12-20 | End: 2024-12-26

## 2024-12-20 RX ORDER — MIRABEGRON 50 MG/1
1 TABLET, FILM COATED, EXTENDED RELEASE ORAL
Qty: 90 | Refills: 1
Start: 2024-12-20 | End: 2025-06-17

## 2024-12-20 RX ORDER — NITROFURANTOIN MONOHYDRATE/MACROCRYSTALLINE 25; 75 MG/1; MG/1
100 CAPSULE ORAL
Refills: 0 | Status: DISCONTINUED | OUTPATIENT
Start: 2024-12-20 | End: 2024-12-24

## 2024-12-20 RX ORDER — NALOXONE HCL 0.4 MG/ML
1 VIAL (ML) INJECTION
Qty: 1 | Refills: 0
Start: 2024-12-20

## 2024-12-20 RX ADMIN — SODIUM CHLORIDE 75 MILLILITER(S): 9 INJECTION, SOLUTION INTRAVENOUS at 17:27

## 2024-12-20 RX ADMIN — Medication 25 MILLIGRAM(S): at 05:57

## 2024-12-20 RX ADMIN — SULFASALAZINE 1500 MILLIGRAM(S): 500 TABLET ORAL at 21:28

## 2024-12-20 RX ADMIN — Medication 25 MILLIGRAM(S): at 17:29

## 2024-12-20 RX ADMIN — PANTOPRAZOLE 40 MILLIGRAM(S): 40 TABLET, DELAYED RELEASE ORAL at 05:57

## 2024-12-20 RX ADMIN — EZETIMIBE 10 MILLIGRAM(S): 10 TABLET ORAL at 11:01

## 2024-12-20 RX ADMIN — Medication 325 MILLIGRAM(S): at 11:01

## 2024-12-20 RX ADMIN — SULFASALAZINE 1000 MILLIGRAM(S): 500 TABLET ORAL at 05:58

## 2024-12-20 RX ADMIN — NITROFURANTOIN MONOHYDRATE/MACROCRYSTALLINE 100 MILLIGRAM(S): 25; 75 CAPSULE ORAL at 17:30

## 2024-12-20 RX ADMIN — CHLORHEXIDINE GLUCONATE 1 APPLICATION(S): 1.2 RINSE ORAL at 05:58

## 2024-12-20 RX ADMIN — Medication 10 MILLIGRAM(S): at 05:57

## 2024-12-20 RX ADMIN — LOSARTAN POTASSIUM 100 MILLIGRAM(S): 100 TABLET, FILM COATED ORAL at 05:57

## 2024-12-20 RX ADMIN — Medication 1 MILLIGRAM(S): at 11:01

## 2024-12-20 RX ADMIN — Medication 40 MILLIGRAM(S): at 05:57

## 2024-12-20 RX ADMIN — ENOXAPARIN SODIUM 40 MILLIGRAM(S): 60 INJECTION INTRAVENOUS; SUBCUTANEOUS at 05:57

## 2024-12-20 RX ADMIN — Medication 5 MILLIGRAM(S): at 11:01

## 2024-12-20 NOTE — DISCHARGE NOTE PROVIDER - NSDCFUADDAPPT_GEN_ALL_CORE_FT
APPTS ARE READY TO BE MADE: [ ] YES    Best Family or Patient Contact (if needed):    Additional Information about above appointments (if needed):    1: Please follow up with your PCP within the next week.  2:   3:     Other comments or requests:
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
## 2024-12-20 NOTE — DISCHARGE NOTE PROVIDER - NSDCMRMEDTOKEN_GEN_ALL_CORE_FT
Albuterol (Eqv-ProAir HFA) 90 mcg/inh inhalation aerosol: 2 puff(s) inhaled every 6 hours  amLODIPine 10 mg oral tablet: 1 tab(s) orally once a day  aspirin 325 mg oral delayed release tablet: 1 tab(s) orally once a day  Breo Ellipta 100 mcg-25 mcg/inh inhalation powder: 1 puff(s) inhaled 2 times a day  finasteride 5 mg oral tablet: 1 tab(s) orally once a day  folic acid 1 mg oral tablet: 1 tab(s) orally once a day  metoprolol tartrate 25 mg oral tablet: 1 tab(s) orally 2 times a day  Myrbetriq 50 mg oral tablet, extended release: 1 tab(s) orally once a day  omeprazole 40 mg oral delayed release capsule: 1 cap(s) orally once a day  sulfaSALAzine 500 mg oral tablet: 2 tab(s) orally 2 times a day  sulfaSALAzine 500 mg oral tablet: 1 tab(s) orally once a day (at bedtime)  Zetia 10 mg oral tablet: 1 tab(s) orally once a day   Albuterol (Eqv-ProAir HFA) 90 mcg/inh inhalation aerosol: 2 puff(s) inhaled every 6 hours  amLODIPine 10 mg oral tablet: 1 tab(s) orally once a day  aspirin 325 mg oral delayed release tablet: 1 tab(s) orally once a day  Breo Ellipta 100 mcg-25 mcg/inh inhalation powder: 1 puff(s) inhaled 2 times a day  Cozaar 100 mg oral tablet: 1 tab(s) orally once a day  finasteride 5 mg oral tablet: 1 tab(s) orally once a day  folic acid 1 mg oral tablet: 1 tab(s) orally once a day  metoprolol tartrate 25 mg oral tablet: 1 tab(s) orally 2 times a day  Myrbetriq 50 mg oral tablet, extended release: 1 tab(s) orally once a day  omeprazole 40 mg oral delayed release capsule: 1 cap(s) orally once a day  predniSONE 20 mg oral tablet: 2 tab(s) orally once a day Take 2 tabs once a day (40mg) for 7 days until 12/27. Starting 12/28, take 1 tab a day for 7 days.  sulfaSALAzine 500 mg oral tablet: 2 tab(s) orally 2 times a day  sulfaSALAzine 500 mg oral tablet: 1 tab(s) orally once a day (at bedtime)  Tylenol Regular Strength 325 mg oral tablet: 2 tab(s) orally every 6 hours As needed Temp greater or equal to 38C (100.4F), Mild Pain (1 - 3)  Zetia 10 mg oral tablet: 1 tab(s) orally once a day   Albuterol (Eqv-ProAir HFA) 90 mcg/inh inhalation aerosol: 2 puff(s) inhaled every 6 hours  amLODIPine 10 mg oral tablet: 1 tab(s) orally once a day  aspirin 325 mg oral delayed release tablet: 1 tab(s) orally once a day  Breo Ellipta 100 mcg-25 mcg/inh inhalation powder: 1 puff(s) inhaled 2 times a day  Cozaar 100 mg oral tablet: 1 tab(s) orally once a day  finasteride 5 mg oral tablet: 1 tab(s) orally once a day  folic acid 1 mg oral tablet: 1 tab(s) orally once a day  metoprolol tartrate 25 mg oral tablet: 1 tab(s) orally 2 times a day  Myrbetriq 50 mg oral tablet, extended release: 1 tab(s) orally once a day  Narcan 4 mg/0.1 mL nasal spray: 1 spray(s) intranasally once a day as needed for Opioid overdose  omeprazole 40 mg oral delayed release capsule: 1 cap(s) orally once a day  oxycodone-acetaminophen 5 mg-325 mg oral tablet: 1 tab(s) orally every 6 hours as needed for Severe Pain (7 - 10) MDD: 4 tabs  predniSONE 20 mg oral tablet: 2 tab(s) orally once a day Take 2 tabs once a day (40mg) for 7 days until 12/27. Starting 12/28, take 1 tab a day for 7 days.  sulfaSALAzine 500 mg oral tablet: 2 tab(s) orally 2 times a day  sulfaSALAzine 500 mg oral tablet: 1 tab(s) orally once a day (at bedtime)  Tylenol Regular Strength 325 mg oral tablet: 2 tab(s) orally every 6 hours As needed Temp greater or equal to 38C (100.4F), Mild Pain (1 - 3)  Zetia 10 mg oral tablet: 1 tab(s) orally once a day   Albuterol (Eqv-ProAir HFA) 90 mcg/inh inhalation aerosol: 2 puff(s) inhaled every 6 hours  amLODIPine 10 mg oral tablet: 1 tab(s) orally once a day  aspirin 325 mg oral delayed release tablet: 1 tab(s) orally once a day  Breo Ellipta 100 mcg-25 mcg/inh inhalation powder: 1 puff(s) inhaled 2 times a day  Cozaar 100 mg oral tablet: 1 tab(s) orally once a day  finasteride 5 mg oral tablet: 1 tab(s) orally once a day  folic acid 1 mg oral tablet: 1 tab(s) orally once a day  Macrobid 100 mg oral capsule: 1 cap(s) orally 2 times a day  metoprolol tartrate 25 mg oral tablet: 1 tab(s) orally 2 times a day  Myrbetriq 50 mg oral tablet, extended release: 1 tab(s) orally once a day  Narcan 4 mg/0.1 mL nasal spray: 1 spray(s) intranasally once a day as needed for Opioid overdose  omeprazole 40 mg oral delayed release capsule: 1 cap(s) orally once a day  oxycodone-acetaminophen 5 mg-325 mg oral tablet: 1 tab(s) orally every 6 hours as needed for Severe Pain (7 - 10) MDD: 4 tabs  predniSONE 20 mg oral tablet: 2 tab(s) orally once a day Take 2 tabs once a day (40mg) for 7 days until 12/27. Starting 12/28, take 1 tab a day for 7 days.  sulfaSALAzine 500 mg oral tablet: 2 tab(s) orally 2 times a day  sulfaSALAzine 500 mg oral tablet: 1 tab(s) orally once a day (at bedtime)  Tylenol Regular Strength 325 mg oral tablet: 2 tab(s) orally every 6 hours As needed Temp greater or equal to 38C (100.4F), Mild Pain (1 - 3)  Zetia 10 mg oral tablet: 1 tab(s) orally once a day   Albuterol (Eqv-ProAir HFA) 90 mcg/inh inhalation aerosol: 2 puff(s) inhaled every 6 hours  amLODIPine 10 mg oral tablet: 1 tab(s) orally once a day  aspirin 325 mg oral delayed release tablet: 1 tab(s) orally once a day  Breo Ellipta 100 mcg-25 mcg/inh inhalation powder: 1 puff(s) inhaled 2 times a day  Cozaar 100 mg oral tablet: 1 tab(s) orally once a day  finasteride 5 mg oral tablet: 1 tab(s) orally once a day  folic acid 1 mg oral tablet: 1 tab(s) orally once a day  Macrobid 100 mg oral capsule: 1 cap(s) orally 2 times a day  metoprolol tartrate 25 mg oral tablet: 1 tab(s) orally 2 times a day  Myrbetriq 50 mg oral tablet, extended release: 1 tab(s) orally once a day  Narcan 4 mg/0.1 mL nasal spray: 1 spray(s) intranasally once a day as needed for Opioid overdose  omeprazole 40 mg oral delayed release capsule: 1 cap(s) orally once a day  oxycodone-acetaminophen 5 mg-325 mg oral tablet: 1 tab(s) orally every 6 hours as needed for Severe Pain (7 - 10) MDD: 4 tabs  predniSONE 20 mg oral tablet: 2 tab(s) orally once a day Take 2 tabs once a day (40mg) for 7 days until 12/27. Starting 12/28, take 1 tab a day for 7 days.  sulfaSALAzine 500 mg oral tablet: 1 tab(s) orally once a day (at bedtime)  sulfaSALAzine 500 mg oral tablet: 2 tab(s) orally 2 times a day  Tylenol Regular Strength 325 mg oral tablet: 2 tab(s) orally every 6 hours As needed Temp greater or equal to 38C (100.4F), Mild Pain (1 - 3)  Zetia 10 mg oral tablet: 1 tab(s) orally once a day   Albuterol (Eqv-ProAir HFA) 90 mcg/inh inhalation aerosol: 2 puff(s) inhaled every 6 hours  amLODIPine 10 mg oral tablet: 1 tab(s) orally once a day  aspirin 325 mg oral delayed release tablet: 1 tab(s) orally once a day  Breo Ellipta 100 mcg-25 mcg/inh inhalation powder: 1 puff(s) inhaled 2 times a day  Cozaar 100 mg oral tablet: 1 tab(s) orally once a day  finasteride 5 mg oral tablet: 1 tab(s) orally once a day  folic acid 1 mg oral tablet: 1 tab(s) orally once a day  Macrobid 100 mg oral capsule: 1 cap(s) orally 2 times a day  metoprolol tartrate 25 mg oral tablet: 1 tab(s) orally 2 times a day  Myrbetriq 50 mg oral tablet, extended release: 1 tab(s) orally once a day  Narcan 4 mg/0.1 mL nasal spray: 1 spray(s) intranasally once a day as needed for Opioid overdose  omeprazole 40 mg oral delayed release capsule: 1 cap(s) orally once a day  oxycodone-acetaminophen 5 mg-325 mg oral tablet: 1 tab(s) orally every 6 hours as needed for Severe Pain (7 - 10) MDD: 4 tabs  predniSONE 20 mg oral tablet: 2 tab(s) orally once a day Take 2 tabs once a day (40mg) for 7 days until 12/27. Starting 12/28, take 1 tab a day for 7 days.  sulfaSALAzine 500 mg oral tablet: 2 tab(s) orally 2 times a day  Tylenol Regular Strength 325 mg oral tablet: 2 tab(s) orally every 6 hours As needed Temp greater or equal to 38C (100.4F), Mild Pain (1 - 3)  Zetia 10 mg oral tablet: 1 tab(s) orally once a day

## 2024-12-20 NOTE — DISCHARGE NOTE PROVIDER - NSDCFUSCHEDAPPT_GEN_ALL_CORE_FT
Darren Murphy  [facility]  PULMMED [facility] [place] Maykel Esparza  Scheduled Appointment: 03/17/2025

## 2024-12-20 NOTE — DISCHARGE NOTE PROVIDER - CARE PROVIDER_API CALL
Dusty Mack  Internal Medicine  [address] [address]  Phone: [phone]  Fax: [phone]  Established Patient  Follow Up Time: 1 week

## 2024-12-20 NOTE — DISCHARGE NOTE PROVIDER - NSDCCPCAREPLAN_GEN_ALL_CORE_FT
PRINCIPAL DISCHARGE DIAGNOSIS  Diagnosis: Fall  Assessment and Plan of Treatment: You came in after a fall. Imaging showed no signs of fracture or new bleeding. You will be discharged with pain medication and should receive physical therapy.  -Take all the medications you were discharged with, unless otherwise instructed by your healthcare provider(s).  - It is recommended you follow up with your PCP. Please call to make an appointment. Bring your paperwork from this hospital stay to that visit. You can access your visit information by signing up for an account for the patient portal at [email or web address]  -Seek immediate medical attention if you develop fevers, chills, chest pain, shortness of breath, dizziness, nausea and vomiting, abdominal pain, passing out, weakness or numbness or tingling, or any other concerning signs or symptoms.      SECONDARY DISCHARGE DIAGNOSES  Diagnosis: Inability to ambulate due to hip  Assessment and Plan of Treatment:

## 2024-12-21 LAB
ANION GAP SERPL CALC-SCNC: 10 MMOL/L — SIGNIFICANT CHANGE UP (ref 7–14)
BASOPHILS # BLD AUTO: 0.03 K/UL — SIGNIFICANT CHANGE UP (ref 0–0.2)
BASOPHILS NFR BLD AUTO: 0.6 % — SIGNIFICANT CHANGE UP (ref 0–1)
BUN SERPL-MCNC: 50 MG/DL — HIGH (ref 10–20)
CALCIUM SERPL-MCNC: 8 MG/DL — LOW (ref 8.4–10.5)
CHLORIDE SERPL-SCNC: 105 MMOL/L — SIGNIFICANT CHANGE UP (ref 98–110)
CO2 SERPL-SCNC: 21 MMOL/L — SIGNIFICANT CHANGE UP (ref 17–32)
CREAT ?TM UR-MCNC: 88 MG/DL — SIGNIFICANT CHANGE UP
CREAT SERPL-MCNC: 1.8 MG/DL — HIGH (ref 0.7–1.5)
EGFR: 40 ML/MIN/1.73M2 — LOW
EOSINOPHIL # BLD AUTO: 0.06 K/UL — SIGNIFICANT CHANGE UP (ref 0–0.7)
EOSINOPHIL NFR BLD AUTO: 1.2 % — SIGNIFICANT CHANGE UP (ref 0–8)
GLUCOSE SERPL-MCNC: 81 MG/DL — SIGNIFICANT CHANGE UP (ref 70–99)
HCT VFR BLD CALC: 30 % — LOW (ref 42–52)
HGB BLD-MCNC: 9.7 G/DL — LOW (ref 14–18)
IMM GRANULOCYTES NFR BLD AUTO: 0.8 % — HIGH (ref 0.1–0.3)
LYMPHOCYTES # BLD AUTO: 0.79 K/UL — LOW (ref 1.2–3.4)
LYMPHOCYTES # BLD AUTO: 15.2 % — LOW (ref 20.5–51.1)
MAGNESIUM SERPL-MCNC: 2 MG/DL — SIGNIFICANT CHANGE UP (ref 1.8–2.4)
MCHC RBC-ENTMCNC: 30.6 PG — SIGNIFICANT CHANGE UP (ref 27–31)
MCHC RBC-ENTMCNC: 32.3 G/DL — SIGNIFICANT CHANGE UP (ref 32–37)
MCV RBC AUTO: 94.6 FL — HIGH (ref 80–94)
MONOCYTES # BLD AUTO: 0.71 K/UL — HIGH (ref 0.1–0.6)
MONOCYTES NFR BLD AUTO: 13.7 % — HIGH (ref 1.7–9.3)
NEUTROPHILS # BLD AUTO: 3.57 K/UL — SIGNIFICANT CHANGE UP (ref 1.4–6.5)
NEUTROPHILS NFR BLD AUTO: 68.5 % — SIGNIFICANT CHANGE UP (ref 42.2–75.2)
NRBC # BLD: 0 /100 WBCS — SIGNIFICANT CHANGE UP (ref 0–0)
PLATELET # BLD AUTO: 132 K/UL — SIGNIFICANT CHANGE UP (ref 130–400)
PMV BLD: 10.3 FL — SIGNIFICANT CHANGE UP (ref 7.4–10.4)
POTASSIUM SERPL-MCNC: 4.2 MMOL/L — SIGNIFICANT CHANGE UP (ref 3.5–5)
POTASSIUM SERPL-SCNC: 4.2 MMOL/L — SIGNIFICANT CHANGE UP (ref 3.5–5)
PROT ?TM UR-MCNC: 61 MG/DLG/24H — SIGNIFICANT CHANGE UP
PROT/CREAT UR-RTO: 0.7 RATIO — HIGH (ref 0–0.2)
RBC # BLD: 3.17 M/UL — LOW (ref 4.7–6.1)
RBC # FLD: 13.5 % — SIGNIFICANT CHANGE UP (ref 11.5–14.5)
SODIUM SERPL-SCNC: 136 MMOL/L — SIGNIFICANT CHANGE UP (ref 135–146)
UUN UR-MCNC: 706 MG/DL — SIGNIFICANT CHANGE UP
WBC # BLD: 5.2 K/UL — SIGNIFICANT CHANGE UP (ref 4.8–10.8)
WBC # FLD AUTO: 5.2 K/UL — SIGNIFICANT CHANGE UP (ref 4.8–10.8)

## 2024-12-21 PROCEDURE — 99232 SBSQ HOSP IP/OBS MODERATE 35: CPT

## 2024-12-21 RX ADMIN — SODIUM CHLORIDE 75 MILLILITER(S): 9 INJECTION, SOLUTION INTRAVENOUS at 05:38

## 2024-12-21 RX ADMIN — Medication 10 MILLIGRAM(S): at 05:32

## 2024-12-21 RX ADMIN — Medication 1 MILLIGRAM(S): at 11:07

## 2024-12-21 RX ADMIN — NITROFURANTOIN MONOHYDRATE/MACROCRYSTALLINE 100 MILLIGRAM(S): 25; 75 CAPSULE ORAL at 05:36

## 2024-12-21 RX ADMIN — EZETIMIBE 10 MILLIGRAM(S): 10 TABLET ORAL at 11:07

## 2024-12-21 RX ADMIN — NITROFURANTOIN MONOHYDRATE/MACROCRYSTALLINE 100 MILLIGRAM(S): 25; 75 CAPSULE ORAL at 17:19

## 2024-12-21 RX ADMIN — Medication 325 MILLIGRAM(S): at 11:07

## 2024-12-21 RX ADMIN — ENOXAPARIN SODIUM 40 MILLIGRAM(S): 60 INJECTION INTRAVENOUS; SUBCUTANEOUS at 05:32

## 2024-12-21 RX ADMIN — LOSARTAN POTASSIUM 100 MILLIGRAM(S): 100 TABLET, FILM COATED ORAL at 05:34

## 2024-12-21 RX ADMIN — SULFASALAZINE 1500 MILLIGRAM(S): 500 TABLET ORAL at 20:32

## 2024-12-21 RX ADMIN — Medication 40 MILLIGRAM(S): at 05:33

## 2024-12-21 RX ADMIN — Medication 25 MILLIGRAM(S): at 17:18

## 2024-12-21 RX ADMIN — PANTOPRAZOLE 40 MILLIGRAM(S): 40 TABLET, DELAYED RELEASE ORAL at 05:34

## 2024-12-21 RX ADMIN — CHLORHEXIDINE GLUCONATE 1 APPLICATION(S): 1.2 RINSE ORAL at 05:34

## 2024-12-21 RX ADMIN — SULFASALAZINE 1000 MILLIGRAM(S): 500 TABLET ORAL at 05:33

## 2024-12-21 RX ADMIN — SODIUM CHLORIDE 75 MILLILITER(S): 9 INJECTION, SOLUTION INTRAVENOUS at 17:17

## 2024-12-21 RX ADMIN — Medication 5 MILLIGRAM(S): at 11:06

## 2024-12-21 RX ADMIN — FLUTICASONE PROPIONATE AND SALMETEROL 1 DOSE(S): 50; 500 POWDER ORAL; RESPIRATORY (INHALATION) at 20:33

## 2024-12-21 RX ADMIN — Medication 25 MILLIGRAM(S): at 05:33

## 2024-12-22 LAB
ANION GAP SERPL CALC-SCNC: 10 MMOL/L — SIGNIFICANT CHANGE UP (ref 7–14)
BASOPHILS # BLD AUTO: 0.03 K/UL — SIGNIFICANT CHANGE UP (ref 0–0.2)
BASOPHILS NFR BLD AUTO: 0.6 % — SIGNIFICANT CHANGE UP (ref 0–1)
BUN SERPL-MCNC: 44 MG/DL — HIGH (ref 10–20)
CALCIUM SERPL-MCNC: 7.8 MG/DL — LOW (ref 8.4–10.5)
CHLORIDE SERPL-SCNC: 105 MMOL/L — SIGNIFICANT CHANGE UP (ref 98–110)
CO2 SERPL-SCNC: 20 MMOL/L — SIGNIFICANT CHANGE UP (ref 17–32)
CREAT SERPL-MCNC: 1.3 MG/DL — SIGNIFICANT CHANGE UP (ref 0.7–1.5)
EGFR: 59 ML/MIN/1.73M2 — LOW
EOSINOPHIL # BLD AUTO: 0.04 K/UL — SIGNIFICANT CHANGE UP (ref 0–0.7)
EOSINOPHIL NFR BLD AUTO: 0.8 % — SIGNIFICANT CHANGE UP (ref 0–8)
GLUCOSE SERPL-MCNC: 77 MG/DL — SIGNIFICANT CHANGE UP (ref 70–99)
HCT VFR BLD CALC: 30.4 % — LOW (ref 42–52)
HGB BLD-MCNC: 9.6 G/DL — LOW (ref 14–18)
IMM GRANULOCYTES NFR BLD AUTO: 1 % — HIGH (ref 0.1–0.3)
LYMPHOCYTES # BLD AUTO: 0.82 K/UL — LOW (ref 1.2–3.4)
LYMPHOCYTES # BLD AUTO: 16.4 % — LOW (ref 20.5–51.1)
MAGNESIUM SERPL-MCNC: 1.9 MG/DL — SIGNIFICANT CHANGE UP (ref 1.8–2.4)
MCHC RBC-ENTMCNC: 30.3 PG — SIGNIFICANT CHANGE UP (ref 27–31)
MCHC RBC-ENTMCNC: 31.6 G/DL — LOW (ref 32–37)
MCV RBC AUTO: 95.9 FL — HIGH (ref 80–94)
MONOCYTES # BLD AUTO: 0.7 K/UL — HIGH (ref 0.1–0.6)
MONOCYTES NFR BLD AUTO: 14 % — HIGH (ref 1.7–9.3)
NEUTROPHILS # BLD AUTO: 3.36 K/UL — SIGNIFICANT CHANGE UP (ref 1.4–6.5)
NEUTROPHILS NFR BLD AUTO: 67.2 % — SIGNIFICANT CHANGE UP (ref 42.2–75.2)
NRBC # BLD: 0 /100 WBCS — SIGNIFICANT CHANGE UP (ref 0–0)
PLATELET # BLD AUTO: 135 K/UL — SIGNIFICANT CHANGE UP (ref 130–400)
PMV BLD: 10.4 FL — SIGNIFICANT CHANGE UP (ref 7.4–10.4)
POTASSIUM SERPL-MCNC: 4 MMOL/L — SIGNIFICANT CHANGE UP (ref 3.5–5)
POTASSIUM SERPL-SCNC: 4 MMOL/L — SIGNIFICANT CHANGE UP (ref 3.5–5)
RBC # BLD: 3.17 M/UL — LOW (ref 4.7–6.1)
RBC # FLD: 13.2 % — SIGNIFICANT CHANGE UP (ref 11.5–14.5)
SODIUM SERPL-SCNC: 135 MMOL/L — SIGNIFICANT CHANGE UP (ref 135–146)
WBC # BLD: 5 K/UL — SIGNIFICANT CHANGE UP (ref 4.8–10.8)
WBC # FLD AUTO: 5 K/UL — SIGNIFICANT CHANGE UP (ref 4.8–10.8)

## 2024-12-22 PROCEDURE — 99232 SBSQ HOSP IP/OBS MODERATE 35: CPT

## 2024-12-22 RX ADMIN — CHLORHEXIDINE GLUCONATE 1 APPLICATION(S): 1.2 RINSE ORAL at 05:29

## 2024-12-22 RX ADMIN — ENOXAPARIN SODIUM 40 MILLIGRAM(S): 60 INJECTION INTRAVENOUS; SUBCUTANEOUS at 05:30

## 2024-12-22 RX ADMIN — Medication 40 MILLIGRAM(S): at 05:30

## 2024-12-22 RX ADMIN — PANTOPRAZOLE 40 MILLIGRAM(S): 40 TABLET, DELAYED RELEASE ORAL at 05:30

## 2024-12-22 RX ADMIN — SODIUM CHLORIDE 75 MILLILITER(S): 9 INJECTION, SOLUTION INTRAVENOUS at 10:20

## 2024-12-22 RX ADMIN — Medication 325 MILLIGRAM(S): at 11:06

## 2024-12-22 RX ADMIN — NITROFURANTOIN MONOHYDRATE/MACROCRYSTALLINE 100 MILLIGRAM(S): 25; 75 CAPSULE ORAL at 17:16

## 2024-12-22 RX ADMIN — SULFASALAZINE 1500 MILLIGRAM(S): 500 TABLET ORAL at 21:32

## 2024-12-22 RX ADMIN — NITROFURANTOIN MONOHYDRATE/MACROCRYSTALLINE 100 MILLIGRAM(S): 25; 75 CAPSULE ORAL at 05:31

## 2024-12-22 RX ADMIN — Medication 10 MILLIGRAM(S): at 05:30

## 2024-12-22 RX ADMIN — SULFASALAZINE 1000 MILLIGRAM(S): 500 TABLET ORAL at 05:47

## 2024-12-22 RX ADMIN — Medication 1 MILLIGRAM(S): at 11:07

## 2024-12-22 RX ADMIN — EZETIMIBE 10 MILLIGRAM(S): 10 TABLET ORAL at 11:07

## 2024-12-22 RX ADMIN — Medication 25 MILLIGRAM(S): at 05:30

## 2024-12-22 RX ADMIN — Medication 5 MILLIGRAM(S): at 11:06

## 2024-12-22 RX ADMIN — Medication 25 MILLIGRAM(S): at 17:15

## 2024-12-22 RX ADMIN — LOSARTAN POTASSIUM 100 MILLIGRAM(S): 100 TABLET, FILM COATED ORAL at 05:30

## 2024-12-23 ENCOUNTER — RX RENEWAL (OUTPATIENT)
Age: 69
End: 2024-12-23

## 2024-12-23 LAB
ANION GAP SERPL CALC-SCNC: 8 MMOL/L — SIGNIFICANT CHANGE UP (ref 7–14)
BASOPHILS # BLD AUTO: 0.03 K/UL — SIGNIFICANT CHANGE UP (ref 0–0.2)
BASOPHILS NFR BLD AUTO: 0.6 % — SIGNIFICANT CHANGE UP (ref 0–1)
BUN SERPL-MCNC: 34 MG/DL — HIGH (ref 10–20)
CALCIUM SERPL-MCNC: 8.2 MG/DL — LOW (ref 8.4–10.4)
CHLORIDE SERPL-SCNC: 104 MMOL/L — SIGNIFICANT CHANGE UP (ref 98–110)
CO2 SERPL-SCNC: 24 MMOL/L — SIGNIFICANT CHANGE UP (ref 17–32)
CREAT SERPL-MCNC: 1.1 MG/DL — SIGNIFICANT CHANGE UP (ref 0.7–1.5)
EGFR: 73 ML/MIN/1.73M2 — SIGNIFICANT CHANGE UP
EOSINOPHIL # BLD AUTO: 0.04 K/UL — SIGNIFICANT CHANGE UP (ref 0–0.7)
EOSINOPHIL NFR BLD AUTO: 0.8 % — SIGNIFICANT CHANGE UP (ref 0–8)
GLUCOSE SERPL-MCNC: 81 MG/DL — SIGNIFICANT CHANGE UP (ref 70–99)
HCT VFR BLD CALC: 31.8 % — LOW (ref 42–52)
HGB BLD-MCNC: 10.2 G/DL — LOW (ref 14–18)
IMM GRANULOCYTES NFR BLD AUTO: 1 % — HIGH (ref 0.1–0.3)
LYMPHOCYTES # BLD AUTO: 0.85 K/UL — LOW (ref 1.2–3.4)
LYMPHOCYTES # BLD AUTO: 17.2 % — LOW (ref 20.5–51.1)
MAGNESIUM SERPL-MCNC: 1.9 MG/DL — SIGNIFICANT CHANGE UP (ref 1.8–2.4)
MCHC RBC-ENTMCNC: 30.6 PG — SIGNIFICANT CHANGE UP (ref 27–31)
MCHC RBC-ENTMCNC: 32.1 G/DL — SIGNIFICANT CHANGE UP (ref 32–37)
MCV RBC AUTO: 95.5 FL — HIGH (ref 80–94)
MONOCYTES # BLD AUTO: 0.73 K/UL — HIGH (ref 0.1–0.6)
MONOCYTES NFR BLD AUTO: 14.8 % — HIGH (ref 1.7–9.3)
NEUTROPHILS # BLD AUTO: 3.24 K/UL — SIGNIFICANT CHANGE UP (ref 1.4–6.5)
NEUTROPHILS NFR BLD AUTO: 65.6 % — SIGNIFICANT CHANGE UP (ref 42.2–75.2)
NRBC # BLD: 0 /100 WBCS — SIGNIFICANT CHANGE UP (ref 0–0)
PLATELET # BLD AUTO: 141 K/UL — SIGNIFICANT CHANGE UP (ref 130–400)
PMV BLD: 10.2 FL — SIGNIFICANT CHANGE UP (ref 7.4–10.4)
POTASSIUM SERPL-MCNC: 4.3 MMOL/L — SIGNIFICANT CHANGE UP (ref 3.5–5)
POTASSIUM SERPL-SCNC: 4.3 MMOL/L — SIGNIFICANT CHANGE UP (ref 3.5–5)
RBC # BLD: 3.33 M/UL — LOW (ref 4.7–6.1)
RBC # FLD: 13.2 % — SIGNIFICANT CHANGE UP (ref 11.5–14.5)
SODIUM SERPL-SCNC: 136 MMOL/L — SIGNIFICANT CHANGE UP (ref 135–146)
WBC # BLD: 4.94 K/UL — SIGNIFICANT CHANGE UP (ref 4.8–10.8)
WBC # FLD AUTO: 4.94 K/UL — SIGNIFICANT CHANGE UP (ref 4.8–10.8)

## 2024-12-23 PROCEDURE — 76770 US EXAM ABDO BACK WALL COMP: CPT | Mod: 26

## 2024-12-23 PROCEDURE — 99232 SBSQ HOSP IP/OBS MODERATE 35: CPT

## 2024-12-23 RX ORDER — SIMETHICONE 125 MG/1
80 CAPSULE, LIQUID FILLED ORAL ONCE
Refills: 0 | Status: COMPLETED | OUTPATIENT
Start: 2024-12-23 | End: 2024-12-23

## 2024-12-23 RX ORDER — NITROFURANTOIN MONOHYDRATE/MACROCRYSTALLINE 25; 75 MG/1; MG/1
1 CAPSULE ORAL
Qty: 14 | Refills: 0
Start: 2024-12-23 | End: 2024-12-29

## 2024-12-23 RX ADMIN — SULFASALAZINE 1000 MILLIGRAM(S): 500 TABLET ORAL at 05:35

## 2024-12-23 RX ADMIN — Medication 5 MILLIGRAM(S): at 11:11

## 2024-12-23 RX ADMIN — LOSARTAN POTASSIUM 100 MILLIGRAM(S): 100 TABLET, FILM COATED ORAL at 05:34

## 2024-12-23 RX ADMIN — SIMETHICONE 80 MILLIGRAM(S): 125 CAPSULE, LIQUID FILLED ORAL at 19:19

## 2024-12-23 RX ADMIN — NITROFURANTOIN MONOHYDRATE/MACROCRYSTALLINE 100 MILLIGRAM(S): 25; 75 CAPSULE ORAL at 05:34

## 2024-12-23 RX ADMIN — EZETIMIBE 10 MILLIGRAM(S): 10 TABLET ORAL at 11:12

## 2024-12-23 RX ADMIN — Medication 40 MILLIGRAM(S): at 05:34

## 2024-12-23 RX ADMIN — Medication 25 MILLIGRAM(S): at 05:33

## 2024-12-23 RX ADMIN — FLUTICASONE PROPIONATE AND SALMETEROL 1 DOSE(S): 50; 500 POWDER ORAL; RESPIRATORY (INHALATION) at 08:50

## 2024-12-23 RX ADMIN — Medication 25 MILLIGRAM(S): at 17:40

## 2024-12-23 RX ADMIN — PANTOPRAZOLE 40 MILLIGRAM(S): 40 TABLET, DELAYED RELEASE ORAL at 05:38

## 2024-12-23 RX ADMIN — ENOXAPARIN SODIUM 40 MILLIGRAM(S): 60 INJECTION INTRAVENOUS; SUBCUTANEOUS at 05:37

## 2024-12-23 RX ADMIN — CHLORHEXIDINE GLUCONATE 1 APPLICATION(S): 1.2 RINSE ORAL at 05:35

## 2024-12-23 RX ADMIN — Medication 1 MILLIGRAM(S): at 11:11

## 2024-12-23 RX ADMIN — NITROFURANTOIN MONOHYDRATE/MACROCRYSTALLINE 100 MILLIGRAM(S): 25; 75 CAPSULE ORAL at 17:40

## 2024-12-23 RX ADMIN — Medication 325 MILLIGRAM(S): at 11:11

## 2024-12-23 RX ADMIN — Medication 10 MILLIGRAM(S): at 05:33

## 2024-12-23 RX ADMIN — SULFASALAZINE 1500 MILLIGRAM(S): 500 TABLET ORAL at 22:19

## 2024-12-24 VITALS — RESPIRATION RATE: 18 BRPM | HEART RATE: 62 BPM | OXYGEN SATURATION: 95 %

## 2024-12-24 LAB
ALBUMIN SERPL ELPH-MCNC: 3.1 G/DL — LOW (ref 3.5–5.2)
ALP SERPL-CCNC: 53 U/L — SIGNIFICANT CHANGE UP (ref 30–115)
ALT FLD-CCNC: 7 U/L — SIGNIFICANT CHANGE UP (ref 0–41)
ANION GAP SERPL CALC-SCNC: 10 MMOL/L — SIGNIFICANT CHANGE UP (ref 7–14)
AST SERPL-CCNC: 17 U/L — SIGNIFICANT CHANGE UP (ref 0–41)
BASOPHILS # BLD AUTO: 0.04 K/UL — SIGNIFICANT CHANGE UP (ref 0–0.2)
BASOPHILS NFR BLD AUTO: 0.9 % — SIGNIFICANT CHANGE UP (ref 0–1)
BILIRUB SERPL-MCNC: <0.2 MG/DL — SIGNIFICANT CHANGE UP (ref 0.2–1.2)
BUN SERPL-MCNC: 36 MG/DL — HIGH (ref 10–20)
CALCIUM SERPL-MCNC: 8.3 MG/DL — LOW (ref 8.4–10.5)
CHLORIDE SERPL-SCNC: 103 MMOL/L — SIGNIFICANT CHANGE UP (ref 98–110)
CO2 SERPL-SCNC: 23 MMOL/L — SIGNIFICANT CHANGE UP (ref 17–32)
CREAT SERPL-MCNC: 1.2 MG/DL — SIGNIFICANT CHANGE UP (ref 0.7–1.5)
EGFR: 65 ML/MIN/1.73M2 — SIGNIFICANT CHANGE UP
EOSINOPHIL # BLD AUTO: 0.04 K/UL — SIGNIFICANT CHANGE UP (ref 0–0.7)
EOSINOPHIL NFR BLD AUTO: 0.9 % — SIGNIFICANT CHANGE UP (ref 0–8)
GLUCOSE SERPL-MCNC: 77 MG/DL — SIGNIFICANT CHANGE UP (ref 70–99)
HCT VFR BLD CALC: 33.6 % — LOW (ref 42–52)
HGB BLD-MCNC: 10.7 G/DL — LOW (ref 14–18)
IMM GRANULOCYTES NFR BLD AUTO: 1.7 % — HIGH (ref 0.1–0.3)
LYMPHOCYTES # BLD AUTO: 0.86 K/UL — LOW (ref 1.2–3.4)
LYMPHOCYTES # BLD AUTO: 18.7 % — LOW (ref 20.5–51.1)
MAGNESIUM SERPL-MCNC: 1.8 MG/DL — SIGNIFICANT CHANGE UP (ref 1.8–2.4)
MCHC RBC-ENTMCNC: 30.6 PG — SIGNIFICANT CHANGE UP (ref 27–31)
MCHC RBC-ENTMCNC: 31.8 G/DL — LOW (ref 32–37)
MCV RBC AUTO: 96 FL — HIGH (ref 80–94)
MONOCYTES # BLD AUTO: 0.67 K/UL — HIGH (ref 0.1–0.6)
MONOCYTES NFR BLD AUTO: 14.5 % — HIGH (ref 1.7–9.3)
NEUTROPHILS # BLD AUTO: 2.92 K/UL — SIGNIFICANT CHANGE UP (ref 1.4–6.5)
NEUTROPHILS NFR BLD AUTO: 63.3 % — SIGNIFICANT CHANGE UP (ref 42.2–75.2)
NRBC # BLD: 0 /100 WBCS — SIGNIFICANT CHANGE UP (ref 0–0)
PHOSPHATE SERPL-MCNC: 3 MG/DL — SIGNIFICANT CHANGE UP (ref 2.1–4.9)
PLATELET # BLD AUTO: 137 K/UL — SIGNIFICANT CHANGE UP (ref 130–400)
PMV BLD: 10.2 FL — SIGNIFICANT CHANGE UP (ref 7.4–10.4)
POTASSIUM SERPL-MCNC: 4.5 MMOL/L — SIGNIFICANT CHANGE UP (ref 3.5–5)
POTASSIUM SERPL-SCNC: 4.5 MMOL/L — SIGNIFICANT CHANGE UP (ref 3.5–5)
PROT SERPL-MCNC: 6.3 G/DL — SIGNIFICANT CHANGE UP (ref 6–8)
RBC # BLD: 3.5 M/UL — LOW (ref 4.7–6.1)
RBC # FLD: 13.3 % — SIGNIFICANT CHANGE UP (ref 11.5–14.5)
SODIUM SERPL-SCNC: 136 MMOL/L — SIGNIFICANT CHANGE UP (ref 135–146)
WBC # BLD: 4.61 K/UL — LOW (ref 4.8–10.8)
WBC # FLD AUTO: 4.61 K/UL — LOW (ref 4.8–10.8)

## 2024-12-24 PROCEDURE — 99232 SBSQ HOSP IP/OBS MODERATE 35: CPT

## 2024-12-24 RX ORDER — OMEPRAZOLE MAGNESIUM 20 MG/1
1 CAPSULE, DELAYED RELEASE ORAL
Qty: 30 | Refills: 0
Start: 2024-12-24 | End: 2025-01-22

## 2024-12-24 RX ORDER — EZETIMIBE 10 MG/1
1 TABLET ORAL
Qty: 30 | Refills: 0
Start: 2024-12-24 | End: 2025-01-22

## 2024-12-24 RX ORDER — METOPROLOL TARTRATE 50 MG
1 TABLET ORAL
Qty: 60 | Refills: 0
Start: 2024-12-24 | End: 2025-01-22

## 2024-12-24 RX ORDER — FINASTERIDE 5 MG
1 TABLET ORAL
Qty: 30 | Refills: 0
Start: 2024-12-24 | End: 2025-01-22

## 2024-12-24 RX ORDER — ASPIRIN 81 MG
1 TABLET, DELAYED RELEASE (ENTERIC COATED) ORAL
Qty: 30 | Refills: 0
Start: 2024-12-24 | End: 2025-01-22

## 2024-12-24 RX ORDER — SULFASALAZINE 500 MG/1
1 TABLET ORAL
Refills: 0 | DISCHARGE

## 2024-12-24 RX ORDER — FLUTICASONE FUROATE AND VILANTEROL TRIFENATATE 200; 25 UG/1; UG/1
1 POWDER RESPIRATORY (INHALATION)
Refills: 0 | DISCHARGE

## 2024-12-24 RX ORDER — FLUTICASONE FUROATE AND VILANTEROL TRIFENATATE 200; 25 UG/1; UG/1
1 POWDER RESPIRATORY (INHALATION)
Qty: 2 | Refills: 0
Start: 2024-12-24 | End: 2025-01-22

## 2024-12-24 RX ORDER — OMEPRAZOLE MAGNESIUM 20 MG/1
1 CAPSULE, DELAYED RELEASE ORAL
Refills: 0 | DISCHARGE

## 2024-12-24 RX ORDER — SULFASALAZINE 500 MG/1
2 TABLET ORAL
Qty: 120 | Refills: 0
Start: 2024-12-24 | End: 2025-01-22

## 2024-12-24 RX ORDER — LOSARTAN POTASSIUM 100 MG/1
1 TABLET, FILM COATED ORAL
Qty: 30 | Refills: 0
Start: 2024-12-24 | End: 2025-01-22

## 2024-12-24 RX ORDER — ALBUTEROL SULFATE 90 UG/1
2 INHALANT RESPIRATORY (INHALATION)
Qty: 2 | Refills: 0
Start: 2024-12-24 | End: 2025-01-22

## 2024-12-24 RX ADMIN — Medication 40 MILLIGRAM(S): at 05:01

## 2024-12-24 RX ADMIN — CHLORHEXIDINE GLUCONATE 1 APPLICATION(S): 1.2 RINSE ORAL at 05:05

## 2024-12-24 RX ADMIN — LOSARTAN POTASSIUM 100 MILLIGRAM(S): 100 TABLET, FILM COATED ORAL at 05:01

## 2024-12-24 RX ADMIN — Medication 325 MILLIGRAM(S): at 11:32

## 2024-12-24 RX ADMIN — Medication 10 MILLIGRAM(S): at 05:01

## 2024-12-24 RX ADMIN — EZETIMIBE 10 MILLIGRAM(S): 10 TABLET ORAL at 11:32

## 2024-12-24 RX ADMIN — Medication 5 MILLIGRAM(S): at 11:32

## 2024-12-24 RX ADMIN — SULFASALAZINE 1000 MILLIGRAM(S): 500 TABLET ORAL at 05:00

## 2024-12-24 RX ADMIN — Medication 1 MILLIGRAM(S): at 11:32

## 2024-12-24 RX ADMIN — FLUTICASONE PROPIONATE AND SALMETEROL 1 DOSE(S): 50; 500 POWDER ORAL; RESPIRATORY (INHALATION) at 08:06

## 2024-12-24 RX ADMIN — NITROFURANTOIN MONOHYDRATE/MACROCRYSTALLINE 100 MILLIGRAM(S): 25; 75 CAPSULE ORAL at 05:05

## 2024-12-24 RX ADMIN — Medication 25 MILLIGRAM(S): at 05:01

## 2024-12-24 RX ADMIN — PANTOPRAZOLE 40 MILLIGRAM(S): 40 TABLET, DELAYED RELEASE ORAL at 05:01

## 2024-12-24 RX ADMIN — ENOXAPARIN SODIUM 40 MILLIGRAM(S): 60 INJECTION INTRAVENOUS; SUBCUTANEOUS at 05:00

## 2024-12-24 NOTE — PROGRESS NOTE ADULT - PROVIDER SPECIALTY LIST ADULT
Hospitalist
Internal Medicine
Hospitalist
Hospitalist
Internal Medicine
Hospitalist
Internal Medicine
Hospitalist
Hospitalist
27-Feb[birth year]2018

## 2024-12-24 NOTE — PROGRESS NOTE ADULT - SUBJECTIVE AND OBJECTIVE BOX
TIERRA BAKER  69y  Male      Patient is a 69y old  Male who presents with a chief complaint of Fall.       INTERVAL HPI/OVERNIGHT EVENTS:      ******************************* REVIEW OF SYSTEMS:**********************************************    All other review of systems negative    *********************** VITALS ******************************************    T(F): 97.4 (12-22-24 @ 05:02)  HR: 60 (12-22-24 @ 05:02) (60 - 70)  BP: 144/85 (12-22-24 @ 05:02) (109/66 - 144/85)  RR: 18 (12-22-24 @ 05:02) (18 - 18)  SpO2: 97% (12-22-24 @ 05:02) (96% - 97%)    12-21-24 @ 07:01  -  12-22-24 @ 07:00  --------------------------------------------------------  IN: 1650 mL / OUT: 220 mL / NET: 1430 mL            12-21-24 @ 07:01  -  12-22-24 @ 07:00  --------------------------------------------------------  IN: 1650 mL / OUT: 220 mL / NET: 1430 mL        ******************************** PHYSICAL EXAM:**************************************************  GENERAL: NAD    PSYCH: no agitation, baseline mentation  HEENT:     NERVOUS SYSTEM:  Alert & Oriented X3,     PULMONARY: JENNIE, CTA    CARDIOVASCULAR: S1S2 RRR    GI: Soft, NT, ND; BS present.    EXTREMITIES:  2+ Peripheral Pulses, No clubbing, cyanosis, or edema    LYMPH: No lymphadenopathy noted    SKIN: No rashes or lesions      **************************** LABS *******************************************************                          9.6    5.00  )-----------( 135      ( 22 Dec 2024 06:13 )             30.4     12-22    135  |  105  |  44[H]  ----------------------------<  77  4.0   |  20  |  1.3    Ca    7.8[L]      22 Dec 2024 06:13  Mg     1.9     12-22        Urinalysis Basic - ( 22 Dec 2024 06:13 )    Color: x / Appearance: x / SG: x / pH: x  Gluc: 77 mg/dL / Ketone: x  / Bili: x / Urobili: x   Blood: x / Protein: x / Nitrite: x   Leuk Esterase: x / RBC: x / WBC x   Sq Epi: x / Non Sq Epi: x / Bacteria: x        Lactate Trend  12-17 @ 14:43 Lactate:1.1         CAPILLARY BLOOD GLUCOSE      POCT Blood Glucose.: 123 mg/dL (20 Dec 2024 21:53)          **************************Active Medications *******************************************  lactose (Diarrhea)  atorvastatin (Unknown)  enalapril (Unknown)  baclofen (Unknown)      acetaminophen     Tablet .. 650 milliGRAM(s) Oral every 6 hours PRN  albuterol    90 MICROgram(s) HFA Inhaler 2 Puff(s) Inhalation every 6 hours PRN  amLODIPine   Tablet 10 milliGRAM(s) Oral daily  aspirin enteric coated 325 milliGRAM(s) Oral daily  chlorhexidine 2% Cloths 1 Application(s) Topical <User Schedule>  enoxaparin Injectable 40 milliGRAM(s) SubCutaneous every 24 hours  ezetimibe 10 milliGRAM(s) Oral daily  finasteride 5 milliGRAM(s) Oral daily  fluticasone propionate/ salmeterol 100-50 MICROgram(s) Diskus 1 Dose(s) Inhalation two times a day  folic acid 1 milliGRAM(s) Oral daily  lactated ringers. 1000 milliLiter(s) IV Continuous <Continuous>  losartan 100 milliGRAM(s) Oral daily  metoprolol tartrate 25 milliGRAM(s) Oral two times a day  nitrofurantoin monohydrate/macrocrystals (MACROBID) 100 milliGRAM(s) Oral two times a day  oxycodone    5 mG/acetaminophen 325 mG 2 Tablet(s) Oral every 6 hours PRN  pantoprazole    Tablet 40 milliGRAM(s) Oral before breakfast  predniSONE   Tablet 40 milliGRAM(s) Oral daily  sulfaSALAzine 1000 milliGRAM(s) Oral <User Schedule>  sulfaSALAzine 1500 milliGRAM(s) Oral at bedtime      ***************************************************  RADIOLOGY & ADDITIONAL TESTS:    Imaging Personally Reviewed:  [ ] YES  [ ] NO    HEALTH ISSUES - PROBLEM Dx:      
  TIERRA BAKER  69y  Male      Patient is a 69y old  Male who presents with a chief complaint of Fall.       INTERVAL HPI/OVERNIGHT EVENTS:      ******************************* REVIEW OF SYSTEMS:**********************************************    All other review of systems negative    *********************** VITALS ******************************************    T(F): 97.5 (12-23-24 @ 05:23)  HR: 60 (12-23-24 @ 05:23) (58 - 72)  BP: 135/78 (12-23-24 @ 05:23) (121/73 - 135/78)  RR: 18 (12-23-24 @ 05:23) (18 - 18)  SpO2: 99% (12-23-24 @ 05:23) (96% - 99%)    12-22-24 @ 07:01  -  12-23-24 @ 07:00  --------------------------------------------------------  IN: 0 mL / OUT: 600 mL / NET: -600 mL            12-22-24 @ 07:01  -  12-23-24 @ 07:00  --------------------------------------------------------  IN: 0 mL / OUT: 600 mL / NET: -600 mL        ******************************** PHYSICAL EXAM:**************************************************  GENERAL: NAD    PSYCH: no agitation, baseline mentation  HEENT:     NERVOUS SYSTEM:  Alert & Oriented X3,   PULMONARY: JENNIE, CTA    CARDIOVASCULAR: S1S2 RRR    GI: Soft, NT, ND; BS present.    EXTREMITIES:  2+ Peripheral Pulses, No clubbing, cyanosis, or edema    LYMPH: No lymphadenopathy noted    SKIN: No rashes or lesions      **************************** LABS *******************************************************                          10.2   4.94  )-----------( 141      ( 23 Dec 2024 05:59 )             31.8     12-23    136  |  104  |  34[H]  ----------------------------<  81  4.3   |  24  |  1.1    Ca    8.2[L]      23 Dec 2024 05:59  Mg     1.9     12-23        Urinalysis Basic - ( 23 Dec 2024 05:59 )    Color: x / Appearance: x / SG: x / pH: x  Gluc: 81 mg/dL / Ketone: x  / Bili: x / Urobili: x   Blood: x / Protein: x / Nitrite: x   Leuk Esterase: x / RBC: x / WBC x   Sq Epi: x / Non Sq Epi: x / Bacteria: x        Lactate Trend        CAPILLARY BLOOD GLUCOSE              **************************Active Medications *******************************************  lactose (Diarrhea)  atorvastatin (Unknown)  enalapril (Unknown)  baclofen (Unknown)      acetaminophen     Tablet .. 650 milliGRAM(s) Oral every 6 hours PRN  albuterol    90 MICROgram(s) HFA Inhaler 2 Puff(s) Inhalation every 6 hours PRN  amLODIPine   Tablet 10 milliGRAM(s) Oral daily  aspirin enteric coated 325 milliGRAM(s) Oral daily  chlorhexidine 2% Cloths 1 Application(s) Topical <User Schedule>  enoxaparin Injectable 40 milliGRAM(s) SubCutaneous every 24 hours  ezetimibe 10 milliGRAM(s) Oral daily  finasteride 5 milliGRAM(s) Oral daily  fluticasone propionate/ salmeterol 100-50 MICROgram(s) Diskus 1 Dose(s) Inhalation two times a day  folic acid 1 milliGRAM(s) Oral daily  losartan 100 milliGRAM(s) Oral daily  metoprolol tartrate 25 milliGRAM(s) Oral two times a day  nitrofurantoin monohydrate/macrocrystals (MACROBID) 100 milliGRAM(s) Oral two times a day  oxycodone    5 mG/acetaminophen 325 mG 2 Tablet(s) Oral every 6 hours PRN  pantoprazole    Tablet 40 milliGRAM(s) Oral before breakfast  predniSONE   Tablet 40 milliGRAM(s) Oral daily  sulfaSALAzine 1000 milliGRAM(s) Oral <User Schedule>  sulfaSALAzine 1500 milliGRAM(s) Oral at bedtime      ***************************************************  RADIOLOGY & ADDITIONAL TESTS:    Imaging Personally Reviewed:  [ ] YES  [ ] NO    HEALTH ISSUES - PROBLEM Dx:      
  TIERRA BAKER  69y  Male      Patient is a 69y old  Male who presents with a chief complaint of Fall.      INTERVAL HPI/OVERNIGHT EVENTS:      ******************************* REVIEW OF SYSTEMS:**********************************************    All other review of systems negative    *********************** VITALS ******************************************    T(F): 97.5 (12-24-24 @ 05:12)  HR: 62 (12-24-24 @ 07:43) (62 - 70)  BP: 148/87 (12-24-24 @ 05:12) (109/64 - 148/87)  RR: 18 (12-24-24 @ 07:43) (18 - 19)  SpO2: 95% (12-24-24 @ 07:43) (94% - 98%)            ******************************** PHYSICAL EXAM:**************************************************  GENERAL: NAD    PSYCH: no agitation, baseline mentation  HEENT:     NERVOUS SYSTEM:  Alert & Oriented X3,   PULMONARY: JENNIE, CTA    CARDIOVASCULAR: S1S2 RRR    GI: Soft, NT, ND; BS present.    EXTREMITIES:  2+ Peripheral Pulses, No clubbing, cyanosis, or edema    LYMPH: No lymphadenopathy noted    SKIN: No rashes or lesions      **************************** LABS *******************************************************                          10.7   4.61  )-----------( 137      ( 24 Dec 2024 06:00 )             33.6     12-24    136  |  103  |  36[H]  ----------------------------<  77  4.5   |  23  |  1.2    Ca    8.3[L]      24 Dec 2024 06:00  Phos  3.0     12-24  Mg     1.8     12-24    TPro  6.3  /  Alb  3.1[L]  /  TBili  <0.2  /  DBili  x   /  AST  17  /  ALT  7   /  AlkPhos  53  12-24      Urinalysis Basic - ( 24 Dec 2024 06:00 )    Color: x / Appearance: x / SG: x / pH: x  Gluc: 77 mg/dL / Ketone: x  / Bili: x / Urobili: x   Blood: x / Protein: x / Nitrite: x   Leuk Esterase: x / RBC: x / WBC x   Sq Epi: x / Non Sq Epi: x / Bacteria: x        Lactate Trend        CAPILLARY BLOOD GLUCOSE              **************************Active Medications *******************************************  lactose (Diarrhea)  atorvastatin (Unknown)  enalapril (Unknown)  baclofen (Unknown)      acetaminophen     Tablet .. 650 milliGRAM(s) Oral every 6 hours PRN  albuterol    90 MICROgram(s) HFA Inhaler 2 Puff(s) Inhalation every 6 hours PRN  amLODIPine   Tablet 10 milliGRAM(s) Oral daily  aspirin enteric coated 325 milliGRAM(s) Oral daily  chlorhexidine 2% Cloths 1 Application(s) Topical <User Schedule>  enoxaparin Injectable 40 milliGRAM(s) SubCutaneous every 24 hours  ezetimibe 10 milliGRAM(s) Oral daily  finasteride 5 milliGRAM(s) Oral daily  fluticasone propionate/ salmeterol 100-50 MICROgram(s) Diskus 1 Dose(s) Inhalation two times a day  folic acid 1 milliGRAM(s) Oral daily  losartan 100 milliGRAM(s) Oral daily  metoprolol tartrate 25 milliGRAM(s) Oral two times a day  nitrofurantoin monohydrate/macrocrystals (MACROBID) 100 milliGRAM(s) Oral two times a day  oxycodone    5 mG/acetaminophen 325 mG 2 Tablet(s) Oral every 6 hours PRN  pantoprazole    Tablet 40 milliGRAM(s) Oral before breakfast  predniSONE   Tablet 40 milliGRAM(s) Oral daily  sulfaSALAzine 1000 milliGRAM(s) Oral <User Schedule>  sulfaSALAzine 1500 milliGRAM(s) Oral at bedtime      ***************************************************  RADIOLOGY & ADDITIONAL TESTS:    Imaging Personally Reviewed:  [ ] YES  [ ] NO    HEALTH ISSUES - PROBLEM Dx:      
  TIERRA BAKER  69y, Male  Allergy: lactose (Diarrhea)  atorvastatin (Unknown)  enalapril (Unknown)  baclofen (Unknown)    Hospital Day: 1d    Patient seen and examined. No acute events overnight    PMH/PSH:  PAST MEDICAL & SURGICAL HISTORY:  Stroke      CAD (coronary artery disease)      Pulmonary embolism      COPD, mild      History of BPH      HTN (hypertension)      Degeneration of spine      Kidney stone      RA (rheumatoid arthritis)      High cholesterol      Infrarenal abdominal aortic aneurysm (AAA) without rupture      History of GI bleed      Lung cancer      Diverticulosis      Colon polyp      History of coronary artery bypass, single      History of hip replacement      S/P lobectomy of lung          VITALS:  T(F): 97.7 (12-18-24 @ 08:20), Max: 98.1 (12-17-24 @ 16:12)  HR: 67 (12-18-24 @ 08:20)  BP: 151/69 (12-18-24 @ 08:20) (136/62 - 151/69)  RR: 18 (12-18-24 @ 08:20)  SpO2: 97% (12-18-24 @ 08:20)    TESTS & MEASUREMENTS:  Weight (Kg): 59 (12-17-24 @ 12:31)      12-17-24 @ 07:01  -  12-18-24 @ 07:00  --------------------------------------------------------  IN: 0 mL / OUT: 675 mL / NET: -675 mL                            11.7   4.22  )-----------( 146      ( 18 Dec 2024 08:21 )             35.5     PT/INR - ( 18 Dec 2024 08:21 )   PT: 11.20 sec;   INR: 0.95 ratio         PTT - ( 18 Dec 2024 08:21 )  PTT:30.0 sec  12-18    136  |  102  |  21[H]  ----------------------------<  81  3.9   |  22  |  0.8    Ca    8.8      18 Dec 2024 08:21    TPro  7.7  /  Alb  3.4[L]  /  TBili  0.4  /  DBili  x   /  AST  13  /  ALT  6   /  AlkPhos  80  12-18    LIVER FUNCTIONS - ( 18 Dec 2024 08:21 )  Alb: 3.4 g/dL / Pro: 7.7 g/dL / ALK PHOS: 80 U/L / ALT: 6 U/L / AST: 13 U/L / GGT: x                 Urinalysis with Rflx Culture (collected 12-18-24 @ 01:30)      Urinalysis Basic - ( 18 Dec 2024 08:21 )    Color: x / Appearance: x / SG: x / pH: x  Gluc: 81 mg/dL / Ketone: x  / Bili: x / Urobili: x   Blood: x / Protein: x / Nitrite: x   Leuk Esterase: x / RBC: x / WBC x   Sq Epi: x / Non Sq Epi: x / Bacteria: x        RADIOLOGY & ADDITIONAL TESTS:    RECENT DIAGNOSTIC ORDERS:  Magnesium: AM Sched. Collection: 19-Dec-2024 04:30 (12-18-24 @ 11:04)  Comprehensive Metabolic Panel: AM Sched. Collection: 19-Dec-2024 04:30 (12-18-24 @ 11:04)  Complete Blood Count + Automated Diff: AM Sched. Collection: 19-Dec-2024 04:30 (12-18-24 @ 11:04)  VA Duplex Lower Ext Vein Scan, Bilat: Routine   Indication: r/o dvt  Transport: Stretcher-Crib (12-18-24 @ 09:15)  Diet, Regular (12-18-24 @ 02:40)  Culture - Urine: 01:30 (12-18-24 @ 01:52)  Wet Read: Routine  WET READ: no fx (12-17-24 @ 16:24)  Wet Read: Routine  WET READ: no fx (12-17-24 @ 16:24)  Wet Read: Routine  WET READ: no fx (12-17-24 @ 16:24)  Wet Read: Routine  WET READ: no fx (12-17-24 @ 16:24)  Wet Read: Routine  WET READ: napd (12-17-24 @ 16:24)      MEDICATIONS:  MEDICATIONS  (STANDING):  amLODIPine   Tablet 10 milliGRAM(s) Oral daily  aspirin enteric coated 325 milliGRAM(s) Oral daily  chlorhexidine 2% Cloths 1 Application(s) Topical <User Schedule>  ezetimibe 10 milliGRAM(s) Oral daily  finasteride 5 milliGRAM(s) Oral daily  folic acid 1 milliGRAM(s) Oral daily  losartan 100 milliGRAM(s) Oral daily  metoprolol tartrate 25 milliGRAM(s) Oral two times a day  oxycodone    5 mG/acetaminophen 325 mG 1 Tablet(s) Oral once  pantoprazole    Tablet 40 milliGRAM(s) Oral before breakfast  sulfaSALAzine 1000 milliGRAM(s) Oral <User Schedule>  sulfaSALAzine 1500 milliGRAM(s) Oral at bedtime    MEDICATIONS  (PRN):  acetaminophen     Tablet .. 650 milliGRAM(s) Oral every 6 hours PRN Temp greater or equal to 38C (100.4F), Mild Pain (1 - 3)  albuterol    90 MICROgram(s) HFA Inhaler 2 Puff(s) Inhalation every 6 hours PRN Wheezing      HOME MEDICATIONS:  Albuterol (Eqv-ProAir HFA) 90 mcg/inh inhalation aerosol (04-21)  amLODIPine 10 mg oral tablet (04-24)  aspirin 325 mg oral delayed release tablet (04-21)  finasteride 5 mg oral tablet (04-21)  folic acid 1 mg oral tablet (04-21)  metoprolol tartrate 25 mg oral tablet (04-21)  Myrbetriq 50 mg oral tablet, extended release (12-18)  omeprazole 40 mg oral delayed release capsule (12-18)  sulfaSALAzine 500 mg oral tablet (12-18)  sulfaSALAzine 500 mg oral tablet (12-18)  Zetia 10 mg oral tablet (04-21)      REVIEW OF SYSTEMS:  All other review of systems is negative unless indicated above.     PHYSICAL EXAM:  PHYSICAL EXAM:  GENERAL: NAD  HEAD:  Atraumatic, Normocephalic  NECK: Supple, No JVD  CHEST/LUNG: Clear to auscultation bilaterally; No wheeze  HEART: Regular rate and rhythm; No murmurs, rubs, or gallops  ABDOMEN: Soft, Nontender, Nondistended; Bowel sounds present  EXTREMITIES:  2+ Peripheral Pulses, No clubbing, cyanosis, or edema  SKIN: No rashes or lesions      
  TIERRA BAKER  69y, Male  Allergy: lactose (Diarrhea)  atorvastatin (Unknown)  enalapril (Unknown)  baclofen (Unknown)    Hospital Day: 3d    Patient seen and examined. No acute events overnight    PMH/PSH:  PAST MEDICAL & SURGICAL HISTORY:  Stroke      CAD (coronary artery disease)      Pulmonary embolism      COPD, mild      History of BPH      HTN (hypertension)      Degeneration of spine      Kidney stone      RA (rheumatoid arthritis)      High cholesterol      Infrarenal abdominal aortic aneurysm (AAA) without rupture      History of GI bleed      Lung cancer      Diverticulosis      Colon polyp      History of coronary artery bypass, single      History of hip replacement      S/P lobectomy of lung          VITALS:  T(F): 97.7 (12-20-24 @ 14:00), Max: 98.7 (12-19-24 @ 23:00)  HR: 73 (12-20-24 @ 14:00)  BP: 101/61 (12-20-24 @ 14:00) (101/61 - 119/62)  RR: 18 (12-20-24 @ 14:00)  SpO2: 96% (12-20-24 @ 14:00)    TESTS & MEASUREMENTS:  Weight (Kg):   BMI (kg/m2): 19.2 (12-20)    12-18-24 @ 07:01  -  12-19-24 @ 07:00  --------------------------------------------------------  IN: 0 mL / OUT: 300 mL / NET: -300 mL    12-19-24 @ 07:01  -  12-20-24 @ 07:00  --------------------------------------------------------  IN: 110 mL / OUT: 200 mL / NET: -90 mL                            11.0   3.58  )-----------( 111      ( 20 Dec 2024 05:52 )             34.0       12-20    129[L]  |  97[L]  |  42[H]  ----------------------------<  84  4.5   |  18  |  1.6[H]    Ca    8.2[L]      20 Dec 2024 05:52  Mg     2.2     12-20    TPro  6.8  /  Alb  3.1[L]  /  TBili  0.2  /  DBili  x   /  AST  14  /  ALT  <5  /  AlkPhos  63  12-20    LIVER FUNCTIONS - ( 20 Dec 2024 05:52 )  Alb: 3.1 g/dL / Pro: 6.8 g/dL / ALK PHOS: 63 U/L / ALT: <5 U/L / AST: 14 U/L / GGT: x                 Culture - Urine (collected 12-18-24 @ 01:30)  Source: Clean Catch None  Preliminary Report (12-19-24 @ 23:52):    >100,000 CFU/ml Enterococcus faecium    Urinalysis with Rflx Culture (collected 12-18-24 @ 01:30)      Urinalysis Basic - ( 20 Dec 2024 05:52 )    Color: x / Appearance: x / SG: x / pH: x  Gluc: 84 mg/dL / Ketone: x  / Bili: x / Urobili: x   Blood: x / Protein: x / Nitrite: x   Leuk Esterase: x / RBC: x / WBC x   Sq Epi: x / Non Sq Epi: x / Bacteria: x        RADIOLOGY & ADDITIONAL TESTS:    RECENT DIAGNOSTIC ORDERS:  Urea Nitrogen,  Random Urine: STAT (12-20-24 @ 10:52)  Protein/Creatinine Ratio, Urine: STAT (12-20-24 @ 10:52)  Magnesium: AM Sched. Collection: 23-Dec-2024 04:30 (12-20-24 @ 10:21)  Magnesium: AM Sched. Collection: 22-Dec-2024 04:30 (12-20-24 @ 10:21)  Basic Metabolic Panel: AM Sched. Collection: 23-Dec-2024 04:30 (12-20-24 @ 10:21)  Basic Metabolic Panel: AM Sched. Collection: 22-Dec-2024 04:30 (12-20-24 @ 10:21)  Complete Blood Count + Automated Diff: AM Sched. Collection: 23-Dec-2024 04:30 (12-20-24 @ 10:21)  Complete Blood Count + Automated Diff: AM Sched. Collection: 22-Dec-2024 04:30 (12-20-24 @ 10:21)  Magnesium: AM Sched. Collection: 21-Dec-2024 04:30 (12-20-24 @ 10:21)  Basic Metabolic Panel: AM Sched. Collection: 21-Dec-2024 04:30 (12-20-24 @ 10:21)  Complete Blood Count + Automated Diff: AM Sched. Collection: 21-Dec-2024 04:30 (12-20-24 @ 10:21)      MEDICATIONS:  MEDICATIONS  (STANDING):  amLODIPine   Tablet 10 milliGRAM(s) Oral daily  aspirin enteric coated 325 milliGRAM(s) Oral daily  chlorhexidine 2% Cloths 1 Application(s) Topical <User Schedule>  enoxaparin Injectable 40 milliGRAM(s) SubCutaneous every 24 hours  ezetimibe 10 milliGRAM(s) Oral daily  finasteride 5 milliGRAM(s) Oral daily  fluticasone propionate/ salmeterol 100-50 MICROgram(s) Diskus 1 Dose(s) Inhalation two times a day  folic acid 1 milliGRAM(s) Oral daily  lactated ringers. 1000 milliLiter(s) (75 mL/Hr) IV Continuous <Continuous>  losartan 100 milliGRAM(s) Oral daily  metoprolol tartrate 25 milliGRAM(s) Oral two times a day  nitrofurantoin monohydrate/macrocrystals (MACROBID) 100 milliGRAM(s) Oral two times a day  pantoprazole    Tablet 40 milliGRAM(s) Oral before breakfast  predniSONE   Tablet 40 milliGRAM(s) Oral daily  sulfaSALAzine 1000 milliGRAM(s) Oral <User Schedule>  sulfaSALAzine 1500 milliGRAM(s) Oral at bedtime    MEDICATIONS  (PRN):  acetaminophen     Tablet .. 650 milliGRAM(s) Oral every 6 hours PRN Temp greater or equal to 38C (100.4F), Mild Pain (1 - 3)  albuterol    90 MICROgram(s) HFA Inhaler 2 Puff(s) Inhalation every 6 hours PRN Wheezing  oxycodone    5 mG/acetaminophen 325 mG 2 Tablet(s) Oral every 6 hours PRN Severe Pain (7 - 10)      HOME MEDICATIONS:  Albuterol (Eqv-ProAir HFA) 90 mcg/inh inhalation aerosol (04-21)  amLODIPine 10 mg oral tablet (04-24)  aspirin 325 mg oral delayed release tablet (04-21)  Breo Ellipta 100 mcg-25 mcg/inh inhalation powder (12-18)  Cozaar 100 mg oral tablet (12-20)  finasteride 5 mg oral tablet (04-21)  folic acid 1 mg oral tablet (04-21)  metoprolol tartrate 25 mg oral tablet (04-21)  omeprazole 40 mg oral delayed release capsule (12-18)  sulfaSALAzine 500 mg oral tablet (12-18)  sulfaSALAzine 500 mg oral tablet (12-18)  Tylenol Regular Strength 325 mg oral tablet (12-20)  Zetia 10 mg oral tablet (04-21)      REVIEW OF SYSTEMS:  All other review of systems is negative unless indicated above.     PHYSICAL EXAM:  PHYSICAL EXAM:  GENERAL: NAD  HEAD:  Atraumatic, Normocephalic  NECK: Supple, No JVD  CHEST/LUNG: Clear to auscultation bilaterally; No wheeze  HEART: Regular rate and rhythm; No murmurs, rubs, or gallops  ABDOMEN: Soft, Nontender, Nondistended; Bowel sounds present  EXTREMITIES:  2+ Peripheral Pulses, No clubbing, cyanosis, or edema  SKIN: No rashes or lesions      
  TIERRA BAKER  69y  Male      Patient is a 69y old  Male who presents with a chief complaint of Fall.  INTERVAL HPI/OVERNIGHT EVENTS:      ******************************* REVIEW OF SYSTEMS:**********************************************    All other review of systems negative    *********************** VITALS ******************************************    T(F): 98.5 (12-21-24 @ 14:10)  HR: 70 (12-21-24 @ 14:10) (63 - 87)  BP: 112/65 (12-21-24 @ 14:10) (106/67 - 135/75)  RR: 18 (12-21-24 @ 04:51) (18 - 18)  SpO2: 96% (12-21-24 @ 14:10) (96% - 98%)            ******************************** PHYSICAL EXAM:**************************************************  GENERAL: NAD    PSYCH: no agitation, baseline mentation  HEENT:     NERVOUS SYSTEM:  Alert & Oriented X3,   PULMONARY: JENNIE, CTA    CARDIOVASCULAR: S1S2 RRR    GI: Soft, NT, ND; BS present.    EXTREMITIES:  2+ Peripheral Pulses, No clubbing, cyanosis, or edema    LYMPH: No lymphadenopathy noted    SKIN: No rashes or lesions      **************************** LABS *******************************************************                          9.7    5.20  )-----------( 132      ( 21 Dec 2024 06:51 )             30.0     12-21    136  |  105  |  50[H]  ----------------------------<  81  4.2   |  21  |  1.8[H]    Ca    8.0[L]      21 Dec 2024 06:51  Mg     2.0     12-21    TPro  6.8  /  Alb  3.1[L]  /  TBili  0.2  /  DBili  x   /  AST  14  /  ALT  <5  /  AlkPhos  63  12-20      Urinalysis Basic - ( 21 Dec 2024 06:51 )    Color: x / Appearance: x / SG: x / pH: x  Gluc: 81 mg/dL / Ketone: x  / Bili: x / Urobili: x   Blood: x / Protein: x / Nitrite: x   Leuk Esterase: x / RBC: x / WBC x   Sq Epi: x / Non Sq Epi: x / Bacteria: x        Lactate Trend  12-17 @ 14:43 Lactate:1.1         CAPILLARY BLOOD GLUCOSE      POCT Blood Glucose.: 123 mg/dL (20 Dec 2024 21:53)          **************************Active Medications *******************************************  lactose (Diarrhea)  atorvastatin (Unknown)  enalapril (Unknown)  baclofen (Unknown)      acetaminophen     Tablet .. 650 milliGRAM(s) Oral every 6 hours PRN  albuterol    90 MICROgram(s) HFA Inhaler 2 Puff(s) Inhalation every 6 hours PRN  amLODIPine   Tablet 10 milliGRAM(s) Oral daily  aspirin enteric coated 325 milliGRAM(s) Oral daily  chlorhexidine 2% Cloths 1 Application(s) Topical <User Schedule>  enoxaparin Injectable 40 milliGRAM(s) SubCutaneous every 24 hours  ezetimibe 10 milliGRAM(s) Oral daily  finasteride 5 milliGRAM(s) Oral daily  fluticasone propionate/ salmeterol 100-50 MICROgram(s) Diskus 1 Dose(s) Inhalation two times a day  folic acid 1 milliGRAM(s) Oral daily  lactated ringers. 1000 milliLiter(s) IV Continuous <Continuous>  losartan 100 milliGRAM(s) Oral daily  metoprolol tartrate 25 milliGRAM(s) Oral two times a day  nitrofurantoin monohydrate/macrocrystals (MACROBID) 100 milliGRAM(s) Oral two times a day  oxycodone    5 mG/acetaminophen 325 mG 2 Tablet(s) Oral every 6 hours PRN  pantoprazole    Tablet 40 milliGRAM(s) Oral before breakfast  predniSONE   Tablet 40 milliGRAM(s) Oral daily  sulfaSALAzine 1000 milliGRAM(s) Oral <User Schedule>  sulfaSALAzine 1500 milliGRAM(s) Oral at bedtime      ***************************************************  RADIOLOGY & ADDITIONAL TESTS:    Imaging Personally Reviewed:  [ ] YES  [ ] NO    HEALTH ISSUES - PROBLEM Dx:      
SUBJECTIVE/OVERNIGHT EVENTS  Today is hospital day 5d. No acute or major events overnight.    MEDICATIONS  STANDING MEDICATIONS  amLODIPine   Tablet 10 milliGRAM(s) Oral daily  aspirin enteric coated 325 milliGRAM(s) Oral daily  chlorhexidine 2% Cloths 1 Application(s) Topical <User Schedule>  enoxaparin Injectable 40 milliGRAM(s) SubCutaneous every 24 hours  ezetimibe 10 milliGRAM(s) Oral daily  finasteride 5 milliGRAM(s) Oral daily  fluticasone propionate/ salmeterol 100-50 MICROgram(s) Diskus 1 Dose(s) Inhalation two times a day  folic acid 1 milliGRAM(s) Oral daily  lactated ringers. 1000 milliLiter(s) IV Continuous <Continuous>  losartan 100 milliGRAM(s) Oral daily  metoprolol tartrate 25 milliGRAM(s) Oral two times a day  nitrofurantoin monohydrate/macrocrystals (MACROBID) 100 milliGRAM(s) Oral two times a day  pantoprazole    Tablet 40 milliGRAM(s) Oral before breakfast  predniSONE   Tablet 40 milliGRAM(s) Oral daily  sulfaSALAzine 1000 milliGRAM(s) Oral <User Schedule>  sulfaSALAzine 1500 milliGRAM(s) Oral at bedtime    PRN MEDICATIONS  acetaminophen     Tablet .. 650 milliGRAM(s) Oral every 6 hours PRN  albuterol    90 MICROgram(s) HFA Inhaler 2 Puff(s) Inhalation every 6 hours PRN  oxycodone    5 mG/acetaminophen 325 mG 2 Tablet(s) Oral every 6 hours PRN    VITALS  T(F): 98.1 (12-21-24 @ 20:09), Max: 98.5 (12-21-24 @ 14:10)  HR: 69 (12-21-24 @ 20:09) (63 - 70)  BP: 109/66 (12-21-24 @ 20:09) (109/66 - 135/75)  RR: 18 (12-21-24 @ 20:09) (18 - 18)  SpO2: 96% (12-21-24 @ 20:09) (96% - 98%)      LABS             9.7    5.20  )-----------( 132      ( 12-21-24 @ 06:51 )             30.0     136  |  105  |  50  -------------------------<  81   12-21-24 @ 06:51  4.2  |  21  |  1.8    Ca      8.0     12-21-24 @ 06:51  Mg     2.0     12-21-24 @ 06:51    TPro  6.8  /  Alb  3.1  /  TBili  0.2  /  DBili  x   /  AST  14  /  ALT  <5  /  AlkPhos  63  /  GGT  x     12-20-24 @ 05:52        Urinalysis Basic - ( 21 Dec 2024 06:51 )    Color: x / Appearance: x / SG: x / pH: x  Gluc: 81 mg/dL / Ketone: x  / Bili: x / Urobili: x   Blood: x / Protein: x / Nitrite: x   Leuk Esterase: x / RBC: x / WBC x   Sq Epi: x / Non Sq Epi: x / Bacteria: x
24H events:    Patient is a 69y old Male who presents with a chief complaint of Fall (18 Dec 2024 13:59)    Primary diagnosis of Fall    Today is hospital day 2d. This morning patient was seen and examined at bedside, resting comfortably in bed.    No acute or major events overnight. Hemodynamically stable, tolerating oral diet, voiding appropriately with appropriate bowel movements.     Code Status:    PAST MEDICAL & SURGICAL HISTORY  Stroke  CAD (coronary artery disease)  Pulmonary embolism  COPD, mild  History of BPH  HTN (hypertension)  Degeneration of spine  Kidney stone  RA (rheumatoid arthritis)  High cholesterol  Infrarenal abdominal aortic aneurysm (AAA) without rupture  History of GI bleed  Lung cancer  Diverticulosis  Colon polyp  History of coronary artery bypass, single  History of hip replacement  S/P lobectomy of lung      SOCIAL HISTORY:  Social History:  Lives in a home with a roommate, (17 Dec 2024 22:36)      ALLERGIES:  atorvastatin (Unknown)  enalapril (Unknown)  baclofen (Unknown)    MEDICATIONS:  STANDING MEDICATIONS  amLODIPine   Tablet 10 milliGRAM(s) Oral daily  aspirin enteric coated 325 milliGRAM(s) Oral daily  chlorhexidine 2% Cloths 1 Application(s) Topical <User Schedule>  enoxaparin Injectable 40 milliGRAM(s) SubCutaneous every 24 hours  ezetimibe 10 milliGRAM(s) Oral daily  finasteride 5 milliGRAM(s) Oral daily  fluticasone propionate/ salmeterol 100-50 MICROgram(s) Diskus 1 Dose(s) Inhalation two times a day  folic acid 1 milliGRAM(s) Oral daily  losartan 100 milliGRAM(s) Oral daily  magnesium sulfate  IVPB 2 Gram(s) IV Intermittent once  metoprolol tartrate 25 milliGRAM(s) Oral two times a day  pantoprazole    Tablet 40 milliGRAM(s) Oral before breakfast  sulfaSALAzine 1000 milliGRAM(s) Oral <User Schedule>  sulfaSALAzine 1500 milliGRAM(s) Oral at bedtime    PRN MEDICATIONS  acetaminophen     Tablet .. 650 milliGRAM(s) Oral every 6 hours PRN  albuterol    90 MICROgram(s) HFA Inhaler 2 Puff(s) Inhalation every 6 hours PRN  oxycodone    5 mG/acetaminophen 325 mG 1 Tablet(s) Oral every 6 hours PRN    VITALS:   T(F): 97.8  HR: 94  BP: 151/90  RR: 18  SpO2: 100%    PHYSICAL EXAM:      AMPAC score:      LABS:                        11.3   4.44  )-----------( 146      ( 19 Dec 2024 05:29 )             35.0     12-19    133[L]  |  101  |  36[H]  ----------------------------<  89  4.2   |  20  |  1.5    Ca    8.8      19 Dec 2024 05:29  Mg     1.7     12-19    TPro  7.2  /  Alb  3.4[L]  /  TBili  0.2  /  DBili  x   /  AST  11  /  ALT  <5  /  AlkPhos  73  12-19    PT/INR - ( 18 Dec 2024 08:21 )   PT: 11.20 sec;   INR: 0.95 ratio    PTT - ( 18 Dec 2024 08:21 )  PTT:30.0 sec    Urinalysis Basic - ( 19 Dec 2024 05:29 )  Color: x / Appearance: x / SG: x / pH: x  Gluc: 89 mg/dL / Ketone: x  / Bili: x / Urobili: x   Blood: x / Protein: x / Nitrite: x   Leuk Esterase: x / RBC: x / WBC x   Sq Epi: x / Non Sq Epi: x / Bacteria: x    Urinalysis with Rflx Culture (collected 18 Dec 2024 01:30)    RADIOLOGY:      RADIOLOGY  
24H events:  Patient is a 69y old Male who presents with a chief complaint of Fall (23 Dec 2024 12:48)  Primary diagnosis of Fall    Today is 7d of hospitalization. This morning patient was seen and examined at bedside, resting comfortably in bed.    No acute or major events overnight.    PAST MEDICAL & SURGICAL HISTORY  Stroke    CAD (coronary artery disease)    Pulmonary embolism    COPD, mild    History of BPH    HTN (hypertension)    Degeneration of spine    Kidney stone    RA (rheumatoid arthritis)    High cholesterol    Infrarenal abdominal aortic aneurysm (AAA) without rupture    History of GI bleed    Lung cancer    Diverticulosis    Colon polyp    History of coronary artery bypass, single    History of hip replacement    S/P lobectomy of lung      SOCIAL HISTORY:  Social History:  Lives in a home with a roommate, (17 Dec 2024 22:36)      ALLERGIES:  atorvastatin (Unknown)  enalapril (Unknown)  baclofen (Unknown)    MEDICATIONS:  STANDING MEDICATIONS  amLODIPine   Tablet 10 milliGRAM(s) Oral daily  aspirin enteric coated 325 milliGRAM(s) Oral daily  chlorhexidine 2% Cloths 1 Application(s) Topical <User Schedule>  enoxaparin Injectable 40 milliGRAM(s) SubCutaneous every 24 hours  ezetimibe 10 milliGRAM(s) Oral daily  finasteride 5 milliGRAM(s) Oral daily  fluticasone propionate/ salmeterol 100-50 MICROgram(s) Diskus 1 Dose(s) Inhalation two times a day  folic acid 1 milliGRAM(s) Oral daily  losartan 100 milliGRAM(s) Oral daily  metoprolol tartrate 25 milliGRAM(s) Oral two times a day  nitrofurantoin monohydrate/macrocrystals (MACROBID) 100 milliGRAM(s) Oral two times a day  pantoprazole    Tablet 40 milliGRAM(s) Oral before breakfast  predniSONE   Tablet 40 milliGRAM(s) Oral daily  sulfaSALAzine 1000 milliGRAM(s) Oral <User Schedule>  sulfaSALAzine 1500 milliGRAM(s) Oral at bedtime    PRN MEDICATIONS  acetaminophen     Tablet .. 650 milliGRAM(s) Oral every 6 hours PRN  albuterol    90 MICROgram(s) HFA Inhaler 2 Puff(s) Inhalation every 6 hours PRN  oxycodone    5 mG/acetaminophen 325 mG 2 Tablet(s) Oral every 6 hours PRN    VITALS:   T(F): 97.5  HR: 62  BP: 148/87  RR: 18  SpO2: 95%    PHYSICAL EXAM:  GENERAL:   ( x) NAD, lying in bed comfortably     (  ) obtunded     (  ) lethargic     (  ) somnolent      NECK:  (x) Supple     (  ) neck stiffness     (  ) nuchal rigidity     (  )  no JVD     (  ) JVD present ( -- cm)    HEART:  Rate -->     (x) normal rate     (  ) bradycardic     (  ) tachycardic  Rhythm -->     (x) regular     (  ) regularly irregular     (  ) irregularly irregular  Murmurs -->     (x) normal s1s2     (  ) systolic murmur     (  ) diastolic murmur     (  ) continuous murmur      (  ) S3 present     (  ) S4 present    LUNGS:   ( x)Unlabored respirations     (  ) tachypnea  ( x) B/L air entry     (  ) decreased breath sounds in:  (location     )    ( x) no adventitious sound     (  ) crackles     (  ) wheezing      (  ) rhonchi      (specify location:       )  (  ) chest wall tenderness (specify location:       )    ABDOMEN:   ( x) Soft     (  ) tense   |   (  ) nondistended     (  ) distended   |   (  ) +BS     (  ) hypoactive bowel sounds     (  ) hyperactive bowel sounds  ( x) nontender     (  ) RUQ tenderness     (  ) RLQ tenderness     (  ) LLQ tenderness     (  ) epigastric tenderness     (  ) diffuse tenderness  (  ) Splenomegaly      (  ) Hepatomegaly      (  ) Jaundice     (  ) ecchymosis     EXTREMITIES:  ( x) Normal     (  ) Rash     (  ) ecchymosis     (  ) varicose veins      (  ) pitting edema     (  ) non-pitting edema   (  ) ulceration     (  ) gangrene:     (location:     )    NERVOUS SYSTEM:    ( x) A&Ox3     (  ) confused     (  ) lethargic  CN II-XII:     ( x) Intact     (  ) deficits found     (Specify:     )   Upper extremities:     (  ) no sensorimotor deficits     (  ) weakness     (  ) loss of proprioception/vibration     (  ) loss of touch/temperature (specify:    )  Lower extremities:     (  ) no sensorimotor deficits     (  ) weakness     (  ) loss of proprioception/vibration     (  ) loss of touch/temperature (specify:    )    SKIN:   (  ) No rashes or lesions     (  ) maculopapular rash     (  ) pustules     (  ) vesicles     (  ) ulcer     (  ) ecchymosis     (specify location:     )      LABS:                        10.7   4.61  )-----------( 137      ( 24 Dec 2024 06:00 )             33.6     12-24    136  |  103  |  36[H]  ----------------------------<  77  4.5   |  23  |  1.2    Ca    8.3[L]      24 Dec 2024 06:00  Phos  3.0     12-24  Mg     1.8     12-24    TPro  6.3  /  Alb  3.1[L]  /  TBili  <0.2  /  DBili  x   /  AST  17  /  ALT  7   /  AlkPhos  53  12-24      Urinalysis Basic - ( 24 Dec 2024 06:00 )    Color: x / Appearance: x / SG: x / pH: x  Gluc: 77 mg/dL / Ketone: x  / Bili: x / Urobili: x   Blood: x / Protein: x / Nitrite: x   Leuk Esterase: x / RBC: x / WBC x   Sq Epi: x / Non Sq Epi: x / Bacteria: x
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
## 2024-12-24 NOTE — CDI QUERY NOTE - [id number]_GEN_ALL_CORE_HH
Clinical documentation and/or evidence of the patient’s presentation, evaluation, and medical management, as evidenced below, may support a diagnosis that is not documented in the medical record.  In order to ensure accurate coding and accuracy of the clinical record, the documentation in this patient’s medical record requires additional clarification.      If you think the supporting documentation and/or clinical evidence supports a more specific diagnosis, please include more specific documentation of a diagnosis associated with these findings in your progress note and/or discharge summary.    	  Please clarify if the cause of the patient's fall can be further specified as:    • The patient's fall was related to imbalance associated with hemiparesis from the previous stroke. 	  • The patient's fall resulted from imbalance unrelated to hemiparesis from the previous stroke.	  • Other (specify)    Supporting documentation and/or clinical evidence:     H&P Adult:  past medical history of hypertension, hyperlipidemia, CAD,  intracranial hemorrhage with residual left hemiparesis, COPD presents after mechanical fall. Patient states he missed the last step and fell onto his left side.    12/18 Physical Therapy Initial Evaluation Adult:   Transfer Safety Concerns Noted: decreased balance during turns; decreased weight-shifting ability; decreased L knee flexion  Impairments Contributing to Impaired Transfers: impaired balance; decreased flexibility; decreased ROM; decreased strength  Gait Pattern Used: swing-to gait  Gait Deviations Noted: increased time in double stance; decreased step length; decreased stride length; decreased weight-shifting ability; decreased L knee flexion  Impairments Contributing to Gait Deviations: impaired balance; decreased flexibility; decreased ROM; decreased strength; pain; c/o L LE pain and numbness after ~15'    12/24 Progress Note Adult-Hospitalist Attending: Mechanical Fall  Hx of ICH w residual left hemiparesis  - Fall precautions   - PT follow up   - Pain control     Thank you,  Denise Thacker RN, BSN, CCDS  [phone]/ TEAMS (preferred)

## 2024-12-31 ENCOUNTER — INPATIENT (INPATIENT)
Facility: HOSPITAL | Age: 69
LOS: 3 days | Discharge: HOME HEALTH | DRG: 563 | End: 2025-01-04
Attending: HOSPITALIST | Admitting: HOSPITALIST
Payer: MEDICARE

## 2024-12-31 ENCOUNTER — TRANSCRIPTION ENCOUNTER (OUTPATIENT)
Age: 69
End: 2024-12-31

## 2024-12-31 VITALS
HEART RATE: 89 BPM | RESPIRATION RATE: 16 BRPM | OXYGEN SATURATION: 95 % | HEIGHT: 69 IN | SYSTOLIC BLOOD PRESSURE: 118 MMHG | TEMPERATURE: 98 F | DIASTOLIC BLOOD PRESSURE: 77 MMHG | WEIGHT: 145.06 LBS

## 2024-12-31 DIAGNOSIS — Z90.2 ACQUIRED ABSENCE OF LUNG [PART OF]: Chronic | ICD-10-CM

## 2024-12-31 DIAGNOSIS — R26.2 DIFFICULTY IN WALKING, NOT ELSEWHERE CLASSIFIED: ICD-10-CM

## 2024-12-31 DIAGNOSIS — Z95.1 PRESENCE OF AORTOCORONARY BYPASS GRAFT: Chronic | ICD-10-CM

## 2024-12-31 DIAGNOSIS — Z96.649 PRESENCE OF UNSPECIFIED ARTIFICIAL HIP JOINT: Chronic | ICD-10-CM

## 2024-12-31 LAB
ALBUMIN SERPL ELPH-MCNC: 3.4 G/DL — LOW (ref 3.5–5.2)
ALP SERPL-CCNC: 56 U/L — SIGNIFICANT CHANGE UP (ref 30–115)
ALT FLD-CCNC: 8 U/L — SIGNIFICANT CHANGE UP (ref 0–41)
ANION GAP SERPL CALC-SCNC: 9 MMOL/L — SIGNIFICANT CHANGE UP (ref 7–14)
AST SERPL-CCNC: 14 U/L — SIGNIFICANT CHANGE UP (ref 0–41)
BASOPHILS # BLD AUTO: 0.06 K/UL — SIGNIFICANT CHANGE UP (ref 0–0.2)
BASOPHILS NFR BLD AUTO: 1.3 % — HIGH (ref 0–1)
BILIRUB SERPL-MCNC: 0.2 MG/DL — SIGNIFICANT CHANGE UP (ref 0.2–1.2)
BUN SERPL-MCNC: 43 MG/DL — HIGH (ref 10–20)
CALCIUM SERPL-MCNC: 8.8 MG/DL — SIGNIFICANT CHANGE UP (ref 8.4–10.4)
CHLORIDE SERPL-SCNC: 105 MMOL/L — SIGNIFICANT CHANGE UP (ref 98–110)
CO2 SERPL-SCNC: 24 MMOL/L — SIGNIFICANT CHANGE UP (ref 17–32)
CREAT SERPL-MCNC: 1.3 MG/DL — SIGNIFICANT CHANGE UP (ref 0.7–1.5)
EGFR: 59 ML/MIN/1.73M2 — LOW
EOSINOPHIL # BLD AUTO: 0.04 K/UL — SIGNIFICANT CHANGE UP (ref 0–0.7)
EOSINOPHIL NFR BLD AUTO: 0.9 % — SIGNIFICANT CHANGE UP (ref 0–8)
GLUCOSE SERPL-MCNC: 91 MG/DL — SIGNIFICANT CHANGE UP (ref 70–99)
HCT VFR BLD CALC: 31.3 % — LOW (ref 42–52)
HGB BLD-MCNC: 10 G/DL — LOW (ref 14–18)
IMM GRANULOCYTES NFR BLD AUTO: 2.9 % — HIGH (ref 0.1–0.3)
LYMPHOCYTES # BLD AUTO: 0.96 K/UL — LOW (ref 1.2–3.4)
LYMPHOCYTES # BLD AUTO: 21.3 % — SIGNIFICANT CHANGE UP (ref 20.5–51.1)
MCHC RBC-ENTMCNC: 31.1 PG — HIGH (ref 27–31)
MCHC RBC-ENTMCNC: 31.9 G/DL — LOW (ref 32–37)
MCV RBC AUTO: 97.2 FL — HIGH (ref 80–94)
MONOCYTES # BLD AUTO: 0.75 K/UL — HIGH (ref 0.1–0.6)
MONOCYTES NFR BLD AUTO: 16.6 % — HIGH (ref 1.7–9.3)
NEUTROPHILS # BLD AUTO: 2.57 K/UL — SIGNIFICANT CHANGE UP (ref 1.4–6.5)
NEUTROPHILS NFR BLD AUTO: 57 % — SIGNIFICANT CHANGE UP (ref 42.2–75.2)
NRBC # BLD: 0 /100 WBCS — SIGNIFICANT CHANGE UP (ref 0–0)
PLATELET # BLD AUTO: 131 K/UL — SIGNIFICANT CHANGE UP (ref 130–400)
PMV BLD: 10.2 FL — SIGNIFICANT CHANGE UP (ref 7.4–10.4)
POTASSIUM SERPL-MCNC: 4.7 MMOL/L — SIGNIFICANT CHANGE UP (ref 3.5–5)
POTASSIUM SERPL-SCNC: 4.7 MMOL/L — SIGNIFICANT CHANGE UP (ref 3.5–5)
PROT SERPL-MCNC: 6.9 G/DL — SIGNIFICANT CHANGE UP (ref 6–8)
RBC # BLD: 3.22 M/UL — LOW (ref 4.7–6.1)
RBC # FLD: 14.5 % — SIGNIFICANT CHANGE UP (ref 11.5–14.5)
SODIUM SERPL-SCNC: 138 MMOL/L — SIGNIFICANT CHANGE UP (ref 135–146)
WBC # BLD: 4.51 K/UL — LOW (ref 4.8–10.8)
WBC # FLD AUTO: 4.51 K/UL — LOW (ref 4.8–10.8)

## 2024-12-31 PROCEDURE — 86480 TB TEST CELL IMMUN MEASURE: CPT

## 2024-12-31 PROCEDURE — 82746 ASSAY OF FOLIC ACID SERUM: CPT

## 2024-12-31 PROCEDURE — 72170 X-RAY EXAM OF PELVIS: CPT | Mod: 26

## 2024-12-31 PROCEDURE — 97162 PT EVAL MOD COMPLEX 30 MIN: CPT | Mod: GP

## 2024-12-31 PROCEDURE — 86707 HEPATITIS BE ANTIBODY: CPT

## 2024-12-31 PROCEDURE — 93970 EXTREMITY STUDY: CPT

## 2024-12-31 PROCEDURE — 86706 HEP B SURFACE ANTIBODY: CPT

## 2024-12-31 PROCEDURE — 99222 1ST HOSP IP/OBS MODERATE 55: CPT

## 2024-12-31 PROCEDURE — 85027 COMPLETE CBC AUTOMATED: CPT

## 2024-12-31 PROCEDURE — 80053 COMPREHEN METABOLIC PANEL: CPT

## 2024-12-31 PROCEDURE — 73721 MRI JNT OF LWR EXTRE W/O DYE: CPT | Mod: MC,LT

## 2024-12-31 PROCEDURE — 84100 ASSAY OF PHOSPHORUS: CPT

## 2024-12-31 PROCEDURE — 94640 AIRWAY INHALATION TREATMENT: CPT

## 2024-12-31 PROCEDURE — 83735 ASSAY OF MAGNESIUM: CPT

## 2024-12-31 PROCEDURE — 97110 THERAPEUTIC EXERCISES: CPT | Mod: GP

## 2024-12-31 PROCEDURE — 73564 X-RAY EXAM KNEE 4 OR MORE: CPT | Mod: 26,LT

## 2024-12-31 PROCEDURE — 85025 COMPLETE CBC W/AUTO DIFF WBC: CPT

## 2024-12-31 PROCEDURE — 82607 VITAMIN B-12: CPT

## 2024-12-31 PROCEDURE — 73552 X-RAY EXAM OF FEMUR 2/>: CPT | Mod: 26,LT

## 2024-12-31 PROCEDURE — 36415 COLL VENOUS BLD VENIPUNCTURE: CPT

## 2024-12-31 PROCEDURE — 87340 HEPATITIS B SURFACE AG IA: CPT

## 2024-12-31 PROCEDURE — 99285 EMERGENCY DEPT VISIT HI MDM: CPT | Mod: 25

## 2024-12-31 RX ORDER — TAMSULOSIN HYDROCHLORIDE 0.4 MG/1
1 CAPSULE ORAL
Refills: 0 | DISCHARGE

## 2024-12-31 RX ORDER — TAMSULOSIN HYDROCHLORIDE 0.4 MG/1
0.4 CAPSULE ORAL AT BEDTIME
Refills: 0 | Status: DISCONTINUED | OUTPATIENT
Start: 2024-12-31 | End: 2025-01-04

## 2024-12-31 RX ORDER — METOPROLOL TARTRATE 50 MG
25 TABLET ORAL
Refills: 0 | Status: DISCONTINUED | OUTPATIENT
Start: 2024-12-31 | End: 2025-01-04

## 2024-12-31 RX ORDER — LOSARTAN POTASSIUM 100 MG/1
100 TABLET, FILM COATED ORAL DAILY
Refills: 0 | Status: DISCONTINUED | OUTPATIENT
Start: 2024-12-31 | End: 2025-01-04

## 2024-12-31 RX ORDER — OXYBUTYNIN CHLORIDE 5 MG/1
5 TABLET ORAL
Refills: 0 | Status: DISCONTINUED | OUTPATIENT
Start: 2024-12-31 | End: 2025-01-04

## 2024-12-31 RX ORDER — FLUTICASONE PROPIONATE AND SALMETEROL 50; 500 UG/1; UG/1
1 POWDER ORAL; RESPIRATORY (INHALATION)
Refills: 0 | Status: DISCONTINUED | OUTPATIENT
Start: 2024-12-31 | End: 2025-01-04

## 2024-12-31 RX ORDER — SULFASALAZINE 500 MG/1
1000 TABLET ORAL
Refills: 0 | Status: DISCONTINUED | OUTPATIENT
Start: 2024-12-31 | End: 2025-01-01

## 2024-12-31 RX ORDER — PREDNISONE 5 MG
20 TABLET ORAL DAILY
Refills: 0 | Status: COMPLETED | OUTPATIENT
Start: 2024-12-31 | End: 2025-01-03

## 2024-12-31 RX ORDER — ASPIRIN 81 MG
81 TABLET, DELAYED RELEASE (ENTERIC COATED) ORAL DAILY
Refills: 0 | Status: DISCONTINUED | OUTPATIENT
Start: 2024-12-31 | End: 2025-01-04

## 2024-12-31 RX ORDER — ACETAMINOPHEN 80 MG/.8ML
650 SOLUTION/ DROPS ORAL EVERY 6 HOURS
Refills: 0 | Status: DISCONTINUED | OUTPATIENT
Start: 2024-12-31 | End: 2025-01-04

## 2024-12-31 RX ORDER — ALBUTEROL SULFATE 90 UG/1
2 INHALANT RESPIRATORY (INHALATION) EVERY 6 HOURS
Refills: 0 | Status: DISCONTINUED | OUTPATIENT
Start: 2024-12-31 | End: 2025-01-04

## 2024-12-31 RX ORDER — EZETIMIBE 10 MG/1
10 TABLET ORAL DAILY
Refills: 0 | Status: DISCONTINUED | OUTPATIENT
Start: 2024-12-31 | End: 2025-01-04

## 2024-12-31 RX ORDER — VITAMIN A 10000 UNIT
1 TABLET ORAL DAILY
Refills: 0 | Status: DISCONTINUED | OUTPATIENT
Start: 2024-12-31 | End: 2025-01-04

## 2024-12-31 RX ORDER — FINASTERIDE 5 MG
5 TABLET ORAL DAILY
Refills: 0 | Status: DISCONTINUED | OUTPATIENT
Start: 2024-12-31 | End: 2025-01-04

## 2024-12-31 RX ORDER — LIDOCAINE 50 MG/G
1 OINTMENT TOPICAL DAILY
Refills: 0 | Status: DISCONTINUED | OUTPATIENT
Start: 2024-12-31 | End: 2025-01-04

## 2024-12-31 RX ORDER — PANTOPRAZOLE 40 MG/1
40 TABLET, DELAYED RELEASE ORAL
Refills: 0 | Status: DISCONTINUED | OUTPATIENT
Start: 2024-12-31 | End: 2025-01-04

## 2024-12-31 RX ORDER — ENOXAPARIN SODIUM 60 MG/.6ML
40 INJECTION INTRAVENOUS; SUBCUTANEOUS EVERY 24 HOURS
Refills: 0 | Status: DISCONTINUED | OUTPATIENT
Start: 2024-12-31 | End: 2025-01-04

## 2024-12-31 RX ADMIN — LIDOCAINE 1 PATCH: 50 OINTMENT TOPICAL at 19:28

## 2024-12-31 RX ADMIN — Medication 25 MILLIGRAM(S): at 18:02

## 2024-12-31 RX ADMIN — OXYBUTYNIN CHLORIDE 5 MILLIGRAM(S): 5 TABLET ORAL at 18:02

## 2024-12-31 RX ADMIN — Medication 81 MILLIGRAM(S): at 13:15

## 2024-12-31 RX ADMIN — TAMSULOSIN HYDROCHLORIDE 0.4 MILLIGRAM(S): 0.4 CAPSULE ORAL at 21:51

## 2024-12-31 RX ADMIN — EZETIMIBE 10 MILLIGRAM(S): 10 TABLET ORAL at 15:15

## 2024-12-31 RX ADMIN — Medication 1 MILLIGRAM(S): at 13:15

## 2024-12-31 RX ADMIN — SULFASALAZINE 1000 MILLIGRAM(S): 500 TABLET ORAL at 18:02

## 2024-12-31 RX ADMIN — LIDOCAINE 1 PATCH: 50 OINTMENT TOPICAL at 13:15

## 2024-12-31 RX ADMIN — Medication 10 MILLIGRAM(S): at 21:51

## 2024-12-31 RX ADMIN — LOSARTAN POTASSIUM 100 MILLIGRAM(S): 100 TABLET, FILM COATED ORAL at 18:55

## 2024-12-31 RX ADMIN — ENOXAPARIN SODIUM 40 MILLIGRAM(S): 60 INJECTION INTRAVENOUS; SUBCUTANEOUS at 18:56

## 2024-12-31 RX ADMIN — Medication 5 MILLIGRAM(S): at 13:15

## 2024-12-31 NOTE — ED PROVIDER NOTE - CHILD ABUSE FACILITY
HISTORY OF PRESENT ILLNESS  Phuong Carlin is a 70 y.o. female. HPI Comments: Visit survey reviewed  Chief complaint chronic pain syndrome90 day supply low back pain, neck pain, pain to different joints  Please see my previous note  I had asked the patient to taper down on her Ativan. She has 1 mg tablets. She has been breaking these in half and therefore is using half milligram in the evening. To help with muscle spasms and sleep. I will now prescribe 0.5 mg, to use half of this tablet at night, as needed. She will continue with her usual tizanidine and gabapentin. She reports she has plenty of this remaining including refills. I will prescribing continue her on her fentanyl 50 mcg every 48 hour  Oxycodone 30 mg, once a day as needed  She continues with massage therapy  Medication helps general activity, mood, walking, sleep, enjoyment of life    No new significant side effects reported  Medication continues to help improve quality of life. Medications reviewed including risk and benefits. Recent average level of pain5    Measurement of clinical outcome for chronic pain patient/ SPAASMS Score Card-            Information reviewed and will be scanned. Total score today-8      Review of Systems   Constitutional: Positive for malaise/fatigue. Respiratory:        Sleep apnea; on CPAP   Gastrointestinal: Positive for heartburn (reflux). Negative for constipation. Musculoskeletal: Positive for back pain, joint pain, myalgias and neck pain. Neurological: Positive for focal weakness. Psychiatric/Behavioral: Positive for memory loss. Negative for suicidal ideas. All other systems reviewed and are negative. Physical Exam   Constitutional: She appears well-developed and well-nourished. She is cooperative. She does not have a sickly appearance. HENT:   Head: Normocephalic and atraumatic. Right Ear: External ear normal. No drainage. Left Ear: External ear normal. No drainage.    Nose: Nose normal.   Eyes: Lids are normal. Right eye exhibits no discharge. Left eye exhibits no discharge. Right conjunctiva has no hemorrhage. Left conjunctiva has no hemorrhage. Neck: Neck supple. No tracheal deviation present. No thyroid mass present. Pulmonary/Chest: Effort normal. No respiratory distress. Neurological: She is alert. No cranial nerve deficit. Skin: Skin is intact. No rash noted. Psychiatric: Her speech is normal. Her affect is not angry. She does not express inappropriate judgment. Nursing note and vitals reviewed. ASSESSMENT and PLAN  Encounter Diagnoses   Name Primary?  Chronic bilateral low back pain without sciatica Yes    Lumbosacral spondylosis without myelopathy     Pain in joint, multiple sites     Primary osteoarthritis of both knees     Status post bilateral knee replacements     Osteoarthritis of thumb, unspecified laterality     Chronic pain syndrome     Chronic midline thoracic back pain    No indicators for recent medication misuse.  reviewed. Pain Meds and Quality Of Life have been reviewed. Nonpharmacologic therapy and non-opioid pharmacologic therapy were considered. If opioid therapy is prescribed, this is only if the expected benefits are anticipated to outweigh risks. Possible changes to treatment plan considered. Support/education given as needed. Today-medications are as listed. Follow up --3 months. [facility]

## 2024-12-31 NOTE — H&P ADULT - NSHPLABSRESULTS_GEN_ALL_CORE
LABS:  cret                        10.0   4.51  )-----------( 131      ( 31 Dec 2024 07:40 )             31.3     12-31    138  |  105  |  43[H]  ----------------------------<  91  4.7   |  24  |  1.3    Ca    8.8      31 Dec 2024 07:40    TPro  6.9  /  Alb  3.4[L]  /  TBili  0.2  /  DBili  x   /  AST  14  /  ALT  8   /  AlkPhos  56  12-31
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
## 2024-12-31 NOTE — H&P ADULT - NSHPPHYSICALEXAM_GEN_ALL_CORE
LOS:     VITALS:   T(C): 36.6 (12-31-24 @ 08:23), Max: 36.7 (12-31-24 @ 06:21)  HR: 59 (12-31-24 @ 08:23) (59 - 89)  BP: 124/81 (12-31-24 @ 08:23) (118/77 - 124/81)  RR: 18 (12-31-24 @ 08:23) (16 - 18)  SpO2: 98% (12-31-24 @ 08:23) (95% - 98%)    GENERAL: NAD, lying in bed comfortably  HEAD:  Atraumatic, Normocephalic  EYES: EOMI, PERRLA, conjunctiva and sclera clear  ENT: Moist mucous membranes  NECK: Supple, No JVD  CHEST/LUNG: Clear to auscultation bilaterally; No rales, rhonchi, wheezing, or rubs. Unlabored respirations  HEART: Regular rate and rhythm; No murmurs, rubs, or gallops  ABDOMEN: BSx4; Soft, nontender, nondistended  EXTREMITIES:  left sided weakness chronic  NERVOUS SYSTEM:  A&Ox3, no focal deficits   SKIN: No rashes or lesions LOS:     VITALS:   T(C): 36.6 (12-31-24 @ 08:23), Max: 36.7 (12-31-24 @ 06:21)  HR: 59 (12-31-24 @ 08:23) (59 - 89)  BP: 124/81 (12-31-24 @ 08:23) (118/77 - 124/81)  RR: 18 (12-31-24 @ 08:23) (16 - 18)  SpO2: 98% (12-31-24 @ 08:23) (95% - 98%)    GENERAL: NAD, lying in bed comfortably  HEAD:  Atraumatic, Normocephalic  EYES: EOMI, PERRLA, conjunctiva and sclera clear  ENT: Moist mucous membranes  NECK: Supple, No JVD  CHEST/LUNG: Clear to auscultation bilaterally; No rales, rhonchi, wheezing, or rubs. Unlabored respirations  HEART: Regular rate and rhythm; No murmurs, rubs, or gallops  ABDOMEN: BSx4; Soft, nontender, nondistended  EXTREMITIES:  left sided weakness chronic, unable to flex L knee completely due to pain, no edema  NERVOUS SYSTEM:  A&Ox3   SKIN: No rashes or lesions

## 2024-12-31 NOTE — DISCHARGE NOTE NURSING/CASE MANAGEMENT/SOCIAL WORK - NSDCPEFALRISK_GEN_ALL_CORE
For information on Fall & Injury Prevention, visit: [email or web address] OR  [email or web address] OR  https://www.cdc.gov/steadi/patient.html

## 2024-12-31 NOTE — ED ADULT NURSE NOTE - OBJECTIVE STATEMENT
Pt presents to the ED c/o knee pain after hitting knee into step trying to sitdown. No LOC, No AC use. Pt has a hx of stroke with L side deficits.

## 2024-12-31 NOTE — ED ADULT TRIAGE NOTE - CHIEF COMPLAINT QUOTE
pt BIBEMS from home c/o L knee pain after attempting to sit on the step and L knee hit into step. (-) HT, (-) LOC, (-) AC. pt with hx of stroke with L sided deficits.

## 2024-12-31 NOTE — H&P ADULT - HISTORY OF PRESENT ILLNESS
69-year-old male with PMH of HTN, HLD, CAD s/p CABG, BPH, neurogenic bladder, intracranial hemorrhage with left-sided deficits, minimally ambulatory with a walker at baseline, RA, recent admission 12/17 - 12/24 for fall and refused NICHOLAS at that time, presents to ED with left knee pain after injury yesterday. Patient states he was attempting to walk downstairs when his left leg stiffened and he was unable to continue walking down the steps. States he attempted to sit down and while doing so hit his left knee into a wall. Called girlfriend who helped him up a few minutes later. Has been unable to walk since due to pain in the knee. States he did not fall down any stairs or hit his head and is only complaining of left knee pain after hitting it on the wall.  No other injuries.  Notes some continued left-sided rib pain after his previous fall but pan scan showed no acute injuries in the chest/abdomen/pelvis. States this is not new or worsened since.  He denies any fever/chills, neck pain or stiffness, CP, SOB, palpitations, dizziness, syncope, abdominal pain, N/V/D, urinary symptoms, black or bloody stools.    VS all wnl  Labs all stable  Imaging- performed but not read       69-year-old male with PMH of HTN, HLD, CAD s/p CABG, BPH, neurogenic bladder, intracranial hemorrhage with left-sided deficits, minimally ambulatory with a walker at baseline, RA, lung ca s/p lobectomy, recent admission 12/17 - 12/24 for fall and refused NICHOLAS at that time, presents to ED with left knee pain after injury yesterday. Patient states he was attempting to walk downstairs when his left leg stiffened and he was unable to continue walking down the steps. States he attempted to sit down and while doing so hit his left knee locked and hit the wall. Called girlfriend who helped him up a few minutes later. Has been unable to walk or bear weight since due to pain in the knee. States he did not fall down any stairs or hit his head and is only complaining of left knee pain after hitting it on the wall. He denies any fever/chills, neck pain or stiffness, CP, SOB, palpitations, dizziness, syncope, abdominal pain, N/V/D, urinary symptoms, black or bloody stools.    VS all wnl  Labs all stable  Imaging- performed but not read

## 2024-12-31 NOTE — PATIENT PROFILE ADULT - FALL HARM RISK - HARM RISK INTERVENTIONS

## 2024-12-31 NOTE — ED PROVIDER NOTE - PHYSICAL EXAMINATION
VITAL SIGNS: I have reviewed nursing notes and confirm.  CONSTITUTIONAL: chronically ill-appearing, in NAD  SKIN: Skin exam is warm and dry, no acute rash.  HEAD: Normocephalic; atraumatic.  EYES: PERRL, conjunctiva and sclera clear.  ENT: mmm  CARD: S1, S2 normal; no murmurs, gallops, or rubs. Regular rate and rhythm.  RESP: Normal respiratory effort, no tachypnea or distress. Lungs CTAB, no wheezes, rales or rhonchi.  ABD: soft, NT/ND.  EXT: Patient has limited range of motion of the left upper and lower extremities but this is baseline for patient.  Has no tenderness or swelling of the knee, no skin changes.  1+ DP pulses bilaterally.  NEURO: Alert, oriented. Grossly unremarkable.   PSYCH: Cooperative, appropriate.

## 2024-12-31 NOTE — ED PROVIDER NOTE - CLINICAL SUMMARY MEDICAL DECISION MAKING FREE TEXT BOX
69-year-old male with PMH of HTN, HLD, CAD s/p CABG, BPH, neurogenic bladder, intracranial hemorrhage with left-sided deficits, minimally ambulatory with a walker at baseline, RA, lung ca s/p lobectomy, recent admission 12/17 - 12/24 for fall and refused NICHOLAS at that time, presents to ED with left knee pain after injury yesterday. Patient states he was attempting to walk downstairs when his left leg stiffened and he was unable to continue walking down the steps. States he attempted to sit down and while doing so hit his left knee locked and hit the wall. Agree with above exam. xrays shows no fx, hx and exam does not suggest occult fx, xray negative admit and further eval as inpatient.

## 2024-12-31 NOTE — ED PROVIDER NOTE - OBJECTIVE STATEMENT
69-year-old male with PMH of HTN, HLD, CAD s/p CABG, BPH, neurogenic bladder, intracranial hemorrhage with left-sided deficits, minimally ambulatory with a walker at baseline, RA, recent admission 12/17 - 12/24 for fall and refused NICHOLAS at that time, presents to ED with left knee pain after injury yesterday.  Patient states he was attempting to walk downstairs when his left leg stiffened and he was unable to continue walking down the steps.  States he attempted to sit down and while doing so hit his left knee into a wall.  Called girlfriend who helped him up a few minutes later.  Has been unable to walk since due to pain in the knee.  States he did not fall down any stairs or hit his head and is only complaining of left knee pain after hitting it on the wall.  No other injuries.  Notes some continued left-sided rib pain after his previous fall but pan scan showed acute injuries in the chest/abdomen/pelvis.  States this is not new or worsened since.  He denies any fever/chills, neck pain or stiffness, CP, SOB, palpitations, dizziness, syncope, abdominal pain, N/V/D, urinary symptoms, black or bloody stools.

## 2024-12-31 NOTE — ED ADULT NURSE NOTE - CCCP TRG CHIEF CMPLNT
Called to check on connections, medical insurance, and pcp.   Parent stated she is in the process of getting MA and will make an appointment at [facility].
knee pain/injury

## 2025-01-01 LAB
HCT VFR BLD CALC: 33.4 % — LOW (ref 42–52)
HGB BLD-MCNC: 10.6 G/DL — LOW (ref 14–18)
MCHC RBC-ENTMCNC: 31 PG — SIGNIFICANT CHANGE UP (ref 27–31)
MCHC RBC-ENTMCNC: 31.7 G/DL — LOW (ref 32–37)
MCV RBC AUTO: 97.7 FL — HIGH (ref 80–94)
NRBC # BLD: 0 /100 WBCS — SIGNIFICANT CHANGE UP (ref 0–0)
PLATELET # BLD AUTO: 129 K/UL — LOW (ref 130–400)
PMV BLD: 10.4 FL — SIGNIFICANT CHANGE UP (ref 7.4–10.4)
RBC # BLD: 3.42 M/UL — LOW (ref 4.7–6.1)
RBC # FLD: 14.6 % — HIGH (ref 11.5–14.5)
WBC # BLD: 4.81 K/UL — SIGNIFICANT CHANGE UP (ref 4.8–10.8)
WBC # FLD AUTO: 4.81 K/UL — SIGNIFICANT CHANGE UP (ref 4.8–10.8)

## 2025-01-01 PROCEDURE — 99232 SBSQ HOSP IP/OBS MODERATE 35: CPT

## 2025-01-01 RX ORDER — GINKGO BILOBA 40 MG
5 CAPSULE ORAL AT BEDTIME
Refills: 0 | Status: DISCONTINUED | OUTPATIENT
Start: 2025-01-01 | End: 2025-01-04

## 2025-01-01 RX ORDER — SULFASALAZINE 500 MG/1
1500 TABLET ORAL
Refills: 0 | Status: DISCONTINUED | OUTPATIENT
Start: 2025-01-01 | End: 2025-01-04

## 2025-01-01 RX ORDER — SULFADIAZINE
1000 POWDER (GRAM) MISCELLANEOUS AT BEDTIME
Refills: 0 | Status: DISCONTINUED | OUTPATIENT
Start: 2025-01-01 | End: 2025-01-04

## 2025-01-01 RX ORDER — SULFASALAZINE 500 MG/1
1500 TABLET ORAL DAILY
Refills: 0 | Status: DISCONTINUED | OUTPATIENT
Start: 2025-01-01 | End: 2025-01-01

## 2025-01-01 RX ADMIN — Medication 81 MILLIGRAM(S): at 11:37

## 2025-01-01 RX ADMIN — LIDOCAINE 1 PATCH: 50 OINTMENT TOPICAL at 23:38

## 2025-01-01 RX ADMIN — Medication 5 MILLIGRAM(S): at 11:38

## 2025-01-01 RX ADMIN — Medication 20 MILLIGRAM(S): at 05:33

## 2025-01-01 RX ADMIN — LIDOCAINE 1 PATCH: 50 OINTMENT TOPICAL at 11:38

## 2025-01-01 RX ADMIN — FLUTICASONE PROPIONATE AND SALMETEROL 1 DOSE(S): 50; 500 POWDER ORAL; RESPIRATORY (INHALATION) at 17:17

## 2025-01-01 RX ADMIN — OXYBUTYNIN CHLORIDE 5 MILLIGRAM(S): 5 TABLET ORAL at 05:33

## 2025-01-01 RX ADMIN — Medication 5 MILLIGRAM(S): at 21:12

## 2025-01-01 RX ADMIN — LIDOCAINE 1 PATCH: 50 OINTMENT TOPICAL at 01:00

## 2025-01-01 RX ADMIN — OXYBUTYNIN CHLORIDE 5 MILLIGRAM(S): 5 TABLET ORAL at 17:15

## 2025-01-01 RX ADMIN — Medication 10 MILLIGRAM(S): at 05:33

## 2025-01-01 RX ADMIN — Medication 1 MILLIGRAM(S): at 11:38

## 2025-01-01 RX ADMIN — SULFASALAZINE 1000 MILLIGRAM(S): 500 TABLET ORAL at 05:34

## 2025-01-01 RX ADMIN — Medication 25 MILLIGRAM(S): at 17:15

## 2025-01-01 RX ADMIN — ENOXAPARIN SODIUM 40 MILLIGRAM(S): 60 INJECTION INTRAVENOUS; SUBCUTANEOUS at 17:16

## 2025-01-01 RX ADMIN — TAMSULOSIN HYDROCHLORIDE 0.4 MILLIGRAM(S): 0.4 CAPSULE ORAL at 21:12

## 2025-01-01 RX ADMIN — LIDOCAINE 1 PATCH: 50 OINTMENT TOPICAL at 19:30

## 2025-01-01 RX ADMIN — EZETIMIBE 10 MILLIGRAM(S): 10 TABLET ORAL at 11:38

## 2025-01-01 RX ADMIN — Medication 1000 MILLIGRAM(S): at 21:12

## 2025-01-01 RX ADMIN — PANTOPRAZOLE 40 MILLIGRAM(S): 40 TABLET, DELAYED RELEASE ORAL at 05:34

## 2025-01-01 NOTE — PROGRESS NOTE ADULT - SUBJECTIVE AND OBJECTIVE BOX
MEDICINE ATTENDING PROGRESS NOTE  HPI:  69-year-old male with PMH of HTN, HLD, CAD s/p CABG, BPH, neurogenic bladder, intracranial hemorrhage with left-sided deficits, minimally ambulatory with a walker at baseline, RA, lung ca s/p lobectomy, recent admission 12/17 - 12/24 for fall and refused NICHOLAS at that time, presents to ED with left knee pain after injury yesterday. Patient states he was attempting to walk downstairs when his left leg stiffened and he was unable to continue walking down the steps. States he attempted to sit down and while doing so hit his left knee locked and hit the wall. Called girlfriend who helped him up a few minutes later. Has been unable to walk or bear weight since due to pain in the knee. States he did not fall down any stairs or hit his head and is only complaining of left knee pain after hitting it on the wall. He denies any fever/chills, neck pain or stiffness, CP, SOB, palpitations, dizziness, syncope, abdominal pain, N/V/D, urinary symptoms, black or bloody stools.    VS all wnl  Labs all stable  Imaging- performed but not read       (31 Dec 2024 10:14)      Interval/Overnight Events      ROS  General: Denies fevers, chills, nightsweats, weight loss  HEENT: Denies headache, rhinorrhea, sore throat, eye pain  CV: Denies CP, palpitations  PULM: Denies SOB, cough  GI: Denies abdominal pain, diarrhea  : Denies dysuria, hematuria  MSK: Denies arthralgias  SKIN: Denies rash   NEURO: Denies paresthesias, weakness  PSYCH: Denies depression    MEDICATIONS  (STANDING):  amLODIPine   Tablet 10 milliGRAM(s) Oral daily  aspirin enteric coated 81 milliGRAM(s) Oral daily  enoxaparin Injectable 40 milliGRAM(s) SubCutaneous every 24 hours  ezetimibe 10 milliGRAM(s) Oral daily  finasteride 5 milliGRAM(s) Oral daily  fluticasone propionate/ salmeterol 250-50 MICROgram(s) Diskus 1 Dose(s) Inhalation two times a day  folic acid 1 milliGRAM(s) Oral daily  lidocaine   4% Patch 1 Patch Transdermal daily  losartan 100 milliGRAM(s) Oral daily  metoprolol tartrate 25 milliGRAM(s) Oral two times a day  oxybutynin 5 milliGRAM(s) Oral two times a day  pantoprazole    Tablet 40 milliGRAM(s) Oral before breakfast  predniSONE   Tablet 20 milliGRAM(s) Oral daily  sulfADIAZINE 1000 milliGRAM(s) Oral at bedtime  sulfaSALAzine 1500 milliGRAM(s) Oral daily  tamsulosin 0.4 milliGRAM(s) Oral at bedtime    MEDICATIONS  (PRN):  acetaminophen     Tablet .. 650 milliGRAM(s) Oral every 6 hours PRN Mild Pain (1 - 3)  albuterol    90 MICROgram(s) HFA Inhaler 2 Puff(s) Inhalation every 6 hours PRN Bronchospasm  oxycodone    5 mG/acetaminophen 325 mG 1 Tablet(s) Oral every 6 hours PRN Severe Pain (7 - 10)    ANTIBIOTICS:  sulfADIAZINE 1000 milliGRAM(s) Oral at bedtime      VITALS:  T(F): 97.3, Max: 98 (12-31-24 @ 18:47)  HR: 57  BP: 106/62  RR: 18Vital Signs Last 24 Hrs  T(C): 36.3 (01 Jan 2025 05:23), Max: 36.7 (31 Dec 2024 18:47)  T(F): 97.3 (01 Jan 2025 05:23), Max: 98 (31 Dec 2024 18:47)  HR: 57 (01 Jan 2025 07:38) (57 - 66)  BP: 106/62 (01 Jan 2025 05:23) (106/62 - 177/84)  BP(mean): 115 (31 Dec 2024 18:47) (115 - 115)  RR: 18 (01 Jan 2025 05:23) (18 - 18)  SpO2: 99% (01 Jan 2025 07:38) (97% - 99%)    Parameters below as of 01 Jan 2025 07:38  Patient On (Oxygen Delivery Method): room air     I&O's Summary    01 Jan 2025 07:01  -  01 Jan 2025 12:10  --------------------------------------------------------  IN: 0 mL / OUT: 200 mL / NET: -200 mL      PHYSICAL EXAM:  Gen: NAD, resting in bed  HEENT: Normocephalic, atraumatic  Neck: supple, no lymphadenopathy  CV: Regular rate & regular rhythm  Lungs: CTABL no wheeze  Abdomen: Soft, NTND+ BS present  Ext: Warm, well perfused no CCE  Neuro: non focal, awake, CN II-XII intact   Skin: no rash, no erythema  Psych: no SI, HI, Hallucination     LABS:                        10.6   4.81  )-----------( 129      ( 01 Jan 2025 08:04 )             33.4     12-31    138  |  105  |  43[H]  ----------------------------<  91  4.7   |  24  |  1.3    Ca    8.8      31 Dec 2024 07:40    TPro  6.9  /  Alb  3.4[L]  /  TBili  0.2  /  DBili  x   /  AST  14  /  ALT  8   /  AlkPhos  56  12-31      LIVER FUNCTIONS - ( 31 Dec 2024 07:40 )  Alb: 3.4 g/dL / Pro: 6.9 g/dL / ALK PHOS: 56 U/L / ALT: 8 U/L / AST: 14 U/L / GGT: x               MICROBIOLOGY:  Urinalysis Basic - ( 31 Dec 2024 07:40 )    Color: x / Appearance: x / SG: x / pH: x  Gluc: 91 mg/dL / Ketone: x  / Bili: x / Urobili: x   Blood: x / Protein: x / Nitrite: x   Leuk Esterase: x / RBC: x / WBC x   Sq Epi: x / Non Sq Epi: x / Bacteria: x                IMAGING:  CXR      CT    CARDIOLOGY TESTING  QRS axis to [] ° and NSR at a rate of [] BPM. There was no atrial enlargement. There was no ventricular hypertrophy. There were no ST-T changes and all intervals were normal.        ASSESSMENT/PLAN:  69 M with PMH of HTN, HLD, CAD s/p CABG, BPH, neurogenic bladder, intracranial hemorrhage with left-sided deficits, minimally ambulatory with a walker at baseline, RA, lung ca s/p lobectomy, recent admission 12/17 - 12/24 for fall and refused NICHOLAS at that time, presents to ED with left knee pain after injury yesterday and inability to ambulation   < from: Xray Femur 2 Views, Left (12.31.24 @ 08:47) > Total hip arthroplasty. No acute fracture. Vascular calcifications.  < from: Xray Pelvis AP only (12.31.24 @ 08:46) >  No acute fracture or dislocation. Status post total bilateral hip arthroplasty and lumbar spinal fixation with interbody disc spacer. Degenerative changes.  < from: Xray Knee 4 Views, Left (12.31.24 @ 08:45) > No evidence of acute fracture or dislocation.    # Mechanical injury of left knee, r/o ACL/MCL/PCL tears  # Hx of ICH w residual left hemiparesis  # h/o DVT  - xray of left LE, plevis, femur, knee negative for acute etiology  - will get MRI knee to r/o  ACL/MCL/PCL tears  - f/u venous duplex of LE   - Fall precautions   - PT follow up   - Pain control     # CKD  # BPH   # Neurogenic bladder  - avoid nephrotoxic drugs  - continue flomax and finasteride (home meds)    # Hx of rheumatoid arthritis  - on prednisone taper  - continue sulfasalazine    # CAD  # Dyslipidemia   # HTN   - c/w amlodipine 10mg qD,  losartan 100mg qD, metoprolol tartrate 25 BID  - c/w aspirin 325 mg, ezetimibe 10mg qD    # COPD   - c/w home inhaler, Breo, albuterol as needed    #Supportive Management:  Dispo:  Home vs SNF  DVT Ppx: enoxaparin Injectable 40 milliGRAM(s) SubCutaneous every 24 hours  GI Ppx: pantoprazole    Tablet 40 milliGRAM(s) Oral before breakfast  Diet:  Diet, Regular:   DASH/TLC Sodium & Cholesterol Restricted (DASH) (12-31-24 @ 11:09) [Active]      Total time spent to complete patient's bedside assessment, review medical chart, discuss medical plan of care with covering medical team was more than 45 minutes with >50% of time spent face to face with patient, discussion with patient/family and/or coordination of care    Housestaff's notes reviewed. When there is confusion regarding the content or information provided in the notes of a housestaff (resident, medical student, physician assistant, or nurse practioner) and the attending physician notes, the attending physician's note takes precedence and supersedes the other notes.    Eduardo Serrato MD/Claire  Attending Physician MEDICINE ATTENDING PROGRESS NOTE  69 M with PMH of HTN, HLD, CAD s/p CABG, BPH, neurogenic bladder, intracranial hemorrhage with left-sided deficits, minimally ambulatory with a walker at baseline, RA, lung ca s/p lobectomy, recent admission 12/17 - 12/24 for fall and refused NICHOLAS at that time, presents to ED with left knee pain after injury yesterday and inability to ambulation     Interval/Overnight Events  - Still in Pain  - Will get MRI of knee; pt with h/o titanium; RN informs radiology techs. Of note, patient states he had MRI after back surgery with titanium    ROS  General: Denies fevers, chills, nightsweats, weight loss  HEENT: Denies headache, rhinorrhea, sore throat, eye pain  CV: Denies CP, palpitations  PULM: Denies SOB, cough  GI: Denies abdominal pain, diarrhea  : Denies dysuria, hematuria  MSK: Denies arthralgias  SKIN: Denies rash   NEURO: Denies paresthesias, weakness  PSYCH: Denies depression    MEDICATIONS  (STANDING):  amLODIPine   Tablet 10 milliGRAM(s) Oral daily  aspirin enteric coated 81 milliGRAM(s) Oral daily  enoxaparin Injectable 40 milliGRAM(s) SubCutaneous every 24 hours  ezetimibe 10 milliGRAM(s) Oral daily  finasteride 5 milliGRAM(s) Oral daily  fluticasone propionate/ salmeterol 250-50 MICROgram(s) Diskus 1 Dose(s) Inhalation two times a day  folic acid 1 milliGRAM(s) Oral daily  lidocaine   4% Patch 1 Patch Transdermal daily  losartan 100 milliGRAM(s) Oral daily  metoprolol tartrate 25 milliGRAM(s) Oral two times a day  oxybutynin 5 milliGRAM(s) Oral two times a day  pantoprazole    Tablet 40 milliGRAM(s) Oral before breakfast  predniSONE   Tablet 20 milliGRAM(s) Oral daily  sulfADIAZINE 1000 milliGRAM(s) Oral at bedtime  sulfaSALAzine 1500 milliGRAM(s) Oral daily  tamsulosin 0.4 milliGRAM(s) Oral at bedtime    MEDICATIONS  (PRN):  acetaminophen     Tablet .. 650 milliGRAM(s) Oral every 6 hours PRN Mild Pain (1 - 3)  albuterol    90 MICROgram(s) HFA Inhaler 2 Puff(s) Inhalation every 6 hours PRN Bronchospasm  oxycodone    5 mG/acetaminophen 325 mG 1 Tablet(s) Oral every 6 hours PRN Severe Pain (7 - 10)    ANTIBIOTICS:  sulfADIAZINE 1000 milliGRAM(s) Oral at bedtime    VITALS:  T(F): 97.3, Max: 98 (12-31-24 @ 18:47)  HR: 57  BP: 106/62  RR: 18Vital Signs Last 24 Hrs  T(C): 36.3 (01 Jan 2025 05:23), Max: 36.7 (31 Dec 2024 18:47)  T(F): 97.3 (01 Jan 2025 05:23), Max: 98 (31 Dec 2024 18:47)  HR: 57 (01 Jan 2025 07:38) (57 - 66)  BP: 106/62 (01 Jan 2025 05:23) (106/62 - 177/84)  BP(mean): 115 (31 Dec 2024 18:47) (115 - 115)  RR: 18 (01 Jan 2025 05:23) (18 - 18)  SpO2: 99% (01 Jan 2025 07:38) (97% - 99%)    Parameters below as of 01 Jan 2025 07:38  Patient On (Oxygen Delivery Method): room air     I&O's Summary    01 Jan 2025 07:01  -  01 Jan 2025 12:10  --------------------------------------------------------  IN: 0 mL / OUT: 200 mL / NET: -200 mL      PHYSICAL EXAM:  Gen: NAD, resting in bed  HEENT: Normocephalic, atraumatic  Neck: supple, no lymphadenopathy  CV: Regular rate & regular rhythm  Lungs: CTABL no wheeze  Abdomen: Soft, NTND+ BS present  Ext: Warm, well perfused no CCE  Neuro: non focal, awake, CN II-XII intact   Skin: no rash, no erythema  Psych: no SI, HI, Hallucination     LABS:                        10.6   4.81  )-----------( 129      ( 01 Jan 2025 08:04 )             33.4     12-31    138  |  105  |  43[H]  ----------------------------<  91  4.7   |  24  |  1.3    Ca    8.8      31 Dec 2024 07:40    TPro  6.9  /  Alb  3.4[L]  /  TBili  0.2  /  DBili  x   /  AST  14  /  ALT  8   /  AlkPhos  56  12-31      LIVER FUNCTIONS - ( 31 Dec 2024 07:40 )  Alb: 3.4 g/dL / Pro: 6.9 g/dL / ALK PHOS: 56 U/L / ALT: 8 U/L / AST: 14 U/L / GGT: x               MICROBIOLOGY:  Urinalysis Basic - ( 31 Dec 2024 07:40 )    Color: x / Appearance: x / SG: x / pH: x  Gluc: 91 mg/dL / Ketone: x  / Bili: x / Urobili: x   Blood: x / Protein: x / Nitrite: x   Leuk Esterase: x / RBC: x / WBC x   Sq Epi: x / Non Sq Epi: x / Bacteria: x    IMAGING:  CXR    CT    CARDIOLOGY TESTING    ASSESSMENT/PLAN:  69 M with PMH of HTN, HLD, CAD s/p CABG, BPH, neurogenic bladder, intracranial hemorrhage with left-sided deficits, minimally ambulatory with a walker at baseline, RA, lung ca s/p lobectomy, recent admission 12/17 - 12/24 for fall and refused NICHOLAS at that time, presents to ED with left knee pain after injury yesterday and inability to ambulation   < from: Xray Femur 2 Views, Left (12.31.24 @ 08:47) > Total hip arthroplasty. No acute fracture. Vascular calcifications.  < from: Xray Pelvis AP only (12.31.24 @ 08:46) >  No acute fracture or dislocation. Status post total bilateral hip arthroplasty and lumbar spinal fixation with interbody disc spacer. Degenerative changes.  < from: Xray Knee 4 Views, Left (12.31.24 @ 08:45) > No evidence of acute fracture or dislocation.    # Mechanical injury of left knee, r/o ACL/MCL/PCL tears  # Hx of ICH w residual left hemiparesis  # h/o DVT  - xray of left LE, plevis, femur, knee negative for acute etiology  - will get MRI knee to r/o  ACL/MCL/PCL tears  - f/u venous duplex of LE   - Fall precautions   - PT follow up   - Pain control     # CKD  # BPH   # Neurogenic bladder  - avoid nephrotoxic drugs  - continue flomax and finasteride (home meds)    # Hx of rheumatoid arthritis  - on prednisone taper  - continue sulfasalazine    # CAD  # Dyslipidemia   # HTN   - c/w amlodipine 10mg qD,  losartan 100mg qD, metoprolol tartrate 25 BID  - c/w aspirin 325 mg, ezetimibe 10mg qD    # COPD   - c/w home inhaler, Breo, albuterol as needed    pending: MRI Knee    #Supportive Management:  Dispo:  Home vs SNF  DVT Ppx: enoxaparin Injectable 40 milliGRAM(s) SubCutaneous every 24 hours  GI Ppx: pantoprazole    Tablet 40 milliGRAM(s) Oral before breakfast  Diet:  Diet, Regular:   DASH/TLC Sodium & Cholesterol Restricted (DASH) (12-31-24 @ 11:09) [Active]    Total time spent to complete patient's bedside assessment, review medical chart, discuss medical plan of care with covering medical team was more than 45 minutes with >50% of time spent face to face with patient, discussion with patient/family and/or coordination of care    Housestaff's notes reviewed. When there is confusion regarding the content or information provided in the notes of a housestaff (resident, medical student, physician assistant, or nurse practioner) and the attending physician notes, the attending physician's note takes precedence and supersedes the other notes.    Eduardo Serrato MD/Claire  Attending Physician

## 2025-01-02 ENCOUNTER — TRANSCRIPTION ENCOUNTER (OUTPATIENT)
Age: 70
End: 2025-01-02

## 2025-01-02 LAB
ALBUMIN SERPL ELPH-MCNC: 3.3 G/DL — LOW (ref 3.5–5.2)
ALP SERPL-CCNC: 51 U/L — SIGNIFICANT CHANGE UP (ref 30–115)
ALT FLD-CCNC: 5 U/L — SIGNIFICANT CHANGE UP (ref 0–41)
ANION GAP SERPL CALC-SCNC: 9 MMOL/L — SIGNIFICANT CHANGE UP (ref 7–14)
AST SERPL-CCNC: 10 U/L — SIGNIFICANT CHANGE UP (ref 0–41)
BASOPHILS # BLD AUTO: 0.04 K/UL — SIGNIFICANT CHANGE UP (ref 0–0.2)
BASOPHILS NFR BLD AUTO: 0.9 % — SIGNIFICANT CHANGE UP (ref 0–1)
BILIRUB SERPL-MCNC: 0.2 MG/DL — SIGNIFICANT CHANGE UP (ref 0.2–1.2)
BUN SERPL-MCNC: 45 MG/DL — HIGH (ref 10–20)
CALCIUM SERPL-MCNC: 8.2 MG/DL — LOW (ref 8.4–10.5)
CHLORIDE SERPL-SCNC: 100 MMOL/L — SIGNIFICANT CHANGE UP (ref 98–110)
CO2 SERPL-SCNC: 22 MMOL/L — SIGNIFICANT CHANGE UP (ref 17–32)
CREAT SERPL-MCNC: 1.4 MG/DL — SIGNIFICANT CHANGE UP (ref 0.7–1.5)
EGFR: 54 ML/MIN/1.73M2 — LOW
EOSINOPHIL # BLD AUTO: 0.04 K/UL — SIGNIFICANT CHANGE UP (ref 0–0.7)
EOSINOPHIL NFR BLD AUTO: 0.9 % — SIGNIFICANT CHANGE UP (ref 0–8)
FOLATE SERPL-MCNC: 11.8 NG/ML — SIGNIFICANT CHANGE UP
GLUCOSE SERPL-MCNC: 95 MG/DL — SIGNIFICANT CHANGE UP (ref 70–99)
HCT VFR BLD CALC: 31.7 % — LOW (ref 42–52)
HGB BLD-MCNC: 10.1 G/DL — LOW (ref 14–18)
IMM GRANULOCYTES NFR BLD AUTO: 1.6 % — HIGH (ref 0.1–0.3)
LYMPHOCYTES # BLD AUTO: 0.87 K/UL — LOW (ref 1.2–3.4)
LYMPHOCYTES # BLD AUTO: 19.6 % — LOW (ref 20.5–51.1)
MAGNESIUM SERPL-MCNC: 1.8 MG/DL — SIGNIFICANT CHANGE UP (ref 1.8–2.4)
MCHC RBC-ENTMCNC: 30.9 PG — SIGNIFICANT CHANGE UP (ref 27–31)
MCHC RBC-ENTMCNC: 31.9 G/DL — LOW (ref 32–37)
MCV RBC AUTO: 96.9 FL — HIGH (ref 80–94)
MONOCYTES # BLD AUTO: 0.51 K/UL — SIGNIFICANT CHANGE UP (ref 0.1–0.6)
MONOCYTES NFR BLD AUTO: 11.5 % — HIGH (ref 1.7–9.3)
NEUTROPHILS # BLD AUTO: 2.9 K/UL — SIGNIFICANT CHANGE UP (ref 1.4–6.5)
NEUTROPHILS NFR BLD AUTO: 65.5 % — SIGNIFICANT CHANGE UP (ref 42.2–75.2)
NRBC # BLD: 0 /100 WBCS — SIGNIFICANT CHANGE UP (ref 0–0)
PHOSPHATE SERPL-MCNC: 3.7 MG/DL — SIGNIFICANT CHANGE UP (ref 2.1–4.9)
PLATELET # BLD AUTO: 128 K/UL — LOW (ref 130–400)
PMV BLD: 10.6 FL — HIGH (ref 7.4–10.4)
POTASSIUM SERPL-MCNC: 4.1 MMOL/L — SIGNIFICANT CHANGE UP (ref 3.5–5)
POTASSIUM SERPL-SCNC: 4.1 MMOL/L — SIGNIFICANT CHANGE UP (ref 3.5–5)
PROT SERPL-MCNC: 6.1 G/DL — SIGNIFICANT CHANGE UP (ref 6–8)
RBC # BLD: 3.27 M/UL — LOW (ref 4.7–6.1)
RBC # FLD: 14.6 % — HIGH (ref 11.5–14.5)
SODIUM SERPL-SCNC: 131 MMOL/L — LOW (ref 135–146)
VIT B12 SERPL-MCNC: 288 PG/ML — SIGNIFICANT CHANGE UP (ref 232–1245)
WBC # BLD: 4.43 K/UL — LOW (ref 4.8–10.8)
WBC # FLD AUTO: 4.43 K/UL — LOW (ref 4.8–10.8)

## 2025-01-02 PROCEDURE — 99233 SBSQ HOSP IP/OBS HIGH 50: CPT

## 2025-01-02 PROCEDURE — 93970 EXTREMITY STUDY: CPT | Mod: 26

## 2025-01-02 PROCEDURE — 73721 MRI JNT OF LWR EXTRE W/O DYE: CPT | Mod: 26,LT

## 2025-01-02 RX ORDER — LIDOCAINE 50 MG/G
1 OINTMENT TOPICAL ONCE
Refills: 0 | Status: DISCONTINUED | OUTPATIENT
Start: 2025-01-02 | End: 2025-01-04

## 2025-01-02 RX ORDER — ETHYL CHLORIDE
1 AEROSOL, SPRAY (ML) TOPICAL ONCE
Refills: 0 | Status: DISCONTINUED | OUTPATIENT
Start: 2025-01-02 | End: 2025-01-04

## 2025-01-02 RX ORDER — MEDROXYPROGESTERONE ACETATE 10 MG/1
40 TABLET ORAL ONCE
Refills: 0 | Status: DISCONTINUED | OUTPATIENT
Start: 2025-01-02 | End: 2025-01-02

## 2025-01-02 RX ORDER — LIDOCAINE HYDROCHLORIDE 10 MG/ML
5 INJECTION INFILTRATION; PERINEURAL ONCE
Refills: 0 | Status: DISCONTINUED | OUTPATIENT
Start: 2025-01-02 | End: 2025-01-04

## 2025-01-02 RX ORDER — METHYLPREDNISOLONE 4 MG/1
40 TABLET ORAL ONCE
Refills: 0 | Status: DISCONTINUED | OUTPATIENT
Start: 2025-01-02 | End: 2025-01-03

## 2025-01-02 RX ORDER — METHYLPREDNISOLONE 4 MG/1
40 TABLET ORAL ONCE
Refills: 0 | Status: DISCONTINUED | OUTPATIENT
Start: 2025-01-02 | End: 2025-01-02

## 2025-01-02 RX ADMIN — LOSARTAN POTASSIUM 100 MILLIGRAM(S): 100 TABLET, FILM COATED ORAL at 06:26

## 2025-01-02 RX ADMIN — EZETIMIBE 10 MILLIGRAM(S): 10 TABLET ORAL at 11:09

## 2025-01-02 RX ADMIN — Medication 10 MILLIGRAM(S): at 06:25

## 2025-01-02 RX ADMIN — Medication 25 MILLIGRAM(S): at 06:24

## 2025-01-02 RX ADMIN — TAMSULOSIN HYDROCHLORIDE 0.4 MILLIGRAM(S): 0.4 CAPSULE ORAL at 21:38

## 2025-01-02 RX ADMIN — Medication 1 MILLIGRAM(S): at 11:09

## 2025-01-02 RX ADMIN — Medication 25 MILLIGRAM(S): at 17:17

## 2025-01-02 RX ADMIN — Medication 5 MILLIGRAM(S): at 11:09

## 2025-01-02 RX ADMIN — SULFASALAZINE 1500 MILLIGRAM(S): 500 TABLET ORAL at 08:06

## 2025-01-02 RX ADMIN — ENOXAPARIN SODIUM 40 MILLIGRAM(S): 60 INJECTION INTRAVENOUS; SUBCUTANEOUS at 17:17

## 2025-01-02 RX ADMIN — LIDOCAINE 1 PATCH: 50 OINTMENT TOPICAL at 11:09

## 2025-01-02 RX ADMIN — Medication 81 MILLIGRAM(S): at 11:09

## 2025-01-02 RX ADMIN — LIDOCAINE 1 PATCH: 50 OINTMENT TOPICAL at 20:14

## 2025-01-02 RX ADMIN — Medication 5 MILLIGRAM(S): at 21:39

## 2025-01-02 RX ADMIN — OXYBUTYNIN CHLORIDE 5 MILLIGRAM(S): 5 TABLET ORAL at 17:17

## 2025-01-02 RX ADMIN — OXYBUTYNIN CHLORIDE 5 MILLIGRAM(S): 5 TABLET ORAL at 06:24

## 2025-01-02 RX ADMIN — Medication 20 MILLIGRAM(S): at 06:24

## 2025-01-02 RX ADMIN — Medication 1000 MILLIGRAM(S): at 21:39

## 2025-01-02 RX ADMIN — FLUTICASONE PROPIONATE AND SALMETEROL 1 DOSE(S): 50; 500 POWDER ORAL; RESPIRATORY (INHALATION) at 11:08

## 2025-01-02 RX ADMIN — PANTOPRAZOLE 40 MILLIGRAM(S): 40 TABLET, DELAYED RELEASE ORAL at 06:24

## 2025-01-02 NOTE — DISCHARGE NOTE NURSING/CASE MANAGEMENT/SOCIAL WORK - FINANCIAL ASSISTANCE
[facility] provides services at a reduced cost to those who are determined to be eligible through [facility]’s financial assistance program. Information regarding [facility]’s financial assistance program can be found by going to [email or web address] or by calling [phone].
[facility] provides services at a reduced cost to those who are determined to be eligible through [facility]’s financial assistance program. Information regarding [facility]’s financial assistance program can be found by going to [email or web address] or by calling [phone].

## 2025-01-02 NOTE — PROGRESS NOTE ADULT - SUBJECTIVE AND OBJECTIVE BOX
CC: left knee pain (01 Jan 2025 12:10)    INTERVAL HPI/OVERNIGHT EVENTS:  Patient seen and examined at bedside. Reports major pain in L knee and has limited ROM. No other complaints. VSS.     Vital Signs Last 24 Hrs  T(C): 36.4 (02 Jan 2025 04:25), Max: 37.2 (01 Jan 2025 19:25)  T(F): 97.6 (02 Jan 2025 04:25), Max: 98.9 (01 Jan 2025 19:25)  HR: 69 (02 Jan 2025 07:56) (59 - 88)  BP: 124/73 (02 Jan 2025 06:09) (93/60 - 124/73)  BP(mean): 90 (02 Jan 2025 06:09) (73 - 90)  RR: 18 (02 Jan 2025 04:25) (18 - 18)  SpO2: 98% (02 Jan 2025 07:56) (96% - 99%)    Parameters below as of 02 Jan 2025 07:56  Patient On (Oxygen Delivery Method): room air    PHYSICAL EXAM:  General: Denies fevers, chills, nightsweats, weight loss  HEENT: Denies headache, rhinorrhea, sore throat, eye pain  CV: Denies CP, palpitations  PULM: Denies SOB, cough  GI: Denies abdominal pain, diarrhea  : Denies dysuria, hematuria  MSK: Denies arthralgias  SKIN: Denies rash   NEURO: Denies paresthesias, weakness    I&O's Detail    01 Jan 2025 07:01  -  02 Jan 2025 07:00  --------------------------------------------------------  IN:  Total IN: 0 mL    OUT:    Voided (mL): 200 mL  Total OUT: 200 mL    Total NET: -200 mL                                    10.1   4.43  )-----------( 128      ( 02 Jan 2025 07:34 )             31.7     02 Jan 2025 07:34    131    |  100    |  45     ----------------------------<  95     4.1     |  22     |  1.4      Ca    8.2        02 Jan 2025 07:34  Phos  3.7       02 Jan 2025 07:34  Mg     1.8       02 Jan 2025 07:34    TPro  6.1    /  Alb  3.3    /  TBili  0.2    /  DBili  x      /  AST  10     /  ALT  5      /  AlkPhos  51     02 Jan 2025 07:34      CAPILLARY BLOOD GLUCOSE        LIVER FUNCTIONS - ( 02 Jan 2025 07:34 )  Alb: 3.3 g/dL / Pro: 6.1 g/dL / ALK PHOS: 51 U/L / ALT: 5 U/L / AST: 10 U/L / GGT: x           Urinalysis Basic - ( 02 Jan 2025 07:34 )    Color: x / Appearance: x / SG: x / pH: x  Gluc: 95 mg/dL / Ketone: x  / Bili: x / Urobili: x   Blood: x / Protein: x / Nitrite: x   Leuk Esterase: x / RBC: x / WBC x   Sq Epi: x / Non Sq Epi: x / Bacteria: x        MEDICATIONS  (STANDING):  amLODIPine   Tablet 10 milliGRAM(s) Oral daily  aspirin enteric coated 81 milliGRAM(s) Oral daily  enoxaparin Injectable 40 milliGRAM(s) SubCutaneous every 24 hours  ezetimibe 10 milliGRAM(s) Oral daily  finasteride 5 milliGRAM(s) Oral daily  fluticasone propionate/ salmeterol 250-50 MICROgram(s) Diskus 1 Dose(s) Inhalation two times a day  folic acid 1 milliGRAM(s) Oral daily  lidocaine   4% Patch 1 Patch Transdermal daily  losartan 100 milliGRAM(s) Oral daily  melatonin 5 milliGRAM(s) Oral at bedtime  metoprolol tartrate 25 milliGRAM(s) Oral two times a day  oxybutynin 5 milliGRAM(s) Oral two times a day  pantoprazole    Tablet 40 milliGRAM(s) Oral before breakfast  predniSONE   Tablet 20 milliGRAM(s) Oral daily  sulfADIAZINE 1000 milliGRAM(s) Oral at bedtime  sulfaSALAzine 1500 milliGRAM(s) Oral <User Schedule>  tamsulosin 0.4 milliGRAM(s) Oral at bedtime    MEDICATIONS  (PRN):  acetaminophen     Tablet .. 650 milliGRAM(s) Oral every 6 hours PRN Mild Pain (1 - 3)  albuterol    90 MICROgram(s) HFA Inhaler 2 Puff(s) Inhalation every 6 hours PRN Bronchospasm  oxycodone    5 mG/acetaminophen 325 mG 1 Tablet(s) Oral every 6 hours PRN Severe Pain (7 - 10)      RADIOLOGY & ADDITIONAL TESTS:

## 2025-01-02 NOTE — PHYSICAL THERAPY INITIAL EVALUATION ADULT - GENERAL OBSERVATIONS, REHAB EVAL
[id number] Pt attempted for PT IE, c/o L knee pain, able to demonstrate 50% AROM knee flexion before inc in pain, deferring OOB mobility and complete evaluation at this time. Pt educated on importance of OOB mobility, POC and role of PT, continued to defer, perseverating on MRI. Pt with hx of difficulty using LLE due to PMH. Able to provide PSH and prior level of function, see below. PT to f/u at next available session.
PT IE [id number]. Chart reviewed. Pt encountered semi-reclined in bed. In NAD. + IV lock.

## 2025-01-02 NOTE — DISCHARGE NOTE NURSING/CASE MANAGEMENT/SOCIAL WORK - PATIENT PORTAL LINK FT
You can access the FollowMyHealth Patient Portal offered by [facility] by registering at the following website: [email or web address]. By joining [employer]’s FollowMyHealth portal, you will also be able to view your health information using other applications (apps) compatible with our system.
You can access the FollowMyHealth Patient Portal offered by [facility] by registering at the following website: [email or web address]. By joining [employer]’s FollowMyHealth portal, you will also be able to view your health information using other applications (apps) compatible with our system.

## 2025-01-02 NOTE — PHYSICAL THERAPY INITIAL EVALUATION ADULT - ADDITIONAL COMMENTS
Pt ambulates with rollator unlimited distances, lives with roommate in home with 2 steps to enter, 1 flight of stairs inside to 2nd floor, able to negotiate independently.
pt lives with friend in a pvt home. + 1 flight of stairs. uses RW for ambulation

## 2025-01-02 NOTE — PROGRESS NOTE ADULT - ASSESSMENT
69 M with PMH of HTN, HLD, CAD s/p CABG, BPH, neurogenic bladder, intracranial hemorrhage with left-sided deficits, minimally ambulatory with a walker at baseline, RA, lung ca s/p lobectomy presents to ED with left knee pain after injury yesterday and inability to ambulation. Admitted for further workup of left knee pain.       < from: Xray Femur 2 Views, Left (12.31.24 @ 08:47) > Total hip arthroplasty. No acute fracture. Vascular calcifications.  < from: Xray Pelvis AP only (12.31.24 @ 08:46) >  No acute fracture or dislocation. Status post total bilateral hip arthroplasty and lumbar spinal fixation with interbody disc spacer. Degenerative changes.  < from: Xray Knee 4 Views, Left (12.31.24 @ 08:45) > No evidence of acute fracture or dislocation.    # Mechanical injury of left knee, r/o ACL/MCL/PCL tears  # Hx of ICH w residual left hemiparesis  - xray of left LE, plevis, femur, knee negative for acute fractures   - will get MRI knee to r/o ligament tears and soft tissue swelling   - consider arthrocentesis of L knee to rule out septic arthritis  - f/u venous duplex of LE   - Fall precautions   - PT consult  - Pain control with percocet    # CKD  # BPH   # Neurogenic bladder  - avoid nephrotoxic drugs  - continue flomax and finasteride (home meds)    # Hx of rheumatoid arthritis  - on prednisone taper  - continue sulfasalazine    # CAD  # Dyslipidemia   # HTN   - c/w amlodipine 10mg qD,  losartan 100mg qD, metoprolol tartrate 25 BID  - c/w aspirin 325 mg, ezetimibe 10mg qD    # COPD   - c/w home inhaler, Breo, albuterol as needed    DVT PPx: lovenox  GI Ppx: pantoprazole  Diet: Regular   Activity: IAT   69 M with PMH of HTN, HLD, CAD s/p CABG, BPH, neurogenic bladder, intracranial hemorrhage with left-sided deficits, minimally ambulatory with a walker at baseline, RA, lung ca s/p lobectomy presents to ED with left knee pain after injury yesterday and inability to ambulation. Admitted for further workup of left knee pain.     # Mechanical injury of left knee, r/o ACL/MCL/PCL tears  # Hx of ICH w residual left hemiparesis  - xray of left LE, plevis, femur, knee negative for acute fractures   - will get MRI knee to r/o ligament tears and soft tissue swelling   - consider arthrocentesis of L knee to rule out septic arthritis  - venous duplex of LE show L common femoral DVT  - Fall precautions   - PT consult  - Pain control with percocet    # CKD  # BPH   # Neurogenic bladder  - avoid nephrotoxic drugs  - continue flomax and finasteride (home meds)    # Hx of rheumatoid arthritis  - on prednisone taper  - continue sulfasalazine    # CAD  # Dyslipidemia   # HTN   - c/w amlodipine 10mg qD,  losartan 100mg qD, metoprolol tartrate 25 BID  - c/w aspirin 325 mg, ezetimibe 10mg qD    # COPD   - c/w home inhaler, Breo, albuterol as needed    DVT PPx: lovenox  GI Ppx: pantoprazole  Diet: Regular   Activity: IAT

## 2025-01-02 NOTE — PHYSICAL THERAPY INITIAL EVALUATION ADULT - WEIGHT-BEARING RESTRICTIONS: GAIT, REHAB EVAL
NA: pt apprehensive of L knee buckling and places only minimal weight on L LE/weight-bearing as tolerated

## 2025-01-02 NOTE — PHYSICAL THERAPY INITIAL EVALUATION ADULT - LEVEL OF INDEPENDENCE: SIT/STAND, REHAB EVAL
NA: pt apprehensive of L knee buckling and places only minimal weight on L LE/minimum assist (75% patients effort)

## 2025-01-03 ENCOUNTER — TRANSCRIPTION ENCOUNTER (OUTPATIENT)
Age: 70
End: 2025-01-03

## 2025-01-03 VITALS
SYSTOLIC BLOOD PRESSURE: 127 MMHG | DIASTOLIC BLOOD PRESSURE: 74 MMHG | RESPIRATION RATE: 18 BRPM | HEART RATE: 64 BPM | TEMPERATURE: 98 F | OXYGEN SATURATION: 98 %

## 2025-01-03 LAB
ALBUMIN SERPL ELPH-MCNC: 3.6 G/DL — SIGNIFICANT CHANGE UP (ref 3.5–5.2)
ALP SERPL-CCNC: 57 U/L — SIGNIFICANT CHANGE UP (ref 30–115)
ALT FLD-CCNC: 6 U/L — SIGNIFICANT CHANGE UP (ref 0–41)
ANION GAP SERPL CALC-SCNC: 8 MMOL/L — SIGNIFICANT CHANGE UP (ref 7–14)
AST SERPL-CCNC: 12 U/L — SIGNIFICANT CHANGE UP (ref 0–41)
BASOPHILS # BLD AUTO: 0.02 K/UL — SIGNIFICANT CHANGE UP (ref 0–0.2)
BASOPHILS NFR BLD AUTO: 0.3 % — SIGNIFICANT CHANGE UP (ref 0–1)
BILIRUB SERPL-MCNC: 0.2 MG/DL — SIGNIFICANT CHANGE UP (ref 0.2–1.2)
BUN SERPL-MCNC: 35 MG/DL — HIGH (ref 10–20)
CALCIUM SERPL-MCNC: 9.1 MG/DL — SIGNIFICANT CHANGE UP (ref 8.4–10.5)
CHLORIDE SERPL-SCNC: 101 MMOL/L — SIGNIFICANT CHANGE UP (ref 98–110)
CO2 SERPL-SCNC: 24 MMOL/L — SIGNIFICANT CHANGE UP (ref 17–32)
CREAT SERPL-MCNC: 1.3 MG/DL — SIGNIFICANT CHANGE UP (ref 0.7–1.5)
EGFR: 59 ML/MIN/1.73M2 — LOW
EOSINOPHIL # BLD AUTO: 0.05 K/UL — SIGNIFICANT CHANGE UP (ref 0–0.7)
EOSINOPHIL NFR BLD AUTO: 0.8 % — SIGNIFICANT CHANGE UP (ref 0–8)
GLUCOSE SERPL-MCNC: 103 MG/DL — HIGH (ref 70–99)
HCT VFR BLD CALC: 34.8 % — LOW (ref 42–52)
HGB BLD-MCNC: 11 G/DL — LOW (ref 14–18)
IMM GRANULOCYTES NFR BLD AUTO: 1.1 % — HIGH (ref 0.1–0.3)
LYMPHOCYTES # BLD AUTO: 0.63 K/UL — LOW (ref 1.2–3.4)
LYMPHOCYTES # BLD AUTO: 9.8 % — LOW (ref 20.5–51.1)
MAGNESIUM SERPL-MCNC: 2 MG/DL — SIGNIFICANT CHANGE UP (ref 1.8–2.4)
MCHC RBC-ENTMCNC: 31.1 PG — HIGH (ref 27–31)
MCHC RBC-ENTMCNC: 31.6 G/DL — LOW (ref 32–37)
MCV RBC AUTO: 98.3 FL — HIGH (ref 80–94)
MONOCYTES # BLD AUTO: 0.52 K/UL — SIGNIFICANT CHANGE UP (ref 0.1–0.6)
MONOCYTES NFR BLD AUTO: 8.1 % — SIGNIFICANT CHANGE UP (ref 1.7–9.3)
NEUTROPHILS # BLD AUTO: 5.13 K/UL — SIGNIFICANT CHANGE UP (ref 1.4–6.5)
NEUTROPHILS NFR BLD AUTO: 79.9 % — HIGH (ref 42.2–75.2)
NRBC # BLD: 0 /100 WBCS — SIGNIFICANT CHANGE UP (ref 0–0)
PHOSPHATE SERPL-MCNC: 3.3 MG/DL — SIGNIFICANT CHANGE UP (ref 2.1–4.9)
PLATELET # BLD AUTO: 134 K/UL — SIGNIFICANT CHANGE UP (ref 130–400)
PMV BLD: 10.6 FL — HIGH (ref 7.4–10.4)
POTASSIUM SERPL-MCNC: 5.1 MMOL/L — HIGH (ref 3.5–5)
POTASSIUM SERPL-SCNC: 5.1 MMOL/L — HIGH (ref 3.5–5)
PROT SERPL-MCNC: 6.9 G/DL — SIGNIFICANT CHANGE UP (ref 6–8)
RBC # BLD: 3.54 M/UL — LOW (ref 4.7–6.1)
RBC # FLD: 14.6 % — HIGH (ref 11.5–14.5)
SODIUM SERPL-SCNC: 133 MMOL/L — LOW (ref 135–146)
WBC # BLD: 6.42 K/UL — SIGNIFICANT CHANGE UP (ref 4.8–10.8)
WBC # FLD AUTO: 6.42 K/UL — SIGNIFICANT CHANGE UP (ref 4.8–10.8)

## 2025-01-03 PROCEDURE — 99232 SBSQ HOSP IP/OBS MODERATE 35: CPT

## 2025-01-03 RX ORDER — PREDNISONE 5 MG
10 TABLET ORAL DAILY
Refills: 0 | Status: DISCONTINUED | OUTPATIENT
Start: 2025-01-03 | End: 2025-01-04

## 2025-01-03 RX ORDER — SODIUM ZIRCONIUM CYCLOSILICATE 10 G/10G
10 POWDER, FOR SUSPENSION ORAL ONCE
Refills: 0 | Status: COMPLETED | OUTPATIENT
Start: 2025-01-03 | End: 2025-01-03

## 2025-01-03 RX ORDER — GINKGO BILOBA 40 MG
5 CAPSULE ORAL ONCE
Refills: 0 | Status: COMPLETED | OUTPATIENT
Start: 2025-01-03 | End: 2025-01-03

## 2025-01-03 RX ADMIN — LIDOCAINE 1 PATCH: 50 OINTMENT TOPICAL at 13:29

## 2025-01-03 RX ADMIN — Medication 5 MILLIGRAM(S): at 12:22

## 2025-01-03 RX ADMIN — LOSARTAN POTASSIUM 100 MILLIGRAM(S): 100 TABLET, FILM COATED ORAL at 05:58

## 2025-01-03 RX ADMIN — LIDOCAINE 1 PATCH: 50 OINTMENT TOPICAL at 00:07

## 2025-01-03 RX ADMIN — FLUTICASONE PROPIONATE AND SALMETEROL 1 DOSE(S): 50; 500 POWDER ORAL; RESPIRATORY (INHALATION) at 08:00

## 2025-01-03 RX ADMIN — Medication 5 MILLIGRAM(S): at 00:28

## 2025-01-03 RX ADMIN — OXYBUTYNIN CHLORIDE 5 MILLIGRAM(S): 5 TABLET ORAL at 05:57

## 2025-01-03 RX ADMIN — Medication 81 MILLIGRAM(S): at 12:22

## 2025-01-03 RX ADMIN — SULFASALAZINE 1500 MILLIGRAM(S): 500 TABLET ORAL at 07:51

## 2025-01-03 RX ADMIN — Medication 25 MILLIGRAM(S): at 05:58

## 2025-01-03 RX ADMIN — SODIUM ZIRCONIUM CYCLOSILICATE 10 GRAM(S): 10 POWDER, FOR SUSPENSION ORAL at 12:21

## 2025-01-03 RX ADMIN — EZETIMIBE 10 MILLIGRAM(S): 10 TABLET ORAL at 12:21

## 2025-01-03 RX ADMIN — Medication 10 MILLIGRAM(S): at 12:34

## 2025-01-03 RX ADMIN — Medication 1 MILLIGRAM(S): at 12:21

## 2025-01-03 RX ADMIN — Medication 20 MILLIGRAM(S): at 05:58

## 2025-01-03 RX ADMIN — PANTOPRAZOLE 40 MILLIGRAM(S): 40 TABLET, DELAYED RELEASE ORAL at 05:57

## 2025-01-03 RX ADMIN — Medication 10 MILLIGRAM(S): at 05:58

## 2025-01-03 NOTE — CONSULT NOTE ADULT - ASSESSMENT
69-year-old male with PMH of HTN, HLD, CAD s/p CABG, BPH, neurogenic bladder, intracranial hemorrhage with left-sided deficits, minimally ambulatory with a walker at baseline, RA, lung ca s/p lobectomy, recent admission 12/17 - 12/24 for fall and refused NICHOLAS at that time, presents to ED with left knee pain after injury yesterday.    IR consulted for IVC filter placement. Per primary team, patient cannot receive anticoagulation due to history of intracranial hemorrhage. CTH 12/17/24 negative for acute intracranial hemorrhage. MR brain 5/5/2024 showed hemosiderin staining in the right thalamus and right periatrial subependymal region, negative for for acute intracranial hemorrhage.    - Duplex showed chronic left femoral vein DVT. Recommend repeat duplex in 1 week to reevaluate need for IVC filter placement.

## 2025-01-03 NOTE — DISCHARGE NOTE PROVIDER - PROVIDER TOKENS
PROVIDER:[TOKEN:[[id number]],FOLLOWUP:[2 weeks]],PROVIDER:[TOKEN:[[id number]],FOLLOWUP:[1 week]],PROVIDER:[TOKEN:[[id number]],FOLLOWUP:[1 month]]

## 2025-01-03 NOTE — DISCHARGE NOTE PROVIDER - NSDCCPCAREPLAN_GEN_ALL_CORE_FT
PRINCIPAL DISCHARGE DIAGNOSIS  Diagnosis: Left knee pain  Assessment and Plan of Treatment: You had an injury to your left knee and and had pain in your left knee since then, which made it difficult for you to ambulate. We did an xray and MRI, which did not reveal any acute pathologies. Physical therapy worked with you and recommended acute rehab to build up your strength. You declined rehab and want to go back home. Please follow with a physical therapist outpatient. If your pain persist, follow with an orthopedic surgeon outpatient in 4 weeks after discharge. Continue management of rheumatoid arthritis with your rheumatologist.      SECONDARY DISCHARGE DIAGNOSES  Diagnosis: DVT of leg (deep venous thrombosis)  Assessment and Plan of Treatment: A duplex was done and you was found to have left common femoral deep venous thrombosis. It appears cronic in nature. You will not be started on anticoagulation due to your history of brain bleed. Please get a repeat duplex scan in one week and follow up with vascular surgery outpatient in two weeks to discuss duplex results and further management. Please be on the lookout for warning signs such as leg swelling, severe leg pain, redness. If you develop shortness of breath, desaturation, chest tightness, please call your PCP or come to the nearest emergenc room in case the clot has migrated into your pulmonary vasculature. Please try to ambulate everyday if possible.

## 2025-01-03 NOTE — DISCHARGE NOTE PROVIDER - NSDCMRMEDTOKEN_GEN_ALL_CORE_FT
Albuterol (Eqv-ProAir HFA) 90 mcg/inh inhalation aerosol: 2 puff(s) inhaled every 6 hours  amLODIPine 10 mg oral tablet: 1 tab(s) orally once a day  aspirin 325 mg oral delayed release tablet: 1 tab(s) orally once a day  Breo Ellipta 100 mcg-25 mcg/inh inhalation powder: 1 puff(s) inhaled 2 times a day  Cozaar 100 mg oral tablet: 1 tab(s) orally once a day  finasteride 5 mg oral tablet: 1 tab(s) orally once a day  folic acid 1 mg oral tablet: 1 tab(s) orally once a day  metoprolol tartrate 25 mg oral tablet: 1 tab(s) orally 2 times a day  Myrbetriq 50 mg oral tablet, extended release: 1 tab(s) orally once a day  Narcan 4 mg/0.1 mL nasal spray: 1 spray(s) intranasally once a day as needed for Opioid overdose  omeprazole 40 mg oral delayed release capsule: 1 cap(s) orally once a day  oxycodone-acetaminophen 5 mg-325 mg oral tablet: 1 tab(s) orally every 6 hours as needed for Severe Pain (7 - 10) MDD: 4 tabs  predniSONE 20 mg oral tablet: 2 tab(s) orally once a day Take 2 tabs once a day (40mg) for 7 days until 12/27. Starting 12/28, take 1 tab a day for 7 days.  sulfaSALAzine 500 mg oral tablet: 2 tab(s) orally 2 times a day  tamsulosin 0.4 mg oral capsule: 1 cap(s) orally once a day  Tylenol Regular Strength 325 mg oral tablet: 2 tab(s) orally every 6 hours As needed Temp greater or equal to 38C (100.4F), Mild Pain (1 - 3)  Zetia 10 mg oral tablet: 1 tab(s) orally once a day   Albuterol (Eqv-ProAir HFA) 90 mcg/inh inhalation aerosol: 2 puff(s) inhaled every 6 hours  amLODIPine 10 mg oral tablet: 1 tab(s) orally once a day  aspirin 325 mg oral delayed release tablet: 1 tab(s) orally once a day  Breo Ellipta 100 mcg-25 mcg/inh inhalation powder: 1 puff(s) inhaled 2 times a day  Cozaar 100 mg oral tablet: 1 tab(s) orally once a day  finasteride 5 mg oral tablet: 1 tab(s) orally once a day  folic acid 1 mg oral tablet: 1 tab(s) orally once a day  metoprolol tartrate 25 mg oral tablet: 1 tab(s) orally 2 times a day  Myrbetriq 50 mg oral tablet, extended release: 1 tab(s) orally once a day  Narcan 4 mg/0.1 mL nasal spray: 1 spray(s) intranasally once a day as needed for Opioid overdose  omeprazole 40 mg oral delayed release capsule: 1 cap(s) orally once a day  oxycodone-acetaminophen 5 mg-325 mg oral tablet: 1 tab(s) orally every 6 hours as needed for Severe Pain (7 - 10) MDD: 4 tabs  predniSONE 20 mg oral tablet: 2 tab(s) orally once a day Take 2 tabs once a day (40mg) for 7 days until 12/27. Starting 12/28, take 1 tab a day for 7 days.  sulfaSALAzine 500 mg oral tablet: 2 tab(s) orally 2 times a day  tamsulosin 0.4 mg oral capsule: 1 cap(s) orally once a day  Zetia 10 mg oral tablet: 1 tab(s) orally once a day

## 2025-01-03 NOTE — CONSULT NOTE ADULT - SUBJECTIVE AND OBJECTIVE BOX
HISTORY OF PRESENTING ILLNESS:    HPI:  69-year-old male with PMH of HTN, HLD, CAD s/p CABG, BPH, neurogenic bladder, intracranial hemorrhage with left-sided deficits, minimally ambulatory with a walker at baseline, RA, lung ca s/p lobectomy, recent admission 12/17 - 12/24 for fall and refused NICHOLAS at that time, presents to ED with left knee pain after injury yesterday. Patient states he was attempting to walk downstairs when his left leg stiffened and he was unable to continue walking down the steps. States he attempted to sit down and while doing so hit his left knee locked and hit the wall. Called girlfriend who helped him up a few minutes later. Has been unable to walk or bear weight since due to pain in the knee. States he did not fall down any stairs or hit his head and is only complaining of left knee pain after hitting it on the wall. He denies any fever/chills, neck pain or stiffness, CP, SOB, palpitations, dizziness, syncope, abdominal pain, N/V/D, urinary symptoms, black or bloody stools.    VS all wnl  Labs all stable  Imaging- performed but not read       (31 Dec 2024 10:14)    PAST MEDICAL & SURGICAL HISTORY  PAST MEDICAL & SURGICAL HISTORY:  Stroke      CAD (coronary artery disease)      Pulmonary embolism      COPD, mild      History of BPH      HTN (hypertension)      Degeneration of spine      Kidney stone      RA (rheumatoid arthritis)      High cholesterol      Infrarenal abdominal aortic aneurysm (AAA) without rupture      History of GI bleed      Lung cancer      Diverticulosis      Colon polyp      History of coronary artery bypass, single      History of hip replacement      S/P lobectomy of lung        SOCIAL HISTORY:    ALLERGIES:  atorvastatin (Unknown)  baclofen (Unknown)  enalapril (Unknown)    MEDICATIONS:  STANDING MEDICATIONS  amLODIPine   Tablet 10 milliGRAM(s) Oral daily  aspirin enteric coated 81 milliGRAM(s) Oral daily  enoxaparin Injectable 40 milliGRAM(s) SubCutaneous every 24 hours  ethyl chloride Spray 1 Application(s) Topical once  ezetimibe 10 milliGRAM(s) Oral daily  finasteride 5 milliGRAM(s) Oral daily  fluticasone propionate/ salmeterol 250-50 MICROgram(s) Diskus 1 Dose(s) Inhalation two times a day  folic acid 1 milliGRAM(s) Oral daily  lidocaine   4% Patch 1 Patch Transdermal daily  lidocaine 2% Injectable 5 milliLiter(s) Local Injection once  lidocaine 4% Cream 1 Application(s) Topical once  losartan 100 milliGRAM(s) Oral daily  melatonin 5 milliGRAM(s) Oral at bedtime  methylPREDNISolone acetate Injectable 40 milliGRAM(s) IntraArticular once  metoprolol tartrate 25 milliGRAM(s) Oral two times a day  oxybutynin 5 milliGRAM(s) Oral two times a day  pantoprazole    Tablet 40 milliGRAM(s) Oral before breakfast  sulfADIAZINE 1000 milliGRAM(s) Oral at bedtime  sulfaSALAzine 1500 milliGRAM(s) Oral <User Schedule>  tamsulosin 0.4 milliGRAM(s) Oral at bedtime    PRN MEDICATIONS  acetaminophen     Tablet .. 650 milliGRAM(s) Oral every 6 hours PRN  albuterol    90 MICROgram(s) HFA Inhaler 2 Puff(s) Inhalation every 6 hours PRN  oxycodone    5 mG/acetaminophen 325 mG 1 Tablet(s) Oral every 6 hours PRN    VITALS:   T(F): 97.6  HR: 60  BP: 124/69  RR: 18  SpO2: 100%    LABS:                        10.1   4.43  )-----------( 128      ( 02 Jan 2025 07:34 )             31.7     01-02    131[L]  |  100  |  45[H]  ----------------------------<  95  4.1   |  22  |  1.4    Ca    8.2[L]      02 Jan 2025 07:34  Phos  3.7     01-02  Mg     1.8     01-02    TPro  6.1  /  Alb  3.3[L]  /  TBili  0.2  /  DBili  x   /  AST  10  /  ALT  5   /  AlkPhos  51  01-02      Urinalysis Basic - ( 02 Jan 2025 07:34 )    Color: x / Appearance: x / SG: x / pH: x  Gluc: 95 mg/dL / Ketone: x  / Bili: x / Urobili: x   Blood: x / Protein: x / Nitrite: x   Leuk Esterase: x / RBC: x / WBC x   Sq Epi: x / Non Sq Epi: x / Bacteria: x

## 2025-01-03 NOTE — DISCHARGE NOTE PROVIDER - CARE PROVIDER_API CALL
Chavez Leiva.  Vascular Surgery  [address] [place] [address], [address]  [place] [address]  Phone: [phone]  Fax: [phone]  Follow Up Time: 2 weeks    Dusty Mack  Internal Medicine  [address] [place] [address] [address]  [place] [address]  Phone: [phone]  Fax: [phone]  Follow Up Time: 1 week    Chavez Zarate  Orthopaedic Surgery  [address] [place] [id number]  Phone: [phone]  Fax: [phone]  Follow Up Time: 1 month

## 2025-01-03 NOTE — DISCHARGE NOTE PROVIDER - ATTENDING DISCHARGE PHYSICAL EXAMINATION:
PHYSICAL EXAM:  GENERAL: NAD  HEAD:  Atraumatic, Normocephalic  EYES:   conjunctiva and sclera clear  NECK: Supple, No JVD  CHEST/LUNG: Clear to auscultation bilaterally; No wheeze  HEART: Regular rate and rhythm; No murmurs, rubs, or gallops  ABDOMEN: Soft, Nontender, Nondistended; Bowel sounds present  EXTREMITIES:  2+ Peripheral Pulses, No clubbing, cyanosis, or edema  PSYCH: AAOx3  NEUROLOGY: non-focal  SKIN: No rashes or lesions

## 2025-01-03 NOTE — DISCHARGE NOTE PROVIDER - HOSPITAL COURSE
H&P:  69-year-old male with PMH of HTN, HLD, CAD s/p CABG, BPH, neurogenic bladder, intracranial hemorrhage with left-sided deficits, minimally ambulatory with a walker at baseline, RA, lung ca s/p lobectomy, recent admission 12/17 - 12/24 for fall and refused NICHOLAS at that time, presents to ED with left knee pain after injury yesterday. Patient states he was attempting to walk downstairs when his left leg stiffened and he was unable to continue walking down the steps. States he attempted to sit down and while doing so hit his left knee locked and hit the wall. Called girlfriend who helped him up a few minutes later. Has been unable to walk or bear weight since due to pain in the knee. States he did not fall down any stairs or hit his head and is only complaining of left knee pain after hitting it on the wall. He denies any fever/chills, neck pain or stiffness, CP, SOB, palpitations, dizziness, syncope, abdominal pain, N/V/D, urinary symptoms, black or bloody stools.    VS all wnl  Labs all stable  Imaging- performed but not read    HOSPITAL COURSE:    69 M with PMH of HTN, HLD, CAD s/p CABG, BPH, neurogenic bladder, intracranial hemorrhage with left-sided deficits, minimally ambulatory with a walker at baseline, RA, lung ca s/p lobectomy presents to ED with left knee pain after injury yesterday and inability to ambulation. Admitted for further workup of left knee pain.     # Mechanical injury of left knee, r/o ACL/MCL/PCL tears  # Hx of ICH w residual left hemiparesis  - xray of left LE, plevis, femur, knee negative for acute fractures   - MRI knee:   - venous duplex of LE show L common femoral DVT  - Fall precautions   - PT consult  - Pain control with percocet  - follow up with orthopedic surgery outpatient     #Chronic L common femoral DVT   - duplex reveal L common femoral deep venous thrombosis  - will hold off on AC at this time sa pt has hx of spontaneous ICH in 2018 after being placed on a blood thinner with plavix, was taken off AC after this brain bleed  - consulted IR for IVC filter-> said no indication for IVC filter as this is a chronic DVT, repeat US in one week   - do repeat duplex outpatient in one week and follow up with vascular surgery outpatient  - cw ASA only at this time    # CKD  # BPH   # Neurogenic bladder  - avoid nephrotoxic drugs  - continue flomax and finasteride (home meds)    # Hx of rheumatoid arthritis  - on prednisone taper  - continue sulfasalazine    # CAD  # Dyslipidemia   # HTN   - c/w amlodipine 10mg qD,  losartan 100mg qD, metoprolol tartrate 25 BID  - c/w aspirin 325 mg, ezetimibe 10mg qD    # COPD   - c/w home inhaler, Breo, albuterol as needed    Plans of discharge, including diagnoses, medication reconciliation, and treatment plan, have been discussed with Dr. Glass on 01/03/2025 and discharge was approved. H&P:  69-year-old male with PMH of HTN, HLD, CAD s/p CABG, BPH, neurogenic bladder, intracranial hemorrhage with left-sided deficits, minimally ambulatory with a walker at baseline, RA, lung ca s/p lobectomy, recent admission 12/17 - 12/24 for fall and refused NICHOLAS at that time, presents to ED with left knee pain after injury yesterday. Patient states he was attempting to walk downstairs when his left leg stiffened and he was unable to continue walking down the steps. States he attempted to sit down and while doing so hit his left knee locked and hit the wall. Called girlfriend who helped him up a few minutes later. Has been unable to walk or bear weight since due to pain in the knee. States he did not fall down any stairs or hit his head and is only complaining of left knee pain after hitting it on the wall. He denies any fever/chills, neck pain or stiffness, CP, SOB, palpitations, dizziness, syncope, abdominal pain, N/V/D, urinary symptoms, black or bloody stools.    VS all wnl  Labs all stable  Imaging- performed but not read    HOSPITAL COURSE:    69 M with PMH of HTN, HLD, CAD s/p CABG, BPH, neurogenic bladder, intracranial hemorrhage with left-sided deficits, minimally ambulatory with a walker at baseline, RA, lung ca s/p lobectomy presents to ED with left knee pain after injury yesterday and inability to ambulation. Admitted for further workup of left knee pain.     # Mechanical injury of left knee, r/o ACL/MCL/PCL tears  # Hx of ICH w residual left hemiparesis  - xray of left LE, plevis, femur, knee negative for acute fractures   - MRI knee: Anterior cruciate ligament sprain at the distal tibial insertion. Insertional popliteus tendinosis. Full-thickness cartilaginous fissures at the lateral femoral trochlea with subchondral cystic changes. Partial-thickness cartilaginous thinning at the periphery of the medial and lateral femoral condyles.  - venous duplex of LE show L common femoral DVT  - Fall precautions   - PT consult  - Pain control with percocet  - follow up with orthopedic surgery outpatient     #Chronic L common femoral DVT   - duplex reveal L common femoral deep venous thrombosis  - will hold off on AC at this time sa pt has hx of spontaneous ICH in 2018 after being placed on a blood thinner with plavix, was taken off AC after this brain bleed  - consulted IR for IVC filter-> said no indication for IVC filter as this is a chronic DVT, repeat US in one week   - do repeat duplex outpatient in one week and follow up with vascular surgery outpatient  - cw ASA only at this time    # CKD  # BPH   # Neurogenic bladder  - avoid nephrotoxic drugs  - continue flomax and finasteride (home meds)    # Hx of rheumatoid arthritis  - on prednisone taper  - continue sulfasalazine    # CAD  # Dyslipidemia   # HTN   - c/w amlodipine 10mg qD,  losartan 100mg qD, metoprolol tartrate 25 BID  - c/w aspirin 325 mg, ezetimibe 10mg qD    # COPD   - c/w home inhaler, Breo, albuterol as needed    Plans of discharge, including diagnoses, medication reconciliation, and treatment plan, have been discussed with Dr. Glass on 01/03/2025 and discharge was approved.

## 2025-01-03 NOTE — DISCHARGE NOTE PROVIDER - CARE PROVIDERS DIRECT ADDRESSES
[email or web address]

## 2025-01-03 NOTE — DISCHARGE NOTE PROVIDER - NSDCFUSCHEDAPPT_GEN_ALL_CORE_FT
Darren Murphy  [facility]  PULMMED [facility] Maykel Esparza  Scheduled Appointment: 03/17/2025

## 2025-01-04 LAB
HBV SURFACE AB SER-ACNC: ABNORMAL
HBV SURFACE AG SER-ACNC: SIGNIFICANT CHANGE UP

## 2025-01-06 ENCOUNTER — NON-APPOINTMENT (OUTPATIENT)
Age: 70
End: 2025-01-06

## 2025-01-07 LAB
GAMMA INTERFERON BACKGROUND BLD IA-ACNC: 0.03 IU/ML — SIGNIFICANT CHANGE UP
M TB IFN-G BLD-IMP: NEGATIVE — SIGNIFICANT CHANGE UP
M TB IFN-G CD4+ BCKGRND COR BLD-ACNC: 0 IU/ML — SIGNIFICANT CHANGE UP
M TB IFN-G CD4+CD8+ BCKGRND COR BLD-ACNC: 0 IU/ML — SIGNIFICANT CHANGE UP
QUANT TB PLUS MITOGEN MINUS NIL: 1.02 IU/ML — SIGNIFICANT CHANGE UP

## 2025-01-08 LAB — HBV E AB SER-ACNC: SIGNIFICANT CHANGE UP

## 2025-01-09 DIAGNOSIS — M06.9 RHEUMATOID ARTHRITIS, UNSPECIFIED: ICD-10-CM

## 2025-01-09 DIAGNOSIS — Z95.1 PRESENCE OF AORTOCORONARY BYPASS GRAFT: ICD-10-CM

## 2025-01-09 DIAGNOSIS — S83.512A SPRAIN OF ANTERIOR CRUCIATE LIGAMENT OF LEFT KNEE, INITIAL ENCOUNTER: ICD-10-CM

## 2025-01-09 DIAGNOSIS — N18.9 CHRONIC KIDNEY DISEASE, UNSPECIFIED: ICD-10-CM

## 2025-01-09 DIAGNOSIS — Z88.8 ALLERGY STATUS TO OTHER DRUGS, MEDICAMENTS AND BIOLOGICAL SUBSTANCES: ICD-10-CM

## 2025-01-09 DIAGNOSIS — I71.43 INFRARENAL ABDOMINAL AORTIC ANEURYSM, WITHOUT RUPTURE: ICD-10-CM

## 2025-01-09 DIAGNOSIS — Z90.2 ACQUIRED ABSENCE OF LUNG [PART OF]: ICD-10-CM

## 2025-01-09 DIAGNOSIS — Z98.1 ARTHRODESIS STATUS: ICD-10-CM

## 2025-01-09 DIAGNOSIS — Z86.711 PERSONAL HISTORY OF PULMONARY EMBOLISM: ICD-10-CM

## 2025-01-09 DIAGNOSIS — M25.562 PAIN IN LEFT KNEE: ICD-10-CM

## 2025-01-09 DIAGNOSIS — Z96.643 PRESENCE OF ARTIFICIAL HIP JOINT, BILATERAL: ICD-10-CM
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
## 2025-01-09 DIAGNOSIS — J44.9 CHRONIC OBSTRUCTIVE PULMONARY DISEASE, UNSPECIFIED: ICD-10-CM

## 2025-01-09 DIAGNOSIS — I12.9 HYPERTENSIVE CHRONIC KIDNEY DISEASE WITH STAGE 1 THROUGH STAGE 4 CHRONIC KIDNEY DISEASE, OR UNSPECIFIED CHRONIC KIDNEY DISEASE: ICD-10-CM
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
## 2025-01-09 DIAGNOSIS — Z85.118 PERSONAL HISTORY OF OTHER MALIGNANT NEOPLASM OF BRONCHUS AND LUNG: ICD-10-CM

## 2025-01-09 DIAGNOSIS — I69.254 HEMIPLEGIA AND HEMIPARESIS FOLLOWING OTHER NONTRAUMATIC INTRACRANIAL HEMORRHAGE AFFECTING LEFT NON-DOMINANT SIDE: ICD-10-CM

## 2025-01-09 DIAGNOSIS — E78.5 HYPERLIPIDEMIA, UNSPECIFIED: ICD-10-CM

## 2025-01-09 DIAGNOSIS — E73.9 LACTOSE INTOLERANCE, UNSPECIFIED: ICD-10-CM

## 2025-01-09 DIAGNOSIS — N31.9 NEUROMUSCULAR DYSFUNCTION OF BLADDER, UNSPECIFIED: ICD-10-CM

## 2025-01-09 DIAGNOSIS — I82.512 CHRONIC EMBOLISM AND THROMBOSIS OF LEFT FEMORAL VEIN: ICD-10-CM

## 2025-01-09 DIAGNOSIS — I25.10 ATHEROSCLEROTIC HEART DISEASE OF NATIVE CORONARY ARTERY WITHOUT ANGINA PECTORIS: ICD-10-CM

## 2025-01-09 DIAGNOSIS — N40.1 BENIGN PROSTATIC HYPERPLASIA WITH LOWER URINARY TRACT SYMPTOMS: ICD-10-CM

## 2025-01-09 DIAGNOSIS — Z79.82 LONG TERM (CURRENT) USE OF ASPIRIN: ICD-10-CM

## 2025-01-23 ENCOUNTER — RX RENEWAL (OUTPATIENT)
Age: 70
End: 2025-01-23

## 2025-01-23 RX ORDER — ABATACEPT 250 MG/15ML
250 INJECTION, POWDER, LYOPHILIZED, FOR SOLUTION INTRAVENOUS
Qty: 6 | Refills: 0 | Status: ACTIVE | COMMUNITY
Start: 2025-01-23 | End: 1900-01-01

## 2025-02-20 ENCOUNTER — RX RENEWAL (OUTPATIENT)
Age: 70
End: 2025-02-20

## 2025-02-25 ENCOUNTER — APPOINTMENT (OUTPATIENT)
Dept: VASCULAR SURGERY | Facility: CLINIC | Age: 70
End: 2025-02-25
Payer: MEDICARE

## 2025-02-25 VITALS — DIASTOLIC BLOOD PRESSURE: 72 MMHG | HEART RATE: 62 BPM | SYSTOLIC BLOOD PRESSURE: 112 MMHG

## 2025-02-25 VITALS — HEIGHT: 69 IN

## 2025-02-25 DIAGNOSIS — M79.89 OTHER SPECIFIED SOFT TISSUE DISORDERS: ICD-10-CM

## 2025-02-25 PROCEDURE — 99213 OFFICE O/P EST LOW 20 MIN: CPT

## 2025-02-25 PROCEDURE — 93971 EXTREMITY STUDY: CPT

## 2025-03-03 ENCOUNTER — OUTPATIENT (OUTPATIENT)
Dept: OUTPATIENT SERVICES | Facility: HOSPITAL | Age: 70
LOS: 1 days | End: 2025-03-03
Payer: MEDICARE

## 2025-03-03 ENCOUNTER — APPOINTMENT (OUTPATIENT)
Age: 70
End: 2025-03-03
Payer: MEDICARE

## 2025-03-03 VITALS
OXYGEN SATURATION: 98 % | HEIGHT: 69 IN | SYSTOLIC BLOOD PRESSURE: 148 MMHG | DIASTOLIC BLOOD PRESSURE: 66 MMHG | HEART RATE: 66 BPM | TEMPERATURE: 97.3 F

## 2025-03-03 DIAGNOSIS — Z95.1 PRESENCE OF AORTOCORONARY BYPASS GRAFT: Chronic | ICD-10-CM

## 2025-03-03 DIAGNOSIS — Z12.5 ENCOUNTER FOR SCREENING FOR MALIGNANT NEOPLASM OF PROSTATE: ICD-10-CM

## 2025-03-03 DIAGNOSIS — R39.9 UNSPECIFIED SYMPTOMS AND SIGNS INVOLVING THE GENITOURINARY SYSTEM: ICD-10-CM

## 2025-03-03 DIAGNOSIS — Z00.00 ENCOUNTER FOR GENERAL ADULT MEDICAL EXAMINATION WITHOUT ABNORMAL FINDINGS: ICD-10-CM

## 2025-03-03 DIAGNOSIS — N32.81 OVERACTIVE BLADDER: ICD-10-CM

## 2025-03-03 DIAGNOSIS — Z96.649 PRESENCE OF UNSPECIFIED ARTIFICIAL HIP JOINT: Chronic | ICD-10-CM

## 2025-03-03 DIAGNOSIS — R35.1 NOCTURIA: ICD-10-CM

## 2025-03-03 DIAGNOSIS — Z90.2 ACQUIRED ABSENCE OF LUNG [PART OF]: Chronic | ICD-10-CM

## 2025-03-03 PROCEDURE — 99204 OFFICE O/P NEW MOD 45 MIN: CPT

## 2025-03-03 RX ORDER — CHROMIUM 200 MCG
TABLET ORAL
Refills: 0 | Status: ACTIVE | COMMUNITY

## 2025-03-03 RX ORDER — MIRABEGRON 25 MG/1
25 TABLET, EXTENDED RELEASE ORAL
Qty: 90 | Refills: 3 | Status: ACTIVE | COMMUNITY
Start: 2025-03-03 | End: 1900-01-01

## 2025-03-03 RX ORDER — EZETIMIBE 10 MG/1
10 TABLET ORAL
Refills: 0 | Status: ACTIVE | COMMUNITY

## 2025-03-03 RX ORDER — FINASTERIDE 5 MG/1
5 TABLET, FILM COATED ORAL
Qty: 90 | Refills: 3 | Status: ACTIVE | COMMUNITY
Start: 2025-03-03 | End: 1900-01-01

## 2025-03-04 DIAGNOSIS — N32.81 OVERACTIVE BLADDER: ICD-10-CM

## 2025-03-04 DIAGNOSIS — R35.1 NOCTURIA: ICD-10-CM

## 2025-03-05 ENCOUNTER — APPOINTMENT (OUTPATIENT)
Dept: ORTHOPEDIC SURGERY | Facility: CLINIC | Age: 70
End: 2025-03-05
Payer: MEDICARE

## 2025-03-05 DIAGNOSIS — M25.862 OTHER SPECIFIED JOINT DISORDERS, LEFT KNEE: ICD-10-CM

## 2025-03-05 PROCEDURE — 73562 X-RAY EXAM OF KNEE 3: CPT | Mod: LT

## 2025-03-05 PROCEDURE — 99213 OFFICE O/P EST LOW 20 MIN: CPT | Mod: 25

## 2025-03-09 ENCOUNTER — INPATIENT (INPATIENT)
Facility: HOSPITAL | Age: 70
LOS: 6 days | Discharge: HOME HEALTH | DRG: 372 | End: 2025-03-16
Attending: INTERNAL MEDICINE | Admitting: INTERNAL MEDICINE
Payer: MEDICARE

## 2025-03-09 VITALS
HEIGHT: 69 IN | TEMPERATURE: 97 F | WEIGHT: 149.91 LBS | DIASTOLIC BLOOD PRESSURE: 60 MMHG | RESPIRATION RATE: 18 BRPM | OXYGEN SATURATION: 95 % | SYSTOLIC BLOOD PRESSURE: 119 MMHG | HEART RATE: 65 BPM

## 2025-03-09 DIAGNOSIS — Z96.649 PRESENCE OF UNSPECIFIED ARTIFICIAL HIP JOINT: Chronic | ICD-10-CM

## 2025-03-09 DIAGNOSIS — Z95.1 PRESENCE OF AORTOCORONARY BYPASS GRAFT: Chronic | ICD-10-CM

## 2025-03-09 DIAGNOSIS — Z90.2 ACQUIRED ABSENCE OF LUNG [PART OF]: Chronic | ICD-10-CM

## 2025-03-09 DIAGNOSIS — K57.92 DIVERTICULITIS OF INTESTINE, PART UNSPECIFIED, WITHOUT PERFORATION OR ABSCESS WITHOUT BLEEDING: ICD-10-CM

## 2025-03-09 LAB
ALP SERPL-CCNC: 66 U/L — SIGNIFICANT CHANGE UP (ref 30–115)
ALT FLD-CCNC: 8 U/L — SIGNIFICANT CHANGE UP (ref 0–41)
ANION GAP SERPL CALC-SCNC: 11 MMOL/L — SIGNIFICANT CHANGE UP (ref 7–14)
APPEARANCE UR: CLEAR — SIGNIFICANT CHANGE UP
AST SERPL-CCNC: 20 U/L — SIGNIFICANT CHANGE UP (ref 0–41)
BASOPHILS # BLD AUTO: 0.03 K/UL — SIGNIFICANT CHANGE UP (ref 0–0.2)
BILIRUB SERPL-MCNC: 0.5 MG/DL — SIGNIFICANT CHANGE UP (ref 0.2–1.2)
BILIRUB UR-MCNC: NEGATIVE — SIGNIFICANT CHANGE UP
BUN SERPL-MCNC: 48 MG/DL — HIGH (ref 10–20)
CALCIUM SERPL-MCNC: 9.1 MG/DL — SIGNIFICANT CHANGE UP (ref 8.4–10.5)
CO2 SERPL-SCNC: 22 MMOL/L — SIGNIFICANT CHANGE UP (ref 17–32)
COLOR SPEC: YELLOW — SIGNIFICANT CHANGE UP
CREAT SERPL-MCNC: 1.4 MG/DL — SIGNIFICANT CHANGE UP (ref 0.7–1.5)
DIFF PNL FLD: NEGATIVE — SIGNIFICANT CHANGE UP
EGFR: 54 ML/MIN/1.73M2 — LOW
EGFR: 54 ML/MIN/1.73M2 — LOW
EOSINOPHIL # BLD AUTO: 0.05 K/UL — SIGNIFICANT CHANGE UP (ref 0–0.7)
EOSINOPHIL NFR BLD AUTO: 1.2 % — SIGNIFICANT CHANGE UP (ref 0–8)
GLUCOSE SERPL-MCNC: 91 MG/DL — SIGNIFICANT CHANGE UP (ref 70–99)
GLUCOSE UR QL: NEGATIVE MG/DL — SIGNIFICANT CHANGE UP
HCT VFR BLD CALC: 38.5 % — LOW (ref 42–52)
IMM GRANULOCYTES NFR BLD AUTO: 0.5 % — HIGH (ref 0.1–0.3)
KETONES UR-MCNC: NEGATIVE MG/DL — SIGNIFICANT CHANGE UP
LACTATE SERPL-SCNC: 0.7 MMOL/L — SIGNIFICANT CHANGE UP (ref 0.7–2)
LEUKOCYTE ESTERASE UR-ACNC: ABNORMAL
LIDOCAIN IGE QN: 29 U/L — SIGNIFICANT CHANGE UP (ref 7–60)
LYMPHOCYTES # BLD AUTO: 16.1 % — LOW (ref 20.5–51.1)
MCHC RBC-ENTMCNC: 30.3 PG — SIGNIFICANT CHANGE UP (ref 27–31)
MCHC RBC-ENTMCNC: 31.9 G/DL — LOW (ref 32–37)
MCV RBC AUTO: 94.8 FL — HIGH (ref 80–94)
MONOCYTES # BLD AUTO: 0.56 K/UL — SIGNIFICANT CHANGE UP (ref 0.1–0.6)
MONOCYTES NFR BLD AUTO: 13.3 % — HIGH (ref 1.7–9.3)
NEUTROPHILS # BLD AUTO: 2.88 K/UL — SIGNIFICANT CHANGE UP (ref 1.4–6.5)
NEUTROPHILS NFR BLD AUTO: 68.2 % — SIGNIFICANT CHANGE UP (ref 42.2–75.2)
NITRITE UR-MCNC: NEGATIVE — SIGNIFICANT CHANGE UP
NRBC BLD AUTO-RTO: 0 /100 WBCS — SIGNIFICANT CHANGE UP (ref 0–0)
PMV BLD: 11.3 FL — HIGH (ref 7.4–10.4)
POTASSIUM SERPL-MCNC: 5.5 MMOL/L — HIGH (ref 3.5–5)
POTASSIUM SERPL-SCNC: 5.5 MMOL/L — HIGH (ref 3.5–5)
PROT SERPL-MCNC: 7.4 G/DL — SIGNIFICANT CHANGE UP (ref 6–8)
PROT UR-MCNC: 30 MG/DL
RBC # BLD: 4.06 M/UL — LOW (ref 4.7–6.1)
RBC # FLD: 13.3 % — SIGNIFICANT CHANGE UP (ref 11.5–14.5)
SODIUM SERPL-SCNC: 143 MMOL/L — SIGNIFICANT CHANGE UP (ref 135–146)
SP GR SPEC: 1.02 — SIGNIFICANT CHANGE UP (ref 1–1.03)
UROBILINOGEN FLD QL: 0.2 MG/DL — SIGNIFICANT CHANGE UP (ref 0.2–1)
WBC # BLD: 4.22 K/UL — LOW (ref 4.8–10.8)
WBC # FLD AUTO: 4.22 K/UL — LOW (ref 4.8–10.8)

## 2025-03-09 PROCEDURE — 87507 IADNA-DNA/RNA PROBE TQ 12-25: CPT

## 2025-03-09 PROCEDURE — 97530 THERAPEUTIC ACTIVITIES: CPT | Mod: GP

## 2025-03-09 PROCEDURE — 97162 PT EVAL MOD COMPLEX 30 MIN: CPT | Mod: GP

## 2025-03-09 PROCEDURE — 99222 1ST HOSP IP/OBS MODERATE 55: CPT

## 2025-03-09 PROCEDURE — 70450 CT HEAD/BRAIN W/O DYE: CPT | Mod: MC

## 2025-03-09 PROCEDURE — 83735 ASSAY OF MAGNESIUM: CPT

## 2025-03-09 PROCEDURE — 87186 SC STD MICRODIL/AGAR DIL: CPT

## 2025-03-09 PROCEDURE — 80053 COMPREHEN METABOLIC PANEL: CPT

## 2025-03-09 PROCEDURE — 99285 EMERGENCY DEPT VISIT HI MDM: CPT

## 2025-03-09 PROCEDURE — 93970 EXTREMITY STUDY: CPT

## 2025-03-09 PROCEDURE — 87046 STOOL CULTR AEROBIC BACT EA: CPT

## 2025-03-09 PROCEDURE — 97164 PT RE-EVAL EST PLAN CARE: CPT | Mod: GP

## 2025-03-09 PROCEDURE — 87177 OVA AND PARASITES SMEARS: CPT

## 2025-03-09 PROCEDURE — 74174 CTA ABD&PLVS W/CONTRAST: CPT | Mod: 26

## 2025-03-09 PROCEDURE — 87045 FECES CULTURE AEROBIC BACT: CPT

## 2025-03-09 PROCEDURE — 87077 CULTURE AEROBIC IDENTIFY: CPT

## 2025-03-09 PROCEDURE — 85025 COMPLETE CBC W/AUTO DIFF WBC: CPT

## 2025-03-09 PROCEDURE — 87184 SC STD DISK METHOD PER PLATE: CPT

## 2025-03-09 PROCEDURE — 94640 AIRWAY INHALATION TREATMENT: CPT

## 2025-03-09 PROCEDURE — 97116 GAIT TRAINING THERAPY: CPT | Mod: GP

## 2025-03-09 PROCEDURE — 36415 COLL VENOUS BLD VENIPUNCTURE: CPT

## 2025-03-09 RX ORDER — KETOROLAC TROMETHAMINE 30 MG/ML
15 INJECTION, SOLUTION INTRAMUSCULAR; INTRAVENOUS ONCE
Refills: 0 | Status: DISCONTINUED | OUTPATIENT
Start: 2025-03-09 | End: 2025-03-09

## 2025-03-09 RX ORDER — MIRABEGRON 50 MG/1
50 TABLET, FILM COATED, EXTENDED RELEASE ORAL DAILY
Refills: 0 | Status: DISCONTINUED | OUTPATIENT
Start: 2025-03-09 | End: 2025-03-16

## 2025-03-09 RX ORDER — METOPROLOL SUCCINATE 50 MG/1
25 TABLET, EXTENDED RELEASE ORAL
Refills: 0 | Status: DISCONTINUED | OUTPATIENT
Start: 2025-03-09 | End: 2025-03-16

## 2025-03-09 RX ORDER — METRONIDAZOLE 250 MG
500 TABLET ORAL EVERY 8 HOURS
Refills: 0 | Status: DISCONTINUED | OUTPATIENT
Start: 2025-03-09 | End: 2025-03-13

## 2025-03-09 RX ORDER — TAMSULOSIN HYDROCHLORIDE 0.4 MG/1
0.4 CAPSULE ORAL AT BEDTIME
Refills: 0 | Status: DISCONTINUED | OUTPATIENT
Start: 2025-03-09 | End: 2025-03-16

## 2025-03-09 RX ORDER — FINASTERIDE 1 MG/1
5 TABLET, FILM COATED ORAL DAILY
Refills: 0 | Status: DISCONTINUED | OUTPATIENT
Start: 2025-03-09 | End: 2025-03-16

## 2025-03-09 RX ORDER — TRAMADOL HYDROCHLORIDE 50 MG/1
25 TABLET, FILM COATED ORAL EVERY 8 HOURS
Refills: 0 | Status: DISCONTINUED | OUTPATIENT
Start: 2025-03-09 | End: 2025-03-10

## 2025-03-09 RX ORDER — CEFTRIAXONE 500 MG/1
1000 INJECTION, POWDER, FOR SOLUTION INTRAMUSCULAR; INTRAVENOUS ONCE
Refills: 0 | Status: COMPLETED | OUTPATIENT
Start: 2025-03-09 | End: 2025-03-09

## 2025-03-09 RX ORDER — ALBUTEROL SULFATE 2.5 MG/3ML
2 VIAL, NEBULIZER (ML) INHALATION EVERY 6 HOURS
Refills: 0 | Status: DISCONTINUED | OUTPATIENT
Start: 2025-03-09 | End: 2025-03-16

## 2025-03-09 RX ORDER — POLYETHYLENE GLYCOL 3350 17 G/17G
17 POWDER, FOR SOLUTION ORAL DAILY
Refills: 0 | Status: DISCONTINUED | OUTPATIENT
Start: 2025-03-09 | End: 2025-03-10

## 2025-03-09 RX ORDER — ACETAMINOPHEN 500 MG/5ML
650 LIQUID (ML) ORAL EVERY 6 HOURS
Refills: 0 | Status: DISCONTINUED | OUTPATIENT
Start: 2025-03-09 | End: 2025-03-16

## 2025-03-09 RX ORDER — METRONIDAZOLE 250 MG
500 TABLET ORAL ONCE
Refills: 0 | Status: COMPLETED | OUTPATIENT
Start: 2025-03-09 | End: 2025-03-09

## 2025-03-09 RX ORDER — ENOXAPARIN SODIUM 100 MG/ML
40 INJECTION SUBCUTANEOUS EVERY 24 HOURS
Refills: 0 | Status: DISCONTINUED | OUTPATIENT
Start: 2025-03-09 | End: 2025-03-14

## 2025-03-09 RX ORDER — FOLIC ACID 1 MG/1
1 TABLET ORAL DAILY
Refills: 0 | Status: DISCONTINUED | OUTPATIENT
Start: 2025-03-09 | End: 2025-03-16

## 2025-03-09 RX ORDER — ACETAMINOPHEN 500 MG/5ML
975 LIQUID (ML) ORAL ONCE
Refills: 0 | Status: COMPLETED | OUTPATIENT
Start: 2025-03-09 | End: 2025-03-09

## 2025-03-09 RX ORDER — ONDANSETRON HCL/PF 4 MG/2 ML
4 VIAL (ML) INJECTION EVERY 8 HOURS
Refills: 0 | Status: DISCONTINUED | OUTPATIENT
Start: 2025-03-09 | End: 2025-03-16

## 2025-03-09 RX ORDER — AMLODIPINE BESYLATE 10 MG/1
10 TABLET ORAL DAILY
Refills: 0 | Status: DISCONTINUED | OUTPATIENT
Start: 2025-03-09 | End: 2025-03-16

## 2025-03-09 RX ORDER — EZETIMIBE 10 MG/1
10 TABLET ORAL DAILY
Refills: 0 | Status: DISCONTINUED | OUTPATIENT
Start: 2025-03-09 | End: 2025-03-16

## 2025-03-09 RX ORDER — CEFTRIAXONE 500 MG/1
1000 INJECTION, POWDER, FOR SOLUTION INTRAMUSCULAR; INTRAVENOUS EVERY 24 HOURS
Refills: 0 | Status: DISCONTINUED | OUTPATIENT
Start: 2025-03-10 | End: 2025-03-13

## 2025-03-09 RX ORDER — SODIUM CHLORIDE 9 G/1000ML
1000 INJECTION, SOLUTION INTRAVENOUS
Refills: 0 | Status: DISCONTINUED | OUTPATIENT
Start: 2025-03-09 | End: 2025-03-10

## 2025-03-09 RX ORDER — SENNA 187 MG
2 TABLET ORAL AT BEDTIME
Refills: 0 | Status: DISCONTINUED | OUTPATIENT
Start: 2025-03-09 | End: 2025-03-10

## 2025-03-09 RX ORDER — ASPIRIN 325 MG
81 TABLET ORAL DAILY
Refills: 0 | Status: DISCONTINUED | OUTPATIENT
Start: 2025-03-09 | End: 2025-03-11

## 2025-03-09 RX ORDER — BISACODYL 5 MG
10 TABLET, DELAYED RELEASE (ENTERIC COATED) ORAL AT BEDTIME
Refills: 0 | Status: DISCONTINUED | OUTPATIENT
Start: 2025-03-09 | End: 2025-03-10

## 2025-03-09 RX ADMIN — POLYETHYLENE GLYCOL 3350 17 GRAM(S): 17 POWDER, FOR SOLUTION ORAL at 23:47

## 2025-03-09 RX ADMIN — TAMSULOSIN HYDROCHLORIDE 0.4 MILLIGRAM(S): 0.4 CAPSULE ORAL at 22:05

## 2025-03-09 RX ADMIN — Medication 975 MILLIGRAM(S): at 14:21

## 2025-03-09 RX ADMIN — Medication 20 MILLIGRAM(S): at 11:08

## 2025-03-09 RX ADMIN — CEFTRIAXONE 100 MILLIGRAM(S): 500 INJECTION, POWDER, FOR SOLUTION INTRAMUSCULAR; INTRAVENOUS at 13:06

## 2025-03-09 RX ADMIN — CEFTRIAXONE 1000 MILLIGRAM(S): 500 INJECTION, POWDER, FOR SOLUTION INTRAMUSCULAR; INTRAVENOUS at 14:21

## 2025-03-09 RX ADMIN — SODIUM CHLORIDE 60 MILLILITER(S): 9 INJECTION, SOLUTION INTRAVENOUS at 18:54

## 2025-03-09 RX ADMIN — METOPROLOL SUCCINATE 25 MILLIGRAM(S): 50 TABLET, EXTENDED RELEASE ORAL at 18:52

## 2025-03-09 RX ADMIN — Medication 10 MILLIGRAM(S): at 23:50

## 2025-03-09 RX ADMIN — Medication 1000 MILLILITER(S): at 10:53

## 2025-03-09 RX ADMIN — Medication 100 MILLIGRAM(S): at 22:10

## 2025-03-09 RX ADMIN — TRAMADOL HYDROCHLORIDE 25 MILLIGRAM(S): 50 TABLET, FILM COATED ORAL at 19:58

## 2025-03-09 RX ADMIN — KETOROLAC TROMETHAMINE 15 MILLIGRAM(S): 30 INJECTION, SOLUTION INTRAMUSCULAR; INTRAVENOUS at 14:21

## 2025-03-09 RX ADMIN — TRAMADOL HYDROCHLORIDE 25 MILLIGRAM(S): 50 TABLET, FILM COATED ORAL at 20:30

## 2025-03-09 RX ADMIN — Medication 1 DOSE(S): at 23:45

## 2025-03-09 RX ADMIN — Medication 100 MILLIGRAM(S): at 15:24

## 2025-03-09 RX ADMIN — Medication 2 TABLET(S): at 23:45

## 2025-03-09 NOTE — ED PROVIDER NOTE - CLINICAL SUMMARY MEDICAL DECISION MAKING FREE TEXT BOX
>70-year-old male past medical history of hypertension hyperlipidemia CAD BPH ICH with left-sided deficits at baseline or a lung CA status post lobectomy AAA presents with sudden onset lower abdominal cramping with nonbloody diarrhea this morning.  Patient denies fever chest pain shortness of breath nausea vomiting dysuria.  Patient endorsesis significant diarrhea     VITAL SIGNS: I have reviewed nursing notes and confirm.  CONSTITUTIONAL: well-appearing, non-toxic, NAD  SKIN: Warm dry, normal skin turgor  HEAD: NCAT  EYES: EOMI, PERRLA, no scleral icterus  ENT: Moist mucous membranes, normal pharynx with no erythema or exudates  NECK: Supple; non tender. Full ROM. No cervical LAD  CARD: RRR, no murmurs, rubs or gallops  RESP: clear to ausculation b/l.    ABD: soft, + BS, tttender lower abd, non-distended, no rebound or guarding. No CVA tenderness  EXT: Full ROM, no bony tenderness, no pedal edema, no calf tenderness  NEURO: normal motor. normal sensory. CN II-XII intact. Cerebellar testing normal. Normal gait.  PSYCH: Cooperative, appropriate.    I have considered a variety of diagnoses for this patient, including but not limited to:  infectious, lytes, msk    labs, meds, imaginga, reassess, dispo

## 2025-03-09 NOTE — H&P ADULT - ATTENDING COMMENTS
Medicine Attending Addendum  Patient was seen and examined with medicine team.  Nursing records reviewed. I agree with the resident/PA/NP's note including past medical history, home medications, social history, allergies, surgical history, family history, and review of system. I have reviewed relevant vitals, laboratory values, imaging studies, and microbiology.   - Above resident's note was edited by me  - 70-year-old male past medical history past medical history HTN, HLD, CAD, BPH, ICH with left-sided deficits at baseline, RA, lung CA s/p lobectomy, AAA, presents with sudden onset lower abdominal cramping with nonbloody diarrhea this morning. Patient is hemodynamically stable and afebrile. Exam significant for +LLQ and suprapubic tenderness. Labs relatively benign. Imaging significant for Moderate mural thickening and mucosal  enhancement involving the sigmoid and descending colon, with mild pericolonic fat stranding/ascites, suspicious for acute colitis. Admitted for management of acute colitis and nonbloody diarrhea (likely overflow). On IV abx and aggressive bowel regiments.  - rest of A/P as per detailed housestaff note above except above modifications    Total time spent to complete patient's bedside assessment, reviewed medical chart, discussed medical plan of care with team was 45 minutes with >50% of time spent face to face with patient, discussion with patient/family and/or coordination of care.

## 2025-03-09 NOTE — ED PROVIDER NOTE - PHYSICAL EXAMINATION
Vital Signs: I have reviewed the initial vital signs.  CONSTITUTIONAL: Pt in no acute distress laying on stretcher.  SKIN: Skin exam is warm and dry, no acute rash.  HEAD: Normocephalic; atraumatic.  EYES: PERRL, EOM intact; conjunctiva and sclera clear.  ENT: No nasal discharge; airway clear.   NECK: Supple;   CARD: S1, S2 normal; no murmurs, gallops, or rubs. Regular rate and rhythm.  RESP: CTAB. No wheezes, rales or rhonchi.  ABD: +LLQ and suprapubic tenderness. soft; non-distended; no hepatosplenomegaly.  MSK: Normal ROM. No clubbing, cyanosis or edema.

## 2025-03-09 NOTE — PATIENT PROFILE ADULT - FALL HARM RISK - FALL HARM RISK
Exercise:   Pelvic tilts  Abdominal bracing - hook lying, sitting and standing   Abdominal bracing - standing with marching and hip abduction   Continued Education regarding bracing with household activities   Nu step - emphasis on core activation with leg strengthening - 6 minutes; seat 7, load 4     Manual Therapy:   Soft tissue mobilization to lumbar paraspinals and quadratus lumborum right side  Soft tissue mobilization to lateral hip and gluteal region  Piriformis release      Ultrasound (91597):  Patient has been made aware of potential contraindications and possible risks associated with the use of modality and has agreed.  Location: right gluteal region   Position: sidelying  Duration: 8 minutes Duty Cycle: 100% duty cycle  Frequency: 1 Mhz  Intensity: 1.8 W/cm2   Results: decreased pain; no adverse reaction to treatment     Current Home Program (not performed this date except as noted above):   Pelvic tilts  Single knee to chest  Use of heat   Abdominal bracing - hook lying, sitting and standing   bracing with household activities     ASSESSMENT   Reducing use of cane for walking during the week.  Improving abdominal control     Pain after treatment: 0/10  Result of above outlined education: Verbalizes understanding and Demonstrates understanding    Goals:       See evaluation      PLAN   Ultrasound, manual therapy, stretching, stabilization exercises      THERAPY DAILY BILLING   Primary Insurance:  MEDICARE  Secondary Insurance: [employer]TH/[employer]    Evaluation Procedures:  No evaluation codes were used on this date of service    Timed Procedures:  Manual Therapy, 15 minutes  Therapeutic Exercise, 15 minutes  Ultrasound, 11 minutes    Untimed Procedures:  No untimed codes were used on this date of service    Total Treatment Time: 41 minutes        G-Code:  G-Code Score ABN form  reporting not required this treatment session  Modifier based on outcome measure(s)/functional testing/clinical judgement as listed  above    The referring provider's electronic or written signature on the evaluation authorizes the therapy plan of care and certifies the need for these services, furnished under this plan of care while under their care.  Referring provider signature on file      Age/Other

## 2025-03-09 NOTE — ED ADULT NURSE NOTE - NSFALLHARMRISKINTERV_ED_ALL_ED
Note faxed.
Assistance OOB with selected safe patient handling equipment if applicable/Assistance with ambulation/Communicate risk of Fall with Harm to all staff, patient, and family/Provide visual cue: red socks, yellow wristband, yellow gown, etc/Reinforce activity limits and safety measures with patient and family/Bed in lowest position, wheels locked, appropriate side rails in place/Call bell, personal items and telephone in reach/Instruct patient to call for assistance before getting out of bed/chair/stretcher/Non-slip footwear applied when patient is off stretcher/[place] to call system/Physically safe environment - no spills, clutter or unnecessary equipment/Purposeful Proactive Rounding/Room/bathroom lighting operational, light cord in reach

## 2025-03-09 NOTE — ED PROVIDER NOTE - OBJECTIVE STATEMENT
70-year-old male past medical history past medical history HTN, HLD, CAD, BPH, ICH with left-sided deficits at baseline, RA, lung CA s/p lobectomy, AAA, presents with sudden onset lower abdominal cramping with nonbloody diarrhea this morning.  Denies fever, chest pain, shortness of breath, nausea, vomiting, urinary symptoms.

## 2025-03-09 NOTE — PATIENT PROFILE ADULT - FALL HARM RISK - HARM RISK INTERVENTIONS

## 2025-03-09 NOTE — H&P ADULT - NSHPLABSRESULTS_GEN_ALL_CORE
Home Medications:  folic acid 1 mg oral tablet: 1 tab(s) orally once a day (09 Mar 2025 17:31)  tamsulosin 0.4 mg oral capsule: 1 cap(s) orally once a day (09 Mar 2025 17:31)    MEDICATIONS  (STANDING):  amLODIPine   Tablet 10 milliGRAM(s) Oral daily  ezetimibe 10 milliGRAM(s) Oral daily  finasteride 5 milliGRAM(s) Oral daily  fluticasone propionate/ salmeterol 100-50 MICROgram(s) Diskus 1 Dose(s) Inhalation two times a day  folic acid 1 milliGRAM(s) Oral daily  metoprolol tartrate 25 milliGRAM(s) Oral two times a day  mirabegron ER 50 milliGRAM(s) Oral daily  pantoprazole    Tablet 40 milliGRAM(s) Oral before breakfast  tamsulosin 0.4 milliGRAM(s) Oral at bedtime    MEDICATIONS  (PRN):  albuterol    90 MICROgram(s) HFA Inhaler 2 Puff(s) Inhalation every 6 hours PRN Shortness of Breath and/or Wheezing        LABS:                        12.3   4.22  )-----------( 149      ( 09 Mar 2025 10:58 )             38.5     03-09    143  |  110  |  48[H]  ----------------------------<  91  5.5[H]   |  22  |  1.4    Ca    9.1      09 Mar 2025 10:58    TPro  7.4  /  Alb  3.9  /  TBili  0.5  /  DBili  x   /  AST  20  /  ALT  8   /  AlkPhos  66  03-09    LIVER FUNCTIONS - ( 09 Mar 2025 10:58 )  Alb: 3.9 g/dL / Pro: 7.4 g/dL / ALK PHOS: 66 U/L / ALT: 8 U/L / AST: 20 U/L / GGT: x                 Urinalysis Basic - ( 09 Mar 2025 11:15 )    Color: Yellow / Appearance: Clear / S.020 / pH: x  Gluc: x / Ketone: Negative mg/dL  / Bili: Negative / Urobili: 0.2 mg/dL   Blood: x / Protein: 30 mg/dL / Nitrite: Negative   Leuk Esterase: Moderate / RBC: 0 /HPF / WBC 55 /HPF   Sq Epi: x / Non Sq Epi: 1 /HPF / Bacteria: Negative /HPF          Urinalysis with Rflx Culture (collected 25 @ 11:15)        Urinalysis with Rflx Culture (collected 25 @ 11:15)        Urinalysis with Rflx Culture (collected 09 Mar 2025 11:15)

## 2025-03-09 NOTE — H&P ADULT - HISTORY OF PRESENT ILLNESS
70-year-old male with PMH of HTN, HLD, CAD s/p CABG, BPH, neurogenic bladder, intracranial hemorrhage with left-sided deficits, minimally ambulatory with a walker at baseline, RA, lung ca s/p lobectomy presents with sudden onset abdominal pain. Patient states sudden onset LLQ pain this morning, severe, started while he was enjoying his breakfast, associated symptoms of dizziness, no nausea, vomiting or headache. Patient has had altered bowel movements for few days, explains he has constipation, after passage of small amount of hard stools he has few episodes of watery diarrhea non-bloody, non-mucoid.     ED Course:  Vitals: HDS and Afebrile  WBC 4.22, Hb 12.3, K+ 5.5 (Hemolysed), Lactate 0.7, Lipase 29, LFT WNL   UA: Moderate LE, WBC 55   CT A/P:  Moderate mural thickening and mucosal enhancement involving the sigmoid and descending colon, with mild pericolonic fat  stranding/ascites, suspicious for acute colitis   Medications: Dicyclomine, NS 1L bolus, Ceftriaxone, Metronidazole, Ketorolac      Patient being admitted to medicine for further evaluation and management of colitis.

## 2025-03-09 NOTE — H&P ADULT - NSHPPHYSICALEXAM_GEN_ALL_CORE
Physical Examination   CONSTITUTIONAL: Not in acute distress  NEUROLOGICAL: Alert and oriented x 3  EYES: Pupils equal, Round and reactive to light.  CARDIAC: Normal rate, Regular rhythm.    RESPIRATORY: No wheezing, No crackles, Normal chest expansion, Not tachypneic, No use of accessory muscles  GASTROINTESTINAL: Abdomen soft, No guarding, + BS, Tenderness +nt in LLQ and Suprapubic area.   EXTREMITIES:  2+ Peripheral Pulses, No clubbing, cyanosis, or edema

## 2025-03-09 NOTE — H&P ADULT - ASSESSMENT
70-year-old male with PMH of HTN, HLD, CAD s/p CABG, BPH, neurogenic bladder, intracranial hemorrhage with left-sided deficits, minimally ambulatory with a walker at baseline, RA, lung ca s/p lobectomy presenting w/ abdominal pain admitted for colitis.     # LLQ abdominal pain 2/2 colitis   - Acute onset abdominal pain, starting today in the morning, left sided, severe, associated w/ dizziness, radiation to groin, no associated nausea or vomiting  - Tenderness +nt on superficial palpation of LL abdomen  - CT abdomen showing moderate mural thickening and mucosal enhancement involving the sigmoid and descending colon, with mild pericolonic fat  stranding/ascites, suspicious for acute colitis.   - Lactate 0.7, Lipase 29, LFT WNL  - In ED: Dicyclomine, NS 1L bolus, Ceftriaxone, Metronidazole, Ketorolac    Plan:  - Diet: Advance as tolerated   - IV fluids: D5LR 60 cc/hr   - Pain control: Acetaminophen & Tramadol   - Antibiotics: c/w Ceftriaxone and Metronidazole  - Nausea: Zofran  - Labs: RVP, Stool culture, Ova and Parasites    # Possible Cystitis   # Pyuria  - Symptoms: Mild discomfort during the initiation of urination, Mild suprapubic tenderness, No increased frequency or urgency  - UA: Moderate LE, WBC 55, No Bacteria    Plan  - Cultures: UCx      # Neurogenic Bladder  # BPH  - c/w finasteride and tamsulosin   - c/w mirabegron ER    # Hx of rheumatoid arthritis   - Patient currently on abatacept infusion monthly   - c/w folic acid daily  - Outpatient rheum fu     # H/o CAD s/p CABG  # H/o ICH w/ left sided residual deficits  - On ASA 325mg qD (Patient states patient takes 162 mg BID confirm w/ Pharmacy)     # Dyslipidemia    - Cont ezetimibe 10mg qD     # HTN    - c/w amlodipine 10mg qD   - Cont metoprolol tartrate 25 BID       # COPD    -c/w OP Rx      Miscellaneous:  DVT prophylaxis: Lovenox  Bowel  - Feeds: Full   - Prophylaxis: PPI  - Regimen: None  Activity: AAT  MedRec: Confirmed w/ patient at bedside (Confirm the dosing of Aspirin in AM -> [address])  Code status: Full

## 2025-03-09 NOTE — ED PROVIDER NOTE - CARE PLAN
1 Principal Discharge DX:	Acute diverticulitis  Secondary Diagnosis:	Urinary tract infection   Principal Discharge DX:	Acute colitis  Secondary Diagnosis:	Urinary tract infection

## 2025-03-10 LAB
ALBUMIN SERPL ELPH-MCNC: 3.1 G/DL — LOW (ref 3.5–5.2)
ALP SERPL-CCNC: 59 U/L — SIGNIFICANT CHANGE UP (ref 30–115)
ALT FLD-CCNC: 6 U/L — SIGNIFICANT CHANGE UP (ref 0–41)
ANION GAP SERPL CALC-SCNC: 10 MMOL/L — SIGNIFICANT CHANGE UP (ref 7–14)
AST SERPL-CCNC: 12 U/L — SIGNIFICANT CHANGE UP (ref 0–41)
BASOPHILS # BLD AUTO: 0.01 K/UL — SIGNIFICANT CHANGE UP (ref 0–0.2)
BASOPHILS NFR BLD AUTO: 0.2 % — SIGNIFICANT CHANGE UP (ref 0–1)
BILIRUB SERPL-MCNC: 0.4 MG/DL — SIGNIFICANT CHANGE UP (ref 0.2–1.2)
BUN SERPL-MCNC: 32 MG/DL — HIGH (ref 10–20)
CALCIUM SERPL-MCNC: 8.1 MG/DL — LOW (ref 8.4–10.5)
CHLORIDE SERPL-SCNC: 110 MMOL/L — SIGNIFICANT CHANGE UP (ref 98–110)
CO2 SERPL-SCNC: 20 MMOL/L — SIGNIFICANT CHANGE UP (ref 17–32)
CREAT SERPL-MCNC: 1.1 MG/DL — SIGNIFICANT CHANGE UP (ref 0.7–1.5)
EGFR: 72 ML/MIN/1.73M2 — SIGNIFICANT CHANGE UP
EOSINOPHIL # BLD AUTO: 0.07 K/UL — SIGNIFICANT CHANGE UP (ref 0–0.7)
EOSINOPHIL NFR BLD AUTO: 1.2 % — SIGNIFICANT CHANGE UP (ref 0–8)
GLUCOSE SERPL-MCNC: 80 MG/DL — SIGNIFICANT CHANGE UP (ref 70–99)
HCT VFR BLD CALC: 32.6 % — LOW (ref 42–52)
HGB BLD-MCNC: 11.1 G/DL — LOW (ref 14–18)
IMM GRANULOCYTES NFR BLD AUTO: 0.5 % — HIGH (ref 0.1–0.3)
LYMPHOCYTES # BLD AUTO: 0.54 K/UL — LOW (ref 1.2–3.4)
MCHC RBC-ENTMCNC: 30.7 PG — SIGNIFICANT CHANGE UP (ref 27–31)
MCHC RBC-ENTMCNC: 34 G/DL — SIGNIFICANT CHANGE UP (ref 32–37)
MCV RBC AUTO: 90.1 FL — SIGNIFICANT CHANGE UP (ref 80–94)
MONOCYTES # BLD AUTO: 0.49 K/UL — SIGNIFICANT CHANGE UP (ref 0.1–0.6)
NEUTROPHILS # BLD AUTO: 4.54 K/UL — SIGNIFICANT CHANGE UP (ref 1.4–6.5)
NEUTROPHILS NFR BLD AUTO: 80 % — HIGH (ref 42.2–75.2)
NRBC BLD AUTO-RTO: 0 /100 WBCS — SIGNIFICANT CHANGE UP (ref 0–0)
POTASSIUM SERPL-MCNC: 3.9 MMOL/L — SIGNIFICANT CHANGE UP (ref 3.5–5)
POTASSIUM SERPL-SCNC: 3.9 MMOL/L — SIGNIFICANT CHANGE UP (ref 3.5–5)
PROT SERPL-MCNC: 5.8 G/DL — LOW (ref 6–8)
RBC # BLD: 3.62 M/UL — LOW (ref 4.7–6.1)
RBC # FLD: 13.2 % — SIGNIFICANT CHANGE UP (ref 11.5–14.5)
SODIUM SERPL-SCNC: 140 MMOL/L — SIGNIFICANT CHANGE UP (ref 135–146)
WBC # BLD: 5.68 K/UL — SIGNIFICANT CHANGE UP (ref 4.8–10.8)
WBC # FLD AUTO: 5.68 K/UL — SIGNIFICANT CHANGE UP (ref 4.8–10.8)

## 2025-03-10 PROCEDURE — 99232 SBSQ HOSP IP/OBS MODERATE 35: CPT

## 2025-03-10 RX ORDER — TRAMADOL HYDROCHLORIDE 50 MG/1
25 TABLET, FILM COATED ORAL ONCE
Refills: 0 | Status: DISCONTINUED | OUTPATIENT
Start: 2025-03-10 | End: 2025-03-10

## 2025-03-10 RX ORDER — SODIUM CHLORIDE 9 G/1000ML
1000 INJECTION, SOLUTION INTRAVENOUS
Refills: 0 | Status: DISCONTINUED | OUTPATIENT
Start: 2025-03-10 | End: 2025-03-16

## 2025-03-10 RX ORDER — SUCRALFATE 1 G
1 TABLET ORAL
Refills: 0 | Status: DISCONTINUED | OUTPATIENT
Start: 2025-03-10 | End: 2025-03-16

## 2025-03-10 RX ADMIN — Medication 100 MILLIGRAM(S): at 21:50

## 2025-03-10 RX ADMIN — SODIUM CHLORIDE 60 MILLILITER(S): 9 INJECTION, SOLUTION INTRAVENOUS at 17:25

## 2025-03-10 RX ADMIN — Medication 100 MILLIGRAM(S): at 05:36

## 2025-03-10 RX ADMIN — AMLODIPINE BESYLATE 10 MILLIGRAM(S): 10 TABLET ORAL at 05:34

## 2025-03-10 RX ADMIN — Medication 650 MILLIGRAM(S): at 17:25

## 2025-03-10 RX ADMIN — Medication 1 GRAM(S): at 17:24

## 2025-03-10 RX ADMIN — Medication 650 MILLIGRAM(S): at 17:56

## 2025-03-10 RX ADMIN — TAMSULOSIN HYDROCHLORIDE 0.4 MILLIGRAM(S): 0.4 CAPSULE ORAL at 21:50

## 2025-03-10 RX ADMIN — Medication 1 DOSE(S): at 10:35

## 2025-03-10 RX ADMIN — Medication 100 MILLIGRAM(S): at 13:07

## 2025-03-10 RX ADMIN — METOPROLOL SUCCINATE 25 MILLIGRAM(S): 50 TABLET, EXTENDED RELEASE ORAL at 05:34

## 2025-03-10 RX ADMIN — EZETIMIBE 10 MILLIGRAM(S): 10 TABLET ORAL at 11:34

## 2025-03-10 RX ADMIN — FOLIC ACID 1 MILLIGRAM(S): 1 TABLET ORAL at 11:34

## 2025-03-10 RX ADMIN — Medication 40 MILLIGRAM(S): at 05:34

## 2025-03-10 RX ADMIN — TRAMADOL HYDROCHLORIDE 25 MILLIGRAM(S): 50 TABLET, FILM COATED ORAL at 03:20

## 2025-03-10 RX ADMIN — TRAMADOL HYDROCHLORIDE 25 MILLIGRAM(S): 50 TABLET, FILM COATED ORAL at 11:37

## 2025-03-10 RX ADMIN — TRAMADOL HYDROCHLORIDE 25 MILLIGRAM(S): 50 TABLET, FILM COATED ORAL at 11:56

## 2025-03-10 RX ADMIN — FINASTERIDE 5 MILLIGRAM(S): 1 TABLET, FILM COATED ORAL at 11:34

## 2025-03-10 RX ADMIN — ENOXAPARIN SODIUM 40 MILLIGRAM(S): 100 INJECTION SUBCUTANEOUS at 06:39

## 2025-03-10 RX ADMIN — TRAMADOL HYDROCHLORIDE 25 MILLIGRAM(S): 50 TABLET, FILM COATED ORAL at 02:43

## 2025-03-10 RX ADMIN — MIRABEGRON 50 MILLIGRAM(S): 50 TABLET, FILM COATED, EXTENDED RELEASE ORAL at 11:34

## 2025-03-10 RX ADMIN — Medication 1 APPLICATION(S): at 13:10

## 2025-03-10 RX ADMIN — Medication 81 MILLIGRAM(S): at 11:33

## 2025-03-10 RX ADMIN — CEFTRIAXONE 100 MILLIGRAM(S): 500 INJECTION, POWDER, FOR SOLUTION INTRAMUSCULAR; INTRAVENOUS at 05:30

## 2025-03-11 LAB
ALBUMIN SERPL ELPH-MCNC: 3.1 G/DL — LOW (ref 3.5–5.2)
ALP SERPL-CCNC: 60 U/L — SIGNIFICANT CHANGE UP (ref 30–115)
ALT FLD-CCNC: 6 U/L — SIGNIFICANT CHANGE UP (ref 0–41)
ANION GAP SERPL CALC-SCNC: 11 MMOL/L — SIGNIFICANT CHANGE UP (ref 7–14)
AST SERPL-CCNC: 12 U/L — SIGNIFICANT CHANGE UP (ref 0–41)
BASOPHILS # BLD AUTO: 0.02 K/UL — SIGNIFICANT CHANGE UP (ref 0–0.2)
BILIRUB SERPL-MCNC: 0.3 MG/DL — SIGNIFICANT CHANGE UP (ref 0.2–1.2)
BUN SERPL-MCNC: 21 MG/DL — HIGH (ref 10–20)
CALCIUM SERPL-MCNC: 8.4 MG/DL — SIGNIFICANT CHANGE UP (ref 8.4–10.5)
CHLORIDE SERPL-SCNC: 107 MMOL/L — SIGNIFICANT CHANGE UP (ref 98–110)
CO2 SERPL-SCNC: 23 MMOL/L — SIGNIFICANT CHANGE UP (ref 17–32)
CREAT SERPL-MCNC: 1.1 MG/DL — SIGNIFICANT CHANGE UP (ref 0.7–1.5)
CULTURE RESULTS: SIGNIFICANT CHANGE UP
EGFR: 72 ML/MIN/1.73M2 — SIGNIFICANT CHANGE UP
EGFR: 72 ML/MIN/1.73M2 — SIGNIFICANT CHANGE UP
EOSINOPHIL # BLD AUTO: 0.11 K/UL — SIGNIFICANT CHANGE UP (ref 0–0.7)
EOSINOPHIL NFR BLD AUTO: 2.2 % — SIGNIFICANT CHANGE UP (ref 0–8)
GLUCOSE SERPL-MCNC: 91 MG/DL — SIGNIFICANT CHANGE UP (ref 70–99)
HCT VFR BLD CALC: 35.9 % — LOW (ref 42–52)
HGB BLD-MCNC: 11.6 G/DL — LOW (ref 14–18)
IMM GRANULOCYTES NFR BLD AUTO: 0.4 % — HIGH (ref 0.1–0.3)
LYMPHOCYTES # BLD AUTO: 0.48 K/UL — LOW (ref 1.2–3.4)
LYMPHOCYTES # BLD AUTO: 9.6 % — LOW (ref 20.5–51.1)
MAGNESIUM SERPL-MCNC: 1.5 MG/DL — LOW (ref 1.8–2.4)
MCHC RBC-ENTMCNC: 30.7 PG — SIGNIFICANT CHANGE UP (ref 27–31)
MCHC RBC-ENTMCNC: 32.3 G/DL — SIGNIFICANT CHANGE UP (ref 32–37)
MCV RBC AUTO: 95 FL — HIGH (ref 80–94)
MONOCYTES # BLD AUTO: 0.46 K/UL — SIGNIFICANT CHANGE UP (ref 0.1–0.6)
MONOCYTES NFR BLD AUTO: 9.2 % — SIGNIFICANT CHANGE UP (ref 1.7–9.3)
NEUTROPHILS # BLD AUTO: 3.92 K/UL — SIGNIFICANT CHANGE UP (ref 1.4–6.5)
NEUTROPHILS NFR BLD AUTO: 78.2 % — HIGH (ref 42.2–75.2)
NRBC BLD AUTO-RTO: 0 /100 WBCS — SIGNIFICANT CHANGE UP (ref 0–0)
PLATELET # BLD AUTO: 108 K/UL — LOW (ref 130–400)
PMV BLD: 10.8 FL — HIGH (ref 7.4–10.4)
POTASSIUM SERPL-MCNC: 3.5 MMOL/L — SIGNIFICANT CHANGE UP (ref 3.5–5)
POTASSIUM SERPL-SCNC: 3.5 MMOL/L — SIGNIFICANT CHANGE UP (ref 3.5–5)
PROT SERPL-MCNC: 5.7 G/DL — LOW (ref 6–8)
RBC # BLD: 3.78 M/UL — LOW (ref 4.7–6.1)
RBC # FLD: 13.3 % — SIGNIFICANT CHANGE UP (ref 11.5–14.5)
SODIUM SERPL-SCNC: 141 MMOL/L — SIGNIFICANT CHANGE UP (ref 135–146)
SPECIMEN SOURCE: SIGNIFICANT CHANGE UP
WBC # BLD: 5.01 K/UL — SIGNIFICANT CHANGE UP (ref 4.8–10.8)
WBC # FLD AUTO: 5.01 K/UL — SIGNIFICANT CHANGE UP (ref 4.8–10.8)

## 2025-03-11 PROCEDURE — 99232 SBSQ HOSP IP/OBS MODERATE 35: CPT

## 2025-03-11 RX ORDER — MAGNESIUM SULFATE 500 MG/ML
2 SYRINGE (ML) INJECTION
Refills: 0 | Status: COMPLETED | OUTPATIENT
Start: 2025-03-11 | End: 2025-03-11

## 2025-03-11 RX ORDER — ASPIRIN 325 MG
162 TABLET ORAL
Refills: 0 | Status: DISCONTINUED | OUTPATIENT
Start: 2025-03-11 | End: 2025-03-15

## 2025-03-11 RX ADMIN — FINASTERIDE 5 MILLIGRAM(S): 1 TABLET, FILM COATED ORAL at 11:10

## 2025-03-11 RX ADMIN — Medication 81 MILLIGRAM(S): at 11:10

## 2025-03-11 RX ADMIN — Medication 650 MILLIGRAM(S): at 12:31

## 2025-03-11 RX ADMIN — Medication 1 GRAM(S): at 17:40

## 2025-03-11 RX ADMIN — METOPROLOL SUCCINATE 25 MILLIGRAM(S): 50 TABLET, EXTENDED RELEASE ORAL at 05:52

## 2025-03-11 RX ADMIN — Medication 100 MILLIGRAM(S): at 13:03

## 2025-03-11 RX ADMIN — FOLIC ACID 1 MILLIGRAM(S): 1 TABLET ORAL at 11:10

## 2025-03-11 RX ADMIN — METOPROLOL SUCCINATE 25 MILLIGRAM(S): 50 TABLET, EXTENDED RELEASE ORAL at 17:40

## 2025-03-11 RX ADMIN — MIRABEGRON 50 MILLIGRAM(S): 50 TABLET, FILM COATED, EXTENDED RELEASE ORAL at 11:10

## 2025-03-11 RX ADMIN — EZETIMIBE 10 MILLIGRAM(S): 10 TABLET ORAL at 11:10

## 2025-03-11 RX ADMIN — Medication 100 MILLIGRAM(S): at 21:43

## 2025-03-11 RX ADMIN — Medication 100 MILLIGRAM(S): at 05:52

## 2025-03-11 RX ADMIN — ENOXAPARIN SODIUM 40 MILLIGRAM(S): 100 INJECTION SUBCUTANEOUS at 05:53

## 2025-03-11 RX ADMIN — Medication 162 MILLIGRAM(S): at 17:40

## 2025-03-11 RX ADMIN — TAMSULOSIN HYDROCHLORIDE 0.4 MILLIGRAM(S): 0.4 CAPSULE ORAL at 21:43

## 2025-03-11 RX ADMIN — Medication 1 GRAM(S): at 05:52

## 2025-03-11 RX ADMIN — Medication 1 DOSE(S): at 07:42

## 2025-03-11 RX ADMIN — Medication 25 GRAM(S): at 08:35

## 2025-03-11 RX ADMIN — AMLODIPINE BESYLATE 10 MILLIGRAM(S): 10 TABLET ORAL at 05:52

## 2025-03-11 RX ADMIN — Medication 40 MILLIGRAM(S): at 05:52

## 2025-03-11 RX ADMIN — Medication 650 MILLIGRAM(S): at 11:10

## 2025-03-11 RX ADMIN — Medication 25 GRAM(S): at 10:36

## 2025-03-11 RX ADMIN — SODIUM CHLORIDE 60 MILLILITER(S): 9 INJECTION, SOLUTION INTRAVENOUS at 07:45

## 2025-03-11 RX ADMIN — CEFTRIAXONE 100 MILLIGRAM(S): 500 INJECTION, POWDER, FOR SOLUTION INTRAMUSCULAR; INTRAVENOUS at 05:52

## 2025-03-11 RX ADMIN — Medication 1 DOSE(S): at 21:30

## 2025-03-11 RX ADMIN — Medication 1 APPLICATION(S): at 05:53

## 2025-03-11 NOTE — PROGRESS NOTE ADULT - ASSESSMENT
Patient seen at bedside. Changes made within note as well. Discussed with medical team that includes resident(s), medical student(s), nursing staff, case management.     as per initial summary.     70-year-old male with PMH of HTN, HLD, CAD s/p CABG, BPH, neurogenic bladder, intracranial hemorrhage with left-sided deficits, minimally ambulatory with a walker at baseline, RA, lung ca s/p lobectomy presenting w/ abdominal pain admitted for colitis.     # LLQ abdominal pain 2/2 colitis , improving   - Acute onset abdominal pain,   no associated nausea or vomiting  - Tenderness +nt on superficial palpation of LL abdomen now imporved   - CT abdomen showing moderate mural thickening and mucosal enhancement involving the sigmoid and descending colon, with mild pericolonic fat  stranding/ascites, suspicious for acute colitis.   - Lactate 0.7, Lipase 29, LFT WNL  - In ED: Dicyclomine, NS 1L bolus, Ceftriaxone, Metronidazole,   - Diet: Advance as tolerated   - IV fluids: D5LR 60 cc/hr   - Pain control: Acetaminophen & Tramadol   - Antibiotics: c/w Ceftriaxone and Metronidazole  - Nausea: Zofran  follow stool culture, GI PCR panel pending     # Possible Cystitis   # Pyuria  - Symptoms: Mild discomfort during the initiation of urination, Mild suprapubic tenderness, No increased frequency or urgency  - UA: Moderate LE, WBC 55, No Bacteria  - Cultures: UCx  follow   already on ceftriaxone     #thrombocytopenia  daily CBC  possible due to underlying infection.   monitor for now     # Neurogenic Bladder  # BPH  - c/w finasteride and tamsulosin   - c/w mirabegron ER    # Hx of rheumatoid arthritis   - Patient currently on abatacept infusion monthly   - c/w folic acid daily  - Outpatient rheum fu     # H/o CAD s/p CABG  # H/o ICH w/ left sided residual deficits  - On ASA 325mg qD (Patient states patient takes 162 mg BID confirm w/ Pharmacy)     # Dyslipidemia    - Cont ezetimibe 10mg qD     # HTN    - c/w amlodipine 10mg qD   - Cont metoprolol tartrate 25 BID       # COPD    -c/w OP Rx      follow up:   on abx. follow stool culture, GI PCR,  if persistent symptoms consult GI.   follow urine cultures.   confirm aspirin dose. d/w resident   monitor thrombocytopenia  can be discharged in 24 to 48 hours pending clinical improvement.  follow PT

## 2025-03-12 ENCOUNTER — RX RENEWAL (OUTPATIENT)
Age: 70
End: 2025-03-12

## 2025-03-12 LAB
ALBUMIN SERPL ELPH-MCNC: 3.2 G/DL — LOW (ref 3.5–5.2)
ALP SERPL-CCNC: 59 U/L — SIGNIFICANT CHANGE UP (ref 30–115)
ALT FLD-CCNC: 5 U/L — SIGNIFICANT CHANGE UP (ref 0–41)
ANION GAP SERPL CALC-SCNC: 11 MMOL/L — SIGNIFICANT CHANGE UP (ref 7–14)
BASOPHILS # BLD AUTO: 0.02 K/UL — SIGNIFICANT CHANGE UP (ref 0–0.2)
BASOPHILS NFR BLD AUTO: 0.4 % — SIGNIFICANT CHANGE UP (ref 0–1)
BILIRUB SERPL-MCNC: 0.3 MG/DL — SIGNIFICANT CHANGE UP (ref 0.2–1.2)
BUN SERPL-MCNC: 13 MG/DL — SIGNIFICANT CHANGE UP (ref 10–20)
CHLORIDE SERPL-SCNC: 106 MMOL/L — SIGNIFICANT CHANGE UP (ref 98–110)
CO2 SERPL-SCNC: 26 MMOL/L — SIGNIFICANT CHANGE UP (ref 17–32)
CREAT SERPL-MCNC: 1.1 MG/DL — SIGNIFICANT CHANGE UP (ref 0.7–1.5)
EGFR: 72 ML/MIN/1.73M2 — SIGNIFICANT CHANGE UP
EGFR: 72 ML/MIN/1.73M2 — SIGNIFICANT CHANGE UP
EOSINOPHIL # BLD AUTO: 0.1 K/UL — SIGNIFICANT CHANGE UP (ref 0–0.7)
EOSINOPHIL NFR BLD AUTO: 1.8 % — SIGNIFICANT CHANGE UP (ref 0–8)
GI PCR PANEL: SIGNIFICANT CHANGE UP
GLUCOSE SERPL-MCNC: 91 MG/DL — SIGNIFICANT CHANGE UP (ref 70–99)
HCT VFR BLD CALC: 36.5 % — LOW (ref 42–52)
HGB BLD-MCNC: 12 G/DL — LOW (ref 14–18)
IMM GRANULOCYTES NFR BLD AUTO: 0.5 % — HIGH (ref 0.1–0.3)
LYMPHOCYTES # BLD AUTO: 0.49 K/UL — LOW (ref 1.2–3.4)
LYMPHOCYTES # BLD AUTO: 8.7 % — LOW (ref 20.5–51.1)
MAGNESIUM SERPL-MCNC: 1.9 MG/DL — SIGNIFICANT CHANGE UP (ref 1.8–2.4)
MCHC RBC-ENTMCNC: 31 PG — SIGNIFICANT CHANGE UP (ref 27–31)
MCHC RBC-ENTMCNC: 32.9 G/DL — SIGNIFICANT CHANGE UP (ref 32–37)
MONOCYTES # BLD AUTO: 0.47 K/UL — SIGNIFICANT CHANGE UP (ref 0.1–0.6)
MONOCYTES NFR BLD AUTO: 8.3 % — SIGNIFICANT CHANGE UP (ref 1.7–9.3)
NEUTROPHILS # BLD AUTO: 4.52 K/UL — SIGNIFICANT CHANGE UP (ref 1.4–6.5)
NRBC BLD AUTO-RTO: 0 /100 WBCS — SIGNIFICANT CHANGE UP (ref 0–0)
PLATELET # BLD AUTO: 118 K/UL — LOW (ref 130–400)
PMV BLD: 11.6 FL — HIGH (ref 7.4–10.4)
POTASSIUM SERPL-MCNC: 4 MMOL/L — SIGNIFICANT CHANGE UP (ref 3.5–5)
POTASSIUM SERPL-SCNC: 4 MMOL/L — SIGNIFICANT CHANGE UP (ref 3.5–5)
PROT SERPL-MCNC: 5.8 G/DL — LOW (ref 6–8)
RBC # BLD: 3.87 M/UL — LOW (ref 4.7–6.1)
RBC # FLD: 13.2 % — SIGNIFICANT CHANGE UP (ref 11.5–14.5)
SODIUM SERPL-SCNC: 143 MMOL/L — SIGNIFICANT CHANGE UP (ref 135–146)
WBC # BLD: 5.63 K/UL — SIGNIFICANT CHANGE UP (ref 4.8–10.8)
WBC # FLD AUTO: 5.63 K/UL — SIGNIFICANT CHANGE UP (ref 4.8–10.8)

## 2025-03-12 PROCEDURE — 99232 SBSQ HOSP IP/OBS MODERATE 35: CPT

## 2025-03-12 PROCEDURE — 93970 EXTREMITY STUDY: CPT | Mod: 26

## 2025-03-12 RX ADMIN — Medication 100 MILLIGRAM(S): at 13:24

## 2025-03-12 RX ADMIN — FOLIC ACID 1 MILLIGRAM(S): 1 TABLET ORAL at 11:30

## 2025-03-12 RX ADMIN — AMLODIPINE BESYLATE 10 MILLIGRAM(S): 10 TABLET ORAL at 06:03

## 2025-03-12 RX ADMIN — FINASTERIDE 5 MILLIGRAM(S): 1 TABLET, FILM COATED ORAL at 11:30

## 2025-03-12 RX ADMIN — CEFTRIAXONE 100 MILLIGRAM(S): 500 INJECTION, POWDER, FOR SOLUTION INTRAMUSCULAR; INTRAVENOUS at 06:02

## 2025-03-12 RX ADMIN — Medication 100 MILLIGRAM(S): at 06:02

## 2025-03-12 RX ADMIN — EZETIMIBE 10 MILLIGRAM(S): 10 TABLET ORAL at 11:30

## 2025-03-12 RX ADMIN — METOPROLOL SUCCINATE 25 MILLIGRAM(S): 50 TABLET, EXTENDED RELEASE ORAL at 06:03

## 2025-03-12 RX ADMIN — Medication 162 MILLIGRAM(S): at 06:08

## 2025-03-12 RX ADMIN — MIRABEGRON 50 MILLIGRAM(S): 50 TABLET, FILM COATED, EXTENDED RELEASE ORAL at 11:30

## 2025-03-12 RX ADMIN — Medication 2 PUFF(S): at 03:37

## 2025-03-12 RX ADMIN — Medication 40 MILLIGRAM(S): at 06:03

## 2025-03-12 RX ADMIN — Medication 1 GRAM(S): at 17:31

## 2025-03-12 RX ADMIN — Medication 100 MILLIGRAM(S): at 21:52

## 2025-03-12 RX ADMIN — Medication 1 APPLICATION(S): at 06:04

## 2025-03-12 RX ADMIN — METOPROLOL SUCCINATE 25 MILLIGRAM(S): 50 TABLET, EXTENDED RELEASE ORAL at 17:32

## 2025-03-12 RX ADMIN — Medication 1 GRAM(S): at 06:02

## 2025-03-12 RX ADMIN — Medication 162 MILLIGRAM(S): at 17:31

## 2025-03-12 RX ADMIN — ENOXAPARIN SODIUM 40 MILLIGRAM(S): 100 INJECTION SUBCUTANEOUS at 06:03

## 2025-03-12 NOTE — PHYSICAL THERAPY INITIAL EVALUATION ADULT - IMPAIRMENTS CONTRIBUTING TO GAIT DEVIATIONS, PT EVAL
decreased endurance/impaired balance/impaired coordination/decreased flexibility/impaired motor control/decreased strength

## 2025-03-12 NOTE — PROGRESS NOTE ADULT - ASSESSMENT
70-year-old male with PMH of HTN, HLD, CAD s/p CABG, BPH, neurogenic bladder, intracranial hemorrhage with left-sided deficits, minimally ambulatory with a walker at baseline, RA, lung ca s/p lobectomy presenting w/ abdominal pain admitted for colitis.     # LLQ abdominal pain 2/2 colitis , improving   - Acute onset abdominal pain,   no associated nausea or vomiting  - Tenderness +nt on superficial palpation of LL abdomen now improved   - CT abdomen showing moderate mural thickening and mucosal enhancement involving the sigmoid and descending colon, with mild pericolonic fat  stranding/ascites, suspicious for acute colitis.   - Lactate 0.7, Lipase 29, LFT WNL  - - Antibiotics: c/w Ceftriaxone and Metronidazole  -follow stool culture, GI PCR panel pending -- no parasites    # Possible Cystitis   # Pyuria  - Symptoms: Mild discomfort during the initiation of urination, Mild suprapubic tenderness, No increased frequency or urgency  - UA: Moderate LE, WBC 55, No Bacteria  - Cultures: UCx  enterococcus S to vanco  ID eval requested       #thrombocytopenia  fluctuating levels-- autoimmune ?    # Neurogenic Bladder  # BPH  - c/w finasteride and tamsulosin   - c/w mirabegron ER    # Hx of rheumatoid arthritis   - Patient currently on abatacept infusion monthly   - c/w folic acid daily  - Outpatient rheum fu     # H/o CAD s/p CABG  # H/o ICH w/ left sided residual deficits  - On ASA (Patient states patient takes 162 mg BID confirm w/ Pharmacy)     # Dyslipidemia    - Cont ezetimibe 10mg qD     # HTN    - c/w amlodipine 10mg qD   - Cont metoprolol tartrate 25 BID       # COPD    -c/w OP Rx      follow up:   on abx.  ID consult is pending  PT-- walked 45 ft with assistance 70-year-old male with PMH of HTN, HLD, CAD s/p CABG, BPH, neurogenic bladder, intracranial hemorrhage with left-sided deficits, minimally ambulatory with a walker at baseline, RA, lung ca s/p lobectomy presenting w/ abdominal pain admitted for colitis.     # LLQ abdominal pain 2/2 colitis , improving   - Acute onset abdominal pain,   no associated nausea or vomiting  - Tenderness +nt on superficial palpation of LL abdomen now improved   - CT abdomen showing moderate mural thickening and mucosal enhancement involving the sigmoid and descending colon, with mild pericolonic fat  stranding/ascites, suspicious for acute colitis.   - Lactate 0.7, Lipase 29, LFT WNL  - - Antibiotics: c/w Ceftriaxone and Metronidazole  -follow stool culture, GI PCR panel pending -- no parasites    # Possible Cystitis   # Pyuria  - Symptoms: Mild discomfort during the initiation of urination, Mild suprapubic tenderness, No increased frequency or urgency  - UA: Moderate LE, WBC 55, No Bacteria  - Cultures: UCx  enterococcus S to vanco  ID eval requested       #thrombocytopenia  fluctuating levels-- autoimmune ?    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  ID consult is pending  PT-- walked 45 ft with assistance 70-year-old male with PMH of HTN, HLD, CAD s/p CABG, BPH, neurogenic bladder, intracranial hemorrhage with left-sided deficits, minimally ambulatory with a walker at baseline, RA, lung ca s/p lobectomy presenting w/ abdominal pain admitted for colitis.     # LLQ abdominal pain 2/2 colitis , improving   - Acute onset abdominal pain,   no associated nausea or vomiting  - Tenderness +nt on superficial palpation of LL abdomen now improved   - CT abdomen showing moderate mural thickening and mucosal enhancement involving the sigmoid and descending colon, with mild pericolonic fat  stranding/ascites, suspicious for acute colitis.   - Lactate 0.7, Lipase 29, LFT WNL  - - Antibiotics: c/w Ceftriaxone and Metronidazole  -follow stool culture, GI PCR panel pending -- no parasites    # Possible Cystitis   # Pyuria  - Symptoms: Mild discomfort during the initiation of urination, Mild suprapubic tenderness, No increased frequency or urgency  - UA: Moderate LE, WBC 55, No Bacteria  - Cultures: UCx  enterococcus S to vanco  ID eval requested       #thrombocytopenia  fluctuating levels-- autoimmune ?    # Neurogenic Bladder  # BPH  - c/w finasteride and tamsulosin   - c/w mirabegron ER    # Hx of rheumatoid arthritis   - Patient currently on abatacept infusion monthly   - c/w folic acid daily  - Outpatient rheum fu     # H/o CAD s/p CABG  # H/o ICH w/ left sided residual deficits  - On ASA (Patient states patient takes 162 mg BID confirm w/ Pharmacy)     # Dyslipidemia    - Cont ezetimibe 10mg qD     # HTN    - c/w amlodipine 10mg qD   - Cont metoprolol tartrate 25 BID       # COPD    -c/w OP Rx    # chr DVT in lt femoral vein 1/2/25-- hx of IC hge and will discuss with him about AC-- repeat duplex again  follow up:   on abx.  ID consult is pending  PT-- walked 45 ft with assistance 70-year-old male with PMH of HTN, HLD, CAD s/p CABG, BPH, neurogenic bladder, intracranial hemorrhage with left-sided deficits, minimally ambulatory with a walker at baseline, RA, lung ca s/p lobectomy presenting w/ abdominal pain admitted for colitis.     # LLQ abdominal pain 2/2 colitis , improving   - Acute onset abdominal pain,   no associated nausea or vomiting  - Tenderness +nt on superficial palpation of LL abdomen now improved   - CT abdomen showing moderate mural thickening and mucosal enhancement involving the sigmoid and descending colon, with mild pericolonic fat  stranding/ascites, suspicious for acute colitis.   - Lactate 0.7, Lipase 29, LFT WNL  - - Antibiotics: completed Ceftriaxone and Metronidazole  -follow stool culture, GI PCR panel pending -- no parasites    # Possible Cystitis   # Pyuria  - Symptoms: Mild discomfort during the initiation of urination, Mild suprapubic tenderness, No increased frequency or urgency  - UA: Moderate LE, WBC 55, No Bacteria  - Cultures: UCx  enterococcus S to vanco  ID eval no abx       #thrombocytopenia  fluctuating levels-- autoimmune ?    # Neurogenic Bladder  # BPH  - c/w finasteride and tamsulosin   - c/w mirabegron ER    # Hx of rheumatoid arthritis   - Patient currently on abatacept infusion monthly --can increase risk of DVT and patient told me his rheumatologist told him this  - c/w folic acid daily  - Outpatient rheum fu     # H/o CAD s/p CABG  # H/o ICH w/ left sided residual deficits  - On ASA (Patient states patient takes 162 mg BID confirm w/ Pharmacy)     # Dyslipidemia    - Cont ezetimibe 10mg qD     # HTN    - c/w amlodipine 10mg qD   - Cont metoprolol tartrate 25 BID       # COPD    -c/w OP Rx    # chr DVT in lt femoral vein 1/2/25-- hx of IC hge and will discuss with him about AC-- repeat duplex again     PT-- walked 45 ft with assistance  pending neurosurgery eval to start AC

## 2025-03-12 NOTE — PHYSICAL THERAPY INITIAL EVALUATION ADULT - ADDITIONAL COMMENTS
Patient lives with girlfriend in a house with 3 steps to enter. Was assisted in ADLs and independent in short distance ambulation using RW.

## 2025-03-12 NOTE — PHYSICAL THERAPY INITIAL EVALUATION ADULT - PERTINENT HX OF CURRENT PROBLEM, REHAB EVAL
70-year-old male with PMH of HTN, HLD, CAD s/p CABG, BPH, neurogenic bladder, intracranial hemorrhage with left-sided deficits, minimally ambulatory with a walker at baseline, RA, lung ca s/p lobectomy presents with sudden onset abdominal pain. Patient states sudden onset LLQ pain this morning, severe, started while he was enjoying his breakfast, associated symptoms of dizziness, no nausea, vomiting or headache. Patient has had altered bowel movements for few days, explains he has constipation, after passage of small amount of hard stools he has few episodes of watery diarrhea non-bloody, non-mucoid.

## 2025-03-13 LAB
-  AZITHROMYCIN: SIGNIFICANT CHANGE UP
ALBUMIN SERPL ELPH-MCNC: 2.9 G/DL — LOW (ref 3.5–5.2)
ALP SERPL-CCNC: 49 U/L — SIGNIFICANT CHANGE UP (ref 30–115)
ALT FLD-CCNC: 8 U/L — SIGNIFICANT CHANGE UP (ref 0–41)
ANION GAP SERPL CALC-SCNC: 10 MMOL/L — SIGNIFICANT CHANGE UP (ref 7–14)
AST SERPL-CCNC: 23 U/L — SIGNIFICANT CHANGE UP (ref 0–41)
BASOPHILS NFR BLD AUTO: 0.4 % — SIGNIFICANT CHANGE UP (ref 0–1)
BILIRUB SERPL-MCNC: 0.3 MG/DL — SIGNIFICANT CHANGE UP (ref 0.2–1.2)
BUN SERPL-MCNC: 13 MG/DL — SIGNIFICANT CHANGE UP (ref 10–20)
CALCIUM SERPL-MCNC: 8.2 MG/DL — LOW (ref 8.4–10.5)
CHLORIDE SERPL-SCNC: 107 MMOL/L — SIGNIFICANT CHANGE UP (ref 98–110)
CO2 SERPL-SCNC: 23 MMOL/L — SIGNIFICANT CHANGE UP (ref 17–32)
CREAT SERPL-MCNC: 1.1 MG/DL — SIGNIFICANT CHANGE UP (ref 0.7–1.5)
CULTURE RESULTS: ABNORMAL
EGFR: 72 ML/MIN/1.73M2 — SIGNIFICANT CHANGE UP
EGFR: 72 ML/MIN/1.73M2 — SIGNIFICANT CHANGE UP
EOSINOPHIL # BLD AUTO: 0.13 K/UL — SIGNIFICANT CHANGE UP (ref 0–0.7)
EOSINOPHIL NFR BLD AUTO: 2.6 % — SIGNIFICANT CHANGE UP (ref 0–8)
GLUCOSE SERPL-MCNC: 91 MG/DL — SIGNIFICANT CHANGE UP (ref 70–99)
HCT VFR BLD CALC: 34.5 % — LOW (ref 42–52)
HGB BLD-MCNC: 11.2 G/DL — LOW (ref 14–18)
IMM GRANULOCYTES NFR BLD AUTO: 0.4 % — HIGH (ref 0.1–0.3)
LYMPHOCYTES # BLD AUTO: 0.67 K/UL — LOW (ref 1.2–3.4)
LYMPHOCYTES # BLD AUTO: 13.6 % — LOW (ref 20.5–51.1)
MAGNESIUM SERPL-MCNC: 1.7 MG/DL — LOW (ref 1.8–2.4)
MCHC RBC-ENTMCNC: 30.4 PG — SIGNIFICANT CHANGE UP (ref 27–31)
MCHC RBC-ENTMCNC: 32.5 G/DL — SIGNIFICANT CHANGE UP (ref 32–37)
MCV RBC AUTO: 93.5 FL — SIGNIFICANT CHANGE UP (ref 80–94)
METHOD TYPE: SIGNIFICANT CHANGE UP
MONOCYTES # BLD AUTO: 0.59 K/UL — SIGNIFICANT CHANGE UP (ref 0.1–0.6)
MONOCYTES NFR BLD AUTO: 12 % — HIGH (ref 1.7–9.3)
NEUTROPHILS # BLD AUTO: 3.49 K/UL — SIGNIFICANT CHANGE UP (ref 1.4–6.5)
NEUTROPHILS NFR BLD AUTO: 71 % — SIGNIFICANT CHANGE UP (ref 42.2–75.2)
NRBC BLD AUTO-RTO: 0 /100 WBCS — SIGNIFICANT CHANGE UP (ref 0–0)
PMV BLD: 11.3 FL — HIGH (ref 7.4–10.4)
POTASSIUM SERPL-MCNC: 4 MMOL/L — SIGNIFICANT CHANGE UP (ref 3.5–5)
POTASSIUM SERPL-SCNC: 4 MMOL/L — SIGNIFICANT CHANGE UP (ref 3.5–5)
PROT SERPL-MCNC: 5.4 G/DL — LOW (ref 6–8)
RBC # BLD: 3.69 M/UL — LOW (ref 4.7–6.1)
SODIUM SERPL-SCNC: 140 MMOL/L — SIGNIFICANT CHANGE UP (ref 135–146)
SPECIMEN SOURCE: SIGNIFICANT CHANGE UP
WBC # BLD: 4.92 K/UL — SIGNIFICANT CHANGE UP (ref 4.8–10.8)
WBC # FLD AUTO: 4.92 K/UL — SIGNIFICANT CHANGE UP (ref 4.8–10.8)

## 2025-03-13 PROCEDURE — 99232 SBSQ HOSP IP/OBS MODERATE 35: CPT

## 2025-03-13 PROCEDURE — 70450 CT HEAD/BRAIN W/O DYE: CPT | Mod: 26

## 2025-03-13 RX ADMIN — Medication 162 MILLIGRAM(S): at 17:25

## 2025-03-13 RX ADMIN — EZETIMIBE 10 MILLIGRAM(S): 10 TABLET ORAL at 11:29

## 2025-03-13 RX ADMIN — AMLODIPINE BESYLATE 10 MILLIGRAM(S): 10 TABLET ORAL at 05:51

## 2025-03-13 RX ADMIN — ENOXAPARIN SODIUM 40 MILLIGRAM(S): 100 INJECTION SUBCUTANEOUS at 05:50

## 2025-03-13 RX ADMIN — Medication 1 GRAM(S): at 05:51

## 2025-03-13 RX ADMIN — Medication 1 GRAM(S): at 17:25

## 2025-03-13 RX ADMIN — Medication 40 MILLIGRAM(S): at 05:50

## 2025-03-13 RX ADMIN — Medication 2 PUFF(S): at 15:23

## 2025-03-13 RX ADMIN — METOPROLOL SUCCINATE 25 MILLIGRAM(S): 50 TABLET, EXTENDED RELEASE ORAL at 17:25

## 2025-03-13 RX ADMIN — Medication 100 MILLIGRAM(S): at 05:50

## 2025-03-13 RX ADMIN — CEFTRIAXONE 100 MILLIGRAM(S): 500 INJECTION, POWDER, FOR SOLUTION INTRAMUSCULAR; INTRAVENOUS at 05:50

## 2025-03-13 RX ADMIN — Medication 100 MILLIGRAM(S): at 13:18

## 2025-03-13 RX ADMIN — Medication 1 APPLICATION(S): at 05:51

## 2025-03-13 RX ADMIN — MIRABEGRON 50 MILLIGRAM(S): 50 TABLET, FILM COATED, EXTENDED RELEASE ORAL at 11:29

## 2025-03-13 RX ADMIN — METOPROLOL SUCCINATE 25 MILLIGRAM(S): 50 TABLET, EXTENDED RELEASE ORAL at 05:51

## 2025-03-13 RX ADMIN — FOLIC ACID 1 MILLIGRAM(S): 1 TABLET ORAL at 11:29

## 2025-03-13 RX ADMIN — TAMSULOSIN HYDROCHLORIDE 0.4 MILLIGRAM(S): 0.4 CAPSULE ORAL at 21:39

## 2025-03-13 RX ADMIN — Medication 162 MILLIGRAM(S): at 05:50

## 2025-03-13 RX ADMIN — FINASTERIDE 5 MILLIGRAM(S): 1 TABLET, FILM COATED ORAL at 11:29

## 2025-03-13 NOTE — CONSULT NOTE ADULT - ASSESSMENT
70-year-old male with PMH of HTN, HLD, CAD s/p CABG, BPH, neurogenic bladder, intracranial hemorrhage with left-sided deficits, minimally ambulatory with a walker at baseline, RA, lung ca s/p lobectomy presents with sudden onset abdominal pain. Patient states sudden onset LLQ pain this morning, severe, started while he was enjoying his breakfast, associated symptoms of dizziness, no nausea, vomiting or headache. Patient has had altered bowel movements for few days, explains he has constipation, after passage of small amount of hard stools he has few episodes of watery diarrhea non-bloody, non-mucoid.     Vascular surgery consulted for imagining finding of DVT of the left common femoral vein in his previous admission, now redemonstrated on imaging. Discussed with the patient the history of his AC. He mentioned he has a history of Afib and PE, for which he was on xarelto since 2018. Afterwards he had a hemorraghic stroke and AC was stopped.     PLAN:   - I personally evaluated the patient  - I personally reviewed the images  - Discussed with fellow on call: The patient's stroke was over 5 years ago, the need to be off anticoagulation has to be rediscussed with neurosurgery, as he could be eligible for AC again. This patient's DVT, although chronic, has to be treated as acute, as it wasn't in the past. Discussed with the patient the need to AC, and he understood the benefits and risks and is open to discussion with neurosurgery.   - Vascular surgery to follow on neurosurgery reeval for AC need.  - If patient refuses AC and insists on alternatives like an IVC filter, please contact IR to place IVC filter.  - No further intervention by vascular surgery    [id number]
ASSESSMENT AND PLAN:  70-year-old male with PMH of HTN, HLD, CAD s/p CABG, BPH, neurogenic bladder, intracranial hemorrhage with left-sided deficits, minimally ambulatory with a walker at baseline, RA, lung ca s/p lobectomy presents with sudden onset abdominal pain.    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 IR consulted for possible IVC filter.  - AC as per primary team. Pending discussion with neurosurgery given history of hemorrhagic stroke  - If patient fails trial of AC or is not amenable to AC, please re-call IR for evaluation of IVC filter.  - No IR intervention at this time.    Please call Interventional Radiology with questions or concerns:   - M-F [phone]: [phone]   - All other hours: [id number]
70-year-old male with PMH of HTN, HLD, CAD s/p CABG, BPH, neurogenic bladder, intracranial hemorrhage with left-sided deficits, minimally ambulatory with a walker at baseline, RA, lung ca s/p lobectomy presents with sudden onset abdominal pain. Patient states sudden onset LLQ pain this morning, severe, started while he was enjoying his breakfast, associated symptoms of dizziness, no nausea, vomiting or headache. Patient has had altered bowel movements for few days, explains he has constipation, after passage of small amount of hard stools he has few episodes of watery diarrhea non-bloody, non-mucoid.     ED Course:  Vitals: HDS and Afebrile  WBC 4.22, Hb 12.3, K+ 5.5 (Hemolysed), Lactate 0.7, Lipase 29, LFT WNL   UA: Moderate LE, WBC 55       IMPRESSION/RECOMMENDATIONS  Immunosuppression/Immunosenescence ( above age 60 yrs there is a exponential decline in immunity which could result in poor clinical outcomes.   Colitis resolved  3/10 Stool : rare Shigella  3/10 GI PCR NG  Had colitis on presentation which has resolved  3/9 CT A/P:  Moderate mural thickening and mucosal enhancement involving the sigmoid and descending colon, with mild pericolonic fat  stranding/ascites, suspicious for acute colitis   WBC 4.9  Clinically no  infection  Asymptomatic bacteuria with E faecium    HTN  CAD s/p CABG  BPH, neurogenic bladder  Intracranial hemorrhage with left-sided deficits, minimally ambulatory with a walker at baseline, RA   lung ca s/p lobectomy    -Off loading to prevent pressure sores and preventive measures to avoid aspiration  -no Abx    Discussion of management/test results( independently interpretated by me ) /antibiotic regimen  with external/primary medical team. Dr Cao

## 2025-03-13 NOTE — PROGRESS NOTE ADULT - ASSESSMENT
70-year-old male with PMH of HTN, HLD, CAD s/p CABG, BPH, neurogenic bladder, intracranial hemorrhage with left-sided deficits, minimally ambulatory with a walker at baseline, RA, lung ca s/p lobectomy presenting w/ abdominal pain admitted for colitis.     # LLQ abdominal pain 2/2 colitis , improving   - Acute onset abdominal pain,   no associated nausea or vomiting  - Tenderness +nt on superficial palpation of LL abdomen now improved   - CT abdomen showing moderate mural thickening and mucosal enhancement involving the sigmoid and descending colon, with mild pericolonic fat  stranding/ascites, suspicious for acute colitis.   - Lactate 0.7, Lipase 29, LFT WNL  - - Antibiotics: c/w Ceftriaxone and Metronidazole  -follow stool culture, -- shigella,GI PCR panel pending -- no parasites    # Possible Cystitis   # Pyuria  - Symptoms: Mild discomfort during the initiation of urination, Mild suprapubic tenderness, No increased frequency or urgency  - UA: Moderate LE, WBC 55, No Bacteria  - Cultures: UCx  enterococcus S to vanco  ID eval suggested no ABX       #thrombocytopenia  fluctuating levels-- autoimmune ?    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

## 2025-03-13 NOTE — CONSULT NOTE ADULT - TIME BILLING
-My assessment required my independent history taking and time required is independent of the teaching service  -I independently interpreted the most recent imaging ( CXR ) and all available labs ( CBC, CMP) and cultures ( along with the sensitivities / ALPA )  -Time excludes teaching time  -I reviewed all prior tests and documents  -I discussed my recommendations with the primary team housestaff/Attending

## 2025-03-13 NOTE — CONSULT NOTE ADULT - SUBJECTIVE AND OBJECTIVE BOX
VASCULAR SURGERY CONSULT NOTE      HPI:  70-year-old male with PMH of HTN, HLD, CAD s/p CABG, BPH, neurogenic bladder, intracranial hemorrhage with left-sided deficits, minimally ambulatory with a walker at baseline, RA, lung ca s/p lobectomy presents with sudden onset abdominal pain. Patient states sudden onset LLQ pain this morning, severe, started while he was enjoying his breakfast, associated symptoms of dizziness, no nausea, vomiting or headache. Patient has had altered bowel movements for few days, explains he has constipation, after passage of small amount of hard stools he has few episodes of watery diarrhea non-bloody, non-mucoid.     ED Course:  Vitals: HDS and Afebrile  WBC 4.22, Hb 12.3, K+ 5.5 (Hemolysed), Lactate 0.7, Lipase 29, LFT WNL   UA: Moderate LE, WBC 55   CT A/P:  Moderate mural thickening and mucosal enhancement involving the sigmoid and descending colon, with mild pericolonic fat  stranding/ascites, suspicious for acute colitis   Medications: Dicyclomine, NS 1L bolus, Ceftriaxone, Metronidazole, Ketorolac      Patient being admitted to medicine for further evaluation and management of colitis.      Vascular surgery consulted for imagining finding of DVT of the left common femoral vein. Discussed with the patient the history of his AC. He mentioned he has a history of Afib and PE, for which he was on xarelto since 2018. Afterwards he had a hemorraghic stroke and AC was stopped.     (09 Mar 2025 17:41)  PAST MEDICAL & SURGICAL HISTORY:  Stroke  CAD (coronary artery disease)  Pulmonary embolism  COPD, mild  History of BPH  HTN (hypertension)  Degeneration of spine  Kidney stone  RA (rheumatoid arthritis)  High cholesterol  Infrarenal abdominal aortic aneurysm (AAA) without rupture  History of GI bleed  Lung cancer  Diverticulosis  Colon polyp  History of coronary artery bypass, single  History of hip replacement  S/P lobectomy of lung    lactose (Diarrhea)  enalapril (Unknown)  baclofen (Unknown)  atorvastatin (Unknown)    Home Medications:  folic acid 1 mg oral tablet: 1 tab(s) orally once a day (09 Mar 2025 17:31)  tamsulosin 0.4 mg oral capsule: 1 cap(s) orally once a day (09 Mar 2025 17:31)    No permtinent family history of PVD    REVIEW OF SYSTEMS:  GENERAL:                                         negative  SKIN:                                                 negative  OPTHALMOLOGIC:                          negative  ENMT:                                               negative  RESPIRATORY AND THORAX:        negative  CARDIOVASCULAR:                         negative  GASTROINTESTINAL:                       negative  NEPHROLOGY:                                  negative  MUSCULOSKELETAL:                       negative  NEUROLOGIC:                                   negative  PSYCHIATRIC:                                    negative  HEMATOLOGY/LYMPHATICS:         negative  ENDOCRINE:                                     negative  ALLERGIC/IMMUNOLOGIC:            negative    PHYSICAL EXAM  Vital Signs Last 24 Hrs  T(C): 36.7 (13 Mar 2025 08:04), Max: 36.8 (12 Mar 2025 16:17)  T(F): 98.1 (13 Mar 2025 08:04), Max: 98.3 (12 Mar 2025 16:17)  HR: 76 (13 Mar 2025 08:04) (61 - 76)  BP: 115/70 (13 Mar 2025 08:04) (113/67 - 132/69)  BP(mean): --  RR: 17 (13 Mar 2025 08:04) (17 - 18)  SpO2: 98% (13 Mar 2025 08:04) (93% - 98%)    Parameters below as of 13 Mar 2025 08:04  Patient On (Oxygen Delivery Method): room air        Appearance: Normal	  HEENT: Normal oral mucosa, PERRL, EOMI	  Cardiovascular: RRR  Respiratory: Lungs clear to auscultation, No Rales, Rhonchi, Wheezing	  Gastrointestinal:  Soft, Non-tender, positive BS	  Skin: No rashes, No ecchymoses, No cyanosis  Extremities: Normal range of motion, No clubbing, cyanosis or edema  Vascular: Peripheral pulses palpable 2+ bilaterally  Neurologic: Non-focal  Psychiatry: A & O x 3, Mood & affect appropriate      MEDICATIONS:   MEDICATIONS  (STANDING):  amLODIPine   Tablet 10 milliGRAM(s) Oral daily  aspirin enteric coated 162 milliGRAM(s) Oral two times a day  cefTRIAXone   IVPB 1000 milliGRAM(s) IV Intermittent every 24 hours  chlorhexidine 2% Cloths 1 Application(s) Topical <User Schedule>  dextrose 5% + lactated ringers. 1000 milliLiter(s) (60 mL/Hr) IV Continuous <Continuous>  enoxaparin Injectable 40 milliGRAM(s) SubCutaneous every 24 hours  ezetimibe 10 milliGRAM(s) Oral daily  finasteride 5 milliGRAM(s) Oral daily  fluticasone propionate/ salmeterol 100-50 MICROgram(s) Diskus 1 Dose(s) Inhalation two times a day  folic acid 1 milliGRAM(s) Oral daily  metoprolol tartrate 25 milliGRAM(s) Oral two times a day  metroNIDAZOLE  IVPB 500 milliGRAM(s) IV Intermittent every 8 hours  mirabegron ER 50 milliGRAM(s) Oral daily  pantoprazole    Tablet 40 milliGRAM(s) Oral before breakfast  sucralfate 1 Gram(s) Oral two times a day  tamsulosin 0.4 milliGRAM(s) Oral at bedtime    MEDICATIONS  (PRN):  acetaminophen     Tablet .. 650 milliGRAM(s) Oral every 6 hours PRN Temp greater or equal to 38C (100.4F), Mild Pain (1 - 3)  albuterol    90 MICROgram(s) HFA Inhaler 2 Puff(s) Inhalation every 6 hours PRN Shortness of Breath and/or Wheezing  ondansetron Injectable 4 milliGRAM(s) IV Push every 8 hours PRN Nausea and/or Vomiting  traMADol 25 milliGRAM(s) Oral every 8 hours PRN Moderate Pain (4 - 6)      LAB/STUDIES:                        11.2   4.92  )-----------( 116      ( 13 Mar 2025 05:28 )             34.5     03-13    140  |  107  |  13  ----------------------------<  91  4.0   |  23  |  1.1    Ca    8.2[L]      13 Mar 2025 05:28  Mg     1.7     03-13    TPro  5.4[L]  /  Alb  2.9[L]  /  TBili  0.3  /  DBili  x   /  AST  23  /  ALT  8   /  AlkPhos  49  03-13      LIVER FUNCTIONS - ( 13 Mar 2025 05:28 )  Alb: 2.9 g/dL / Pro: 5.4 g/dL / ALK PHOS: 49 U/L / ALT: 8 U/L / AST: 23 U/L / GGT: x             Urinalysis Basic - ( 13 Mar 2025 05:28 )    Color: x / Appearance: x / SG: x / pH: x  Gluc: 91 mg/dL / Ketone: x  / Bili: x / Urobili: x   Blood: x / Protein: x / Nitrite: x   Leuk Esterase: x / RBC: x / WBC x   Sq Epi: x / Non Sq Epi: x / Bacteria: x      Culture - Stool (collected 10 Mar 2025 12:55)  Source: Stool Feces  Preliminary Report (12 Mar 2025 22:07):    Rare Shigella species    (Stool culture examined for Salmonella,    Shigella, Campylobacter, Aeromonas, Plesiomonas,    Vibrio, E.coli O157 and Yersinia)
INTERVENTIONAL RADIOLOGY CONSULT:     Procedure Requested: IVC Filter    HPI:  70-year-old male with PMH of HTN, HLD, CAD s/p CABG, BPH, neurogenic bladder, intracranial hemorrhage with left-sided deficits, minimally ambulatory with a walker at baseline, RA, lung ca s/p lobectomy presents with sudden onset abdominal pain. Patient states sudden onset LLQ pain this morning, severe, started while he was enjoying his breakfast, associated symptoms of dizziness, no nausea, vomiting or headache. Patient has had altered bowel movements for few days, explains he has constipation, after passage of small amount of hard stools he has few episodes of watery diarrhea non-bloody, non-mucoid.     ED Course:  Vitals: HDS and Afebrile  WBC 4.22, Hb 12.3, K+ 5.5 (Hemolysed), Lactate 0.7, Lipase 29, LFT WNL   UA: Moderate LE, WBC 55   CT A/P:  Moderate mural thickening and mucosal enhancement involving the sigmoid and descending colon, with mild pericolonic fat  stranding/ascites, suspicious for acute colitis   Medications: Dicyclomine, NS 1L bolus, Ceftriaxone, Metronidazole, Ketorolac      Patient being admitted to medicine for further evaluation and management of colitis.              (09 Mar 2025 17:41)      PAST MEDICAL & SURGICAL HISTORY:  Stroke      CAD (coronary artery disease)      Pulmonary embolism      COPD, mild      History of BPH      HTN (hypertension)      Degeneration of spine      Kidney stone      RA (rheumatoid arthritis)      High cholesterol      Infrarenal abdominal aortic aneurysm (AAA) without rupture      History of GI bleed      Lung cancer      Diverticulosis      Colon polyp      History of coronary artery bypass, single      History of hip replacement      S/P lobectomy of lung          MEDICATIONS  (STANDING):  amLODIPine   Tablet 10 milliGRAM(s) Oral daily  aspirin enteric coated 162 milliGRAM(s) Oral two times a day  chlorhexidine 2% Cloths 1 Application(s) Topical <User Schedule>  dextrose 5% + lactated ringers. 1000 milliLiter(s) (60 mL/Hr) IV Continuous <Continuous>  enoxaparin Injectable 40 milliGRAM(s) SubCutaneous every 24 hours  ezetimibe 10 milliGRAM(s) Oral daily  finasteride 5 milliGRAM(s) Oral daily  fluticasone propionate/ salmeterol 100-50 MICROgram(s) Diskus 1 Dose(s) Inhalation two times a day  folic acid 1 milliGRAM(s) Oral daily  metoprolol tartrate 25 milliGRAM(s) Oral two times a day  mirabegron ER 50 milliGRAM(s) Oral daily  pantoprazole    Tablet 40 milliGRAM(s) Oral before breakfast  sucralfate 1 Gram(s) Oral two times a day  tamsulosin 0.4 milliGRAM(s) Oral at bedtime    MEDICATIONS  (PRN):  acetaminophen     Tablet .. 650 milliGRAM(s) Oral every 6 hours PRN Temp greater or equal to 38C (100.4F), Mild Pain (1 - 3)  albuterol    90 MICROgram(s) HFA Inhaler 2 Puff(s) Inhalation every 6 hours PRN Shortness of Breath and/or Wheezing  ondansetron Injectable 4 milliGRAM(s) IV Push every 8 hours PRN Nausea and/or Vomiting  traMADol 25 milliGRAM(s) Oral every 8 hours PRN Moderate Pain (4 - 6)      Allergies    enalapril (Unknown)  baclofen (Unknown)  atorvastatin (Unknown)    Intolerances    lactose (Diarrhea)        FAMILY HISTORY:  FH: breast cancer    FH: lupus    FH: heart disease        Physical Exam:   Vital Signs Last 24 Hrs  T(C): 36.7 (13 Mar 2025 08:04), Max: 36.7 (13 Mar 2025 00:26)  T(F): 98.1 (13 Mar 2025 08:04), Max: 98.1 (13 Mar 2025 08:04)  HR: 76 (13 Mar 2025 08:04) (67 - 76)  BP: 115/70 (13 Mar 2025 08:04) (113/67 - 132/69)  BP(mean): --  RR: 17 (13 Mar 2025 08:04) (17 - 18)  SpO2: 98% (13 Mar 2025 08:04) (98% - 98%)    Parameters below as of 13 Mar 2025 08:04  Patient On (Oxygen Delivery Method): room air          Labs:                         11.2   4.92  )-----------( 116      ( 13 Mar 2025 05:28 )             34.5     03-13    140  |  107  |  13  ----------------------------<  91  4.0   |  23  |  1.1    Ca    8.2[L]      13 Mar 2025 05:28  Mg     1.7     03-13    TPro  5.4[L]  /  Alb  2.9[L]  /  TBili  0.3  /  DBili  x   /  AST  23  /  ALT  8   /  AlkPhos  49  03-13        Pertinent labs:                      11.2   4.92  )-----------( 116      ( 13 Mar 2025 05:28 )             34.5       03-13    140  |  107  |  13  ----------------------------<  91  4.0   |  23  |  1.1    Ca    8.2[L]      13 Mar 2025 05:28  Mg     1.7     03-13    TPro  5.4[L]  /  Alb  2.9[L]  /  TBili  0.3  /  DBili  x   /  AST  23  /  ALT  8   /  AlkPhos  49  03-13          Radiology & Additional Studies:     Radiology imaging reviewed.         
  ANN BAKERIS  70y, Male  Allergy: lactose (Diarrhea)  enalapril (Unknown)  baclofen (Unknown)  atorvastatin (Unknown)      All historical available data reviewed.    HPI:  70-year-old male with PMH of HTN, HLD, CAD s/p CABG, BPH, neurogenic bladder, intracranial hemorrhage with left-sided deficits, minimally ambulatory with a walker at baseline, RA, lung ca s/p lobectomy presents with sudden onset abdominal pain. Patient states sudden onset LLQ pain this morning, severe, started while he was enjoying his breakfast, associated symptoms of dizziness, no nausea, vomiting or headache. Patient has had altered bowel movements for few days, explains he has constipation, after passage of small amount of hard stools he has few episodes of watery diarrhea non-bloody, non-mucoid.     ED Course:  Vitals: HDS and Afebrile  WBC 4.22, Hb 12.3, K+ 5.5 (Hemolysed), Lactate 0.7, Lipase 29, LFT WNL   UA: Moderate LE, WBC 55   CT A/P:  Moderate mural thickening and mucosal enhancement involving the sigmoid and descending colon, with mild pericolonic fat  stranding/ascites, suspicious for acute colitis   Medications: Dicyclomine, NS 1L bolus, Ceftriaxone, Metronidazole, Ketorolac      Patient being admitted to medicine for further evaluation and management of colitis.       (09 Mar 2025 17:41)    INFECTIOUS DISEASE HISTORY.  ID consulted for diagnostic work up/ABx recommendations  Prior hospital charts reviewed.   Primary team notes reviewed.   Other consultant notes reviewed.   Prior cultures noted     FAMILY HISTORY:  FH: breast cancer    FH: lupus    FH: heart disease      PAST MEDICAL & SURGICAL HISTORY:  Stroke      CAD (coronary artery disease)      Pulmonary embolism      COPD, mild      History of BPH      HTN (hypertension)      Degeneration of spine      Kidney stone      RA (rheumatoid arthritis)      High cholesterol      Infrarenal abdominal aortic aneurysm (AAA) without rupture      History of GI bleed      Lung cancer      Diverticulosis      Colon polyp      History of coronary artery bypass, single      History of hip replacement      S/P lobectomy of lung            VITALS:  T(F): 98.1, Max: 98.3 (03-12-25 @ 16:17)  HR: 76  BP: 115/70  RR: 17Vital Signs Last 24 Hrs  T(C): 36.7 (13 Mar 2025 08:04), Max: 36.8 (12 Mar 2025 16:17)  T(F): 98.1 (13 Mar 2025 08:04), Max: 98.3 (12 Mar 2025 16:17)  HR: 76 (13 Mar 2025 08:04) (61 - 76)  BP: 115/70 (13 Mar 2025 08:04) (113/67 - 132/69)  BP(mean): --  RR: 17 (13 Mar 2025 08:04) (17 - 18)  SpO2: 98% (13 Mar 2025 08:04) (93% - 98%)    Parameters below as of 13 Mar 2025 08:04  Patient On (Oxygen Delivery Method): room air        TESTS & MEASUREMENTS:                        11.2   4.92  )-----------( 116      ( 13 Mar 2025 05:28 )             34.5     03-13    140  |  107  |  13  ----------------------------<  91  4.0   |  23  |  1.1    Ca    8.2[L]      13 Mar 2025 05:28  Mg     1.7     03-13    TPro  5.4[L]  /  Alb  2.9[L]  /  TBili  0.3  /  DBili  x   /  AST  23  /  ALT  8   /  AlkPhos  49  03-13    LIVER FUNCTIONS - ( 13 Mar 2025 05:28 )  Alb: 2.9 g/dL / Pro: 5.4 g/dL / ALK PHOS: 49 U/L / ALT: 8 U/L / AST: 23 U/L / GGT: x             Culture - Stool (collected 03-10-25 @ 12:55)  Source: Stool Feces  Preliminary Report (03-12-25 @ 22:07):    Rare Shigella species    (Stool culture examined for Salmonella,    Shigella, Campylobacter, Aeromonas, Plesiomonas,    Vibrio, E.coli O157 and Yersinia)    Culture - Blood (collected 03-09-25 @ 14:12)  Source: Blood Blood  Preliminary Report (03-13-25 @ 02:00):    No growth at 72 Hours    Culture - Blood (collected 03-09-25 @ 14:12)  Source: Blood Blood  Preliminary Report (03-13-25 @ 02:00):    No growth at 72 Hours    Culture - Urine (collected 03-09-25 @ 11:15)  Source: Clean Catch None  Final Report (03-11-25 @ 18:46):    50,000 - 99,000 CFU/mL Enterococcus faecium    <10,000 CFU/ml Normal Urogenital dorothea present  Organism: Enterococcus faecium (03-11-25 @ 18:46)  Organism: Enterococcus faecium (03-11-25 @ 18:46)      Method Type: ALPA      -  Ampicillin: R >8 Predicts results to ampicillin/sulbactam, amoxacillin-clavulanate and  piperacillin-tazobactam.      -  Ciprofloxacin: R >2      -  Levofloxacin: R >4      -  Nitrofurantoin: S <=32 Should not be used to treat pyelonephritis.      -  Tetracycline: R >8      -  Vancomycin: S 0.5    Urinalysis with Rflx Culture (collected 03-09-25 @ 11:15)      Urinalysis Basic - ( 13 Mar 2025 05:28 )    Color: x / Appearance: x / SG: x / pH: x  Gluc: 91 mg/dL / Ketone: x  / Bili: x / Urobili: x   Blood: x / Protein: x / Nitrite: x   Leuk Esterase: x / RBC: x / WBC x   Sq Epi: x / Non Sq Epi: x / Bacteria: x          RADIOLOGY & ADDITIONAL TESTS:  Personal review of radiological diagnostics performed  Echo and EKG results noted when applicable.     MEDICATIONS:  acetaminophen     Tablet .. 650 milliGRAM(s) Oral every 6 hours PRN  albuterol    90 MICROgram(s) HFA Inhaler 2 Puff(s) Inhalation every 6 hours PRN  amLODIPine   Tablet 10 milliGRAM(s) Oral daily  aspirin enteric coated 162 milliGRAM(s) Oral two times a day  cefTRIAXone   IVPB 1000 milliGRAM(s) IV Intermittent every 24 hours  chlorhexidine 2% Cloths 1 Application(s) Topical <User Schedule>  dextrose 5% + lactated ringers. 1000 milliLiter(s) IV Continuous <Continuous>  enoxaparin Injectable 40 milliGRAM(s) SubCutaneous every 24 hours  ezetimibe 10 milliGRAM(s) Oral daily  finasteride 5 milliGRAM(s) Oral daily  fluticasone propionate/ salmeterol 100-50 MICROgram(s) Diskus 1 Dose(s) Inhalation two times a day  folic acid 1 milliGRAM(s) Oral daily  metoprolol tartrate 25 milliGRAM(s) Oral two times a day  metroNIDAZOLE  IVPB 500 milliGRAM(s) IV Intermittent every 8 hours  mirabegron ER 50 milliGRAM(s) Oral daily  ondansetron Injectable 4 milliGRAM(s) IV Push every 8 hours PRN  pantoprazole    Tablet 40 milliGRAM(s) Oral before breakfast  sucralfate 1 Gram(s) Oral two times a day  tamsulosin 0.4 milliGRAM(s) Oral at bedtime  traMADol 25 milliGRAM(s) Oral every 8 hours PRN      ANTIBIOTICS:  cefTRIAXone   IVPB 1000 milliGRAM(s) IV Intermittent every 24 hours  metroNIDAZOLE  IVPB 500 milliGRAM(s) IV Intermittent every 8 hours

## 2025-03-14 LAB
-  AMPICILLIN: SIGNIFICANT CHANGE UP
-  CIPROFLOXACIN: SIGNIFICANT CHANGE UP
-  TRIMETHOPRIM/SULFAMETHOXAZOLE: SIGNIFICANT CHANGE UP
ALBUMIN SERPL ELPH-MCNC: 3.4 G/DL — LOW (ref 3.5–5.2)
ALP SERPL-CCNC: 61 U/L — SIGNIFICANT CHANGE UP (ref 30–115)
ALT FLD-CCNC: 15 U/L — SIGNIFICANT CHANGE UP (ref 0–41)
ANION GAP SERPL CALC-SCNC: 9 MMOL/L — SIGNIFICANT CHANGE UP (ref 7–14)
AST SERPL-CCNC: 40 U/L — SIGNIFICANT CHANGE UP (ref 0–41)
BASOPHILS NFR BLD AUTO: 0.6 % — SIGNIFICANT CHANGE UP (ref 0–1)
BILIRUB SERPL-MCNC: 0.3 MG/DL — SIGNIFICANT CHANGE UP (ref 0.2–1.2)
BUN SERPL-MCNC: 12 MG/DL — SIGNIFICANT CHANGE UP (ref 10–20)
CALCIUM SERPL-MCNC: 8.7 MG/DL — SIGNIFICANT CHANGE UP (ref 8.4–10.5)
CHLORIDE SERPL-SCNC: 104 MMOL/L — SIGNIFICANT CHANGE UP (ref 98–110)
CO2 SERPL-SCNC: 27 MMOL/L — SIGNIFICANT CHANGE UP (ref 17–32)
CREAT SERPL-MCNC: 1.2 MG/DL — SIGNIFICANT CHANGE UP (ref 0.7–1.5)
EGFR: 65 ML/MIN/1.73M2 — SIGNIFICANT CHANGE UP
EGFR: 65 ML/MIN/1.73M2 — SIGNIFICANT CHANGE UP
EOSINOPHIL # BLD AUTO: 0.13 K/UL — SIGNIFICANT CHANGE UP (ref 0–0.7)
EOSINOPHIL NFR BLD AUTO: 2.6 % — SIGNIFICANT CHANGE UP (ref 0–8)
GLUCOSE SERPL-MCNC: 78 MG/DL — SIGNIFICANT CHANGE UP (ref 70–99)
HCT VFR BLD CALC: 37.4 % — LOW (ref 42–52)
HGB BLD-MCNC: 12.1 G/DL — LOW (ref 14–18)
IMM GRANULOCYTES NFR BLD AUTO: 0.6 % — HIGH (ref 0.1–0.3)
LYMPHOCYTES # BLD AUTO: 0.62 K/UL — LOW (ref 1.2–3.4)
LYMPHOCYTES # BLD AUTO: 12.4 % — LOW (ref 20.5–51.1)
MAGNESIUM SERPL-MCNC: 1.7 MG/DL — LOW (ref 1.8–2.4)
MCHC RBC-ENTMCNC: 30.6 PG — SIGNIFICANT CHANGE UP (ref 27–31)
MCHC RBC-ENTMCNC: 32.4 G/DL — SIGNIFICANT CHANGE UP (ref 32–37)
MCV RBC AUTO: 94.4 FL — HIGH (ref 80–94)
MONOCYTES NFR BLD AUTO: 14.1 % — HIGH (ref 1.7–9.3)
NEUTROPHILS # BLD AUTO: 3.47 K/UL — SIGNIFICANT CHANGE UP (ref 1.4–6.5)
NEUTROPHILS NFR BLD AUTO: 69.7 % — SIGNIFICANT CHANGE UP (ref 42.2–75.2)
NRBC BLD AUTO-RTO: 0 /100 WBCS — SIGNIFICANT CHANGE UP (ref 0–0)
ORGANISM # SPEC MICROSCOPIC CNT: ABNORMAL
ORGANISM # SPEC MICROSCOPIC CNT: ABNORMAL
PLATELET # BLD AUTO: 141 K/UL — SIGNIFICANT CHANGE UP (ref 130–400)
PMV BLD: 11.4 FL — HIGH (ref 7.4–10.4)
POTASSIUM SERPL-MCNC: 4.4 MMOL/L — SIGNIFICANT CHANGE UP (ref 3.5–5)
POTASSIUM SERPL-SCNC: 4.4 MMOL/L — SIGNIFICANT CHANGE UP (ref 3.5–5)
PROT SERPL-MCNC: 6.4 G/DL — SIGNIFICANT CHANGE UP (ref 6–8)
RBC # BLD: 3.96 M/UL — LOW (ref 4.7–6.1)
RBC # FLD: 12.8 % — SIGNIFICANT CHANGE UP (ref 11.5–14.5)
SODIUM SERPL-SCNC: 140 MMOL/L — SIGNIFICANT CHANGE UP (ref 135–146)
WBC # FLD AUTO: 4.98 K/UL — SIGNIFICANT CHANGE UP (ref 4.8–10.8)

## 2025-03-14 PROCEDURE — 99232 SBSQ HOSP IP/OBS MODERATE 35: CPT

## 2025-03-14 RX ORDER — MAGNESIUM SULFATE 500 MG/ML
2 SYRINGE (ML) INJECTION ONCE
Refills: 0 | Status: COMPLETED | OUTPATIENT
Start: 2025-03-14 | End: 2025-03-14

## 2025-03-14 RX ORDER — APIXABAN 2.5 MG/1
5 TABLET, FILM COATED ORAL EVERY 12 HOURS
Refills: 0 | Status: DISCONTINUED | OUTPATIENT
Start: 2025-03-14 | End: 2025-03-16

## 2025-03-14 RX ADMIN — Medication 1 GRAM(S): at 17:42

## 2025-03-14 RX ADMIN — MIRABEGRON 50 MILLIGRAM(S): 50 TABLET, FILM COATED, EXTENDED RELEASE ORAL at 12:25

## 2025-03-14 RX ADMIN — ENOXAPARIN SODIUM 40 MILLIGRAM(S): 100 INJECTION SUBCUTANEOUS at 05:23

## 2025-03-14 RX ADMIN — AMLODIPINE BESYLATE 10 MILLIGRAM(S): 10 TABLET ORAL at 05:22

## 2025-03-14 RX ADMIN — Medication 162 MILLIGRAM(S): at 17:42

## 2025-03-14 RX ADMIN — EZETIMIBE 10 MILLIGRAM(S): 10 TABLET ORAL at 12:23

## 2025-03-14 RX ADMIN — APIXABAN 5 MILLIGRAM(S): 2.5 TABLET, FILM COATED ORAL at 17:42

## 2025-03-14 RX ADMIN — METOPROLOL SUCCINATE 25 MILLIGRAM(S): 50 TABLET, EXTENDED RELEASE ORAL at 05:23

## 2025-03-14 RX ADMIN — METOPROLOL SUCCINATE 25 MILLIGRAM(S): 50 TABLET, EXTENDED RELEASE ORAL at 17:42

## 2025-03-14 RX ADMIN — Medication 1 DOSE(S): at 21:25

## 2025-03-14 RX ADMIN — Medication 162 MILLIGRAM(S): at 05:22

## 2025-03-14 RX ADMIN — Medication 1 APPLICATION(S): at 05:27

## 2025-03-14 RX ADMIN — TAMSULOSIN HYDROCHLORIDE 0.4 MILLIGRAM(S): 0.4 CAPSULE ORAL at 21:25

## 2025-03-14 RX ADMIN — FINASTERIDE 5 MILLIGRAM(S): 1 TABLET, FILM COATED ORAL at 12:20

## 2025-03-14 RX ADMIN — Medication 1 GRAM(S): at 05:22

## 2025-03-14 RX ADMIN — FOLIC ACID 1 MILLIGRAM(S): 1 TABLET ORAL at 12:20

## 2025-03-14 RX ADMIN — Medication 25 GRAM(S): at 12:22

## 2025-03-14 RX ADMIN — Medication 40 MILLIGRAM(S): at 05:23

## 2025-03-14 NOTE — CHART NOTE - NSCHARTNOTEFT_GEN_A_CORE
Called for restarting of Anticoagulation. Head CT reviewed with Dr. Larson. < from: CT Head No Cont (03.13.25 @ 16:43) >    No evidence of acute intracranial pathology. Stable exam    < end of copied text >    Pt is cleared to restart Anticoagulation from our standpoint

## 2025-03-15 ENCOUNTER — TRANSCRIPTION ENCOUNTER (OUTPATIENT)
Age: 70
End: 2025-03-15

## 2025-03-15 LAB
ALBUMIN SERPL ELPH-MCNC: 3.1 G/DL — LOW (ref 3.5–5.2)
ALP SERPL-CCNC: 55 U/L — SIGNIFICANT CHANGE UP (ref 30–115)
ALT FLD-CCNC: 17 U/L — SIGNIFICANT CHANGE UP (ref 0–41)
ANION GAP SERPL CALC-SCNC: 11 MMOL/L — SIGNIFICANT CHANGE UP (ref 7–14)
AST SERPL-CCNC: 38 U/L — SIGNIFICANT CHANGE UP (ref 0–41)
BASOPHILS # BLD AUTO: 0 K/UL — SIGNIFICANT CHANGE UP (ref 0–0.2)
BASOPHILS NFR BLD AUTO: 0 % — SIGNIFICANT CHANGE UP (ref 0–1)
BILIRUB SERPL-MCNC: 0.3 MG/DL — SIGNIFICANT CHANGE UP (ref 0.2–1.2)
BUN SERPL-MCNC: 17 MG/DL — SIGNIFICANT CHANGE UP (ref 10–20)
CALCIUM SERPL-MCNC: 8.2 MG/DL — LOW (ref 8.4–10.5)
CHLORIDE SERPL-SCNC: 103 MMOL/L — SIGNIFICANT CHANGE UP (ref 98–110)
CO2 SERPL-SCNC: 24 MMOL/L — SIGNIFICANT CHANGE UP (ref 17–32)
CREAT SERPL-MCNC: 1.3 MG/DL — SIGNIFICANT CHANGE UP (ref 0.7–1.5)
CULTURE RESULTS: SIGNIFICANT CHANGE UP
CULTURE RESULTS: SIGNIFICANT CHANGE UP
EGFR: 59 ML/MIN/1.73M2 — LOW
EOSINOPHIL # BLD AUTO: 0.04 K/UL — SIGNIFICANT CHANGE UP (ref 0–0.7)
EOSINOPHIL NFR BLD AUTO: 0.9 % — SIGNIFICANT CHANGE UP (ref 0–8)
GLUCOSE SERPL-MCNC: 79 MG/DL — SIGNIFICANT CHANGE UP (ref 70–99)
HCT VFR BLD CALC: 35.8 % — LOW (ref 42–52)
HGB BLD-MCNC: 11.8 G/DL — LOW (ref 14–18)
LYMPHOCYTES # BLD AUTO: 0.78 K/UL — LOW (ref 1.2–3.4)
LYMPHOCYTES # BLD AUTO: 17.7 % — LOW (ref 20.5–51.1)
MAGNESIUM SERPL-MCNC: 1.7 MG/DL — LOW (ref 1.8–2.4)
MANUAL SMEAR VERIFICATION: SIGNIFICANT CHANGE UP
MCHC RBC-ENTMCNC: 30.9 PG — SIGNIFICANT CHANGE UP (ref 27–31)
MCHC RBC-ENTMCNC: 33 G/DL — SIGNIFICANT CHANGE UP (ref 32–37)
MCV RBC AUTO: 93.7 FL — SIGNIFICANT CHANGE UP (ref 80–94)
MONOCYTES # BLD AUTO: 0.7 K/UL — HIGH (ref 0.1–0.6)
MONOCYTES NFR BLD AUTO: 15.9 % — HIGH (ref 1.7–9.3)
NEUTROPHILS # BLD AUTO: 2.81 K/UL — SIGNIFICANT CHANGE UP (ref 1.4–6.5)
NEUTROPHILS NFR BLD AUTO: 63.7 % — SIGNIFICANT CHANGE UP (ref 42.2–75.2)
PLAT MORPH BLD: NORMAL — SIGNIFICANT CHANGE UP
PLATELET # BLD AUTO: 120 K/UL — LOW (ref 130–400)
PMV BLD: 11.2 FL — HIGH (ref 7.4–10.4)
POLYCHROMASIA BLD QL SMEAR: SLIGHT — SIGNIFICANT CHANGE UP
POTASSIUM SERPL-MCNC: 4.5 MMOL/L — SIGNIFICANT CHANGE UP (ref 3.5–5)
POTASSIUM SERPL-SCNC: 4.5 MMOL/L — SIGNIFICANT CHANGE UP (ref 3.5–5)
PROT SERPL-MCNC: 6.1 G/DL — SIGNIFICANT CHANGE UP (ref 6–8)
RBC # BLD: 3.82 M/UL — LOW (ref 4.7–6.1)
RBC # FLD: 13 % — SIGNIFICANT CHANGE UP (ref 11.5–14.5)
RBC BLD AUTO: NORMAL — SIGNIFICANT CHANGE UP
SMUDGE CELLS # BLD: PRESENT — SIGNIFICANT CHANGE UP
SODIUM SERPL-SCNC: 138 MMOL/L — SIGNIFICANT CHANGE UP (ref 135–146)
SPECIMEN SOURCE: SIGNIFICANT CHANGE UP
SPECIMEN SOURCE: SIGNIFICANT CHANGE UP
VARIANT LYMPHS # BLD: 1.8 % — SIGNIFICANT CHANGE UP (ref 0–5)
VARIANT LYMPHS NFR BLD MANUAL: 1.8 % — SIGNIFICANT CHANGE UP (ref 0–5)
WBC # BLD: 4.41 K/UL — LOW (ref 4.8–10.8)
WBC # FLD AUTO: 4.41 K/UL — LOW (ref 4.8–10.8)

## 2025-03-15 PROCEDURE — 99232 SBSQ HOSP IP/OBS MODERATE 35: CPT

## 2025-03-15 RX ORDER — APIXABAN 2.5 MG/1
1 TABLET, FILM COATED ORAL
Qty: 60 | Refills: 0
Start: 2025-03-15 | End: 2025-04-13

## 2025-03-15 RX ADMIN — AMLODIPINE BESYLATE 10 MILLIGRAM(S): 10 TABLET ORAL at 05:28

## 2025-03-15 RX ADMIN — Medication 40 MILLIGRAM(S): at 05:28

## 2025-03-15 RX ADMIN — EZETIMIBE 10 MILLIGRAM(S): 10 TABLET ORAL at 12:03

## 2025-03-15 RX ADMIN — APIXABAN 5 MILLIGRAM(S): 2.5 TABLET, FILM COATED ORAL at 05:28

## 2025-03-15 RX ADMIN — MIRABEGRON 50 MILLIGRAM(S): 50 TABLET, FILM COATED, EXTENDED RELEASE ORAL at 12:49

## 2025-03-15 RX ADMIN — METOPROLOL SUCCINATE 25 MILLIGRAM(S): 50 TABLET, EXTENDED RELEASE ORAL at 17:37

## 2025-03-15 RX ADMIN — TAMSULOSIN HYDROCHLORIDE 0.4 MILLIGRAM(S): 0.4 CAPSULE ORAL at 21:11

## 2025-03-15 RX ADMIN — APIXABAN 5 MILLIGRAM(S): 2.5 TABLET, FILM COATED ORAL at 17:37

## 2025-03-15 RX ADMIN — Medication 1 GRAM(S): at 05:28

## 2025-03-15 RX ADMIN — METOPROLOL SUCCINATE 25 MILLIGRAM(S): 50 TABLET, EXTENDED RELEASE ORAL at 05:28

## 2025-03-15 RX ADMIN — FINASTERIDE 5 MILLIGRAM(S): 1 TABLET, FILM COATED ORAL at 12:04

## 2025-03-15 RX ADMIN — Medication 1 GRAM(S): at 17:37

## 2025-03-15 RX ADMIN — Medication 1 APPLICATION(S): at 05:30

## 2025-03-15 RX ADMIN — FOLIC ACID 1 MILLIGRAM(S): 1 TABLET ORAL at 12:04

## 2025-03-15 RX ADMIN — Medication 162 MILLIGRAM(S): at 05:28

## 2025-03-15 NOTE — DISCHARGE NOTE NURSING/CASE MANAGEMENT/SOCIAL WORK - NSDCPEFALRISK_GEN_ALL_CORE
For information on Fall & Injury Prevention, visit: [email or web address] OR  [email or web address] OR  https://www.cdc.gov/steadi/patient.html

## 2025-03-15 NOTE — PROGRESS NOTE ADULT - ASSESSMENT
70-year-old male with PMH of HTN, HLD, CAD s/p CABG, BPH, neurogenic bladder, intracranial hemorrhage with left-sided deficits, minimally ambulatory with a walker at baseline, RA, lung ca s/p lobectomy presenting w/ abdominal pain admitted for colitis.     # LLQ abdominal pain 2/2 colitis , improving   - Acute onset abdominal pain,   no associated nausea or vomiting  - Tenderness +nt on superficial palpation of LL abdomen now improved   - CT abdomen showing moderate mural thickening and mucosal enhancement involving the sigmoid and descending colon, with mild pericolonic fat  stranding/ascites, suspicious for acute colitis.   - Lactate 0.7, Lipase 29, LFT WNL  - - Antibiotics: c/w Ceftriaxone and Metronidazole now stopped  -follow stool culture, -- shigella,GI PCR panel pending -- no parasites    # Possible Cystitis   # Pyuria  - Symptoms: Mild discomfort during the initiation of urination, Mild suprapubic tenderness, No increased frequency or urgency  - UA: Moderate LE, WBC 55, No Bacteria  - Cultures: UCx  enterococcus S to vanco  ID eval suggested no ABX       #thrombocytopenia  fluctuating levels-- autoimmune ?    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  started on eliquis  DC plan AM

## 2025-03-15 NOTE — DISCHARGE NOTE NURSING/CASE MANAGEMENT/SOCIAL WORK - FINANCIAL ASSISTANCE
[facility] provides services at a reduced cost to those who are determined to be eligible through [facility]’s financial assistance program. Information regarding [facility]’s financial assistance program can be found by going to [email or web address] or by calling [phone].

## 2025-03-15 NOTE — DISCHARGE NOTE NURSING/CASE MANAGEMENT/SOCIAL WORK - PATIENT PORTAL LINK FT
You can access the FollowMyHealth Patient Portal offered by [facility] by registering at the following website: [email or web address]. By joining [employer]’s FollowMyHealth portal, you will also be able to view your health information using other applications (apps) compatible with our system.

## 2025-03-16 VITALS
RESPIRATION RATE: 18 BRPM | HEART RATE: 69 BPM | TEMPERATURE: 98 F | OXYGEN SATURATION: 97 % | DIASTOLIC BLOOD PRESSURE: 72 MMHG | SYSTOLIC BLOOD PRESSURE: 119 MMHG

## 2025-03-16 LAB
ALBUMIN SERPL ELPH-MCNC: 3.1 G/DL — LOW (ref 3.5–5.2)
ALP SERPL-CCNC: 55 U/L — SIGNIFICANT CHANGE UP (ref 30–115)
ALT FLD-CCNC: 18 U/L — SIGNIFICANT CHANGE UP (ref 0–41)
ANION GAP SERPL CALC-SCNC: 9 MMOL/L — SIGNIFICANT CHANGE UP (ref 7–14)
AST SERPL-CCNC: 30 U/L — SIGNIFICANT CHANGE UP (ref 0–41)
BASOPHILS # BLD AUTO: 0.03 K/UL — SIGNIFICANT CHANGE UP (ref 0–0.2)
BASOPHILS NFR BLD AUTO: 0.6 % — SIGNIFICANT CHANGE UP (ref 0–1)
BILIRUB SERPL-MCNC: 0.2 MG/DL — SIGNIFICANT CHANGE UP (ref 0.2–1.2)
BUN SERPL-MCNC: 24 MG/DL — HIGH (ref 10–20)
CALCIUM SERPL-MCNC: 8.4 MG/DL — SIGNIFICANT CHANGE UP (ref 8.4–10.5)
CO2 SERPL-SCNC: 28 MMOL/L — SIGNIFICANT CHANGE UP (ref 17–32)
CREAT SERPL-MCNC: 1.2 MG/DL — SIGNIFICANT CHANGE UP (ref 0.7–1.5)
EGFR: 65 ML/MIN/1.73M2 — SIGNIFICANT CHANGE UP
EGFR: 65 ML/MIN/1.73M2 — SIGNIFICANT CHANGE UP
EOSINOPHIL # BLD AUTO: 0.08 K/UL — SIGNIFICANT CHANGE UP (ref 0–0.7)
EOSINOPHIL NFR BLD AUTO: 1.5 % — SIGNIFICANT CHANGE UP (ref 0–8)
GLUCOSE SERPL-MCNC: 82 MG/DL — SIGNIFICANT CHANGE UP (ref 70–99)
HCT VFR BLD CALC: 35 % — LOW (ref 42–52)
HGB BLD-MCNC: 11.4 G/DL — LOW (ref 14–18)
IMM GRANULOCYTES NFR BLD AUTO: 0.8 % — HIGH (ref 0.1–0.3)
LYMPHOCYTES # BLD AUTO: 0.84 K/UL — LOW (ref 1.2–3.4)
LYMPHOCYTES # BLD AUTO: 16.1 % — LOW (ref 20.5–51.1)
MAGNESIUM SERPL-MCNC: 1.7 MG/DL — LOW (ref 1.8–2.4)
MCHC RBC-ENTMCNC: 32.6 G/DL — SIGNIFICANT CHANGE UP (ref 32–37)
MCV RBC AUTO: 94.3 FL — HIGH (ref 80–94)
MONOCYTES # BLD AUTO: 0.98 K/UL — HIGH (ref 0.1–0.6)
MONOCYTES NFR BLD AUTO: 18.8 % — HIGH (ref 1.7–9.3)
NEUTROPHILS # BLD AUTO: 3.24 K/UL — SIGNIFICANT CHANGE UP (ref 1.4–6.5)
NEUTROPHILS NFR BLD AUTO: 62.2 % — SIGNIFICANT CHANGE UP (ref 42.2–75.2)
NRBC BLD AUTO-RTO: 0 /100 WBCS — SIGNIFICANT CHANGE UP (ref 0–0)
PLATELET # BLD AUTO: 124 K/UL — LOW (ref 130–400)
PMV BLD: 11.1 FL — HIGH (ref 7.4–10.4)
POTASSIUM SERPL-MCNC: 4.5 MMOL/L — SIGNIFICANT CHANGE UP (ref 3.5–5)
POTASSIUM SERPL-SCNC: 4.5 MMOL/L — SIGNIFICANT CHANGE UP (ref 3.5–5)
PROT SERPL-MCNC: 6 G/DL — SIGNIFICANT CHANGE UP (ref 6–8)
RBC # FLD: 12.8 % — SIGNIFICANT CHANGE UP (ref 11.5–14.5)
SODIUM SERPL-SCNC: 140 MMOL/L — SIGNIFICANT CHANGE UP (ref 135–146)
WBC # BLD: 5.21 K/UL — SIGNIFICANT CHANGE UP (ref 4.8–10.8)
WBC # FLD AUTO: 5.21 K/UL — SIGNIFICANT CHANGE UP (ref 4.8–10.8)

## 2025-03-16 PROCEDURE — 99239 HOSP IP/OBS DSCHRG MGMT >30: CPT

## 2025-03-16 RX ORDER — MAGNESIUM SULFATE 500 MG/ML
2 SYRINGE (ML) INJECTION
Refills: 0 | Status: COMPLETED | OUTPATIENT
Start: 2025-03-16 | End: 2025-03-16

## 2025-03-16 RX ADMIN — FINASTERIDE 5 MILLIGRAM(S): 1 TABLET, FILM COATED ORAL at 11:13

## 2025-03-16 RX ADMIN — Medication 1 GRAM(S): at 05:33

## 2025-03-16 RX ADMIN — Medication 25 GRAM(S): at 11:15

## 2025-03-16 RX ADMIN — Medication 25 GRAM(S): at 10:30

## 2025-03-16 RX ADMIN — EZETIMIBE 10 MILLIGRAM(S): 10 TABLET ORAL at 11:13

## 2025-03-16 RX ADMIN — Medication 1 APPLICATION(S): at 05:35

## 2025-03-16 RX ADMIN — Medication 40 MILLIGRAM(S): at 05:34

## 2025-03-16 RX ADMIN — FOLIC ACID 1 MILLIGRAM(S): 1 TABLET ORAL at 11:13

## 2025-03-16 RX ADMIN — MIRABEGRON 50 MILLIGRAM(S): 50 TABLET, FILM COATED, EXTENDED RELEASE ORAL at 11:12

## 2025-03-16 RX ADMIN — APIXABAN 5 MILLIGRAM(S): 2.5 TABLET, FILM COATED ORAL at 05:33

## 2025-03-16 RX ADMIN — AMLODIPINE BESYLATE 10 MILLIGRAM(S): 10 TABLET ORAL at 05:33

## 2025-03-16 RX ADMIN — METOPROLOL SUCCINATE 25 MILLIGRAM(S): 50 TABLET, EXTENDED RELEASE ORAL at 05:33

## 2025-03-16 NOTE — PROGRESS NOTE ADULT - ASSESSMENT
70-year-old male with PMH of HTN, HLD, CAD s/p CABG, BPH, neurogenic bladder, intracranial hemorrhage with left-sided deficits, minimally ambulatory with a walker at baseline, RA, lung ca s/p lobectomy presenting w/ abdominal pain admitted for colitis.     # LLQ abdominal pain 2/2 colitis , improving   - Acute onset abdominal pain,   no associated nausea or vomiting  - Tenderness +nt on superficial palpation of LL abdomen now improved   - CT abdomen showing moderate mural thickening and mucosal enhancement involving the sigmoid and descending colon, with mild pericolonic fat  stranding/ascites, suspicious for acute colitis.   - Lactate 0.7, Lipase 29, LFT WNL  - - Antibiotics: c/w Ceftriaxone and Metronidazole now stopped  -follow stool culture, -- shigella,GI PCR panel pending -- no parasites    # Possible Cystitis   # Pyuria  - Symptoms: Mild discomfort during the initiation of urination, Mild suprapubic tenderness, No increased frequency or urgency  - UA: Moderate LE, WBC 55, No Bacteria  - Cultures: UCx  enterococcus S to vanco  ID eval suggested no ABX       #thrombocytopenia  fluctuating levels-- autoimmune ?    # Neurogenic Bladder  # BPH  - c/w finasteride and tamsulosin   - c/w mirabegron ER    # Hx of rheumatoid arthritis   - Patient currently on abatacept infusion monthly -- can cause DVTs and patient told me this as per his rheumatologist  - c/w folic acid daily  - Outpatient rheum fu     # H/o CAD s/p CABG  # H/o ICH w/ left sided residual deficits  - On ASA (Patient states patient takes 162 mg BID confirm w/ Pharmacy) -- now stopped  CT head  normal and neurosurgery  cleared for  anticoagulation    # Dyslipidemia    - Cont ezetimibe 10mg qD     # HTN    - c/w amlodipine 10mg qD   - Cont metoprolol tartrate 25 BID       # COPD    -c/w OP Rx    # 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  started on eliquis-- currently asymptomatic--  DC plan toady-- spent more than 30mins

## 2025-03-16 NOTE — PROGRESS NOTE ADULT - SUBJECTIVE AND OBJECTIVE BOX
SUBJECTIVE:    Patient is a 70y old Male who presents with a chief complaint of Abdominal Pain (13 Mar 2025 16:18)    Currently admitted to medicine with the primary diagnosis of Acute colitis       Today is hospital day 4d.     PAST MEDICAL & SURGICAL HISTORY  Stroke    CAD (coronary artery disease)    Pulmonary embolism    COPD, mild    History of BPH    HTN (hypertension)    Degeneration of spine    Kidney stone    RA (rheumatoid arthritis)    High cholesterol    Infrarenal abdominal aortic aneurysm (AAA) without rupture    History of GI bleed    Lung cancer    Diverticulosis    Colon polyp    History of coronary artery bypass, single    History of hip replacement    S/P lobectomy of lung      ALLERGIES:  enalapril (Unknown)  baclofen (Unknown)  atorvastatin (Unknown)    MEDICATIONS:  STANDING MEDICATIONS  amLODIPine   Tablet 10 milliGRAM(s) Oral daily  aspirin enteric coated 162 milliGRAM(s) Oral two times a day  chlorhexidine 2% Cloths 1 Application(s) Topical <User Schedule>  dextrose 5% + lactated ringers. 1000 milliLiter(s) IV Continuous <Continuous>  enoxaparin Injectable 40 milliGRAM(s) SubCutaneous every 24 hours  ezetimibe 10 milliGRAM(s) Oral daily  finasteride 5 milliGRAM(s) Oral daily  fluticasone propionate/ salmeterol 100-50 MICROgram(s) Diskus 1 Dose(s) Inhalation two times a day  folic acid 1 milliGRAM(s) Oral daily  metoprolol tartrate 25 milliGRAM(s) Oral two times a day  mirabegron ER 50 milliGRAM(s) Oral daily  pantoprazole    Tablet 40 milliGRAM(s) Oral before breakfast  sucralfate 1 Gram(s) Oral two times a day  tamsulosin 0.4 milliGRAM(s) Oral at bedtime    PRN MEDICATIONS  acetaminophen     Tablet .. 650 milliGRAM(s) Oral every 6 hours PRN  albuterol    90 MICROgram(s) HFA Inhaler 2 Puff(s) Inhalation every 6 hours PRN  ondansetron Injectable 4 milliGRAM(s) IV Push every 8 hours PRN  traMADol 25 milliGRAM(s) Oral every 8 hours PRN    VITALS:   T(F): 98.6  HR: 63  BP: 126/73  RR: 18  SpO2: 97%    LABS:                        11.2   4.92  )-----------( 116      ( 13 Mar 2025 05:28 )             34.5     03-13    140  |  107  |  13  ----------------------------<  91  4.0   |  23  |  1.1    Ca    8.2[L]      13 Mar 2025 05:28  Mg     1.7     03-13    TPro  5.4[L]  /  Alb  2.9[L]  /  TBili  0.3  /  DBili  x   /  AST  23  /  ALT  8   /  AlkPhos  49  03-13      Urinalysis Basic - ( 13 Mar 2025 05:28 )    Color: x / Appearance: x / SG: x / pH: x  Gluc: 91 mg/dL / Ketone: x  / Bili: x / Urobili: x   Blood: x / Protein: x / Nitrite: x   Leuk Esterase: x / RBC: x / WBC x   Sq Epi: x / Non Sq Epi: x / Bacteria: x                RADIOLOGY:    PHYSICAL EXAM:  GEN: No acute distress  LUNGS: Clear to auscultation bilaterally   HEART: S1/S2 present. RRR.   ABD/ GI: Soft, non-tender, non-distended. Bowel sounds present  EXT: NC/NC/NE/2+PP/JARRELL  NEURO: AAOX3    
SUBJECTIVE:    Patient is a 70y old Male who presents with a chief complaint of Abdominal Pain (15 Mar 2025 15:00)    Currently admitted to medicine with the primary diagnosis of Acute colitis       Today is hospital day 6d.     PAST MEDICAL & SURGICAL HISTORY  Stroke    CAD (coronary artery disease)    Pulmonary embolism    COPD, mild    History of BPH    HTN (hypertension)    Degeneration of spine    Kidney stone    RA (rheumatoid arthritis)    Infrarenal abdominal aortic aneurysm (AAA) without rupture    History of GI bleed    Lung cancer    Diverticulosis    Colon polyp    History of coronary artery bypass, single    History of hip replacement    S/P lobectomy of lung      ALLERGIES:  enalapril (Unknown)  baclofen (Unknown)  atorvastatin (Unknown)    MEDICATIONS:  STANDING MEDICATIONS  amLODIPine   Tablet 10 milliGRAM(s) Oral daily  apixaban 5 milliGRAM(s) Oral every 12 hours  chlorhexidine 2% Cloths 1 Application(s) Topical <User Schedule>  dextrose 5% + lactated ringers. 1000 milliLiter(s) IV Continuous <Continuous>  ezetimibe 10 milliGRAM(s) Oral daily  finasteride 5 milliGRAM(s) Oral daily  fluticasone propionate/ salmeterol 100-50 MICROgram(s) Diskus 1 Dose(s) Inhalation two times a day  folic acid 1 milliGRAM(s) Oral daily  metoprolol tartrate 25 milliGRAM(s) Oral two times a day  mirabegron ER 50 milliGRAM(s) Oral daily  pantoprazole    Tablet 40 milliGRAM(s) Oral before breakfast  sucralfate 1 Gram(s) Oral two times a day  tamsulosin 0.4 milliGRAM(s) Oral at bedtime    PRN MEDICATIONS  acetaminophen     Tablet .. 650 milliGRAM(s) Oral every 6 hours PRN  albuterol    90 MICROgram(s) HFA Inhaler 2 Puff(s) Inhalation every 6 hours PRN  ondansetron Injectable 4 milliGRAM(s) IV Push every 8 hours PRN  traMADol 25 milliGRAM(s) Oral every 8 hours PRN    VITALS:   T(F): 97.7  HR: 56  BP: 128/83  RR: 18  SpO2: 97%          12.1  4.98  )-----------( 141      ( 14 Mar 2025 05:28 )             37.4     03-14    140  |  104  |  12  ----------------------------<  78  4.4   |  27  |  1.2    Ca    8.7[L]      14 Mar 2025 05:28  PHYSICAL EXAM:  GEN: No acute distress  LUNGS: Clear to auscultation bilaterally   HEART: S1/S2 present. RRR.   ABD/ GI: Soft, non-tender, non-distended. Bowel sounds present  EXT: NC/NC/NE/2+PP/JARRELL
SUBJECTIVE:    Patient is a 70y old Male who presents with a chief complaint of Abdominal Pain (15 Mar 2025 15:00)    Currently admitted to medicine with the primary diagnosis of Acute colitis       Today is hospital day 7d.     PAST MEDICAL & SURGICAL HISTORY  Stroke    CAD (coronary artery disease)    Pulmonary embolism    COPD, mild    History of BPH    HTN (hypertension)    Degeneration of spine    Kidney stone    RA (rheumatoid arthritis)    High cholesterol    Infrarenal abdominal aortic aneurysm (AAA) without rupture    History of GI bleed    Lung cancer    Diverticulosis    Colon polyp    History of coronary artery bypass, single    History of hip replacement    S/P lobectomy of lung      ALLERGIES:  enalapril (Unknown)  baclofen (Unknown)  atorvastatin (Unknown)    MEDICATIONS:  STANDING MEDICATIONS  amLODIPine   Tablet 10 milliGRAM(s) Oral daily  apixaban 5 milliGRAM(s) Oral every 12 hours  chlorhexidine 2% Cloths 1 Application(s) Topical <User Schedule>  dextrose 5% + lactated ringers. 1000 milliLiter(s) IV Continuous <Continuous>  ezetimibe 10 milliGRAM(s) Oral daily  finasteride 5 milliGRAM(s) Oral daily  fluticasone propionate/ salmeterol 100-50 MICROgram(s) Diskus 1 Dose(s) Inhalation two times a day  folic acid 1 milliGRAM(s) Oral daily  metoprolol tartrate 25 milliGRAM(s) Oral two times a day  mirabegron ER 50 milliGRAM(s) Oral daily  pantoprazole    Tablet 40 milliGRAM(s) Oral before breakfast  sucralfate 1 Gram(s) Oral two times a day  tamsulosin 0.4 milliGRAM(s) Oral at bedtime    PRN MEDICATIONS  acetaminophen     Tablet .. 650 milliGRAM(s) Oral every 6 hours PRN  albuterol    90 MICROgram(s) HFA Inhaler 2 Puff(s) Inhalation every 6 hours PRN  ondansetron Injectable 4 milliGRAM(s) IV Push every 8 hours PRN  traMADol 25 milliGRAM(s) Oral every 8 hours PRN    VITALS:   T(F): 98.1  HR: 69  BP: 119/72  RR: 18  SpO2: 97%    LABS:                        11.4   5.21  )-----------( 124      ( 16 Mar 2025 06:29 )             35.0     03-16    140  |  103  |  24[H]  ----------------------------<  82  4.5   |  28  |  1.2    Ca    8.4      16 Mar 2025 06:29  Mg     1.7     03-16    TPro  6.0  /  Alb  3.1[L]  /  TBili  0.2  /  DBili  x   /  AST  30  /  ALT  18  /  AlkPhos  55  03-16      Urinalysis Basic - ( 16 Mar 2025 06:29 )    Color: x / Appearance: x / SG: x / pH: x  Gluc: 82 mg/dL / Ketone: x  / Bili: x / Urobili: x   Blood: x / Protein: x / Nitrite: x   Leuk Esterase: x / RBC: x / WBC x   Sq Epi: x / Non Sq Epi: x / Bacteria: x                RADIOLOGY:    PHYSICAL EXAM:  GEN: No acute distress  LUNGS: Clear to auscultation bilaterally   HEART: S1/S2 present. RRR.   ABD/ GI: Soft, non-tender, non-distended. Bowel sounds present  EXT: NC/NC/NE/2+PP/JARRELL  NEURO: AAOX3    
24H events:    Patient is a 70y old Male who presents with a chief complaint of Abdominal Pain (11 Mar 2025 13:58)    Primary diagnosis of Acute colitis      Day 1:    Today is 3d of hospitalization. This morning patient was seen and examined at bedside, resting comfortably in bed.    No acute or major events overnight.  Denies dysuria, hematuria, bladder pain, no fever chills sob or cp.    PAST MEDICAL & SURGICAL HISTORY  Stroke  CAD (coronary artery disease)  Pulmonary embolism  COPD, mild  History of BPH  HTN (hypertension)  Degeneration of spine  Kidney stone  RA (rheumatoid arthritis)  High cholesterol  Infrarenal abdominal aortic aneurysm (AAA) without rupture  History of GI bleed  Lung cancer  Diverticulosis  Colon polyp  History of coronary artery bypass, single  History of hip replacement  S/P lobectomy of lung      SOCIAL HISTORY:  Social History:      ALLERGIES:  enalapril (Unknown)  baclofen (Unknown)  atorvastatin (Unknown)    MEDICATIONS:  STANDING MEDICATIONS  amLODIPine   Tablet 10 milliGRAM(s) Oral daily  aspirin enteric coated 162 milliGRAM(s) Oral two times a day  cefTRIAXone   IVPB 1000 milliGRAM(s) IV Intermittent every 24 hours  chlorhexidine 2% Cloths 1 Application(s) Topical <User Schedule>  dextrose 5% + lactated ringers. 1000 milliLiter(s) IV Continuous <Continuous>  enoxaparin Injectable 40 milliGRAM(s) SubCutaneous every 24 hours  ezetimibe 10 milliGRAM(s) Oral daily  finasteride 5 milliGRAM(s) Oral daily  fluticasone propionate/ salmeterol 100-50 MICROgram(s) Diskus 1 Dose(s) Inhalation two times a day  folic acid 1 milliGRAM(s) Oral daily  metoprolol tartrate 25 milliGRAM(s) Oral two times a day  metroNIDAZOLE  IVPB 500 milliGRAM(s) IV Intermittent every 8 hours  mirabegron ER 50 milliGRAM(s) Oral daily  pantoprazole    Tablet 40 milliGRAM(s) Oral before breakfast  sucralfate 1 Gram(s) Oral two times a day  tamsulosin 0.4 milliGRAM(s) Oral at bedtime    PRN MEDICATIONS  acetaminophen     Tablet .. 650 milliGRAM(s) Oral every 6 hours PRN  albuterol    90 MICROgram(s) HFA Inhaler 2 Puff(s) Inhalation every 6 hours PRN  ondansetron Injectable 4 milliGRAM(s) IV Push every 8 hours PRN  traMADol 25 milliGRAM(s) Oral every 8 hours PRN    VITALS:   T(F): 97.7  HR: 56  BP: 128/83  RR: 18  SpO2: 97%    PHYSICAL EXAM:  GENERAL:   ( x) NAD, lying in bed comfortably     (  ) obtunded     (  ) lethargic     (  ) somnolent    HEAD:   ( x) Atraumatic     (  ) hematoma     (  ) laceration (specify location:       )     NECK:  (x) Supple     (  ) neck stiffness     (  ) nuchal rigidity     (  ) JVD present ( -- cm)    HEART:  Rate -->     (x) normal rate     (  ) bradycardic     (  ) tachycardic  Rhythm -->     (x) regular     (  ) regularly irregular     (  ) irregularly irregular  Murmurs -->     (x) normal s1s2     (  ) systolic murmur     (  ) diastolic murmur     (  ) continuous murmur      (  ) S3 present     (  ) S4 present    LUNGS:   ( x)Unlabored respirations     (  ) tachypnea  ( x) B/L air entry     (  ) decreased breath sounds in:  (location     )    ( x) no adventitious sound     (  ) crackles     (  ) wheezing      (  ) rhonchi      (specify location:       )  (  ) chest wall tenderness (specify location:       )    ABDOMEN:   ( x) Soft     (  ) tense   |   (  ) nondistended     (  ) distended   |   (  ) +BS     (  ) hypoactive bowel sounds     (  ) hyperactive bowel sounds  ( x) nontender     (  ) RUQ tenderness     (  ) RLQ tenderness     (  ) LLQ tenderness     (  ) epigastric tenderness     (  ) diffuse tenderness  (  ) Splenomegaly      (  ) Hepatomegaly      (  ) Jaundice     (  ) ecchymosis     EXTREMITIES:  ( x) Normal     (  ) Rash     (  ) ecchymosis     (  ) varicose veins      (  ) pitting edema     (  ) non-pitting edema   (  ) ulceration     (  ) gangrene:     (location:     )    NERVOUS SYSTEM:    ( x) A&Ox3     (  ) confused     (  ) lethargic  CN II-XII:     ( x) Intact     (  ) deficits found     (Specify:     )   Upper extremities:     (  ) no sensorimotor deficits     (  ) weakness     (  ) loss of proprioception/vibration     (  ) loss of touch/temperature (specify:    )  Lower extremities:     (  ) no sensorimotor deficits     (  ) weakness     (  ) loss of proprioception/vibration     (  ) loss of touch/temperature (specify:    )    SKIN:   (  ) No rashes or lesions     (  ) maculopapular rash     (  ) pustules     (  ) vesicles     (  ) ulcer     (  ) ecchymosis     (specify location:     )    LABS:             Sodium: 140 mmol/L (03-10-25 @ 06:47)  Sodium: 141 mmol/L (03-11-25 @ 05:50)  Sodium: 143 mmol/L (03-12-25 @ 06:36)    Potassium: 3.9 mmol/L (03-10-25 @ 06:47)  Potassium: 3.5 mmol/L (03-11-25 @ 05:50)  Potassium: 4.0 mmol/L (03-12-25 @ 06:36)    Hemoglobin: 11.1 g/dL *L* (03-10-25 @ 06:47)  Hemoglobin: 11.6 g/dL *L* (03-11-25 @ 05:50)  Hemoglobin: 12.0 g/dL *L* (03-12-25 @ 06:36)    Platelet Count - Automated: 114 K/uL *L* (03-10-25 @ 06:47)  Platelet Count - Automated: 108 K/uL *L* (03-11-25 @ 05:50)  Platelet Count - Automated: 118 K/uL *L* (03-12-25 @ 06:36)    WBC Count: 5.68 K/uL (03-10-25 @ 06:47)  WBC Count: 5.01 K/uL (03-11-25 @ 05:50)  WBC Count: 5.63 K/uL (03-12-25 @ 06:36)    Lactate, Blood: 0.7 mmol/L (03-09-25 @ 10:58)        Urinalysis Basic - ( 11 Mar 2025 05:50 )    Color: x / Appearance: x / SG: x / pH: x  Gluc: 91 mg/dL / Ketone: x  / Bili: x / Urobili: x   Blood: x / Protein: x / Nitrite: x   Leuk Esterase: x / RBC: x / WBC x   Sq Epi: x / Non Sq Epi: x / Bacteria: x        Culture - Blood (collected 09 Mar 2025 14:12)  Source: Blood Blood  Preliminary Report (11 Mar 2025 02:02):    No growth at 24 hours    Culture - Blood (collected 09 Mar 2025 14:12)  Source: Blood Blood  Preliminary Report (11 Mar 2025 02:02):    No growth at 24 hours    Culture - Urine (collected 09 Mar 2025 11:15)  Source: Clean Catch None  Preliminary Report (10 Mar 2025 23:29):    50,000 - 99,000 CFU/mL Enterococcus faecium    <10,000 CFU/ml Normal Urogenital dorothea present    Urinalysis with Rflx Culture (collected 09 Mar 2025 11:15)          RADIOLOGY:      < from: CT Angio Abdomen and Pelvis w/ IV Cont (03.09.25 @ 12:48) >  1. Ectatic atherosclerotic infrarenal abdominal aorta, without evidence   of aortic dissection or intramural hematoma.  2. Moderate mural thickening and mucosal enhancement involving the   sigmoid and descending colon, with mild pericolonic fat   stranding/ascites, suspicious for acute colitis  3. Additional chronic/incidental findings, as detailed above.    < end of copied text >    ASSESSMENT AND PLAN  TIERRA BAKER is a 70y-year-old Male with a history significant for  HTN, HLD, CAD s/p CABG, BPH, neurogenic bladder, intracranial hemorrhage with left-sided deficits, minimally ambulatory with a walker at baseline, RA, lung ca s/p lobectomy presenting w/ abdominal pain admitted for colitis.     # LLQ abdominal pain 2/2 colitis , improving   - Acute onset abdominal pain,   no associated nausea or vomiting  - Tenderness +nt on superficial palpation of LL abdomen now imporved   - CT abdomen showing moderate mural thickening and mucosal enhancement involving the sigmoid and descending colon, with mild pericolonic fat  stranding/ascites, suspicious for acute colitis.   - Lactate 0.7, Lipase 29, LFT WNL  - In ED: Dicyclomine, NS 1L bolus, Ceftriaxone, Metronidazole,   - Diet: Advance as tolerated   - IV fluids: D5LR 60 cc/hr   - Pain control: Acetaminophen & Tramadol   - Antibiotics: c/w Ceftriaxone and Metronidazole  - Nausea: Zofran  - follow stool culture, GI PCR panel pending     # Possible Cystitis   # Pyuria  - Symptoms: Mild discomfort during the initiation of urination, Mild suprapubic tenderness, No increased frequency or urgency  - UA: Moderate LE, WBC 55, No Bacteria  - Cultures: UCx  positive for Enteroccous faecium  - already on ceftriaxone   - bcx negative  - no symptoms of dysuria; given resistance pattern and history of neurogenic bladder will consult ID for further recommendations    #thrombocytopenia  daily CBC  possible due to underlying infection.   monitor for now     # Neurogenic Bladder  # BPH  - c/w finasteride and tamsulosin   - c/w mirabegron ER    # Hx of rheumatoid arthritis   - Patient currently on abatacept infusion monthly   - c/w folic acid daily  - Outpatient rheum fu     # H/o CAD s/p CABG  # H/o ICH w/ left sided residual deficits  #Chronic L femoral DVT  #Hx of Lung cancer s/p lobectomy  - c/w 162 twice daily ASA    # Dyslipidemia    - Cont ezetimibe 10mg qD     # HTN    - c/w amlodipine 10mg qD   - Cont metoprolol tartrate 25 BID       # COPD    -c/w OP Rx       -------------------------------------------------------------------------------------------------------------------------------------  #DVT PPX: lovenox, asa  #GI PPX: panto, sucralafate  #Diet: advanced to soft and bite-sized  #Code Status: full code  #Activity Order: iat  #Dispo/Needs: inpt    #Handoff  - f/u stool cultures, gi pcr, monitor platelets, PT  
      SAMUELTIERRA  70y, Male  Allergy: lactose (Diarrhea)  enalapril (Unknown)  baclofen (Unknown)  atorvastatin (Unknown)    Hospital Day: 1d    Patient seen and examined earlier today. diarrhea better  still having intermitent abd pain but overall better     PMH/PSH:  PAST MEDICAL & SURGICAL HISTORY:  Stroke      CAD (coronary artery disease)      Pulmonary embolism      COPD, mild      History of BPH      HTN (hypertension)      Degeneration of spine      Kidney stone      RA (rheumatoid arthritis)      High cholesterol      Infrarenal abdominal aortic aneurysm (AAA) without rupture      History of GI bleed      Lung cancer      Diverticulosis      Colon polyp      History of coronary artery bypass, single      History of hip replacement      S/P lobectomy of lung          LAST 24-Hr EVENTS:    VITALS:  T(F): 97.4 (03-10-25 @ 08:20), Max: 98 (03-09-25 @ 19:59)  HR: 51 (03-10-25 @ 08:20)  BP: 105/69 (03-10-25 @ 08:20) (105/62 - 134/70)  RR: 17 (03-10-25 @ 08:20)  SpO2: 98% (03-10-25 @ 08:20)          TESTS & MEASUREMENTS:  Weight/BMI  68 (03-09-25 @ 09:53)  22.1 (03-09-25 @ 09:53)    03-09-25 @ 07:01  -  03-10-25 @ 07:00  --------------------------------------------------------  IN: 0 mL / OUT: 300 mL / NET: -300 mL    03-10-25 @ 07:01  -  03-10-25 @ 15:54  --------------------------------------------------------  IN: 720 mL / OUT: 0 mL / NET: 720 mL                            11.1   5.68  )-----------( 114      ( 10 Mar 2025 06:47 )             32.6         03-10    140  |  110  |  32[H]  ----------------------------<  80  3.9   |  20  |  1.1    Ca    8.1[L]      10 Mar 2025 06:47    TPro  5.8[L]  /  Alb  3.1[L]  /  TBili  0.4  /  DBili  x   /  AST  12  /  ALT  6   /  AlkPhos  59  03-10    LIVER FUNCTIONS - ( 10 Mar 2025 06:47 )  Alb: 3.1 g/dL / Pro: 5.8 g/dL / ALK PHOS: 59 U/L / ALT: 6 U/L / AST: 12 U/L / GGT: x                 Urinalysis with Rflx Culture (collected 03-09-25 @ 11:15)      Urinalysis Basic - ( 10 Mar 2025 06:47 )    Color: x / Appearance: x / SG: x / pH: x  Gluc: 80 mg/dL / Ketone: x  / Bili: x / Urobili: x   Blood: x / Protein: x / Nitrite: x   Leuk Esterase: x / RBC: x / WBC x   Sq Epi: x / Non Sq Epi: x / Bacteria: x                            RADIOLOGY, ECG, & ADDITIONAL TESTS:  12 Lead ECG:   Ventricular Rate 69 BPM    Atrial Rate 69 BPM    P-R Interval 168 ms    QRS Duration 146 ms    Q-T Interval 446 ms    QTC Calculation(Bazett) 477 ms    P Axis 88 degrees    R Axis -63 degrees    T Axis 57 degrees    Diagnosis Line Normal sinus rhythm  Right bundle branch block  Left anterior fascicular block  *** Bifascicular block ***  Septal infarct , age undetermined  Abnormal ECG    Confirmed by Phillip Mendiola ([id number]) on 12/18/2024 7:00:25 AM (12-17-24 @ 14:44)      RECENT DIAGNOSTIC ORDERS:  Magnesium: AM Sched. Collection: 11-Mar-2025 04:30 (03-10-25 @ 11:08)  Comprehensive Metabolic Panel: AM Sched. Collection: 11-Mar-2025 04:30 (03-10-25 @ 11:08)  Complete Blood Count + Automated Diff: AM Sched. Collection: 11-Mar-2025 04:30 (03-10-25 @ 11:08)  Diet, Clear Liquid (03-10-25 @ 03:26)  Ova and Parasites: Routine  Specimen Source: Feces (03-09-25 @ 18:09)  Culture - Stool: Routine  Specimen Source: Feces (03-09-25 @ 18:09)      MEDICATIONS:  MEDICATIONS  (STANDING):  amLODIPine   Tablet 10 milliGRAM(s) Oral daily  aspirin enteric coated 81 milliGRAM(s) Oral daily  cefTRIAXone   IVPB 1000 milliGRAM(s) IV Intermittent every 24 hours  chlorhexidine 2% Cloths 1 Application(s) Topical <User Schedule>  dextrose 5% + lactated ringers. 1000 milliLiter(s) (60 mL/Hr) IV Continuous <Continuous>  enoxaparin Injectable 40 milliGRAM(s) SubCutaneous every 24 hours  ezetimibe 10 milliGRAM(s) Oral daily  finasteride 5 milliGRAM(s) Oral daily  fluticasone propionate/ salmeterol 100-50 MICROgram(s) Diskus 1 Dose(s) Inhalation two times a day  folic acid 1 milliGRAM(s) Oral daily  metoprolol tartrate 25 milliGRAM(s) Oral two times a day  metroNIDAZOLE  IVPB 500 milliGRAM(s) IV Intermittent every 8 hours  mirabegron ER 50 milliGRAM(s) Oral daily  pantoprazole    Tablet 40 milliGRAM(s) Oral before breakfast  sucralfate 1 Gram(s) Oral two times a day  tamsulosin 0.4 milliGRAM(s) Oral at bedtime    MEDICATIONS  (PRN):  acetaminophen     Tablet .. 650 milliGRAM(s) Oral every 6 hours PRN Temp greater or equal to 38C (100.4F), Mild Pain (1 - 3)  albuterol    90 MICROgram(s) HFA Inhaler 2 Puff(s) Inhalation every 6 hours PRN Shortness of Breath and/or Wheezing  ondansetron Injectable 4 milliGRAM(s) IV Push every 8 hours PRN Nausea and/or Vomiting  traMADol 25 milliGRAM(s) Oral every 8 hours PRN Moderate Pain (4 - 6)      HOME MEDICATIONS:  Albuterol (Eqv-ProAir HFA) 90 mcg/inh inhalation aerosol (03-09)  amLODIPine 10 mg oral tablet (03-09)  aspirin 325 mg oral delayed release tablet (03-09)  Breo Ellipta 100 mcg-25 mcg/inh inhalation powder (03-09)  finasteride 5 mg oral tablet (03-09)  folic acid 1 mg oral tablet (03-09)  metoprolol tartrate 25 mg oral tablet (03-09)  Myrbetriq 50 mg oral tablet, extended release (03-09)  omeprazole 40 mg oral delayed release capsule (03-09)  tamsulosin 0.4 mg oral capsule (03-09)  Zetia 10 mg oral tablet (03-09)      PHYSICAL EXAM:  GENERAL: Patient lying on bed, comfortable   CHEST/LUNG: NVB, no wheezing   HEART: R1+R2, RRR  ABDOMEN: Soft. mild abd pain   EXTREMITIES:  no edema, Bruising  CNS: AAAx4. No cranial nerves deficit.       
24H events:    Patient is a 70y old Male who presents with a chief complaint of Abdominal Pain (11 Mar 2025 13:58)    Primary diagnosis of Acute colitis      Day 1-2: patient was treated with antibiotics for colitis  Day 3: patient was found to have shigella; dc'd abx;   Pt has chronic L femoral clot; being evaluated by vascular and neurosurgery for possible AC vs IVC placement    Today is 4d of hospitalization. This morning patient was seen and examined at bedside, resting comfortably in bed.    No acute or major events overnight.  Denies dysuria, hematuria, bladder pain, no fever chills sob or cp.    PAST MEDICAL & SURGICAL HISTORY  Stroke  CAD (coronary artery disease)  Pulmonary embolism  COPD, mild  History of BPH  HTN (hypertension)  Degeneration of spine  Kidney stone  RA (rheumatoid arthritis)  High cholesterol  Infrarenal abdominal aortic aneurysm (AAA) without rupture  History of GI bleed  Lung cancer  Diverticulosis  Colon polyp  History of coronary artery bypass, single  History of hip replacement  S/P lobectomy of lung      SOCIAL HISTORY:  Social History:      ALLERGIES:  enalapril (Unknown)  baclofen (Unknown)  atorvastatin (Unknown)    MEDICATIONS:  MEDICATIONS  (STANDING):  amLODIPine   Tablet 10 milliGRAM(s) Oral daily  aspirin enteric coated 162 milliGRAM(s) Oral two times a day  chlorhexidine 2% Cloths 1 Application(s) Topical <User Schedule>  dextrose 5% + lactated ringers. 1000 milliLiter(s) (60 mL/Hr) IV Continuous <Continuous>  enoxaparin Injectable 40 milliGRAM(s) SubCutaneous every 24 hours  ezetimibe 10 milliGRAM(s) Oral daily  finasteride 5 milliGRAM(s) Oral daily  fluticasone propionate/ salmeterol 100-50 MICROgram(s) Diskus 1 Dose(s) Inhalation two times a day  folic acid 1 milliGRAM(s) Oral daily  metoprolol tartrate 25 milliGRAM(s) Oral two times a day  mirabegron ER 50 milliGRAM(s) Oral daily  pantoprazole    Tablet 40 milliGRAM(s) Oral before breakfast  sucralfate 1 Gram(s) Oral two times a day  tamsulosin 0.4 milliGRAM(s) Oral at bedtime    MEDICATIONS  (PRN):  acetaminophen     Tablet .. 650 milliGRAM(s) Oral every 6 hours PRN Temp greater or equal to 38C (100.4F), Mild Pain (1 - 3)  albuterol    90 MICROgram(s) HFA Inhaler 2 Puff(s) Inhalation every 6 hours PRN Shortness of Breath and/or Wheezing  ondansetron Injectable 4 milliGRAM(s) IV Push every 8 hours PRN Nausea and/or Vomiting  traMADol 25 milliGRAM(s) Oral every 8 hours PRN Moderate Pain (4 - 6)        VITALS:   T(F): 97.7  HR: 56  BP: 128/83  RR: 18  SpO2: 97%    PHYSICAL EXAM:  GENERAL:   ( x) NAD, lying in bed comfortably     (  ) obtunded     (  ) lethargic     (  ) somnolent    HEAD:   ( x) Atraumatic     (  ) hematoma     (  ) laceration (specify location:       )     NECK:  (x) Supple     (  ) neck stiffness     (  ) nuchal rigidity     (  ) JVD present ( -- cm)    HEART:  Rate -->     (x) normal rate     (  ) bradycardic     (  ) tachycardic  Rhythm -->     (x) regular     (  ) regularly irregular     (  ) irregularly irregular  Murmurs -->     (x) normal s1s2     (  ) systolic murmur     (  ) diastolic murmur     (  ) continuous murmur      (  ) S3 present     (  ) S4 present    LUNGS:   ( x)Unlabored respirations     (  ) tachypnea  ( x) B/L air entry     (  ) decreased breath sounds in:  (location     )    ( x) no adventitious sound     (  ) crackles     (  ) wheezing      (  ) rhonchi      (specify location:       )  (  ) chest wall tenderness (specify location:       )    ABDOMEN:   ( x) Soft     (  ) tense   |   (  ) nondistended     (  ) distended   |   (  ) +BS     (  ) hypoactive bowel sounds     (  ) hyperactive bowel sounds  ( x) nontender     (  ) RUQ tenderness     (  ) RLQ tenderness     (  ) LLQ tenderness     (  ) epigastric tenderness     (  ) diffuse tenderness  (  ) Splenomegaly      (  ) Hepatomegaly      (  ) Jaundice     (  ) ecchymosis     EXTREMITIES:  ( x) Normal     (  ) Rash     (  ) ecchymosis     (  ) varicose veins      (  ) pitting edema     (  ) non-pitting edema   (  ) ulceration     (  ) gangrene:     (location:     )    NERVOUS SYSTEM:    ( x) A&Ox3     (  ) confused     (  ) lethargic  CN II-XII:     ( x) Intact     (  ) deficits found     (Specify:     )   Upper extremities:     (  ) no sensorimotor deficits     (  ) weakness     (  ) loss of proprioception/vibration     (  ) loss of touch/temperature (specify:    )  Lower extremities:     (  ) no sensorimotor deficits     (  ) weakness     (  ) loss of proprioception/vibration     (  ) loss of touch/temperature (specify:    )    SKIN:   (  ) No rashes or lesions     (  ) maculopapular rash     (  ) pustules     (  ) vesicles     (  ) ulcer     (  ) ecchymosis     (specify location:     )    LABS:             Hemoglobin: 12.0 g/dL *L* (03-12-25 @ 06:36)  Hemoglobin: 11.2 g/dL *L* (03-13-25 @ 05:28)  Hemoglobin: 12.1 g/dL *L* (03-14-25 @ 06:53)    Platelet Count - Automated: 118 K/uL *L* (03-12-25 @ 06:36)  Platelet Count - Automated: 116 K/uL *L* (03-13-25 @ 05:28)  Platelet Count - Automated: 141 K/uL (03-14-25 @ 06:53)    WBC Count: 5.63 K/uL (03-12-25 @ 06:36)  WBC Count: 4.92 K/uL (03-13-25 @ 05:28)  WBC Count: 4.98 K/uL (03-14-25 @ 06:53)    Lactate, Blood: 0.7 mmol/L (03-09-25 @ 10:58)          Urinalysis Basic - ( 11 Mar 2025 05:50 )    Color: x / Appearance: x / SG: x / pH: x  Gluc: 91 mg/dL / Ketone: x  / Bili: x / Urobili: x   Blood: x / Protein: x / Nitrite: x   Leuk Esterase: x / RBC: x / WBC x   Sq Epi: x / Non Sq Epi: x / Bacteria: x        Culture - Blood (collected 09 Mar 2025 14:12)  Source: Blood Blood  Preliminary Report (11 Mar 2025 02:02):    No growth at 24 hours    Culture - Blood (collected 09 Mar 2025 14:12)  Source: Blood Blood  Preliminary Report (11 Mar 2025 02:02):    No growth at 24 hours    Culture - Urine (collected 09 Mar 2025 11:15)  Source: Clean Catch None  Preliminary Report (10 Mar 2025 23:29):    50,000 - 99,000 CFU/mL Enterococcus faecium    <10,000 CFU/ml Normal Urogenital dorothea present    Urinalysis with Rflx Culture (collected 09 Mar 2025 11:15)          RADIOLOGY:      < from: CT Angio Abdomen and Pelvis w/ IV Cont (03.09.25 @ 12:48) >  1. Ectatic atherosclerotic infrarenal abdominal aorta, without evidence   of aortic dissection or intramural hematoma.  2. Moderate mural thickening and mucosal enhancement involving the   sigmoid and descending colon, with mild pericolonic fat   stranding/ascites, suspicious for acute colitis  3. Additional chronic/incidental findings, as detailed above.    < end of copied text >    ASSESSMENT AND PLAN  TIERRA BAKER is a 70y-year-old Male with a history significant for  HTN, HLD, CAD s/p CABG, BPH, neurogenic bladder, intracranial hemorrhage with left-sided deficits, minimally ambulatory with a walker at baseline, RA, lung ca s/p lobectomy presenting w/ abdominal pain admitted for colitis.     # LLQ abdominal pain 2/2 Shigella colitis , improving   - Acute onset abdominal pain,   no associated nausea or vomiting  - Tenderness +nt on superficial palpation of LL abdomen now imporved   - CT abdomen showing moderate mural thickening and mucosal enhancement involving the sigmoid and descending colon, with mild pericolonic fat  stranding/ascites, suspicious for acute colitis.   - Lactate 0.7, Lipase 29, LFT WNL  - In ED: Dicyclomine, NS 1L bolus, Ceftriaxone, Metronidazole,   - Diet: Advance as tolerated   - IV fluids: D5LR 60 cc/hr   - Pain control: Acetaminophen & Tramadol   - Antibiotics: s/p Ceftriaxone and Metronidazole; DC'd antibiotics  - culture grew shigella  - advance diet to regular    # H/o CAD s/p CABG  # H/o ICH w/ left sided residual deficits  #Chronic L femoral DVT  #Hx of Lung cancer s/p lobectomy  - c/w 162 twice daily ASA  - previously patient had intracranial hemorrhage while they were on Xarelto 5 years ago  - recent duplex still showing chronic L femoral DVT  - f/u neurosurg if AC recommended  - if patient not agreeable to AC, will reach out to IR for IVC    # Possible Cystitis, resolved  # Pyuria  - Symptoms: Mild discomfort during the initiation of urination, Mild suprapubic tenderness, No increased frequency or urgency  - UA: Moderate LE, WBC 55, No Bacteria  - Cultures: UCx  positive for Enteroccous faecium  - already on ceftriaxone   - bcx negative  - no further symptoms of dysuria    #thrombocytopenia  daily CBC  possible due to underlying infection.   monitor for now     # Neurogenic Bladder  # BPH  - c/w finasteride and tamsulosin   - c/w mirabegron ER    # Hx of rheumatoid arthritis   - Patient currently on abatacept infusion monthly   - c/w folic acid daily  - Outpatient rheum fu     # Dyslipidemia    - Cont ezetimibe 10mg qD     # HTN    - c/w amlodipine 10mg qD   - Cont metoprolol tartrate 25 BID     # COPD    -c/w OP Rx       -------------------------------------------------------------------------------------------------------------------------------------  #DVT PPX: lovenox, asa  #GI PPX: panto,  #Diet: advanced to reg  #Code Status: full code  #Activity Order: iat  #Dispo/Needs: inpt    #Handoff  - f/u neurosurg; AC trial vs IVC  
24H events:    Patient is a 70y old Male who presents with a chief complaint of Abdominal Pain (11 Mar 2025 13:58)    Primary diagnosis of Acute colitis      Day 1:    Today is 2d of hospitalization. This morning patient was seen and examined at bedside, resting comfortably in bed.    No acute or major events overnight.    PAST MEDICAL & SURGICAL HISTORY  Stroke  CAD (coronary artery disease)  Pulmonary embolism  COPD, mild  History of BPH  HTN (hypertension)  Degeneration of spine  Kidney stone  RA (rheumatoid arthritis)  High cholesterol  Infrarenal abdominal aortic aneurysm (AAA) without rupture  History of GI bleed  Lung cancer  Diverticulosis  Colon polyp  History of coronary artery bypass, single  History of hip replacement  S/P lobectomy of lung      SOCIAL HISTORY:  Social History:      ALLERGIES:  enalapril (Unknown)  baclofen (Unknown)  atorvastatin (Unknown)    MEDICATIONS:  STANDING MEDICATIONS  amLODIPine   Tablet 10 milliGRAM(s) Oral daily  aspirin enteric coated 162 milliGRAM(s) Oral two times a day  cefTRIAXone   IVPB 1000 milliGRAM(s) IV Intermittent every 24 hours  chlorhexidine 2% Cloths 1 Application(s) Topical <User Schedule>  dextrose 5% + lactated ringers. 1000 milliLiter(s) IV Continuous <Continuous>  enoxaparin Injectable 40 milliGRAM(s) SubCutaneous every 24 hours  ezetimibe 10 milliGRAM(s) Oral daily  finasteride 5 milliGRAM(s) Oral daily  fluticasone propionate/ salmeterol 100-50 MICROgram(s) Diskus 1 Dose(s) Inhalation two times a day  folic acid 1 milliGRAM(s) Oral daily  metoprolol tartrate 25 milliGRAM(s) Oral two times a day  metroNIDAZOLE  IVPB 500 milliGRAM(s) IV Intermittent every 8 hours  mirabegron ER 50 milliGRAM(s) Oral daily  pantoprazole    Tablet 40 milliGRAM(s) Oral before breakfast  sucralfate 1 Gram(s) Oral two times a day  tamsulosin 0.4 milliGRAM(s) Oral at bedtime    PRN MEDICATIONS  acetaminophen     Tablet .. 650 milliGRAM(s) Oral every 6 hours PRN  albuterol    90 MICROgram(s) HFA Inhaler 2 Puff(s) Inhalation every 6 hours PRN  ondansetron Injectable 4 milliGRAM(s) IV Push every 8 hours PRN  traMADol 25 milliGRAM(s) Oral every 8 hours PRN    VITALS:   T(F): 97.7  HR: 56  BP: 128/83  RR: 18  SpO2: 97%    PHYSICAL EXAM:  GENERAL:   ( x) NAD, lying in bed comfortably     (  ) obtunded     (  ) lethargic     (  ) somnolent    HEAD:   ( x) Atraumatic     (  ) hematoma     (  ) laceration (specify location:       )     NECK:  (x) Supple     (  ) neck stiffness     (  ) nuchal rigidity     (  ) JVD present ( -- cm)    HEART:  Rate -->     (x) normal rate     (  ) bradycardic     (  ) tachycardic  Rhythm -->     (x) regular     (  ) regularly irregular     (  ) irregularly irregular  Murmurs -->     (x) normal s1s2     (  ) systolic murmur     (  ) diastolic murmur     (  ) continuous murmur      (  ) S3 present     (  ) S4 present    LUNGS:   ( x)Unlabored respirations     (  ) tachypnea  ( x) B/L air entry     (  ) decreased breath sounds in:  (location     )    ( x) no adventitious sound     (  ) crackles     (  ) wheezing      (  ) rhonchi      (specify location:       )  (  ) chest wall tenderness (specify location:       )    ABDOMEN:   ( x) Soft     (  ) tense   |   (  ) nondistended     (  ) distended   |   (  ) +BS     (  ) hypoactive bowel sounds     (  ) hyperactive bowel sounds  ( x) nontender     (  ) RUQ tenderness     (  ) RLQ tenderness     (  ) LLQ tenderness     (  ) epigastric tenderness     (  ) diffuse tenderness  (  ) Splenomegaly      (  ) Hepatomegaly      (  ) Jaundice     (  ) ecchymosis     EXTREMITIES:  ( x) Normal     (  ) Rash     (  ) ecchymosis     (  ) varicose veins      (  ) pitting edema     (  ) non-pitting edema   (  ) ulceration     (  ) gangrene:     (location:     )    NERVOUS SYSTEM:    ( x) A&Ox3     (  ) confused     (  ) lethargic  CN II-XII:     ( x) Intact     (  ) deficits found     (Specify:     )   Upper extremities:     (  ) no sensorimotor deficits     (  ) weakness     (  ) loss of proprioception/vibration     (  ) loss of touch/temperature (specify:    )  Lower extremities:     (  ) no sensorimotor deficits     (  ) weakness     (  ) loss of proprioception/vibration     (  ) loss of touch/temperature (specify:    )    SKIN:   (  ) No rashes or lesions     (  ) maculopapular rash     (  ) pustules     (  ) vesicles     (  ) ulcer     (  ) ecchymosis     (specify location:     )    LABS:                        11.6   5.01  )-----------( 108      ( 11 Mar 2025 05:50 )             35.9     03-11    141  |  107  |  21[H]  ----------------------------<  91  3.5   |  23  |  1.1    Ca    8.4      11 Mar 2025 05:50  Mg     1.5     03-11    TPro  5.7[L]  /  Alb  3.1[L]  /  TBili  0.3  /  DBili  x   /  AST  12  /  ALT  6   /  AlkPhos  60  03-11      Urinalysis Basic - ( 11 Mar 2025 05:50 )    Color: x / Appearance: x / SG: x / pH: x  Gluc: 91 mg/dL / Ketone: x  / Bili: x / Urobili: x   Blood: x / Protein: x / Nitrite: x   Leuk Esterase: x / RBC: x / WBC x   Sq Epi: x / Non Sq Epi: x / Bacteria: x            Culture - Blood (collected 09 Mar 2025 14:12)  Source: Blood Blood  Preliminary Report (11 Mar 2025 02:02):    No growth at 24 hours    Culture - Blood (collected 09 Mar 2025 14:12)  Source: Blood Blood  Preliminary Report (11 Mar 2025 02:02):    No growth at 24 hours    Culture - Urine (collected 09 Mar 2025 11:15)  Source: Clean Catch None  Preliminary Report (10 Mar 2025 23:29):    50,000 - 99,000 CFU/mL Enterococcus faecium    <10,000 CFU/ml Normal Urogenital dorothea present    Urinalysis with Rflx Culture (collected 09 Mar 2025 11:15)          RADIOLOGY:      < from: CT Angio Abdomen and Pelvis w/ IV Cont (03.09.25 @ 12:48) >  1. Ectatic atherosclerotic infrarenal abdominal aorta, without evidence   of aortic dissection or intramural hematoma.  2. Moderate mural thickening and mucosal enhancement involving the   sigmoid and descending colon, with mild pericolonic fat   stranding/ascites, suspicious for acute colitis  3. Additional chronic/incidental findings, as detailed above.    < end of copied text >    ASSESSMENT AND PLAN  TIERRA BAKER is a 70y-year-old Male with a history significant for  HTN, HLD, CAD s/p CABG, BPH, neurogenic bladder, intracranial hemorrhage with left-sided deficits, minimally ambulatory with a walker at baseline, RA, lung ca s/p lobectomy presenting w/ abdominal pain admitted for colitis.     # LLQ abdominal pain 2/2 colitis , improving   - Acute onset abdominal pain,   no associated nausea or vomiting  - Tenderness +nt on superficial palpation of LL abdomen now imporved   - CT abdomen showing moderate mural thickening and mucosal enhancement involving the sigmoid and descending colon, with mild pericolonic fat  stranding/ascites, suspicious for acute colitis.   - Lactate 0.7, Lipase 29, LFT WNL  - In ED: Dicyclomine, NS 1L bolus, Ceftriaxone, Metronidazole,   - Diet: Advance as tolerated   - IV fluids: D5LR 60 cc/hr   - Pain control: Acetaminophen & Tramadol   - Antibiotics: c/w Ceftriaxone and Metronidazole  - Nausea: Zofran  - follow stool culture, GI PCR panel pending     # Possible Cystitis   # Pyuria  - Symptoms: Mild discomfort during the initiation of urination, Mild suprapubic tenderness, No increased frequency or urgency  - UA: Moderate LE, WBC 55, No Bacteria  - Cultures: UCx  positive for Enteroccous faecium  - already on ceftriaxone   - bcx negative    #thrombocytopenia  daily CBC  possible due to underlying infection.   monitor for now     # Neurogenic Bladder  # BPH  - c/w finasteride and tamsulosin   - c/w mirabegron ER    # Hx of rheumatoid arthritis   - Patient currently on abatacept infusion monthly   - c/w folic acid daily  - Outpatient rheum fu     # H/o CAD s/p CABG  # H/o ICH w/ left sided residual deficits  #Chronic L femoral DVT  #Hx of Lung cancer s/p lobectomy  - c/w 162 twice daily ASA    # Dyslipidemia    - Cont ezetimibe 10mg qD     # HTN    - c/w amlodipine 10mg qD   - Cont metoprolol tartrate 25 BID       # COPD    -c/w OP Rx       -------------------------------------------------------------------------------------------------------------------------------------  #DVT PPX: lovenox, asa  #GI PPX: panto, sucralafate  #Diet: advanced to soft and bite-sized  #Code Status: full code  #Activity Order: iat  #Dispo/Needs: inpt    #Handoff  - f/u stool cultures, gi pcr, monitor platelets, PT            
SUBJECTIVE:    Patient is a 70y old Male who presents with a chief complaint of Abdominal Pain (12 Mar 2025 14:35)    Currently admitted to medicine with the primary diagnosis of Acute colitis       Today is hospital day 3d.     PAST MEDICAL & SURGICAL HISTORY  Stroke    CAD (coronary artery disease)    Pulmonary embolism    COPD, mild    History of BPH    HTN (hypertension)    Degeneration of spine    Kidney stone    RA (rheumatoid arthritis)    High cholesterol    Infrarenal abdominal aortic aneurysm (AAA) without rupture    History of GI bleed    Lung cancer    Diverticulosis    Colon polyp    History of coronary artery bypass, single    History of hip replacement    S/P lobectomy of lung      ALLERGIES:  enalapril (Unknown)  baclofen (Unknown)  atorvastatin (Unknown)    MEDICATIONS:  STANDING MEDICATIONS  amLODIPine   Tablet 10 milliGRAM(s) Oral daily  aspirin enteric coated 162 milliGRAM(s) Oral two times a day  cefTRIAXone   IVPB 1000 milliGRAM(s) IV Intermittent every 24 hours  chlorhexidine 2% Cloths 1 Application(s) Topical <User Schedule>  dextrose 5% + lactated ringers. 1000 milliLiter(s) IV Continuous <Continuous>  enoxaparin Injectable 40 milliGRAM(s) SubCutaneous every 24 hours  ezetimibe 10 milliGRAM(s) Oral daily  finasteride 5 milliGRAM(s) Oral daily  fluticasone propionate/ salmeterol 100-50 MICROgram(s) Diskus 1 Dose(s) Inhalation two times a day  folic acid 1 milliGRAM(s) Oral daily  metoprolol tartrate 25 milliGRAM(s) Oral two times a day  metroNIDAZOLE  IVPB 500 milliGRAM(s) IV Intermittent every 8 hours  mirabegron ER 50 milliGRAM(s) Oral daily  pantoprazole    Tablet 40 milliGRAM(s) Oral before breakfast  sucralfate 1 Gram(s) Oral two times a day  tamsulosin 0.4 milliGRAM(s) Oral at bedtime    PRN MEDICATIONS  acetaminophen     Tablet .. 650 milliGRAM(s) Oral every 6 hours PRN  albuterol    90 MICROgram(s) HFA Inhaler 2 Puff(s) Inhalation every 6 hours PRN  ondansetron Injectable 4 milliGRAM(s) IV Push every 8 hours PRN  traMADol 25 milliGRAM(s) Oral every 8 hours PRN    VITALS:   T(F): 98.3  HR: 61  BP: 127/75  RR: 18  SpO2: 93%    LABS:                        12.0   5.63  )-----------( 118      ( 12 Mar 2025 06:36 )             36.5     03-12    143  |  106  |  13  ----------------------------<  91  4.0   |  26  |  1.1    Ca    8.2[L]      12 Mar 2025 06:36  Mg     1.9     03-12    TPro  5.8[L]  /  Alb  3.2[L]  /  TBili  0.3  /  DBili  x   /  AST  12  /  ALT  5   /  AlkPhos  59  03-12      Urinalysis Basic - ( 12 Mar 2025 06:36 )    Color: x / Appearance: x / SG: x / pH: x  Gluc: 91 mg/dL / Ketone: x  / Bili: x / Urobili: x   Blood: x / Protein: x / Nitrite: x   Leuk Esterase: x / RBC: x / WBC x   Sq Epi: x / Non Sq Epi: x / Bacteria: x                RADIOLOGY:    PHYSICAL EXAM:  GEN: No acute distress  LUNGS: Clear to auscultation bilaterally   HEART: S1/S2 present. RRR.   ABD/ GI: Soft, non-tender, non-distended. Bowel sounds present  EXT: NC/NC/NE/2+PP/JARRELL  NEURO: AAOX3    
      SAMUEL TIERRA  70y, Male  Allergy: lactose (Diarrhea)  enalapril (Unknown)  baclofen (Unknown)  atorvastatin (Unknown)    Hospital Day: 2d    Patient seen and examined earlier today. abdominal pain better. patient reported that he has noticed roughly 60% improvement in symptoms but still has intermittent abdominal pain  afebrile     PMH/PSH:  PAST MEDICAL & SURGICAL HISTORY:  Stroke      CAD (coronary artery disease)      Pulmonary embolism      COPD, mild      History of BPH      HTN (hypertension)      Degeneration of spine      Kidney stone      RA (rheumatoid arthritis)      High cholesterol      Infrarenal abdominal aortic aneurysm (AAA) without rupture      History of GI bleed      Lung cancer      Diverticulosis      Colon polyp      History of coronary artery bypass, single      History of hip replacement      S/P lobectomy of lung          LAST 24-Hr EVENTS:    VITALS:  Vital Signs Last 24 Hrs  T(C): 36.5 (11 Mar 2025 08:59), Max: 36.6 (10 Mar 2025 23:52)  T(F): 97.7 (11 Mar 2025 08:59), Max: 97.8 (10 Mar 2025 23:52)  HR: 56 (11 Mar 2025 08:59) (56 - 72)  BP: 128/83 (11 Mar 2025 08:59) (103/62 - 131/81)  BP(mean): --  RR: 18 (11 Mar 2025 08:59) (18 - 18)  SpO2: 97% (11 Mar 2025 08:59) (97% - 98%)    Parameters below as of 11 Mar 2025 08:59  Patient On (Oxygen Delivery Method): room air              TESTS & MEASUREMENTS:  Weight/BMI  68 (03-09-25 @ 09:53)  22.1 (03-09-25 @ 09:53)    03-09-25 @ 07:01  -  03-10-25 @ 07:00  --------------------------------------------------------  IN: 0 mL / OUT: 300 mL / NET: -300 mL    03-10-25 @ 07:01  -  03-10-25 @ 15:54  --------------------------------------------------------  IN: 720 mL / OUT: 0 mL / NET: 720 mL        LABS:                        11.6   5.01  )-----------( 108      ( 11 Mar 2025 05:50 )             35.9     03-11    141  |  107  |  21[H]  ----------------------------<  91  3.5   |  23  |  1.1    Ca    8.4      11 Mar 2025 05:50  Mg     1.5     03-11    TPro  5.7[L]  /  Alb  3.1[L]  /  TBili  0.3  /  DBili  x   /  AST  12  /  ALT  6   /  AlkPhos  60  03-11                    Urinalysis with Rflx Culture (collected 03-09-25 @ 11:15)      Urinalysis Basic - ( 10 Mar 2025 06:47 )    Color: x / Appearance: x / SG: x / pH: x  Gluc: 80 mg/dL / Ketone: x  / Bili: x / Urobili: x   Blood: x / Protein: x / Nitrite: x   Leuk Esterase: x / RBC: x / WBC x   Sq Epi: x / Non Sq Epi: x / Bacteria: x                            RADIOLOGY, ECG, & ADDITIONAL TESTS:  12 Lead ECG:   Ventricular Rate 69 BPM    Atrial Rate 69 BPM    P-R Interval 168 ms    QRS Duration 146 ms    Q-T Interval 446 ms    QTC Calculation(Bazett) 477 ms    P Axis 88 degrees    R Axis -63 degrees    T Axis 57 degrees    Diagnosis Line Normal sinus rhythm  Right bundle branch block  Left anterior fascicular block  *** Bifascicular block ***  Septal infarct , age undetermined  Abnormal ECG    Confirmed by Phillip Mendiola ([id number]) on 12/18/2024 7:00:25 AM (12-17-24 @ 14:44)      RECENT DIAGNOSTIC ORDERS:  Magnesium: AM Sched. Collection: 11-Mar-2025 04:30 (03-10-25 @ 11:08)  Comprehensive Metabolic Panel: AM Sched. Collection: 11-Mar-2025 04:30 (03-10-25 @ 11:08)  Complete Blood Count + Automated Diff: AM Sched. Collection: 11-Mar-2025 04:30 (03-10-25 @ 11:08)  Diet, Clear Liquid (03-10-25 @ 03:26)  Ova and Parasites: Routine  Specimen Source: Feces (03-09-25 @ 18:09)  Culture - Stool: Routine  Specimen Source: Feces (03-09-25 @ 18:09)      MEDICATIONS:  MEDICATIONS  (STANDING):  amLODIPine   Tablet 10 milliGRAM(s) Oral daily  aspirin enteric coated 81 milliGRAM(s) Oral daily  cefTRIAXone   IVPB 1000 milliGRAM(s) IV Intermittent every 24 hours  chlorhexidine 2% Cloths 1 Application(s) Topical <User Schedule>  dextrose 5% + lactated ringers. 1000 milliLiter(s) (60 mL/Hr) IV Continuous <Continuous>  enoxaparin Injectable 40 milliGRAM(s) SubCutaneous every 24 hours  ezetimibe 10 milliGRAM(s) Oral daily  finasteride 5 milliGRAM(s) Oral daily  fluticasone propionate/ salmeterol 100-50 MICROgram(s) Diskus 1 Dose(s) Inhalation two times a day  folic acid 1 milliGRAM(s) Oral daily  metoprolol tartrate 25 milliGRAM(s) Oral two times a day  metroNIDAZOLE  IVPB 500 milliGRAM(s) IV Intermittent every 8 hours  mirabegron ER 50 milliGRAM(s) Oral daily  pantoprazole    Tablet 40 milliGRAM(s) Oral before breakfast  sucralfate 1 Gram(s) Oral two times a day  tamsulosin 0.4 milliGRAM(s) Oral at bedtime    MEDICATIONS  (PRN):  acetaminophen     Tablet .. 650 milliGRAM(s) Oral every 6 hours PRN Temp greater or equal to 38C (100.4F), Mild Pain (1 - 3)  albuterol    90 MICROgram(s) HFA Inhaler 2 Puff(s) Inhalation every 6 hours PRN Shortness of Breath and/or Wheezing  ondansetron Injectable 4 milliGRAM(s) IV Push every 8 hours PRN Nausea and/or Vomiting  traMADol 25 milliGRAM(s) Oral every 8 hours PRN Moderate Pain (4 - 6)      HOME MEDICATIONS:  Albuterol (Eqv-ProAir HFA) 90 mcg/inh inhalation aerosol (03-09)  amLODIPine 10 mg oral tablet (03-09)  aspirin 325 mg oral delayed release tablet (03-09)  Breo Ellipta 100 mcg-25 mcg/inh inhalation powder (03-09)  finasteride 5 mg oral tablet (03-09)  folic acid 1 mg oral tablet (03-09)  metoprolol tartrate 25 mg oral tablet (03-09)  Myrbetriq 50 mg oral tablet, extended release (03-09)  omeprazole 40 mg oral delayed release capsule (03-09)  tamsulosin 0.4 mg oral capsule (03-09)  Zetia 10 mg oral tablet (03-09)      PHYSICAL EXAM:  GENERAL: Patient lying on bed, comfortable   CHEST/LUNG: NVB, no wheezing   HEART: R1+R2, RRR  ABDOMEN: Soft. mild abd pain   EXTREMITIES:  no edema, Bruising  CNS: AAAx4. No cranial nerves deficit.       
SUBJECTIVE:    Patient is a 70y old Male who presents with a chief complaint of Abdominal Pain (14 Mar 2025 13:07)    Currently admitted to medicine with the primary diagnosis of Acute colitis       Today is hospital day 6d.     PAST MEDICAL & SURGICAL HISTORY  Stroke    CAD (coronary artery disease)    Pulmonary embolism    COPD, mild    History of BPH    HTN (hypertension)    Degeneration of spine    Kidney stone    RA (rheumatoid arthritis)    High cholesterol    Infrarenal abdominal aortic aneurysm (AAA) without rupture    History of GI bleed    Lung cancer    Diverticulosis    Colon polyp    History of coronary artery bypass, single    History of hip replacement    S/P lobectomy of lung      ALLERGIES:  enalapril (Unknown)  baclofen (Unknown)  atorvastatin (Unknown)    MEDICATIONS:  STANDING MEDICATIONS  amLODIPine   Tablet 10 milliGRAM(s) Oral daily  apixaban 5 milliGRAM(s) Oral every 12 hours  chlorhexidine 2% Cloths 1 Application(s) Topical <User Schedule>  dextrose 5% + lactated ringers. 1000 milliLiter(s) IV Continuous <Continuous>  ezetimibe 10 milliGRAM(s) Oral daily  finasteride 5 milliGRAM(s) Oral daily  fluticasone propionate/ salmeterol 100-50 MICROgram(s) Diskus 1 Dose(s) Inhalation two times a day  folic acid 1 milliGRAM(s) Oral daily  metoprolol tartrate 25 milliGRAM(s) Oral two times a day  mirabegron ER 50 milliGRAM(s) Oral daily  pantoprazole    Tablet 40 milliGRAM(s) Oral before breakfast  sucralfate 1 Gram(s) Oral two times a day  tamsulosin 0.4 milliGRAM(s) Oral at bedtime    PRN MEDICATIONS  acetaminophen     Tablet .. 650 milliGRAM(s) Oral every 6 hours PRN  albuterol    90 MICROgram(s) HFA Inhaler 2 Puff(s) Inhalation every 6 hours PRN  ondansetron Injectable 4 milliGRAM(s) IV Push every 8 hours PRN  traMADol 25 milliGRAM(s) Oral every 8 hours PRN    VITALS:   T(F): 97.9  HR: 65  BP: 128/73  RR: 18  SpO2: 98%    LABS:                        11.8   4.41  )-----------( 120      ( 15 Mar 2025 06:30 )             35.8     03-15    138  |  103  |  17  ----------------------------<  79  4.5   |  24  |  1.3    Ca    8.2[L]      15 Mar 2025 06:30  Mg     1.7     03-15    TPro  6.1  /  Alb  3.1[L]  /  TBili  0.3  /  DBili  x   /  AST  38  /  ALT  17  /  AlkPhos  55  03-15      Urinalysis Basic - ( 15 Mar 2025 06:30 )    Color: x / Appearance: x / SG: x / pH: x  Gluc: 79 mg/dL / Ketone: x  / Bili: x / Urobili: x   Blood: x / Protein: x / Nitrite: x   Leuk Esterase: x / RBC: x / WBC x   Sq Epi: x / Non Sq Epi: x / Bacteria: x                RADIOLOGY:    PHYSICAL EXAM:  GEN: No acute distress  LUNGS: Clear to auscultation bilaterally   HEART: S1/S2 present. RRR.   ABD/ GI: Soft, non-tender, non-distended. Bowel sounds present  EXT: NC/NC/NE/2+PP/JARRELL  NEURO: AAOX3

## 2025-03-16 NOTE — DISCHARGE NOTE PROVIDER - NSDCMRMEDTOKEN_GEN_ALL_CORE_FT
Albuterol (Eqv-ProAir HFA) 90 mcg/inh inhalation aerosol: 2 puff(s) inhaled every 6 hours  amLODIPine 10 mg oral tablet: 1 tab(s) orally once a day  apixaban 5 mg oral tablet: 1 tab(s) orally every 12 hours  Breo Ellipta 100 mcg-25 mcg/inh inhalation powder: 1 puff(s) inhaled 2 times a day  finasteride 5 mg oral tablet: 1 tab(s) orally once a day  folic acid 1 mg oral tablet: 1 tab(s) orally once a day  metoprolol tartrate 25 mg oral tablet: 1 tab(s) orally 2 times a day  Myrbetriq 50 mg oral tablet, extended release: 1 tab(s) orally once a day  tamsulosin 0.4 mg oral capsule: 1 cap(s) orally once a day  Zetia 10 mg oral tablet: 1 tab(s) orally once a day

## 2025-03-16 NOTE — DISCHARGE NOTE PROVIDER - NSDCFUSCHEDAPPT_GEN_ALL_CORE_FT
Lorenzo Mccray  [facility]  VASCULAR [address]  Scheduled Appointment: 03/19/2025    Norma Bar  [facility]  Scheduled Appointment: 04/14/2025    [facility]  UROLOGY  [address]  Scheduled Appointment: 04/14/2025    Darren Murphy  [facility]  PULMMED [address]  Scheduled Appointment: 05/27/2025

## 2025-03-16 NOTE — DISCHARGE NOTE PROVIDER - CARE PROVIDER_API CALL
Dusty Mack  Internal Medicine  [address] [address]  Phone: [phone]  Fax: [phone]  Follow Up Time:

## 2025-03-16 NOTE — DISCHARGE NOTE PROVIDER - HOSPITAL COURSE
70-year-old male with PMH of HTN, HLD, CAD s/p CABG, BPH, neurogenic bladder, intracranial hemorrhage with left-sided deficits, minimally ambulatory with a walker at baseline, RA, lung ca s/p lobectomy presenting w/ abdominal pain admitted for colitis.     # LLQ abdominal pain 2/2 colitis , improving   - Acute onset abdominal pain,   no associated nausea or vomiting  - Tenderness +nt on superficial palpation of LL abdomen now improved   - CT abdomen showing moderate mural thickening and mucosal enhancement involving the sigmoid and descending colon, with mild pericolonic fat  stranding/ascites, suspicious for acute colitis.   - Lactate 0.7, Lipase 29, LFT WNL  - - Antibiotics: c/w Ceftriaxone and Metronidazole now stopped  -follow stool culture, -- shigella,GI PCR panel pending -- no parasites    # Possible Cystitis   # Pyuria  - Symptoms: Mild discomfort during the initiation of urination, Mild suprapubic tenderness, No increased frequency or urgency  - UA: Moderate LE, WBC 55, No Bacteria  - Cultures: UCx  enterococcus S to vanco  ID eval suggested no ABX       #thrombocytopenia  fluctuating levels-- autoimmune ?    # Neurogenic Bladder  # BPH  - c/w finasteride and tamsulosin   - c/w mirabegron ER    # Hx of rheumatoid arthritis   - Patient currently on abatacept infusion monthly -- can cause DVTs and patient told me this as per his rheumatologist  - c/w folic acid daily  - Outpatient rheum fu     # H/o CAD s/p CABG  # H/o ICH w/ left sided residual deficits  - On ASA (Patient states patient takes 162 mg BID confirm w/ Pharmacy) -- now stopped  CT head  normal and neurosurgery  cleared for  anticoagulation    # Dyslipidemia    - Cont ezetimibe 10mg qD     # HTN    - c/w amlodipine 10mg qD   - Cont metoprolol tartrate 25 BID       # COPD    -c/w OP Rx    # 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  started on eliquis-- currently asymptomatic--             70-year-old male with PMH of HTN, HLD, CAD s/p CABG, BPH, neurogenic bladder, intracranial hemorrhage with left-sided deficits, minimally ambulatory with a walker at baseline, RA, lung ca s/p lobectomy presenting w/ abdominal pain admitted for colitis.     # LLQ abdominal pain 2/2 colitis , improving   - Acute onset abdominal pain,   no associated nausea or vomiting  - Tenderness +nt on superficial palpation of LL abdomen now improved   - CT abdomen showing moderate mural thickening and mucosal enhancement involving the sigmoid and descending colon, with mild pericolonic fat  stranding/ascites, suspicious for acute colitis.   - Lactate 0.7, Lipase 29, LFT WNL  - - Antibiotics: c/w Ceftriaxone and Metronidazole now stopped  -follow stool culture, -- shigella,GI PCR panel pending -- no parasites    # Possible Cystitis   # Pyuria  - Symptoms: Mild discomfort during the initiation of urination, Mild suprapubic tenderness, No increased frequency or urgency  - UA: Moderate LE, WBC 55, No Bacteria  - Cultures: UCx  enterococcus S to vanco  ID eval suggested no ABX       #thrombocytopenia  fluctuating levels-- autoimmune ?    # Neurogenic Bladder  # BPH  - c/w finasteride and tamsulosin   - c/w mirabegron ER    # Hx of rheumatoid arthritis   - Patient currently on abatacept infusion monthly -- can cause DVTs and patient told me this as per his rheumatologist  - c/w folic acid daily  - Outpatient rheum fu     # H/o CAD s/p CABG  # H/o ICH w/ left sided residual deficits  - On ASA (Patient states patient takes 162 mg BID confirm w/ Pharmacy) -- now stopped  CT head  normal and neurosurgery  cleared for  anticoagulation    # Dyslipidemia    - Cont ezetimibe 10mg qD     # HTN    - c/w amlodipine 10mg qD   - Cont metoprolol tartrate 25 BID       # COPD    -c/w OP Rx    # 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 IR refused to put IVC filter and patient was agreeable for eliquis.  started on eliquis-- currently asymptomatic--

## 2025-03-16 NOTE — DISCHARGE NOTE PROVIDER - NSDCCPCAREPLAN_GEN_ALL_CORE_FT
PRINCIPAL DISCHARGE DIAGNOSIS  Diagnosis: Upper leg DVT (deep venous thromboembolism), chronic  Assessment and Plan of Treatment:       SECONDARY DISCHARGE DIAGNOSES  Diagnosis: Urinary tract infection  Assessment and Plan of Treatment:

## 2025-03-16 NOTE — PROGRESS NOTE ADULT - PROVIDER SPECIALTY LIST ADULT
Hospitalist
Internal Medicine
Hospitalist

## 2025-03-20 NOTE — CDI QUERY NOTE - NSCDIOTHERTXTBX_GEN_ALL_CORE_HH
There is inconsistent documentation in the medical record regarding the patient’s condition.      In order to ensure accurate coding and accuracy of the clinical record, the documentation in this patient’s medical record requires additional clarification.      The diagnosis of Shigella colitis has been documented in the medical record on:    Please review the documentation in the medical record and clarify the appropriate diagnosis (along with any supporting documentation) in your progress note and/or discharge summary.    • Shigella colitis has been ruled in.    • Other (please specify):     Supporting documentation and/or clinical evidence:    3/12-3/16 Progress Note Adult-Hospitalist Attending: ... LLQ abdominal pain 2/2 colitis , improving ... Antibiotics: c/w Ceftriaxone and Metronidazole now stopped-follow stool culture, -- shigella,GI PCR panel pending -- no parasites    3/13 Consult Note Adult-Infectious Disease Attending: ... Colitis resolved. 3/10 Stool : rare Shigella ... no Abx    3/14 Progress Note Adult-Internal Medicine Resident: ... Day 1-2: patient was treated with antibiotics for colitis. Day 3: patient was found to have shigella; dc'd abx ... LLQ abdominal pain 2/2 Shigella colitis , improving     3/16  Discharge Note Provider: ... LLQ abdominal pain 2/2 colitis ... follow stool culture, -- shigella,GI PCR panel pending -- no parasites    Lab findings: 3/10 Stool culture-+ shigella species        Thank you,  Loretta Villaseñor RN [facility]  [phone]

## 2025-03-27 ENCOUNTER — RESULT REVIEW (OUTPATIENT)
Age: 70
End: 2025-03-27

## 2025-03-27 ENCOUNTER — OUTPATIENT (OUTPATIENT)
Dept: OUTPATIENT SERVICES | Facility: HOSPITAL | Age: 70
LOS: 1 days | End: 2025-03-27
Payer: MEDICARE

## 2025-03-27 DIAGNOSIS — N40.0 BENIGN PROSTATIC HYPERPLASIA WITHOUT LOWER URINARY TRACT SYMPTOMS: ICD-10-CM

## 2025-03-27 DIAGNOSIS — Z00.8 ENCOUNTER FOR OTHER GENERAL EXAMINATION: ICD-10-CM

## 2025-03-27 DIAGNOSIS — Z95.1 PRESENCE OF AORTOCORONARY BYPASS GRAFT: Chronic | ICD-10-CM

## 2025-03-27 DIAGNOSIS — Z90.2 ACQUIRED ABSENCE OF LUNG [PART OF]: Chronic | ICD-10-CM

## 2025-03-27 DIAGNOSIS — Z96.649 PRESENCE OF UNSPECIFIED ARTIFICIAL HIP JOINT: Chronic | ICD-10-CM

## 2025-03-27 PROCEDURE — 76857 US EXAM PELVIC LIMITED: CPT | Mod: 26

## 2025-03-27 PROCEDURE — 76857 US EXAM PELVIC LIMITED: CPT

## 2025-03-28 DIAGNOSIS — N40.0 BENIGN PROSTATIC HYPERPLASIA WITHOUT LOWER URINARY TRACT SYMPTOMS: ICD-10-CM

## 2025-03-29 DIAGNOSIS — D84.89 OTHER IMMUNODEFICIENCIES: ICD-10-CM

## 2025-03-29 DIAGNOSIS — D84.9 IMMUNODEFICIENCY, UNSPECIFIED: ICD-10-CM

## 2025-03-29 DIAGNOSIS — Z86.711 PERSONAL HISTORY OF PULMONARY EMBOLISM: ICD-10-CM

## 2025-03-29 DIAGNOSIS — Z88.8 ALLERGY STATUS TO OTHER DRUGS, MEDICAMENTS AND BIOLOGICAL SUBSTANCES: ICD-10-CM

## 2025-03-29 DIAGNOSIS — I82.512 CHRONIC EMBOLISM AND THROMBOSIS OF LEFT FEMORAL VEIN: ICD-10-CM

## 2025-03-29 DIAGNOSIS — I10 ESSENTIAL (PRIMARY) HYPERTENSION: ICD-10-CM

## 2025-03-29 DIAGNOSIS — E78.00 PURE HYPERCHOLESTEROLEMIA, UNSPECIFIED: ICD-10-CM

## 2025-03-29 DIAGNOSIS — D69.59 OTHER SECONDARY THROMBOCYTOPENIA: ICD-10-CM

## 2025-03-29 DIAGNOSIS — E83.42 HYPOMAGNESEMIA: ICD-10-CM

## 2025-03-29 DIAGNOSIS — K57.90 DIVERTICULOSIS OF INTESTINE, PART UNSPECIFIED, WITHOUT PERFORATION OR ABSCESS WITHOUT BLEEDING: ICD-10-CM

## 2025-03-29 DIAGNOSIS — J44.9 CHRONIC OBSTRUCTIVE PULMONARY DISEASE, UNSPECIFIED: ICD-10-CM

## 2025-03-29 DIAGNOSIS — A03.9 SHIGELLOSIS, UNSPECIFIED: ICD-10-CM

## 2025-03-29 DIAGNOSIS — N40.0 BENIGN PROSTATIC HYPERPLASIA WITHOUT LOWER URINARY TRACT SYMPTOMS: ICD-10-CM

## 2025-03-29 DIAGNOSIS — I25.10 ATHEROSCLEROTIC HEART DISEASE OF NATIVE CORONARY ARTERY WITHOUT ANGINA PECTORIS: ICD-10-CM

## 2025-03-29 DIAGNOSIS — Z95.1 PRESENCE OF AORTOCORONARY BYPASS GRAFT: ICD-10-CM

## 2025-03-29 DIAGNOSIS — Z85.118 PERSONAL HISTORY OF OTHER MALIGNANT NEOPLASM OF BRONCHUS AND LUNG: ICD-10-CM

## 2025-03-29 DIAGNOSIS — Z79.51 LONG TERM (CURRENT) USE OF INHALED STEROIDS: ICD-10-CM

## 2025-03-29 DIAGNOSIS — N31.9 NEUROMUSCULAR DYSFUNCTION OF BLADDER, UNSPECIFIED: ICD-10-CM

## 2025-03-29 DIAGNOSIS — M06.9 RHEUMATOID ARTHRITIS, UNSPECIFIED: ICD-10-CM

## 2025-03-29 DIAGNOSIS — Z79.01 LONG TERM (CURRENT) USE OF ANTICOAGULANTS: ICD-10-CM

## 2025-03-29 DIAGNOSIS — Z90.2 ACQUIRED ABSENCE OF LUNG [PART OF]: ICD-10-CM

## 2025-04-03 ENCOUNTER — RESULT REVIEW (OUTPATIENT)
Age: 70
End: 2025-04-03

## 2025-04-03 ENCOUNTER — OUTPATIENT (OUTPATIENT)
Dept: OUTPATIENT SERVICES | Facility: HOSPITAL | Age: 70
LOS: 1 days | End: 2025-04-03
Payer: MEDICARE

## 2025-04-03 DIAGNOSIS — Z96.649 PRESENCE OF UNSPECIFIED ARTIFICIAL HIP JOINT: Chronic | ICD-10-CM

## 2025-04-03 DIAGNOSIS — Z00.8 ENCOUNTER FOR OTHER GENERAL EXAMINATION: ICD-10-CM

## 2025-04-03 DIAGNOSIS — M25.862 OTHER SPECIFIED JOINT DISORDERS, LEFT KNEE: ICD-10-CM

## 2025-04-03 DIAGNOSIS — Z90.2 ACQUIRED ABSENCE OF LUNG [PART OF]: Chronic | ICD-10-CM

## 2025-04-03 DIAGNOSIS — Z95.1 PRESENCE OF AORTOCORONARY BYPASS GRAFT: Chronic | ICD-10-CM

## 2025-04-03 PROCEDURE — 73721 MRI JNT OF LWR EXTRE W/O DYE: CPT | Mod: LT

## 2025-04-03 PROCEDURE — 73721 MRI JNT OF LWR EXTRE W/O DYE: CPT | Mod: 26,LT

## 2025-04-04 DIAGNOSIS — M25.862 OTHER SPECIFIED JOINT DISORDERS, LEFT KNEE: ICD-10-CM

## 2025-04-09 ENCOUNTER — NON-APPOINTMENT (OUTPATIENT)
Age: 70
End: 2025-04-09

## 2025-04-09 ENCOUNTER — RX RENEWAL (OUTPATIENT)
Age: 70
End: 2025-04-09

## 2025-04-11 ENCOUNTER — APPOINTMENT (OUTPATIENT)
Dept: VASCULAR SURGERY | Facility: CLINIC | Age: 70
End: 2025-04-11
Payer: MEDICARE

## 2025-04-11 VITALS
DIASTOLIC BLOOD PRESSURE: 77 MMHG | SYSTOLIC BLOOD PRESSURE: 137 MMHG | BODY MASS INDEX: 20.44 KG/M2 | WEIGHT: 138 LBS | HEIGHT: 69 IN

## 2025-04-11 DIAGNOSIS — I71.40 ABDOMINAL AORTIC ANEURYSM, WITHOUT RUPTURE, UNSPECIFIED: ICD-10-CM

## 2025-04-11 PROCEDURE — 99212 OFFICE O/P EST SF 10 MIN: CPT

## 2025-04-11 PROCEDURE — 93971 EXTREMITY STUDY: CPT

## 2025-04-11 PROCEDURE — 93978 VASCULAR STUDY: CPT

## 2025-04-14 ENCOUNTER — OUTPATIENT (OUTPATIENT)
Dept: OUTPATIENT SERVICES | Facility: HOSPITAL | Age: 70
LOS: 1 days | End: 2025-04-14
Payer: MEDICAID

## 2025-04-14 ENCOUNTER — APPOINTMENT (OUTPATIENT)
Age: 70
End: 2025-04-14
Payer: MEDICAID

## 2025-04-14 VITALS — OXYGEN SATURATION: 98 % | HEART RATE: 58 BPM | DIASTOLIC BLOOD PRESSURE: 66 MMHG | SYSTOLIC BLOOD PRESSURE: 101 MMHG

## 2025-04-14 DIAGNOSIS — Z90.2 ACQUIRED ABSENCE OF LUNG [PART OF]: Chronic | ICD-10-CM

## 2025-04-14 DIAGNOSIS — Z96.649 PRESENCE OF UNSPECIFIED ARTIFICIAL HIP JOINT: Chronic | ICD-10-CM

## 2025-04-14 DIAGNOSIS — N32.81 OVERACTIVE BLADDER: ICD-10-CM

## 2025-04-14 DIAGNOSIS — R39.15 URGENCY OF URINATION: ICD-10-CM

## 2025-04-14 DIAGNOSIS — Z00.00 ENCOUNTER FOR GENERAL ADULT MEDICAL EXAMINATION WITHOUT ABNORMAL FINDINGS: ICD-10-CM

## 2025-04-14 DIAGNOSIS — Z95.1 PRESENCE OF AORTOCORONARY BYPASS GRAFT: Chronic | ICD-10-CM

## 2025-04-14 DIAGNOSIS — R39.9 UNSPECIFIED SYMPTOMS AND SIGNS INVOLVING THE GENITOURINARY SYSTEM: ICD-10-CM

## 2025-04-14 PROCEDURE — G2211 COMPLEX E/M VISIT ADD ON: CPT | Mod: NC

## 2025-04-14 PROCEDURE — 99213 OFFICE O/P EST LOW 20 MIN: CPT

## 2025-04-14 RX ORDER — APIXABAN 5 MG/1
5 TABLET, FILM COATED ORAL
Refills: 0 | Status: ACTIVE | COMMUNITY

## 2025-04-24 DIAGNOSIS — N32.81 OVERACTIVE BLADDER: ICD-10-CM

## 2025-04-24 DIAGNOSIS — R39.15 URGENCY OF URINATION: ICD-10-CM

## 2025-04-24 NOTE — PHYSICAL THERAPY INITIAL EVALUATION ADULT - LIVES WITH, PROFILE
[Follow Up] : a follow up visit [Patient] : patient [Mother] : mother [FreeTextEntry1] : new issue right ankle injury girlfriend

## 2025-05-12 ENCOUNTER — RX RENEWAL (OUTPATIENT)
Age: 70
End: 2025-05-12

## 2025-05-27 ENCOUNTER — APPOINTMENT (OUTPATIENT)
Dept: PULMONOLOGY | Facility: CLINIC | Age: 70
End: 2025-05-27
Payer: MEDICARE

## 2025-05-27 DIAGNOSIS — Z85.118 PERSONAL HISTORY OF OTHER MALIGNANT NEOPLASM OF BRONCHUS AND LUNG: ICD-10-CM

## 2025-05-27 DIAGNOSIS — J44.9 CHRONIC OBSTRUCTIVE PULMONARY DISEASE, UNSPECIFIED: ICD-10-CM

## 2025-05-27 PROCEDURE — 99214 OFFICE O/P EST MOD 30 MIN: CPT

## 2025-05-27 PROCEDURE — G2211 COMPLEX E/M VISIT ADD ON: CPT

## 2025-05-27 RX ORDER — FLUTICASONE FUROATE, UMECLIDINIUM BROMIDE AND VILANTEROL TRIFENATATE 200; 62.5; 25 UG/1; UG/1; UG/1
200-62.5-25 POWDER RESPIRATORY (INHALATION) DAILY
Qty: 3 | Refills: 3 | Status: ACTIVE | COMMUNITY
Start: 2025-05-27 | End: 1900-01-01

## 2025-05-31 ENCOUNTER — EMERGENCY (EMERGENCY)
Facility: HOSPITAL | Age: 70
LOS: 0 days | Discharge: HOME | End: 2025-05-31
Attending: EMERGENCY MEDICINE
Payer: MEDICARE

## 2025-05-31 VITALS
DIASTOLIC BLOOD PRESSURE: 81 MMHG | SYSTOLIC BLOOD PRESSURE: 151 MMHG | HEART RATE: 59 BPM | RESPIRATION RATE: 20 BRPM | HEIGHT: 69 IN | TEMPERATURE: 98 F | OXYGEN SATURATION: 97 % | WEIGHT: 149.03 LBS

## 2025-05-31 DIAGNOSIS — R60.0 LOCALIZED EDEMA: ICD-10-CM

## 2025-05-31 DIAGNOSIS — N40.0 BENIGN PROSTATIC HYPERPLASIA WITHOUT LOWER URINARY TRACT SYMPTOMS: ICD-10-CM

## 2025-05-31 DIAGNOSIS — Z88.8 ALLERGY STATUS TO OTHER DRUGS, MEDICAMENTS AND BIOLOGICAL SUBSTANCES: ICD-10-CM

## 2025-05-31 DIAGNOSIS — R10.13 EPIGASTRIC PAIN: ICD-10-CM

## 2025-05-31 DIAGNOSIS — I69.954 HEMIPLEGIA AND HEMIPARESIS FOLLOWING UNSPECIFIED CEREBROVASCULAR DISEASE AFFECTING LEFT NON-DOMINANT SIDE: ICD-10-CM

## 2025-05-31 DIAGNOSIS — R07.89 OTHER CHEST PAIN: ICD-10-CM

## 2025-05-31 DIAGNOSIS — Z95.1 PRESENCE OF AORTOCORONARY BYPASS GRAFT: ICD-10-CM

## 2025-05-31 DIAGNOSIS — I25.10 ATHEROSCLEROTIC HEART DISEASE OF NATIVE CORONARY ARTERY WITHOUT ANGINA PECTORIS: ICD-10-CM

## 2025-05-31 DIAGNOSIS — Z90.2 ACQUIRED ABSENCE OF LUNG [PART OF]: ICD-10-CM

## 2025-05-31 DIAGNOSIS — Z90.2 ACQUIRED ABSENCE OF LUNG [PART OF]: Chronic | ICD-10-CM

## 2025-05-31 DIAGNOSIS — R30.0 DYSURIA: ICD-10-CM

## 2025-05-31 DIAGNOSIS — Z96.649 PRESENCE OF UNSPECIFIED ARTIFICIAL HIP JOINT: Chronic | ICD-10-CM

## 2025-05-31 DIAGNOSIS — J18.9 PNEUMONIA, UNSPECIFIED ORGANISM: ICD-10-CM

## 2025-05-31 DIAGNOSIS — E78.5 HYPERLIPIDEMIA, UNSPECIFIED: ICD-10-CM

## 2025-05-31 DIAGNOSIS — Z95.1 PRESENCE OF AORTOCORONARY BYPASS GRAFT: Chronic | ICD-10-CM

## 2025-05-31 DIAGNOSIS — I10 ESSENTIAL (PRIMARY) HYPERTENSION: ICD-10-CM

## 2025-05-31 DIAGNOSIS — M54.50 LOW BACK PAIN, UNSPECIFIED: ICD-10-CM

## 2025-05-31 LAB
ALBUMIN SERPL ELPH-MCNC: 4.3 G/DL — SIGNIFICANT CHANGE UP (ref 3.5–5.2)
ALP SERPL-CCNC: 68 U/L — SIGNIFICANT CHANGE UP (ref 30–115)
ALT FLD-CCNC: 19 U/L — SIGNIFICANT CHANGE UP (ref 0–41)
ANION GAP SERPL CALC-SCNC: 12 MMOL/L — SIGNIFICANT CHANGE UP (ref 7–14)
APPEARANCE UR: CLEAR — SIGNIFICANT CHANGE UP
AST SERPL-CCNC: 24 U/L — SIGNIFICANT CHANGE UP (ref 0–41)
BACTERIA # UR AUTO: NEGATIVE /HPF — SIGNIFICANT CHANGE UP
BASE EXCESS BLDV CALC-SCNC: -1.3 MMOL/L — SIGNIFICANT CHANGE UP (ref -2–3)
BASOPHILS # BLD AUTO: 0.04 K/UL — SIGNIFICANT CHANGE UP (ref 0–0.2)
BASOPHILS NFR BLD AUTO: 0.8 % — SIGNIFICANT CHANGE UP (ref 0–1)
BILIRUB SERPL-MCNC: 0.3 MG/DL — SIGNIFICANT CHANGE UP (ref 0.2–1.2)
BILIRUB UR-MCNC: NEGATIVE — SIGNIFICANT CHANGE UP
BUN SERPL-MCNC: 42 MG/DL — HIGH (ref 10–20)
CA-I SERPL-SCNC: 1.23 MMOL/L — SIGNIFICANT CHANGE UP (ref 1.15–1.33)
CALCIUM SERPL-MCNC: 9.5 MG/DL — SIGNIFICANT CHANGE UP (ref 8.4–10.5)
CAST: 1 /LPF — SIGNIFICANT CHANGE UP (ref 0–4)
CHLORIDE SERPL-SCNC: 104 MMOL/L — SIGNIFICANT CHANGE UP (ref 98–110)
CO2 SERPL-SCNC: 22 MMOL/L — SIGNIFICANT CHANGE UP (ref 17–32)
COLOR SPEC: YELLOW — SIGNIFICANT CHANGE UP
CREAT SERPL-MCNC: 1.5 MG/DL — SIGNIFICANT CHANGE UP (ref 0.7–1.5)
DIFF PNL FLD: ABNORMAL
EGFR: 50 ML/MIN/1.73M2 — LOW
EGFR: 50 ML/MIN/1.73M2 — LOW
EOSINOPHIL # BLD AUTO: 0.07 K/UL — SIGNIFICANT CHANGE UP (ref 0–0.7)
EOSINOPHIL NFR BLD AUTO: 1.4 % — SIGNIFICANT CHANGE UP (ref 0–8)
FLUAV AG NPH QL: SIGNIFICANT CHANGE UP
FLUBV AG NPH QL: SIGNIFICANT CHANGE UP
GAS PNL BLDV: 136 MMOL/L — SIGNIFICANT CHANGE UP (ref 136–145)
GAS PNL BLDV: SIGNIFICANT CHANGE UP
GLUCOSE SERPL-MCNC: 79 MG/DL — SIGNIFICANT CHANGE UP (ref 70–99)
GLUCOSE UR QL: NEGATIVE MG/DL — SIGNIFICANT CHANGE UP
HCO3 BLDV-SCNC: 25 MMOL/L — SIGNIFICANT CHANGE UP (ref 22–29)
HCT VFR BLD CALC: 38 % — LOW (ref 42–52)
HCT VFR BLDA CALC: 39 % — SIGNIFICANT CHANGE UP (ref 39–51)
HGB BLD CALC-MCNC: 12.9 G/DL — SIGNIFICANT CHANGE UP (ref 12.6–17.4)
HGB BLD-MCNC: 12.6 G/DL — LOW (ref 14–18)
IMM GRANULOCYTES NFR BLD AUTO: 0.6 % — HIGH (ref 0.1–0.3)
KETONES UR QL: NEGATIVE MG/DL — SIGNIFICANT CHANGE UP
LACTATE BLDV-MCNC: 0.7 MMOL/L — SIGNIFICANT CHANGE UP (ref 0.5–2)
LEUKOCYTE ESTERASE UR-ACNC: ABNORMAL
LIDOCAIN IGE QN: 35 U/L — SIGNIFICANT CHANGE UP (ref 7–60)
LYMPHOCYTES # BLD AUTO: 0.99 K/UL — LOW (ref 1.2–3.4)
LYMPHOCYTES # BLD AUTO: 19.3 % — LOW (ref 20.5–51.1)
MCHC RBC-ENTMCNC: 30.2 PG — SIGNIFICANT CHANGE UP (ref 27–31)
MCHC RBC-ENTMCNC: 33.2 G/DL — SIGNIFICANT CHANGE UP (ref 32–37)
MCV RBC AUTO: 91.1 FL — SIGNIFICANT CHANGE UP (ref 80–94)
MONOCYTES # BLD AUTO: 0.56 K/UL — SIGNIFICANT CHANGE UP (ref 0.1–0.6)
MONOCYTES NFR BLD AUTO: 10.9 % — HIGH (ref 1.7–9.3)
NEUTROPHILS # BLD AUTO: 3.45 K/UL — SIGNIFICANT CHANGE UP (ref 1.4–6.5)
NEUTROPHILS NFR BLD AUTO: 67 % — SIGNIFICANT CHANGE UP (ref 42.2–75.2)
NITRITE UR-MCNC: NEGATIVE — SIGNIFICANT CHANGE UP
NRBC BLD AUTO-RTO: 0 /100 WBCS — SIGNIFICANT CHANGE UP (ref 0–0)
NT-PROBNP SERPL-SCNC: 935 PG/ML — HIGH (ref 0–300)
PCO2 BLDV: 46 MMHG — SIGNIFICANT CHANGE UP (ref 42–55)
PH BLDV: 7.34 — SIGNIFICANT CHANGE UP (ref 7.32–7.43)
PH UR: 5.5 — SIGNIFICANT CHANGE UP (ref 5–8)
PLATELET # BLD AUTO: 137 K/UL — SIGNIFICANT CHANGE UP (ref 130–400)
PMV BLD: 10.9 FL — HIGH (ref 7.4–10.4)
PO2 BLDV: 28 MMHG — SIGNIFICANT CHANGE UP (ref 25–45)
POTASSIUM BLDV-SCNC: 4.9 MMOL/L — SIGNIFICANT CHANGE UP (ref 3.5–5.1)
POTASSIUM SERPL-MCNC: 4.8 MMOL/L — SIGNIFICANT CHANGE UP (ref 3.5–5)
POTASSIUM SERPL-SCNC: 4.8 MMOL/L — SIGNIFICANT CHANGE UP (ref 3.5–5)
PROT SERPL-MCNC: 7.7 G/DL — SIGNIFICANT CHANGE UP (ref 6–8)
PROT UR-MCNC: 30 MG/DL
RBC # BLD: 4.17 M/UL — LOW (ref 4.7–6.1)
RBC # FLD: 13.7 % — SIGNIFICANT CHANGE UP (ref 11.5–14.5)
RBC CASTS # UR COMP ASSIST: 1 /HPF — SIGNIFICANT CHANGE UP (ref 0–4)
RSV RNA NPH QL NAA+NON-PROBE: SIGNIFICANT CHANGE UP
SAO2 % BLDV: 41.8 % — LOW (ref 67–88)
SARS-COV-2 RNA SPEC QL NAA+PROBE: SIGNIFICANT CHANGE UP
SODIUM SERPL-SCNC: 138 MMOL/L — SIGNIFICANT CHANGE UP (ref 135–146)
SOURCE RESPIRATORY: SIGNIFICANT CHANGE UP
SP GR SPEC: 1.01 — SIGNIFICANT CHANGE UP (ref 1–1.03)
SQUAMOUS # UR AUTO: 1 /HPF — SIGNIFICANT CHANGE UP (ref 0–5)
TROPONIN T, HIGH SENSITIVITY RESULT: 26 NG/L — HIGH (ref 6–21)
TROPONIN T, HIGH SENSITIVITY RESULT: 30 NG/L — HIGH (ref 6–21)
UROBILINOGEN FLD QL: 0.2 MG/DL — SIGNIFICANT CHANGE UP (ref 0.2–1)
WBC # BLD: 5.14 K/UL — SIGNIFICANT CHANGE UP (ref 4.8–10.8)
WBC # FLD AUTO: 5.14 K/UL — SIGNIFICANT CHANGE UP (ref 4.8–10.8)
WBC UR QL: 32 /HPF — HIGH (ref 0–5)

## 2025-05-31 PROCEDURE — 85018 HEMOGLOBIN: CPT

## 2025-05-31 PROCEDURE — 71045 X-RAY EXAM CHEST 1 VIEW: CPT

## 2025-05-31 PROCEDURE — 83880 ASSAY OF NATRIURETIC PEPTIDE: CPT

## 2025-05-31 PROCEDURE — 83605 ASSAY OF LACTIC ACID: CPT

## 2025-05-31 PROCEDURE — 84295 ASSAY OF SERUM SODIUM: CPT

## 2025-05-31 PROCEDURE — 85014 HEMATOCRIT: CPT

## 2025-05-31 PROCEDURE — 71275 CT ANGIOGRAPHY CHEST: CPT

## 2025-05-31 PROCEDURE — 93010 ELECTROCARDIOGRAM REPORT: CPT

## 2025-05-31 PROCEDURE — 36415 COLL VENOUS BLD VENIPUNCTURE: CPT

## 2025-05-31 PROCEDURE — 99285 EMERGENCY DEPT VISIT HI MDM: CPT | Mod: 25

## 2025-05-31 PROCEDURE — 84484 ASSAY OF TROPONIN QUANT: CPT

## 2025-05-31 PROCEDURE — 74174 CTA ABD&PLVS W/CONTRAST: CPT | Mod: 26

## 2025-05-31 PROCEDURE — 93010 ELECTROCARDIOGRAM REPORT: CPT | Mod: 77

## 2025-05-31 PROCEDURE — 83690 ASSAY OF LIPASE: CPT

## 2025-05-31 PROCEDURE — 85025 COMPLETE CBC W/AUTO DIFF WBC: CPT

## 2025-05-31 PROCEDURE — 82330 ASSAY OF CALCIUM: CPT

## 2025-05-31 PROCEDURE — 82803 BLOOD GASES ANY COMBINATION: CPT

## 2025-05-31 PROCEDURE — 71045 X-RAY EXAM CHEST 1 VIEW: CPT | Mod: 26

## 2025-05-31 PROCEDURE — 87186 SC STD MICRODIL/AGAR DIL: CPT

## 2025-05-31 PROCEDURE — 74174 CTA ABD&PLVS W/CONTRAST: CPT

## 2025-05-31 PROCEDURE — 99285 EMERGENCY DEPT VISIT HI MDM: CPT

## 2025-05-31 PROCEDURE — 96374 THER/PROPH/DIAG INJ IV PUSH: CPT | Mod: XU

## 2025-05-31 PROCEDURE — 84132 ASSAY OF SERUM POTASSIUM: CPT

## 2025-05-31 PROCEDURE — 87077 CULTURE AEROBIC IDENTIFY: CPT

## 2025-05-31 PROCEDURE — 81001 URINALYSIS AUTO W/SCOPE: CPT

## 2025-05-31 PROCEDURE — 0241U: CPT

## 2025-05-31 PROCEDURE — 87086 URINE CULTURE/COLONY COUNT: CPT

## 2025-05-31 PROCEDURE — 93005 ELECTROCARDIOGRAM TRACING: CPT

## 2025-05-31 PROCEDURE — 80053 COMPREHEN METABOLIC PANEL: CPT

## 2025-05-31 PROCEDURE — 71275 CT ANGIOGRAPHY CHEST: CPT | Mod: 26

## 2025-05-31 RX ORDER — LEVOFLOXACIN 25 MG/ML
1 SOLUTION ORAL
Qty: 4 | Refills: 0
Start: 2025-05-31 | End: 2025-06-03

## 2025-05-31 RX ADMIN — Medication 4 MILLIGRAM(S): at 19:02

## 2025-06-02 ENCOUNTER — EMERGENCY (EMERGENCY)
Facility: HOSPITAL | Age: 70
LOS: 0 days | Discharge: HOME | End: 2025-06-03
Attending: EMERGENCY MEDICINE
Payer: MEDICARE

## 2025-06-02 VITALS
OXYGEN SATURATION: 98 % | SYSTOLIC BLOOD PRESSURE: 138 MMHG | DIASTOLIC BLOOD PRESSURE: 84 MMHG | RESPIRATION RATE: 18 BRPM | TEMPERATURE: 98 F | HEIGHT: 69 IN | HEART RATE: 76 BPM

## 2025-06-02 DIAGNOSIS — R29.898 OTHER SYMPTOMS AND SIGNS INVOLVING THE MUSCULOSKELETAL SYSTEM: ICD-10-CM

## 2025-06-02 DIAGNOSIS — Z86.718 PERSONAL HISTORY OF OTHER VENOUS THROMBOSIS AND EMBOLISM: ICD-10-CM

## 2025-06-02 DIAGNOSIS — N40.0 BENIGN PROSTATIC HYPERPLASIA WITHOUT LOWER URINARY TRACT SYMPTOMS: ICD-10-CM

## 2025-06-02 DIAGNOSIS — Z86.79 PERSONAL HISTORY OF OTHER DISEASES OF THE CIRCULATORY SYSTEM: ICD-10-CM

## 2025-06-02 DIAGNOSIS — I25.10 ATHEROSCLEROTIC HEART DISEASE OF NATIVE CORONARY ARTERY WITHOUT ANGINA PECTORIS: ICD-10-CM

## 2025-06-02 DIAGNOSIS — I69.344 MONOPLEGIA OF LOWER LIMB FOLLOWING CEREBRAL INFARCTION AFFECTING LEFT NON-DOMINANT SIDE: ICD-10-CM

## 2025-06-02 DIAGNOSIS — Z79.01 LONG TERM (CURRENT) USE OF ANTICOAGULANTS: ICD-10-CM

## 2025-06-02 DIAGNOSIS — Z90.2 ACQUIRED ABSENCE OF LUNG [PART OF]: Chronic | ICD-10-CM

## 2025-06-02 DIAGNOSIS — I69.391 DYSPHAGIA FOLLOWING CEREBRAL INFARCTION: ICD-10-CM

## 2025-06-02 DIAGNOSIS — R20.2 PARESTHESIA OF SKIN: ICD-10-CM

## 2025-06-02 DIAGNOSIS — E78.5 HYPERLIPIDEMIA, UNSPECIFIED: ICD-10-CM

## 2025-06-02 DIAGNOSIS — Z95.1 PRESENCE OF AORTOCORONARY BYPASS GRAFT: Chronic | ICD-10-CM

## 2025-06-02 DIAGNOSIS — I10 ESSENTIAL (PRIMARY) HYPERTENSION: ICD-10-CM

## 2025-06-02 DIAGNOSIS — R20.0 ANESTHESIA OF SKIN: ICD-10-CM

## 2025-06-02 DIAGNOSIS — R60.0 LOCALIZED EDEMA: ICD-10-CM

## 2025-06-02 DIAGNOSIS — Z96.649 PRESENCE OF UNSPECIFIED ARTIFICIAL HIP JOINT: Chronic | ICD-10-CM

## 2025-06-02 DIAGNOSIS — Z88.8 ALLERGY STATUS TO OTHER DRUGS, MEDICAMENTS AND BIOLOGICAL SUBSTANCES: ICD-10-CM

## 2025-06-02 DIAGNOSIS — Z95.1 PRESENCE OF AORTOCORONARY BYPASS GRAFT: ICD-10-CM

## 2025-06-02 DIAGNOSIS — E73.9 LACTOSE INTOLERANCE, UNSPECIFIED: ICD-10-CM

## 2025-06-02 DIAGNOSIS — R25.1 TREMOR, UNSPECIFIED: ICD-10-CM

## 2025-06-02 LAB
ALBUMIN SERPL ELPH-MCNC: 4.4 G/DL — SIGNIFICANT CHANGE UP (ref 3.5–5.2)
ALP SERPL-CCNC: 70 U/L — SIGNIFICANT CHANGE UP (ref 30–115)
ALT FLD-CCNC: 14 U/L — SIGNIFICANT CHANGE UP (ref 0–41)
ANION GAP SERPL CALC-SCNC: 15 MMOL/L — HIGH (ref 7–14)
APTT BLD: 30.8 SEC — SIGNIFICANT CHANGE UP (ref 27–39.2)
AST SERPL-CCNC: 20 U/L — SIGNIFICANT CHANGE UP (ref 0–41)
BASOPHILS # BLD AUTO: 0.02 K/UL — SIGNIFICANT CHANGE UP (ref 0–0.2)
BASOPHILS NFR BLD AUTO: 0.4 % — SIGNIFICANT CHANGE UP (ref 0–1)
BILIRUB SERPL-MCNC: 0.4 MG/DL — SIGNIFICANT CHANGE UP (ref 0.2–1.2)
BUN SERPL-MCNC: 40 MG/DL — HIGH (ref 10–20)
CALCIUM SERPL-MCNC: 9.5 MG/DL — SIGNIFICANT CHANGE UP (ref 8.4–10.5)
CHLORIDE SERPL-SCNC: 102 MMOL/L — SIGNIFICANT CHANGE UP (ref 98–110)
CO2 SERPL-SCNC: 23 MMOL/L — SIGNIFICANT CHANGE UP (ref 17–32)
CREAT SERPL-MCNC: 1.8 MG/DL — HIGH (ref 0.7–1.5)
EGFR: 40 ML/MIN/1.73M2 — LOW
EGFR: 40 ML/MIN/1.73M2 — LOW
EOSINOPHIL # BLD AUTO: 0.04 K/UL — SIGNIFICANT CHANGE UP (ref 0–0.7)
EOSINOPHIL NFR BLD AUTO: 0.8 % — SIGNIFICANT CHANGE UP (ref 0–8)
GLUCOSE SERPL-MCNC: 81 MG/DL — SIGNIFICANT CHANGE UP (ref 70–99)
HCT VFR BLD CALC: 40.5 % — LOW (ref 42–52)
HGB BLD-MCNC: 13.3 G/DL — LOW (ref 14–18)
IMM GRANULOCYTES NFR BLD AUTO: 0.8 % — HIGH (ref 0.1–0.3)
INR BLD: 1.19 RATIO — SIGNIFICANT CHANGE UP (ref 0.65–1.3)
LYMPHOCYTES # BLD AUTO: 0.84 K/UL — LOW (ref 1.2–3.4)
LYMPHOCYTES # BLD AUTO: 16.3 % — LOW (ref 20.5–51.1)
MCHC RBC-ENTMCNC: 29.8 PG — SIGNIFICANT CHANGE UP (ref 27–31)
MCHC RBC-ENTMCNC: 32.8 G/DL — SIGNIFICANT CHANGE UP (ref 32–37)
MCV RBC AUTO: 90.6 FL — SIGNIFICANT CHANGE UP (ref 80–94)
MONOCYTES # BLD AUTO: 0.69 K/UL — HIGH (ref 0.1–0.6)
MONOCYTES NFR BLD AUTO: 13.4 % — HIGH (ref 1.7–9.3)
NEUTROPHILS # BLD AUTO: 3.52 K/UL — SIGNIFICANT CHANGE UP (ref 1.4–6.5)
NEUTROPHILS NFR BLD AUTO: 68.3 % — SIGNIFICANT CHANGE UP (ref 42.2–75.2)
NRBC BLD AUTO-RTO: 0 /100 WBCS — SIGNIFICANT CHANGE UP (ref 0–0)
PLATELET # BLD AUTO: 139 K/UL — SIGNIFICANT CHANGE UP (ref 130–400)
PMV BLD: 10.9 FL — HIGH (ref 7.4–10.4)
POTASSIUM SERPL-MCNC: 5 MMOL/L — SIGNIFICANT CHANGE UP (ref 3.5–5)
POTASSIUM SERPL-SCNC: 5 MMOL/L — SIGNIFICANT CHANGE UP (ref 3.5–5)
PROT SERPL-MCNC: 7.9 G/DL — SIGNIFICANT CHANGE UP (ref 6–8)
PROTHROM AB SERPL-ACNC: 14.1 SEC — HIGH (ref 9.95–12.87)
RBC # BLD: 4.47 M/UL — LOW (ref 4.7–6.1)
RBC # FLD: 13.7 % — SIGNIFICANT CHANGE UP (ref 11.5–14.5)
SODIUM SERPL-SCNC: 140 MMOL/L — SIGNIFICANT CHANGE UP (ref 135–146)
TROPONIN T, HIGH SENSITIVITY RESULT: 32 NG/L — HIGH (ref 6–21)
TROPONIN T, HIGH SENSITIVITY RESULT: 34 NG/L — HIGH (ref 6–21)
WBC # BLD: 5.15 K/UL — SIGNIFICANT CHANGE UP (ref 4.8–10.8)
WBC # FLD AUTO: 5.15 K/UL — SIGNIFICANT CHANGE UP (ref 4.8–10.8)

## 2025-06-02 PROCEDURE — 70498 CT ANGIOGRAPHY NECK: CPT

## 2025-06-02 PROCEDURE — 70498 CT ANGIOGRAPHY NECK: CPT | Mod: 26

## 2025-06-02 PROCEDURE — 71045 X-RAY EXAM CHEST 1 VIEW: CPT | Mod: 26

## 2025-06-02 PROCEDURE — 99285 EMERGENCY DEPT VISIT HI MDM: CPT | Mod: 25

## 2025-06-02 PROCEDURE — 84484 ASSAY OF TROPONIN QUANT: CPT

## 2025-06-02 PROCEDURE — 70551 MRI BRAIN STEM W/O DYE: CPT

## 2025-06-02 PROCEDURE — G0378: CPT

## 2025-06-02 PROCEDURE — 93005 ELECTROCARDIOGRAM TRACING: CPT

## 2025-06-02 PROCEDURE — 70450 CT HEAD/BRAIN W/O DYE: CPT

## 2025-06-02 PROCEDURE — 85025 COMPLETE CBC W/AUTO DIFF WBC: CPT

## 2025-06-02 PROCEDURE — 99223 1ST HOSP IP/OBS HIGH 75: CPT

## 2025-06-02 PROCEDURE — 70450 CT HEAD/BRAIN W/O DYE: CPT | Mod: 26,XU

## 2025-06-02 PROCEDURE — 85610 PROTHROMBIN TIME: CPT

## 2025-06-02 PROCEDURE — 36415 COLL VENOUS BLD VENIPUNCTURE: CPT

## 2025-06-02 PROCEDURE — 93970 EXTREMITY STUDY: CPT | Mod: 26

## 2025-06-02 PROCEDURE — 80053 COMPREHEN METABOLIC PANEL: CPT

## 2025-06-02 PROCEDURE — 70496 CT ANGIOGRAPHY HEAD: CPT | Mod: 26

## 2025-06-02 PROCEDURE — 71045 X-RAY EXAM CHEST 1 VIEW: CPT

## 2025-06-02 PROCEDURE — 85730 THROMBOPLASTIN TIME PARTIAL: CPT

## 2025-06-02 PROCEDURE — 93970 EXTREMITY STUDY: CPT

## 2025-06-02 PROCEDURE — 70496 CT ANGIOGRAPHY HEAD: CPT

## 2025-06-02 PROCEDURE — 93010 ELECTROCARDIOGRAM REPORT: CPT

## 2025-06-02 RX ADMIN — Medication 1000 MILLILITER(S): at 23:20

## 2025-06-02 NOTE — CONSULT NOTE ADULT - NS ATTEND AMEND GEN_ALL_CORE FT
Pt seen, examined and discussed with neurology ACP. Imaging reviewed, MRI brain without acute ischemia., favor stroke recrudescence in the setting of recent infection. F/u in neurology clinic.

## 2025-06-02 NOTE — ED ADULT TRIAGE NOTE - CHIEF COMPLAINT QUOTE
Pt has hx of previous stroke with L sided weakness, Pt was dx with pneumonia 2 days ago & put on oral abx. Pt states right after he takes his abx he starts feeling worsening numbness and weakness. Pt stopped medication and endorses the weakness & numbness got better.

## 2025-06-02 NOTE — CONSULT NOTE ADULT - ASSESSMENT
Impression:  70-year-old male with history of HTN, HLD, CAD s/p CABG, RA, lung cancer s/p lobectomy, BPH, neurogenic bladder, AAA, ICH with left-sided residual deficits (2018) traumatic left hemispheric ICH in 2009, reports being on Eliquis for DVT. Patient is ambulatory with walker at baseline presents for evaluation of worsening numbness and left leg weakness x 2 days.  Patient endorses having increased left-sided weakness and numbness after he took Levaquin at home.  Patient was given a dose of Levaquin here prior to discharge last time he presented after being diagnosed with a pneumonia last week. CTH without acute intracranial pathology. Patient with worsening of numbness intermittently and transiently worsening LLE weakness. Etiology of symptoms may be due to recrudesence of prior encephalomalacia due to infection vs new neurovascular event.       Suggestion:  Will discuss with attending.  Impression:  70-year-old male with history of HTN, HLD, CAD s/p CABG, RA, lung cancer s/p lobectomy, BPH, neurogenic bladder, AAA, ICH with left-sided residual deficits (2018) traumatic left hemispheric ICH in 2009, reports being on Eliquis for DVT. Patient is ambulatory with walker at baseline presents for evaluation of worsening numbness and left leg weakness x 2 days.  Patient endorses having increased left-sided weakness and numbness after he took Levaquin at home.  Patient was given a dose of Levaquin here prior to discharge last time he presented after being diagnosed with a pneumonia last week. CTH without acute intracranial pathology. Patient with worsening of numbness intermittently and transiently worsening LLE weakness. Etiology of symptoms may be due to recrudescence of prior encephalomalacia due to infection vs new neurovascular event.       Suggestion:  -MRI brain without joslyn.  -Telemetry monitoring.  -Q4 neuro checks.  -ED observational unit.  Impression:  70-year-old male with history of HTN, HLD, CAD s/p CABG, RA, lung cancer s/p lobectomy, BPH, neurogenic bladder, AAA, ICH with left-sided residual deficits (2018) traumatic left hemispheric ICH in 2009, reports being on Eliquis for DVT. Patient is ambulatory with walker at baseline presents for evaluation of worsening numbness and left leg weakness x 2 days.  Patient endorses having increased left-sided weakness and numbness after he took Levaquin at home.  Patient was given a dose of Levaquin here prior to discharge last time he presented after being diagnosed with a pneumonia last week. CTH without acute intracranial pathology. Patient with worsening of numbness intermittently and transiently worsening LLE weakness. Etiology of symptoms may be due to recrudescence of prior encephalomalacia due to infection vs new neurovascular event.       Suggestion:  -Follow up CTA, if abnormal call [phone]  -MRI brain without joslyn.  -Telemetry monitoring.  -Q4 neuro checks.  -ED observational unit.

## 2025-06-02 NOTE — CONSULT NOTE ADULT - SUBJECTIVE AND OBJECTIVE BOX
Neurology Consult    Patient is a 70y old  Male who presents with a chief complaint of worsening left sided numbness    HPI:  70-year-old male with history of HTN, HLD, CAD s/p CABG, RA, lung cancer s/p lobectomy, BPH, neurogenic bladder, AAA, ICH with left-sided residual deficits (2018) traumatic left hemispheric ICH in 2009, reports being on Eliquis for DVT. Patient is ambulatory with walker at baseline presents for evaluation of worsening numbness and left leg weakness x 2 days.  Patient endorses having increased left-sided weakness and numbness after he took Levaquin at home.  Patient was given a dose of Levaquin here prior to discharge last time he presented after being diagnosed with a pneumonia last week.     PAST MEDICAL & SURGICAL HISTORY:  Stroke      CAD (coronary artery disease)      Pulmonary embolism      COPD, mild      History of BPH      HTN (hypertension)      Degeneration of spine      Kidney stone      RA (rheumatoid arthritis)      High cholesterol      Infrarenal abdominal aortic aneurysm (AAA) without rupture      History of GI bleed      Lung cancer      Diverticulosis      Colon polyp      History of coronary artery bypass, single      History of hip replacement      S/P lobectomy of lung          FAMILY HISTORY:  FH: breast cancer    FH: lupus    FH: heart disease        Social History: (-) x 3    Allergies    enalapril (Unknown)  atorvastatin (Unknown)  baclofen (Unknown)    Intolerances    lactose (Diarrhea)      MEDICATIONS  (STANDING):    MEDICATIONS  (PRN):        Vital Signs Last 24 Hrs  T(C): 36.8 (02 Jun 2025 14:35), Max: 36.8 (02 Jun 2025 14:35)  T(F): 98.2 (02 Jun 2025 14:35), Max: 98.2 (02 Jun 2025 14:35)  HR: 76 (02 Jun 2025 14:35) (76 - 76)  BP: 138/84 (02 Jun 2025 14:35) (138/84 - 138/84)  BP(mean): --  RR: 18 (02 Jun 2025 14:35) (18 - 18)  SpO2: 98% (02 Jun 2025 14:35) (98% - 98%)    Parameters below as of 02 Jun 2025 14:35  Patient On (Oxygen Delivery Method): room air        Examination:  Cognitive/Language:  The patient is oriented to person, place, time and date.  Recent and remote memory intact.  Fund of knowledge is intact and normal.  Language with normal repetition, comprehension and naming.  Nondysarthric.    Eyes: intact VA, VFF. right end beat nystagmus, left eye abduction patient reports is from prior injury, skew or reported double vision.  PERRL.  No ptosis/weakness of eyelid closure.    Face:  Facial sensation normal V1 - 3, no facial asymmetry.    Increased tone and spasticity of LUE LLE.  Formal Muscle Strength Testing: (MRC grade R/L)   RUE RLE 5/5  LUE 4/5 distal hand abduction 4/5 Adduction 4+/5  2/5 LLE.  Reflexes:   2+ b/l pectoralis, biceps, triceps, brachioradialis, patella and Achilles on the right and 3+ ont he LUE LLE.   Sensory examination:   Intact to light touch in all extremities except LUE LLE decreased sensation consistent w baseline.  Cerebellum:   FTN intact    NIHSS 5    Labs:   CBC Full  -  ( 02 Jun 2025 17:45 )  WBC Count : 5.15 K/uL  RBC Count : 4.47 M/uL  Hemoglobin : 13.3 g/dL  Hematocrit : 40.5 %  Platelet Count - Automated : x  Mean Cell Volume : 90.6 fL  Mean Cell Hemoglobin : 29.8 pg  Mean Cell Hemoglobin Concentration : 32.8 g/dL  Auto Neutrophil # : x  Auto Lymphocyte # : x  Auto Monocyte # : x  Auto Eosinophil # : x  Auto Basophil # : x  Auto Neutrophil % : x  Auto Lymphocyte % : x  Auto Monocyte % : x  Auto Eosinophil % : x  Auto Basophil % : x    06-02    140  |  102  |  40[H]  ----------------------------<  81  5.0   |  23  |  1.8[H]    Ca    9.5      02 Jun 2025 17:45    TPro  7.9  /  Alb  4.4  /  TBili  0.4  /  DBili  x   /  AST  20  /  ALT  14  /  AlkPhos  70  06-02    LIVER FUNCTIONS - ( 02 Jun 2025 17:45 )  Alb: 4.4 g/dL / Pro: 7.9 g/dL / ALK PHOS: 70 U/L / ALT: 14 U/L / AST: 20 U/L / GGT: x           PT/INR - ( 02 Jun 2025 17:45 )   PT: 14.10 sec;   INR: 1.19 ratio         PTT - ( 02 Jun 2025 17:45 )  PTT:30.8 sec  Urinalysis Basic - ( 02 Jun 2025 17:45 )    Color: x / Appearance: x / SG: x / pH: x  Gluc: 81 mg/dL / Ketone: x  / Bili: x / Urobili: x   Blood: x / Protein: x / Nitrite: x   Leuk Esterase: x / RBC: x / WBC x   Sq Epi: x / Non Sq Epi: x / Bacteria: x          Neuroimaging:  NCHCT: CT Head No Cont:   ACC: [id number] EXAM:  CT BRAIN   ORDERED BY: ADRI PRAJAPATI     IMPRESSION:    No acute intracranial pathology.    Stable mild chronic microvascular ischemic changes.

## 2025-06-02 NOTE — ED ADULT TRIAGE NOTE - HEIGHT IN CM
Text Note


Date of Service


The patient was seen on 12/18/17.





NOTE


Subjective: No acute changes Denies CP/palpitations.   





Objective: 


Vitals: (see below) 


General: No acute distress, laying comfortably in bed.


HEENT: Moist mucous membranes.


Neck: No JVD or lymphadenopathy


Cardiac: RRR, No murmurs


Pulm: Coarse crackles at the bases b/l. No wheezing. No stridor. No use of 

accessory muscles. 


Abd: NT/ND + BS


Ext: 1+ edema right lower extremity greater than the left. No cyanosis. Distal 

pulses intact.





Labs (see below) 





Images:





Assessment/Plan


1. Sepsis 2/2 PNA, with ? UTI ( Foul smelling urine with + UA) - CXR (see above)

. Started on vanco/Levaquin. CRP/WBC improving. Blood/sputum/urine cx sent. 


2. Diastolic heart failure improved. ? Asthma, with questionable component of 

restrictive lung disease given history of rheumatoid arthritis. Patient is 

being diuresed on torsemide, dose decreased. Fluid restriction, and daily 

weights. Also on prednisone. Continue nebs. Incentive spirometer. CTA chest on 

12/6 negative for PE.


3. Nonocclusive DVT in left lower extremity- Dr. Dumont had spoken to the family 

regarding the risks and benefits of anticoagulation and they have opted for  

anticoagulation. She is currently on Eliquis. 


4. Status post hematuria on 12/9. Resolved. Will need outpatient follow-up with 

urology.


5. Generalized weakness secondary to deconditioning- patient encouraged to 

participate with physical therapy


6. Atrial fibrillation- rate is currently controlled. Also on Eliquis. 


7. Parkinson's dz - cont home meds


8. H/o PE s/o coumadin.


9. H/o GI bleed while on coumadin. Hb stable. No bleeding at this time.


10. HLD on statin


11. Hypothyroidism - on synthroid


12. Polymyalgia/RA - cont prednisone. 


13. H/o depression - cont home meds


14. GERD - on PPT





DVT prophy: On Eliquis





Will likely need subacute rehab.





VS,Fishbone, I+O


VS, Fishbone, I+O


Laboratory Tests


12/18/17 05:37








Red Blood Count 3.10 L, Mean Corpuscular Volume 105.5 H, Mean Corpuscular 

Hemoglobin 34.8 H, Mean Corpuscular Hemoglobin Concent 33.0, Red Cell 

Distribution Width 15.5 H, Calcium Level 7.9 L








Vital Signs








  Date Time  Temp Pulse Resp B/P (MAP) Pulse Ox O2 Delivery O2 Flow Rate FiO2


 


12/18/17 09:00      Nasal Cannula 3.0 


 


12/18/17 08:20  74  113/64    


 


12/18/17 06:00 97.9  18  92   

















ARPAN BARBOSA MD Dec 18, 2017 13:59 [phone]

## 2025-06-02 NOTE — ED PROVIDER NOTE - PHYSICAL EXAMINATION
CONSTITUTIONAL: well-appearing, well nourished, non-toxic, NAD  HEAD: NCAT  EYES: EOMI, PERRLA, no scleral icterus, no nystagmus  NECK: Full ROM  CARD: RRR  RESP: clear to ausculation b/l  ABD: soft, non-tender, No CVA tenderness  EXT: Full ROM, L pedal edema, +L calf tenderness and swelling  NEURO: normal motor. left leg weakness, +L pronator drift, +L sided numbness including face, left arm and left leg. baseline left hand tremors  PSYCH: Cooperative, appropriate.

## 2025-06-02 NOTE — ED PROVIDER NOTE - OBJECTIVE STATEMENT
70-year-old male with history of HTN, HLD, CAD s/p CABG, RA, lung cancer s/p lobectomy, BPH, neurogenic bladder, AAA, ICH with left-sided residual deficits, and who is ambulatory with walker at baseline presents for evaluation of worsening numbness and left leg weakness x 2 days.  Patient endorses having increased left-sided weakness and numbness after he took Levaquin at home.  Patient was given a dose of Levaquin here prior to discharge last time he presented after being diagnosed with a pneumonia.  He denies recent fevers, chills, sweats, chest pain, shortness of breath, nausea, vomiting, diarrhea, constipation, abdominal pain, or urinary symptoms.  Patient does state that after he stopped taking the Levaquin, started feeling slightly better, but still feels worse than baseline.  He says he can walk on his own with a walker well, but has difficulty now.

## 2025-06-02 NOTE — ED PROVIDER NOTE - ATTENDING CONTRIBUTION TO CARE
70-year-old male past medical history of CVA with left-sided weakness, DVT on Eliquis, CAD CABG rheumatoid arthritis lung cancer status post lobectomy AAA BPH hypertension hyperlipidemia, neurogenic bladder, from home, ambulates with walker, presents with intermittent worsening left-sided numbness and weakness since Saturday evening.  Patient states symptoms started after he started oral antibiotics for pneumonia.  Did not take the antibiotics today.  Patient states on Saturday evening his left leg felt "dead" and could not bear weight, had symptoms until Sunday afternoon improved however by Sunday evening symptoms were back.  Also complains of a few days of left lower extremity pain.  No trauma.  No fever.  No cough chest pain shortness of breath.  No blurry vision or slurred speech.  Since yesterday patient is having trouble swallowing.  Lives alone.      on exam, AFVSS, well segundo nad, ncat, eomi, perrla, mmm, lctab, rrr nl s1s2 no mrg, abd soft ntnd, aaox3, CN 2-12 intact, No nystagmus.  5/5 motor x 2 ext, chronic L weakness, SILT x 4 extremities, No facial droop or slurred speech. No pronator drift.  Normal rapid alternating movement and finger nose finger bilaterally. No midline C/T/L tenderness to palpation or step off. Normal gait, No ataxia. , no le edema, L calf mild ttp, 2+ dp pulse    a/p; concern for possible new stroke, will do labs, CT scan, CXR, EKG, neurology consult re-eval

## 2025-06-03 VITALS
RESPIRATION RATE: 18 BRPM | OXYGEN SATURATION: 100 % | SYSTOLIC BLOOD PRESSURE: 135 MMHG | DIASTOLIC BLOOD PRESSURE: 76 MMHG | HEART RATE: 76 BPM | TEMPERATURE: 97 F

## 2025-06-03 PROCEDURE — 99239 HOSP IP/OBS DSCHRG MGMT >30: CPT

## 2025-06-03 PROCEDURE — 99283 EMERGENCY DEPT VISIT LOW MDM: CPT

## 2025-06-03 PROCEDURE — 70551 MRI BRAIN STEM W/O DYE: CPT | Mod: 26

## 2025-06-03 RX ORDER — METOPROLOL SUCCINATE 50 MG/1
25 TABLET, EXTENDED RELEASE ORAL
Refills: 0 | Status: DISCONTINUED | OUTPATIENT
Start: 2025-06-03 | End: 2025-06-03

## 2025-06-03 RX ORDER — METOPROLOL SUCCINATE 50 MG/1
100 TABLET, EXTENDED RELEASE ORAL ONCE
Refills: 0 | Status: DISCONTINUED | OUTPATIENT
Start: 2025-06-03 | End: 2025-06-03

## 2025-06-03 RX ORDER — FINASTERIDE 1 MG/1
5 TABLET, FILM COATED ORAL DAILY
Refills: 0 | Status: DISCONTINUED | OUTPATIENT
Start: 2025-06-03 | End: 2025-06-03

## 2025-06-03 RX ORDER — APIXABAN 2.5 MG/1
5 TABLET, FILM COATED ORAL
Refills: 0 | Status: DISCONTINUED | OUTPATIENT
Start: 2025-06-03 | End: 2025-06-03

## 2025-06-03 RX ORDER — AZITHROMYCIN 250 MG
1 CAPSULE ORAL
Qty: 1 | Refills: 0
Start: 2025-06-03 | End: 2025-06-07

## 2025-06-03 RX ORDER — EZETIMIBE 10 MG/1
10 TABLET ORAL DAILY
Refills: 0 | Status: DISCONTINUED | OUTPATIENT
Start: 2025-06-03 | End: 2025-06-03

## 2025-06-03 RX ORDER — AMLODIPINE BESYLATE 10 MG/1
10 TABLET ORAL DAILY
Refills: 0 | Status: DISCONTINUED | OUTPATIENT
Start: 2025-06-03 | End: 2025-06-03

## 2025-06-03 RX ADMIN — APIXABAN 5 MILLIGRAM(S): 2.5 TABLET, FILM COATED ORAL at 08:56

## 2025-06-03 RX ADMIN — AMLODIPINE BESYLATE 10 MILLIGRAM(S): 10 TABLET ORAL at 09:00

## 2025-06-03 RX ADMIN — METOPROLOL SUCCINATE 25 MILLIGRAM(S): 50 TABLET, EXTENDED RELEASE ORAL at 09:00

## 2025-06-03 RX ADMIN — FINASTERIDE 5 MILLIGRAM(S): 1 TABLET, FILM COATED ORAL at 08:57

## 2025-06-03 RX ADMIN — EZETIMIBE 10 MILLIGRAM(S): 10 TABLET ORAL at 08:57

## 2025-06-03 NOTE — ED CDU PROVIDER DISPOSITION NOTE - PATIENT PORTAL LINK FT
You can access the FollowMyHealth Patient Portal offered by [facility] by registering at the following website: [email or web address]. By joining [facility]’s FollowMyHealth portal, you will also be able to view your health information using other applications (apps) compatible with our system.

## 2025-06-03 NOTE — ED CDU PROVIDER DISPOSITION NOTE - CLINICAL COURSE
pt presenting for intermittent weakness, parasthesias. labs imaging reviewed. sp neuro eval. Aware of all results, given a copy of all available results, comfortable with discharge and follow-up outpatient, strict return precautions given. Endorses understanding of all of this and aware that they can return at any time for new or concerning symptoms. No further questions or concerns at this time

## 2025-06-03 NOTE — ED CDU PROVIDER DISPOSITION NOTE - NSFOLLOWUPCLINICS_GEN_ALL_ED_FT
[facility]  Neurology  [address] [place] [address], [facility] [address]  [place] [address]  Phone: [phone]  Fax:   Follow Up Time: 1-3 Days

## 2025-06-03 NOTE — ED CDU PROVIDER DISPOSITION NOTE - NSFOLLOWUPINSTRUCTIONS_ED_ALL_ED_FT
Paresthesia    Paresthesia is an abnormal burning or prickling sensation. This sensation is generally felt in the hands, arms, legs, or feet. However, it may occur in any part of the body. Usually, it is not painful. The feeling may be described as:    Tingling or numbness.  Pins and needles.  Skin crawling.  Buzzing.  Limbs falling asleep.  Itching.    Most people experience temporary (transient) paresthesia at some time in their lives. Paresthesia may occur when you breathe too quickly (hyperventilation). It can also occur without any apparent cause. Commonly, paresthesia occurs when pressure is placed on a nerve. The sensation quickly goes away after the pressure is removed. For some people, however, paresthesia is a long-lasting (chronic) condition that is caused by an underlying disorder. If you continue to have paresthesia, you may need further medical evaluation.    HOME CARE INSTRUCTIONS  Watch your condition for any changes. Taking the following actions may help to lessen any discomfort that you are feeling:    Avoid drinking alcohol.  Try acupuncture or massage to help relieve your symptoms.  Keep all follow-up visits as directed by your health care provider. This is important.    SEEK MEDICAL CARE IF:  You continue to have episodes of paresthesia.  Your burning or prickling feeling gets worse when you walk.  You have pain, cramps, or dizziness.  You develop a rash.    SEEK IMMEDIATE MEDICAL CARE IF:  You feel weak.  You have trouble walking or moving.  You have problems with speech, understanding, or vision.  You feel confused.  You cannot control your bladder or bowel movements.  You have numbness after an injury.  You faint.    ADDITIONAL NOTES AND INSTRUCTIONS    Please follow up with your Primary MD in 24-48 hr.  Seek immediate medical care for any new/worsening signs or symptoms.      Weakness    Weakness is a lack of strength. It may be felt all over the body (generalized) or in one specific part of the body (focal). Some causes of weakness can be serious. You may need further medical evaluation, especially if you are elderly or you have a history of immunosuppression (such as chemotherapy or HIV), kidney disease, heart disease, or diabetes.     CAUSES  Weakness can be caused by many different things, including:    Infection.  Physical exhaustion.  Internal bleeding or other blood loss that results in a lack of red blood cells (anemia).  Dehydration. This cause is more common in elderly people.  Side effects or electrolyte abnormalities from medicines, such as pain medicines or sedatives.  Emotional distress, anxiety, or depression.  Circulation problems, especially severe peripheral arterial disease.  Heart disease, such as rapid atrial fibrillation, bradycardia, or heart failure.  Nervous system disorders, such as Guillain-Barré syndrome, multiple sclerosis, or stroke.    DIAGNOSIS  To find the cause of your weakness, your caregiver will take your history and perform a physical exam. Lab tests or X-rays may also be ordered, if needed.    TREATMENT  Treatment of weakness depends on the cause of your symptoms and can vary greatly.    HOME CARE INSTRUCTIONS  Rest as needed.  Eat a well-balanced diet.  Try to get some exercise every day.  Only take over-the-counter or prescription medicines as directed by your caregiver.    SEEK MEDICAL CARE IF:  Your weakness seems to be getting worse or spreads to other parts of your body.  You develop new aches or pains.    SEEK IMMEDIATE MEDICAL CARE IF:  You cannot perform your normal daily activities, such as getting dressed and feeding yourself.  You cannot walk up and down stairs, or you feel exhausted when you do so.  You have shortness of breath or chest pain.  You have difficulty moving parts of your body.   You have weakness in only one area of the body or on only one side of the body.  You have a fever.  You have trouble speaking or swallowing.  You cannot control your bladder or bowel movements.  You have black or bloody vomit or stools.    MAKE SURE YOU:  Understand these instructions.  Will watch your condition.  Will get help right away if you are not doing well or get worse.    ADDITIONAL NOTES AND INSTRUCTIONS    Please follow up with your Primary MD in 24-48 hr.  Seek immediate medical care for any new/worsening signs or symptoms.

## 2025-06-03 NOTE — ED CDU PROVIDER INITIAL DAY NOTE - PROGRESS NOTE DETAILS
Patient comfortable, MRI results noted, awaiting neuro recommendations. MRI results noted, per neuro William, patient can be DC home. Patient states he does not want to take Levaquin anymore.  He believes that is causing his symptoms.  Requesting another antibiotic for his pneumonia.  Rx for Zithromax sent to his pharmacy.

## 2025-06-05 LAB
-  AMPICILLIN: SIGNIFICANT CHANGE UP
-  CIPROFLOXACIN: SIGNIFICANT CHANGE UP
-  LEVOFLOXACIN: SIGNIFICANT CHANGE UP
-  NITROFURANTOIN: SIGNIFICANT CHANGE UP
-  TETRACYCLINE: SIGNIFICANT CHANGE UP
-  VANCOMYCIN: SIGNIFICANT CHANGE UP
CULTURE RESULTS: ABNORMAL
METHOD TYPE: SIGNIFICANT CHANGE UP
ORGANISM # SPEC MICROSCOPIC CNT: ABNORMAL
ORGANISM # SPEC MICROSCOPIC CNT: SIGNIFICANT CHANGE UP
SPECIMEN SOURCE: SIGNIFICANT CHANGE UP

## 2025-06-07 RX ORDER — NITROFURANTOIN MACROCRYSTAL 100 MG
1 CAPSULE ORAL
Qty: 14 | Refills: 0
Start: 2025-06-07 | End: 2025-06-13

## 2025-06-10 RX ORDER — METHOTREXATE 2.5 MG/1
2.5 TABLET ORAL
Qty: 13 | Refills: 2 | Status: ACTIVE | COMMUNITY
Start: 2025-06-10 | End: 1900-01-01

## 2025-06-10 RX ORDER — SULFASALAZINE 500 MG/1
500 TABLET ORAL
Qty: 450 | Refills: 1 | Status: ACTIVE | COMMUNITY
Start: 2025-06-10 | End: 1900-01-01

## 2025-07-18 NOTE — H&P ADULT - SOCIAL HISTORY: ALCOHOL USE
[facility]ORIN ANDREWS [facility]  [address], [place] [address]    EGD DISCHARGE INSTRUCTIONS    Brooklynn Mitchell  [id number]  1962    Discomfort:  Sore throat- throat lozenges or warm salt water gargle  redness at IV site- apply warm compress to area; if redness or soreness persist- contact your physician  Gaseous discomfort- walking, belching will help relieve any discomfort  You may not operate a vehicle for 12 hours  You may not engage in an occupation involving machinery or appliances for rest of today  You may not drink alcoholic beverages for at least 12 hours  Avoid making any critical decisions for at least 24 hour  DIET  You may resume your regular diet - however -  remember your colon is empty and a heavy meal will produce gas.   Avoid these foods:  vegetables, fried / greasy foods, carbonated drinks    ACTIVITY  You may resume your normal daily activities   Spend the remainder of the day resting -  avoid any strenuous activity.    CALL M.D.  ANY SIGN OF   Increasing pain, nausea, vomiting  Abdominal distension (swelling)  New increased bleeding (oral or rectal)  Fever (chills)  Pain in chest area  Bloody discharge from nose or mouth  Shortness of breath    Follow-up Instructions:   Call Dr. Miguel Young for any questions or problems.  Telephone # [phone]    ENDOSCOPY FINDINGS:   Your endoscopy showed mild gastritis, for which biopsies were taken. We will contact you about the pathology results and any next steps.    Signed By: Miguel Young MD     7/18/2025  11:42 AM         1-2 drinks a week

## 2025-07-28 ENCOUNTER — INPATIENT (INPATIENT)
Facility: HOSPITAL | Age: 70
LOS: 6 days | Discharge: HOME | DRG: 556 | End: 2025-08-04
Attending: INTERNAL MEDICINE | Admitting: INTERNAL MEDICINE
Payer: MEDICARE

## 2025-07-28 DIAGNOSIS — Z90.2 ACQUIRED ABSENCE OF LUNG [PART OF]: Chronic | ICD-10-CM

## 2025-07-28 DIAGNOSIS — Z95.1 PRESENCE OF AORTOCORONARY BYPASS GRAFT: Chronic | ICD-10-CM

## 2025-07-28 DIAGNOSIS — Z96.649 PRESENCE OF UNSPECIFIED ARTIFICIAL HIP JOINT: Chronic | ICD-10-CM

## 2025-07-28 PROCEDURE — 99285 EMERGENCY DEPT VISIT HI MDM: CPT | Mod: 25

## 2025-07-29 VITALS
RESPIRATION RATE: 19 BRPM | HEART RATE: 85 BPM | HEIGHT: 69 IN | TEMPERATURE: 98 F | SYSTOLIC BLOOD PRESSURE: 152 MMHG | WEIGHT: 149.91 LBS | OXYGEN SATURATION: 98 % | DIASTOLIC BLOOD PRESSURE: 83 MMHG

## 2025-07-29 DIAGNOSIS — R09.89 OTHER SPECIFIED SYMPTOMS AND SIGNS INVOLVING THE CIRCULATORY AND RESPIRATORY SYSTEMS: ICD-10-CM

## 2025-07-29 DIAGNOSIS — R26.2 DIFFICULTY IN WALKING, NOT ELSEWHERE CLASSIFIED: ICD-10-CM

## 2025-07-29 LAB
ALBUMIN SERPL ELPH-MCNC: 3.9 G/DL — SIGNIFICANT CHANGE UP (ref 3.5–5.2)
ALP SERPL-CCNC: 63 U/L — SIGNIFICANT CHANGE UP (ref 30–115)
ALT FLD-CCNC: 13 U/L — SIGNIFICANT CHANGE UP (ref 0–41)
ANION GAP SERPL CALC-SCNC: 12 MMOL/L — SIGNIFICANT CHANGE UP (ref 7–14)
ANION GAP SERPL CALC-SCNC: 12 MMOL/L — SIGNIFICANT CHANGE UP (ref 7–14)
APTT BLD: 23.9 SEC — CRITICAL LOW (ref 27–39.2)
AST SERPL-CCNC: 39 U/L — SIGNIFICANT CHANGE UP (ref 0–41)
BASOPHILS # BLD AUTO: 0.03 K/UL — SIGNIFICANT CHANGE UP (ref 0–0.2)
BASOPHILS NFR BLD AUTO: 0.5 % — SIGNIFICANT CHANGE UP (ref 0–1)
BILIRUB SERPL-MCNC: <0.2 MG/DL — SIGNIFICANT CHANGE UP (ref 0.2–1.2)
BUN SERPL-MCNC: 40 MG/DL — HIGH (ref 10–20)
BUN SERPL-MCNC: 44 MG/DL — HIGH (ref 10–20)
CALCIUM SERPL-MCNC: 8.7 MG/DL — SIGNIFICANT CHANGE UP (ref 8.4–10.5)
CALCIUM SERPL-MCNC: 8.8 MG/DL — SIGNIFICANT CHANGE UP (ref 8.4–10.5)
CHLORIDE SERPL-SCNC: 100 MMOL/L — SIGNIFICANT CHANGE UP (ref 98–110)
CHLORIDE SERPL-SCNC: 100 MMOL/L — SIGNIFICANT CHANGE UP (ref 98–110)
CO2 SERPL-SCNC: 20 MMOL/L — SIGNIFICANT CHANGE UP (ref 17–32)
CO2 SERPL-SCNC: 22 MMOL/L — SIGNIFICANT CHANGE UP (ref 17–32)
CREAT SERPL-MCNC: 1.3 MG/DL — SIGNIFICANT CHANGE UP (ref 0.7–1.5)
CREAT SERPL-MCNC: 1.4 MG/DL — SIGNIFICANT CHANGE UP (ref 0.7–1.5)
EGFR: 54 ML/MIN/1.73M2 — LOW
EGFR: 54 ML/MIN/1.73M2 — LOW
EGFR: 59 ML/MIN/1.73M2 — LOW
EGFR: 59 ML/MIN/1.73M2 — LOW
EOSINOPHIL # BLD AUTO: 0.1 K/UL — SIGNIFICANT CHANGE UP (ref 0–0.7)
EOSINOPHIL NFR BLD AUTO: 1.7 % — SIGNIFICANT CHANGE UP (ref 0–8)
GLUCOSE SERPL-MCNC: 92 MG/DL — SIGNIFICANT CHANGE UP (ref 70–99)
GLUCOSE SERPL-MCNC: 94 MG/DL — SIGNIFICANT CHANGE UP (ref 70–99)
HCT VFR BLD CALC: 35.6 % — LOW (ref 42–52)
HGB BLD-MCNC: 11.9 G/DL — LOW (ref 14–18)
IMM GRANULOCYTES NFR BLD AUTO: 1 % — HIGH (ref 0.1–0.3)
INR BLD: 1.1 RATIO — SIGNIFICANT CHANGE UP (ref 0.65–1.3)
LYMPHOCYTES # BLD AUTO: 0.64 K/UL — LOW (ref 1.2–3.4)
LYMPHOCYTES # BLD AUTO: 11.1 % — LOW (ref 20.5–51.1)
MCHC RBC-ENTMCNC: 30.3 PG — SIGNIFICANT CHANGE UP (ref 27–31)
MCHC RBC-ENTMCNC: 33.4 G/DL — SIGNIFICANT CHANGE UP (ref 32–37)
MCV RBC AUTO: 90.6 FL — SIGNIFICANT CHANGE UP (ref 80–94)
MONOCYTES # BLD AUTO: 0.69 K/UL — HIGH (ref 0.1–0.6)
MONOCYTES NFR BLD AUTO: 11.9 % — HIGH (ref 1.7–9.3)
NEUTROPHILS # BLD AUTO: 4.27 K/UL — SIGNIFICANT CHANGE UP (ref 1.4–6.5)
NEUTROPHILS NFR BLD AUTO: 73.8 % — SIGNIFICANT CHANGE UP (ref 42.2–75.2)
NRBC BLD AUTO-RTO: 0 /100 WBCS — SIGNIFICANT CHANGE UP (ref 0–0)
PLATELET # BLD AUTO: 129 K/UL — LOW (ref 130–400)
PMV BLD: 10.9 FL — HIGH (ref 7.4–10.4)
POTASSIUM SERPL-MCNC: 4.5 MMOL/L — SIGNIFICANT CHANGE UP (ref 3.5–5)
POTASSIUM SERPL-MCNC: 6 MMOL/L — CRITICAL HIGH (ref 3.5–5)
POTASSIUM SERPL-SCNC: 4.5 MMOL/L — SIGNIFICANT CHANGE UP (ref 3.5–5)
POTASSIUM SERPL-SCNC: 6 MMOL/L — CRITICAL HIGH (ref 3.5–5)
PROT SERPL-MCNC: 7.6 G/DL — SIGNIFICANT CHANGE UP (ref 6–8)
PROTHROM AB SERPL-ACNC: 13 SEC — HIGH (ref 9.95–12.87)
RBC # BLD: 3.93 M/UL — LOW (ref 4.7–6.1)
RBC # FLD: 13.5 % — SIGNIFICANT CHANGE UP (ref 11.5–14.5)
SODIUM SERPL-SCNC: 132 MMOL/L — LOW (ref 135–146)
SODIUM SERPL-SCNC: 134 MMOL/L — LOW (ref 135–146)
WBC # BLD: 5.79 K/UL — SIGNIFICANT CHANGE UP (ref 4.8–10.8)
WBC # FLD AUTO: 5.79 K/UL — SIGNIFICANT CHANGE UP (ref 4.8–10.8)

## 2025-07-29 PROCEDURE — 72170 X-RAY EXAM OF PELVIS: CPT | Mod: 26

## 2025-07-29 PROCEDURE — 83036 HEMOGLOBIN GLYCOSYLATED A1C: CPT

## 2025-07-29 PROCEDURE — 94640 AIRWAY INHALATION TREATMENT: CPT

## 2025-07-29 PROCEDURE — 85025 COMPLETE CBC W/AUTO DIFF WBC: CPT

## 2025-07-29 PROCEDURE — 93970 EXTREMITY STUDY: CPT

## 2025-07-29 PROCEDURE — 74177 CT ABD & PELVIS W/CONTRAST: CPT | Mod: 26

## 2025-07-29 PROCEDURE — 99223 1ST HOSP IP/OBS HIGH 75: CPT

## 2025-07-29 PROCEDURE — 86850 RBC ANTIBODY SCREEN: CPT

## 2025-07-29 PROCEDURE — 72148 MRI LUMBAR SPINE W/O DYE: CPT

## 2025-07-29 PROCEDURE — 97162 PT EVAL MOD COMPLEX 30 MIN: CPT | Mod: GP

## 2025-07-29 PROCEDURE — 93005 ELECTROCARDIOGRAM TRACING: CPT

## 2025-07-29 PROCEDURE — 93010 ELECTROCARDIOGRAM REPORT: CPT

## 2025-07-29 PROCEDURE — 73552 X-RAY EXAM OF FEMUR 2/>: CPT | Mod: 26,LT

## 2025-07-29 PROCEDURE — 71045 X-RAY EXAM CHEST 1 VIEW: CPT

## 2025-07-29 PROCEDURE — 86900 BLOOD TYPING SEROLOGIC ABO: CPT

## 2025-07-29 PROCEDURE — 80053 COMPREHEN METABOLIC PANEL: CPT

## 2025-07-29 PROCEDURE — 72100 X-RAY EXAM L-S SPINE 2/3 VWS: CPT

## 2025-07-29 PROCEDURE — 97530 THERAPEUTIC ACTIVITIES: CPT | Mod: GP

## 2025-07-29 PROCEDURE — 86901 BLOOD TYPING SEROLOGIC RH(D): CPT

## 2025-07-29 PROCEDURE — 97110 THERAPEUTIC EXERCISES: CPT | Mod: GP

## 2025-07-29 PROCEDURE — 71045 X-RAY EXAM CHEST 1 VIEW: CPT | Mod: 26

## 2025-07-29 PROCEDURE — 73564 X-RAY EXAM KNEE 4 OR MORE: CPT | Mod: 26,LT

## 2025-07-29 PROCEDURE — 97116 GAIT TRAINING THERAPY: CPT | Mod: GP

## 2025-07-29 PROCEDURE — 97167 OT EVAL HIGH COMPLEX 60 MIN: CPT | Mod: GO

## 2025-07-29 PROCEDURE — 36415 COLL VENOUS BLD VENIPUNCTURE: CPT

## 2025-07-29 PROCEDURE — 85027 COMPLETE CBC AUTOMATED: CPT

## 2025-07-29 PROCEDURE — 83735 ASSAY OF MAGNESIUM: CPT

## 2025-07-29 PROCEDURE — 72100 X-RAY EXAM L-S SPINE 2/3 VWS: CPT | Mod: 26

## 2025-07-29 PROCEDURE — 80048 BASIC METABOLIC PNL TOTAL CA: CPT

## 2025-07-29 RX ORDER — IPRATROPIUM BROMIDE AND ALBUTEROL SULFATE .5; 2.5 MG/3ML; MG/3ML
3 SOLUTION RESPIRATORY (INHALATION) EVERY 6 HOURS
Refills: 0 | Status: DISCONTINUED | OUTPATIENT
Start: 2025-07-29 | End: 2025-08-04

## 2025-07-29 RX ORDER — EZETIMIBE 10 MG/1
10 TABLET ORAL DAILY
Refills: 0 | Status: DISCONTINUED | OUTPATIENT
Start: 2025-07-29 | End: 2025-08-04

## 2025-07-29 RX ORDER — APIXABAN 5 MG/1
5 TABLET, FILM COATED ORAL
Refills: 0 | Status: DISCONTINUED | OUTPATIENT
Start: 2025-07-29 | End: 2025-08-04

## 2025-07-29 RX ORDER — KETOROLAC TROMETHAMINE 30 MG/ML
15 INJECTION, SOLUTION INTRAMUSCULAR; INTRAVENOUS ONCE
Refills: 0 | Status: DISCONTINUED | OUTPATIENT
Start: 2025-07-29 | End: 2025-07-29

## 2025-07-29 RX ORDER — SENNA 187 MG
2 TABLET ORAL AT BEDTIME
Refills: 0 | Status: DISCONTINUED | OUTPATIENT
Start: 2025-07-29 | End: 2025-08-04

## 2025-07-29 RX ORDER — TAMSULOSIN HYDROCHLORIDE 0.4 MG/1
0.4 CAPSULE ORAL AT BEDTIME
Refills: 0 | Status: DISCONTINUED | OUTPATIENT
Start: 2025-07-29 | End: 2025-08-04

## 2025-07-29 RX ORDER — GABAPENTIN 400 MG/1
100 CAPSULE ORAL AT BEDTIME
Refills: 0 | Status: DISCONTINUED | OUTPATIENT
Start: 2025-07-29 | End: 2025-08-01

## 2025-07-29 RX ORDER — ALBUTEROL SULFATE 2.5 MG/3ML
2 VIAL, NEBULIZER (ML) INHALATION EVERY 6 HOURS
Refills: 0 | Status: DISCONTINUED | OUTPATIENT
Start: 2025-07-29 | End: 2025-08-04

## 2025-07-29 RX ORDER — FOLIC ACID 1 MG/1
1 TABLET ORAL DAILY
Refills: 0 | Status: DISCONTINUED | OUTPATIENT
Start: 2025-07-29 | End: 2025-08-04

## 2025-07-29 RX ORDER — CYCLOBENZAPRINE HYDROCHLORIDE 15 MG/1
5 CAPSULE, EXTENDED RELEASE ORAL ONCE
Refills: 0 | Status: COMPLETED | OUTPATIENT
Start: 2025-07-29 | End: 2025-07-29

## 2025-07-29 RX ORDER — CYCLOBENZAPRINE HYDROCHLORIDE 15 MG/1
5 CAPSULE, EXTENDED RELEASE ORAL
Refills: 0 | Status: DISCONTINUED | OUTPATIENT
Start: 2025-07-29 | End: 2025-08-01

## 2025-07-29 RX ORDER — METOPROLOL SUCCINATE 50 MG/1
25 TABLET, EXTENDED RELEASE ORAL
Refills: 0 | Status: DISCONTINUED | OUTPATIENT
Start: 2025-07-29 | End: 2025-08-04

## 2025-07-29 RX ORDER — POLYETHYLENE GLYCOL 3350 17 G/17G
17 POWDER, FOR SOLUTION ORAL DAILY
Refills: 0 | Status: DISCONTINUED | OUTPATIENT
Start: 2025-07-29 | End: 2025-08-04

## 2025-07-29 RX ORDER — GABAPENTIN 400 MG/1
50 CAPSULE ORAL AT BEDTIME
Refills: 0 | Status: DISCONTINUED | OUTPATIENT
Start: 2025-07-29 | End: 2025-07-29

## 2025-07-29 RX ORDER — AMLODIPINE BESYLATE 10 MG/1
10 TABLET ORAL DAILY
Refills: 0 | Status: DISCONTINUED | OUTPATIENT
Start: 2025-07-29 | End: 2025-08-04

## 2025-07-29 RX ORDER — ACETAMINOPHEN 500 MG/5ML
650 LIQUID (ML) ORAL EVERY 6 HOURS
Refills: 0 | Status: DISCONTINUED | OUTPATIENT
Start: 2025-07-29 | End: 2025-08-04

## 2025-07-29 RX ADMIN — Medication 4 MILLIGRAM(S): at 00:55

## 2025-07-29 RX ADMIN — CYCLOBENZAPRINE HYDROCHLORIDE 5 MILLIGRAM(S): 15 CAPSULE, EXTENDED RELEASE ORAL at 13:19

## 2025-07-29 RX ADMIN — GABAPENTIN 100 MILLIGRAM(S): 400 CAPSULE ORAL at 21:05

## 2025-07-29 RX ADMIN — FOLIC ACID 1 MILLIGRAM(S): 1 TABLET ORAL at 13:19

## 2025-07-29 RX ADMIN — Medication 650 MILLIGRAM(S): at 22:51

## 2025-07-29 RX ADMIN — IPRATROPIUM BROMIDE AND ALBUTEROL SULFATE 3 MILLILITER(S): .5; 2.5 SOLUTION RESPIRATORY (INHALATION) at 17:53

## 2025-07-29 RX ADMIN — Medication 2 TABLET(S): at 21:05

## 2025-07-29 RX ADMIN — KETOROLAC TROMETHAMINE 15 MILLIGRAM(S): 30 INJECTION, SOLUTION INTRAMUSCULAR; INTRAVENOUS at 03:07

## 2025-07-29 RX ADMIN — Medication 4 MILLIGRAM(S): at 03:07

## 2025-07-29 RX ADMIN — APIXABAN 5 MILLIGRAM(S): 5 TABLET, FILM COATED ORAL at 17:52

## 2025-07-29 RX ADMIN — TAMSULOSIN HYDROCHLORIDE 0.4 MILLIGRAM(S): 0.4 CAPSULE ORAL at 21:05

## 2025-07-29 RX ADMIN — Medication 1 DOSE(S): at 20:43

## 2025-07-29 RX ADMIN — EZETIMIBE 10 MILLIGRAM(S): 10 TABLET ORAL at 13:19

## 2025-07-29 RX ADMIN — Medication 650 MILLIGRAM(S): at 23:51

## 2025-07-30 LAB
A1C WITH ESTIMATED AVERAGE GLUCOSE RESULT: 5.4 % — SIGNIFICANT CHANGE UP (ref 4–5.6)
ALBUMIN SERPL ELPH-MCNC: 3.6 G/DL — SIGNIFICANT CHANGE UP (ref 3.5–5.2)
ALP SERPL-CCNC: 60 U/L — SIGNIFICANT CHANGE UP (ref 30–115)
ALT FLD-CCNC: 8 U/L — SIGNIFICANT CHANGE UP (ref 0–41)
ANION GAP SERPL CALC-SCNC: 12 MMOL/L — SIGNIFICANT CHANGE UP (ref 7–14)
AST SERPL-CCNC: 15 U/L — SIGNIFICANT CHANGE UP (ref 0–41)
BASOPHILS # BLD AUTO: 0.02 K/UL — SIGNIFICANT CHANGE UP (ref 0–0.2)
BASOPHILS NFR BLD AUTO: 0.6 % — SIGNIFICANT CHANGE UP (ref 0–1)
BILIRUB SERPL-MCNC: 0.3 MG/DL — SIGNIFICANT CHANGE UP (ref 0.2–1.2)
BUN SERPL-MCNC: 35 MG/DL — HIGH (ref 10–20)
CALCIUM SERPL-MCNC: 8.6 MG/DL — SIGNIFICANT CHANGE UP (ref 8.4–10.5)
CHLORIDE SERPL-SCNC: 103 MMOL/L — SIGNIFICANT CHANGE UP (ref 98–110)
CO2 SERPL-SCNC: 21 MMOL/L — SIGNIFICANT CHANGE UP (ref 17–32)
CREAT SERPL-MCNC: 1.3 MG/DL — SIGNIFICANT CHANGE UP (ref 0.7–1.5)
EGFR: 59 ML/MIN/1.73M2 — LOW
EGFR: 59 ML/MIN/1.73M2 — LOW
EOSINOPHIL # BLD AUTO: 0.13 K/UL — SIGNIFICANT CHANGE UP (ref 0–0.7)
EOSINOPHIL NFR BLD AUTO: 3.7 % — SIGNIFICANT CHANGE UP (ref 0–8)
ESTIMATED AVERAGE GLUCOSE: 108 MG/DL — SIGNIFICANT CHANGE UP (ref 68–114)
GLUCOSE SERPL-MCNC: 76 MG/DL — SIGNIFICANT CHANGE UP (ref 70–99)
HCT VFR BLD CALC: 34.9 % — LOW (ref 42–52)
HGB BLD-MCNC: 11.3 G/DL — LOW (ref 14–18)
IMM GRANULOCYTES NFR BLD AUTO: 0.6 % — HIGH (ref 0.1–0.3)
LYMPHOCYTES # BLD AUTO: 0.81 K/UL — LOW (ref 1.2–3.4)
LYMPHOCYTES # BLD AUTO: 22.9 % — SIGNIFICANT CHANGE UP (ref 20.5–51.1)
MAGNESIUM SERPL-MCNC: 2.2 MG/DL — SIGNIFICANT CHANGE UP (ref 1.8–2.4)
MCHC RBC-ENTMCNC: 30.3 PG — SIGNIFICANT CHANGE UP (ref 27–31)
MCHC RBC-ENTMCNC: 32.4 G/DL — SIGNIFICANT CHANGE UP (ref 32–37)
MCV RBC AUTO: 93.6 FL — SIGNIFICANT CHANGE UP (ref 80–94)
MONOCYTES # BLD AUTO: 0.56 K/UL — SIGNIFICANT CHANGE UP (ref 0.1–0.6)
MONOCYTES NFR BLD AUTO: 15.9 % — HIGH (ref 1.7–9.3)
NEUTROPHILS # BLD AUTO: 1.99 K/UL — SIGNIFICANT CHANGE UP (ref 1.4–6.5)
NEUTROPHILS NFR BLD AUTO: 56.3 % — SIGNIFICANT CHANGE UP (ref 42.2–75.2)
NRBC BLD AUTO-RTO: 0 /100 WBCS — SIGNIFICANT CHANGE UP (ref 0–0)
PLATELET # BLD AUTO: 118 K/UL — LOW (ref 130–400)
PMV BLD: 11.1 FL — HIGH (ref 7.4–10.4)
POTASSIUM SERPL-MCNC: 4.1 MMOL/L — SIGNIFICANT CHANGE UP (ref 3.5–5)
POTASSIUM SERPL-SCNC: 4.1 MMOL/L — SIGNIFICANT CHANGE UP (ref 3.5–5)
PROT SERPL-MCNC: 6.7 G/DL — SIGNIFICANT CHANGE UP (ref 6–8)
RBC # BLD: 3.73 M/UL — LOW (ref 4.7–6.1)
RBC # FLD: 14 % — SIGNIFICANT CHANGE UP (ref 11.5–14.5)
SODIUM SERPL-SCNC: 136 MMOL/L — SIGNIFICANT CHANGE UP (ref 135–146)
WBC # BLD: 3.53 K/UL — LOW (ref 4.8–10.8)
WBC # FLD AUTO: 3.53 K/UL — LOW (ref 4.8–10.8)

## 2025-07-30 PROCEDURE — 93970 EXTREMITY STUDY: CPT | Mod: 26

## 2025-07-30 PROCEDURE — 99222 1ST HOSP IP/OBS MODERATE 55: CPT

## 2025-07-30 PROCEDURE — 99232 SBSQ HOSP IP/OBS MODERATE 35: CPT

## 2025-07-30 RX ADMIN — APIXABAN 5 MILLIGRAM(S): 5 TABLET, FILM COATED ORAL at 05:21

## 2025-07-30 RX ADMIN — Medication 1 DOSE(S): at 21:50

## 2025-07-30 RX ADMIN — APIXABAN 5 MILLIGRAM(S): 5 TABLET, FILM COATED ORAL at 18:07

## 2025-07-30 RX ADMIN — Medication 1 DOSE(S): at 08:00

## 2025-07-30 RX ADMIN — FOLIC ACID 1 MILLIGRAM(S): 1 TABLET ORAL at 12:40

## 2025-07-30 RX ADMIN — Medication 1 APPLICATION(S): at 05:21

## 2025-07-30 RX ADMIN — GABAPENTIN 100 MILLIGRAM(S): 400 CAPSULE ORAL at 21:51

## 2025-07-30 RX ADMIN — METOPROLOL SUCCINATE 25 MILLIGRAM(S): 50 TABLET, EXTENDED RELEASE ORAL at 05:21

## 2025-07-30 RX ADMIN — Medication 2 TABLET(S): at 21:52

## 2025-07-30 RX ADMIN — Medication 650 MILLIGRAM(S): at 21:51

## 2025-07-30 RX ADMIN — POLYETHYLENE GLYCOL 3350 17 GRAM(S): 17 POWDER, FOR SOLUTION ORAL at 12:44

## 2025-07-30 RX ADMIN — Medication 650 MILLIGRAM(S): at 22:52

## 2025-07-30 RX ADMIN — METOPROLOL SUCCINATE 25 MILLIGRAM(S): 50 TABLET, EXTENDED RELEASE ORAL at 18:07

## 2025-07-30 RX ADMIN — AMLODIPINE BESYLATE 10 MILLIGRAM(S): 10 TABLET ORAL at 05:20

## 2025-07-30 RX ADMIN — TAMSULOSIN HYDROCHLORIDE 0.4 MILLIGRAM(S): 0.4 CAPSULE ORAL at 21:51

## 2025-07-30 RX ADMIN — EZETIMIBE 10 MILLIGRAM(S): 10 TABLET ORAL at 12:40

## 2025-07-31 LAB
ANION GAP SERPL CALC-SCNC: 11 MMOL/L — SIGNIFICANT CHANGE UP (ref 7–14)
BUN SERPL-MCNC: 37 MG/DL — HIGH (ref 10–20)
CALCIUM SERPL-MCNC: 8.8 MG/DL — SIGNIFICANT CHANGE UP (ref 8.4–10.5)
CHLORIDE SERPL-SCNC: 105 MMOL/L — SIGNIFICANT CHANGE UP (ref 98–110)
CO2 SERPL-SCNC: 22 MMOL/L — SIGNIFICANT CHANGE UP (ref 17–32)
CREAT SERPL-MCNC: 1.5 MG/DL — SIGNIFICANT CHANGE UP (ref 0.7–1.5)
EGFR: 50 ML/MIN/1.73M2 — LOW
EGFR: 50 ML/MIN/1.73M2 — LOW
GLUCOSE SERPL-MCNC: 99 MG/DL — SIGNIFICANT CHANGE UP (ref 70–99)
HCT VFR BLD CALC: 36.4 % — LOW (ref 42–52)
HGB BLD-MCNC: 11.7 G/DL — LOW (ref 14–18)
MAGNESIUM SERPL-MCNC: 2 MG/DL — SIGNIFICANT CHANGE UP (ref 1.8–2.4)
MCHC RBC-ENTMCNC: 29.7 PG — SIGNIFICANT CHANGE UP (ref 27–31)
MCHC RBC-ENTMCNC: 32.1 G/DL — SIGNIFICANT CHANGE UP (ref 32–37)
MCV RBC AUTO: 92.4 FL — SIGNIFICANT CHANGE UP (ref 80–94)
NRBC BLD AUTO-RTO: 0 /100 WBCS — SIGNIFICANT CHANGE UP (ref 0–0)
PLATELET # BLD AUTO: 125 K/UL — LOW (ref 130–400)
PMV BLD: 10.8 FL — HIGH (ref 7.4–10.4)
POTASSIUM SERPL-MCNC: 4.4 MMOL/L — SIGNIFICANT CHANGE UP (ref 3.5–5)
POTASSIUM SERPL-SCNC: 4.4 MMOL/L — SIGNIFICANT CHANGE UP (ref 3.5–5)
RBC # BLD: 3.94 M/UL — LOW (ref 4.7–6.1)
RBC # FLD: 13.3 % — SIGNIFICANT CHANGE UP (ref 11.5–14.5)
SODIUM SERPL-SCNC: 138 MMOL/L — SIGNIFICANT CHANGE UP (ref 135–146)
WBC # BLD: 4.7 K/UL — LOW (ref 4.8–10.8)
WBC # FLD AUTO: 4.7 K/UL — LOW (ref 4.8–10.8)

## 2025-07-31 PROCEDURE — 72148 MRI LUMBAR SPINE W/O DYE: CPT | Mod: 26

## 2025-07-31 PROCEDURE — 99232 SBSQ HOSP IP/OBS MODERATE 35: CPT

## 2025-07-31 RX ORDER — FLUTICASONE FUROATE, UMECLIDINIUM BROMIDE AND VILANTEROL TRIFENATATE 100; 62.5; 25 UG/1; UG/1; UG/1
1 POWDER RESPIRATORY (INHALATION)
Refills: 0 | DISCHARGE

## 2025-07-31 RX ORDER — SULFASALAZINE 500 MG/1
500 TABLET ORAL AT BEDTIME
Refills: 0 | Status: DISCONTINUED | OUTPATIENT
Start: 2025-07-31 | End: 2025-08-01

## 2025-07-31 RX ORDER — LOSARTAN POTASSIUM 100 MG/1
100 TABLET, FILM COATED ORAL DAILY
Refills: 0 | Status: DISCONTINUED | OUTPATIENT
Start: 2025-07-31 | End: 2025-08-04

## 2025-07-31 RX ORDER — FINASTERIDE 1 MG/1
5 TABLET, FILM COATED ORAL DAILY
Refills: 0 | Status: DISCONTINUED | OUTPATIENT
Start: 2025-07-31 | End: 2025-08-04

## 2025-07-31 RX ORDER — SULFASALAZINE 500 MG/1
1000 TABLET ORAL DAILY
Refills: 0 | Status: DISCONTINUED | OUTPATIENT
Start: 2025-07-31 | End: 2025-08-04

## 2025-07-31 RX ORDER — LOSARTAN POTASSIUM 100 MG/1
1 TABLET, FILM COATED ORAL
Refills: 0 | DISCHARGE

## 2025-07-31 RX ADMIN — APIXABAN 5 MILLIGRAM(S): 5 TABLET, FILM COATED ORAL at 05:26

## 2025-07-31 RX ADMIN — Medication 650 MILLIGRAM(S): at 20:25

## 2025-07-31 RX ADMIN — CYCLOBENZAPRINE HYDROCHLORIDE 5 MILLIGRAM(S): 15 CAPSULE, EXTENDED RELEASE ORAL at 11:42

## 2025-07-31 RX ADMIN — Medication 1 APPLICATION(S): at 05:27

## 2025-07-31 RX ADMIN — Medication 1 DOSE(S): at 08:28

## 2025-07-31 RX ADMIN — APIXABAN 5 MILLIGRAM(S): 5 TABLET, FILM COATED ORAL at 17:24

## 2025-07-31 RX ADMIN — SULFASALAZINE 500 MILLIGRAM(S): 500 TABLET ORAL at 22:08

## 2025-07-31 RX ADMIN — FINASTERIDE 5 MILLIGRAM(S): 1 TABLET, FILM COATED ORAL at 11:42

## 2025-07-31 RX ADMIN — Medication 650 MILLIGRAM(S): at 11:42

## 2025-07-31 RX ADMIN — EZETIMIBE 10 MILLIGRAM(S): 10 TABLET ORAL at 11:42

## 2025-07-31 RX ADMIN — METOPROLOL SUCCINATE 25 MILLIGRAM(S): 50 TABLET, EXTENDED RELEASE ORAL at 17:25

## 2025-07-31 RX ADMIN — Medication 650 MILLIGRAM(S): at 21:21

## 2025-07-31 RX ADMIN — TAMSULOSIN HYDROCHLORIDE 0.4 MILLIGRAM(S): 0.4 CAPSULE ORAL at 22:07

## 2025-07-31 RX ADMIN — METOPROLOL SUCCINATE 25 MILLIGRAM(S): 50 TABLET, EXTENDED RELEASE ORAL at 05:27

## 2025-07-31 RX ADMIN — SULFASALAZINE 1000 MILLIGRAM(S): 500 TABLET ORAL at 11:39

## 2025-07-31 RX ADMIN — GABAPENTIN 100 MILLIGRAM(S): 400 CAPSULE ORAL at 22:17

## 2025-07-31 RX ADMIN — AMLODIPINE BESYLATE 10 MILLIGRAM(S): 10 TABLET ORAL at 05:27

## 2025-07-31 RX ADMIN — LOSARTAN POTASSIUM 100 MILLIGRAM(S): 100 TABLET, FILM COATED ORAL at 17:24

## 2025-07-31 RX ADMIN — FOLIC ACID 1 MILLIGRAM(S): 1 TABLET ORAL at 11:42

## 2025-07-31 RX ADMIN — CYCLOBENZAPRINE HYDROCHLORIDE 5 MILLIGRAM(S): 15 CAPSULE, EXTENDED RELEASE ORAL at 20:25

## 2025-08-01 ENCOUNTER — TRANSCRIPTION ENCOUNTER (OUTPATIENT)
Age: 70
End: 2025-08-01

## 2025-08-01 LAB
ALBUMIN SERPL ELPH-MCNC: 3.6 G/DL — SIGNIFICANT CHANGE UP (ref 3.5–5.2)
ALP SERPL-CCNC: 62 U/L — SIGNIFICANT CHANGE UP (ref 30–115)
ALT FLD-CCNC: 18 U/L — SIGNIFICANT CHANGE UP (ref 0–41)
ANION GAP SERPL CALC-SCNC: 12 MMOL/L — SIGNIFICANT CHANGE UP (ref 7–14)
AST SERPL-CCNC: 25 U/L — SIGNIFICANT CHANGE UP (ref 0–41)
BASOPHILS # BLD AUTO: 0.02 K/UL — SIGNIFICANT CHANGE UP (ref 0–0.2)
BASOPHILS NFR BLD AUTO: 0.5 % — SIGNIFICANT CHANGE UP (ref 0–1)
BILIRUB SERPL-MCNC: 0.4 MG/DL — SIGNIFICANT CHANGE UP (ref 0.2–1.2)
BUN SERPL-MCNC: 34 MG/DL — HIGH (ref 10–20)
CALCIUM SERPL-MCNC: 8.8 MG/DL — SIGNIFICANT CHANGE UP (ref 8.4–10.5)
CHLORIDE SERPL-SCNC: 104 MMOL/L — SIGNIFICANT CHANGE UP (ref 98–110)
CO2 SERPL-SCNC: 22 MMOL/L — SIGNIFICANT CHANGE UP (ref 17–32)
CREAT SERPL-MCNC: 1.2 MG/DL — SIGNIFICANT CHANGE UP (ref 0.7–1.5)
EGFR: 65 ML/MIN/1.73M2 — SIGNIFICANT CHANGE UP
EGFR: 65 ML/MIN/1.73M2 — SIGNIFICANT CHANGE UP
EOSINOPHIL # BLD AUTO: 0.22 K/UL — SIGNIFICANT CHANGE UP (ref 0–0.7)
EOSINOPHIL NFR BLD AUTO: 5.1 % — SIGNIFICANT CHANGE UP (ref 0–8)
GLUCOSE SERPL-MCNC: 71 MG/DL — SIGNIFICANT CHANGE UP (ref 70–99)
HCT VFR BLD CALC: 35.1 % — LOW (ref 42–52)
HGB BLD-MCNC: 11.4 G/DL — LOW (ref 14–18)
IMM GRANULOCYTES NFR BLD AUTO: 0.9 % — HIGH (ref 0.1–0.3)
LYMPHOCYTES # BLD AUTO: 0.53 K/UL — LOW (ref 1.2–3.4)
LYMPHOCYTES # BLD AUTO: 12.3 % — LOW (ref 20.5–51.1)
MAGNESIUM SERPL-MCNC: 2 MG/DL — SIGNIFICANT CHANGE UP (ref 1.8–2.4)
MCHC RBC-ENTMCNC: 29.9 PG — SIGNIFICANT CHANGE UP (ref 27–31)
MCHC RBC-ENTMCNC: 32.5 G/DL — SIGNIFICANT CHANGE UP (ref 32–37)
MCV RBC AUTO: 92.1 FL — SIGNIFICANT CHANGE UP (ref 80–94)
MONOCYTES # BLD AUTO: 0.63 K/UL — HIGH (ref 0.1–0.6)
MONOCYTES NFR BLD AUTO: 14.6 % — HIGH (ref 1.7–9.3)
NEUTROPHILS # BLD AUTO: 2.87 K/UL — SIGNIFICANT CHANGE UP (ref 1.4–6.5)
NEUTROPHILS NFR BLD AUTO: 66.6 % — SIGNIFICANT CHANGE UP (ref 42.2–75.2)
NRBC BLD AUTO-RTO: 0 /100 WBCS — SIGNIFICANT CHANGE UP (ref 0–0)
PLATELET # BLD AUTO: 121 K/UL — LOW (ref 130–400)
PMV BLD: 11.9 FL — HIGH (ref 7.4–10.4)
POTASSIUM SERPL-MCNC: 4.2 MMOL/L — SIGNIFICANT CHANGE UP (ref 3.5–5)
POTASSIUM SERPL-SCNC: 4.2 MMOL/L — SIGNIFICANT CHANGE UP (ref 3.5–5)
PROT SERPL-MCNC: 6.9 G/DL — SIGNIFICANT CHANGE UP (ref 6–8)
RBC # BLD: 3.81 M/UL — LOW (ref 4.7–6.1)
RBC # FLD: 13.2 % — SIGNIFICANT CHANGE UP (ref 11.5–14.5)
SODIUM SERPL-SCNC: 138 MMOL/L — SIGNIFICANT CHANGE UP (ref 135–146)
WBC # BLD: 4.31 K/UL — LOW (ref 4.8–10.8)
WBC # FLD AUTO: 4.31 K/UL — LOW (ref 4.8–10.8)

## 2025-08-01 PROCEDURE — 99232 SBSQ HOSP IP/OBS MODERATE 35: CPT

## 2025-08-01 RX ORDER — SULFASALAZINE 500 MG/1
2 TABLET ORAL
Qty: 0 | Refills: 0 | DISCHARGE

## 2025-08-01 RX ORDER — GABAPENTIN 400 MG/1
100 CAPSULE ORAL ONCE
Refills: 0 | Status: COMPLETED | OUTPATIENT
Start: 2025-08-01 | End: 2025-08-01

## 2025-08-01 RX ORDER — FINASTERIDE 1 MG/1
1 TABLET, FILM COATED ORAL
Qty: 30 | Refills: 0
Start: 2025-08-01 | End: 2025-08-30

## 2025-08-01 RX ORDER — SULFASALAZINE 500 MG/1
1 TABLET ORAL
Refills: 0 | DISCHARGE

## 2025-08-01 RX ORDER — SULFASALAZINE 500 MG/1
1.5 TABLET ORAL
Qty: 45 | Refills: 0
Start: 2025-08-01 | End: 2025-08-30

## 2025-08-01 RX ORDER — GABAPENTIN 400 MG/1
100 CAPSULE ORAL EVERY 8 HOURS
Refills: 0 | Status: DISCONTINUED | OUTPATIENT
Start: 2025-08-01 | End: 2025-08-04

## 2025-08-01 RX ORDER — ACETAMINOPHEN 500 MG/5ML
2 LIQUID (ML) ORAL
Qty: 80 | Refills: 0
Start: 2025-08-01 | End: 2025-08-10

## 2025-08-01 RX ORDER — METHOTREXATE 25 MG/ML
7.5 INJECTION, SOLUTION INTRA-ARTERIAL; INTRAMUSCULAR; INTRATHECAL; INTRAVENOUS
Refills: 0 | Status: DISCONTINUED | OUTPATIENT
Start: 2025-08-01 | End: 2025-08-04

## 2025-08-01 RX ORDER — METHOCARBAMOL 500 MG/1
500 TABLET, FILM COATED ORAL THREE TIMES A DAY
Refills: 0 | Status: DISCONTINUED | OUTPATIENT
Start: 2025-08-01 | End: 2025-08-04

## 2025-08-01 RX ORDER — METHOCARBAMOL 500 MG/1
500 TABLET, FILM COATED ORAL THREE TIMES A DAY
Refills: 0 | Status: DISCONTINUED | OUTPATIENT
Start: 2025-08-01 | End: 2025-08-01

## 2025-08-01 RX ORDER — SULFASALAZINE 500 MG/1
1500 TABLET ORAL AT BEDTIME
Refills: 0 | Status: DISCONTINUED | OUTPATIENT
Start: 2025-08-01 | End: 2025-08-04

## 2025-08-01 RX ORDER — SENNA 187 MG
2 TABLET ORAL
Qty: 60 | Refills: 0
Start: 2025-08-01 | End: 2025-08-30

## 2025-08-01 RX ORDER — METHOCARBAMOL 500 MG/1
1 TABLET, FILM COATED ORAL
Qty: 42 | Refills: 0
Start: 2025-08-01 | End: 2025-08-14

## 2025-08-01 RX ORDER — GABAPENTIN 400 MG/1
1 CAPSULE ORAL
Qty: 14 | Refills: 0
Start: 2025-08-01 | End: 2025-08-14

## 2025-08-01 RX ORDER — CYCLOBENZAPRINE HYDROCHLORIDE 15 MG/1
1 CAPSULE, EXTENDED RELEASE ORAL
Qty: 14 | Refills: 0
Start: 2025-08-01 | End: 2025-08-14

## 2025-08-01 RX ORDER — POLYETHYLENE GLYCOL 3350 17 G/17G
17 POWDER, FOR SOLUTION ORAL
Qty: 1 | Refills: 0
Start: 2025-08-01 | End: 2025-08-30

## 2025-08-01 RX ADMIN — METHOCARBAMOL 500 MILLIGRAM(S): 500 TABLET, FILM COATED ORAL at 13:26

## 2025-08-01 RX ADMIN — APIXABAN 5 MILLIGRAM(S): 5 TABLET, FILM COATED ORAL at 17:54

## 2025-08-01 RX ADMIN — METOPROLOL SUCCINATE 25 MILLIGRAM(S): 50 TABLET, EXTENDED RELEASE ORAL at 05:06

## 2025-08-01 RX ADMIN — APIXABAN 5 MILLIGRAM(S): 5 TABLET, FILM COATED ORAL at 05:06

## 2025-08-01 RX ADMIN — FINASTERIDE 5 MILLIGRAM(S): 1 TABLET, FILM COATED ORAL at 12:45

## 2025-08-01 RX ADMIN — LOSARTAN POTASSIUM 100 MILLIGRAM(S): 100 TABLET, FILM COATED ORAL at 05:06

## 2025-08-01 RX ADMIN — AMLODIPINE BESYLATE 10 MILLIGRAM(S): 10 TABLET ORAL at 05:07

## 2025-08-01 RX ADMIN — FOLIC ACID 1 MILLIGRAM(S): 1 TABLET ORAL at 12:45

## 2025-08-01 RX ADMIN — GABAPENTIN 100 MILLIGRAM(S): 400 CAPSULE ORAL at 21:27

## 2025-08-01 RX ADMIN — METHOCARBAMOL 500 MILLIGRAM(S): 500 TABLET, FILM COATED ORAL at 21:27

## 2025-08-01 RX ADMIN — Medication 1 APPLICATION(S): at 05:09

## 2025-08-01 RX ADMIN — METOPROLOL SUCCINATE 25 MILLIGRAM(S): 50 TABLET, EXTENDED RELEASE ORAL at 17:54

## 2025-08-01 RX ADMIN — POLYETHYLENE GLYCOL 3350 17 GRAM(S): 17 POWDER, FOR SOLUTION ORAL at 12:45

## 2025-08-01 RX ADMIN — EZETIMIBE 10 MILLIGRAM(S): 10 TABLET ORAL at 12:45

## 2025-08-01 RX ADMIN — METHOTREXATE 7.5 MILLIGRAM(S): 25 INJECTION, SOLUTION INTRA-ARTERIAL; INTRAMUSCULAR; INTRATHECAL; INTRAVENOUS at 12:42

## 2025-08-01 RX ADMIN — SULFASALAZINE 1500 MILLIGRAM(S): 500 TABLET ORAL at 21:28

## 2025-08-01 RX ADMIN — GABAPENTIN 100 MILLIGRAM(S): 400 CAPSULE ORAL at 05:08

## 2025-08-01 RX ADMIN — GABAPENTIN 100 MILLIGRAM(S): 400 CAPSULE ORAL at 13:27

## 2025-08-01 RX ADMIN — Medication 2 TABLET(S): at 21:28

## 2025-08-01 RX ADMIN — TAMSULOSIN HYDROCHLORIDE 0.4 MILLIGRAM(S): 0.4 CAPSULE ORAL at 21:29

## 2025-08-01 RX ADMIN — SULFASALAZINE 1000 MILLIGRAM(S): 500 TABLET ORAL at 12:44

## 2025-08-01 RX ADMIN — Medication 1 DOSE(S): at 08:08

## 2025-08-02 PROCEDURE — 99232 SBSQ HOSP IP/OBS MODERATE 35: CPT

## 2025-08-02 RX ORDER — MELATONIN 5 MG
5 TABLET ORAL ONCE
Refills: 0 | Status: DISCONTINUED | OUTPATIENT
Start: 2025-08-02 | End: 2025-08-03

## 2025-08-02 RX ADMIN — FINASTERIDE 5 MILLIGRAM(S): 1 TABLET, FILM COATED ORAL at 13:05

## 2025-08-02 RX ADMIN — METHOCARBAMOL 500 MILLIGRAM(S): 500 TABLET, FILM COATED ORAL at 21:24

## 2025-08-02 RX ADMIN — FOLIC ACID 1 MILLIGRAM(S): 1 TABLET ORAL at 13:05

## 2025-08-02 RX ADMIN — GABAPENTIN 100 MILLIGRAM(S): 400 CAPSULE ORAL at 21:25

## 2025-08-02 RX ADMIN — EZETIMIBE 10 MILLIGRAM(S): 10 TABLET ORAL at 13:05

## 2025-08-02 RX ADMIN — Medication 2 TABLET(S): at 21:24

## 2025-08-02 RX ADMIN — Medication 1 DOSE(S): at 20:50

## 2025-08-02 RX ADMIN — APIXABAN 5 MILLIGRAM(S): 5 TABLET, FILM COATED ORAL at 17:37

## 2025-08-02 RX ADMIN — SULFASALAZINE 1500 MILLIGRAM(S): 500 TABLET ORAL at 21:27

## 2025-08-02 RX ADMIN — METOPROLOL SUCCINATE 25 MILLIGRAM(S): 50 TABLET, EXTENDED RELEASE ORAL at 17:37

## 2025-08-02 RX ADMIN — METHOCARBAMOL 500 MILLIGRAM(S): 500 TABLET, FILM COATED ORAL at 13:05

## 2025-08-02 RX ADMIN — GABAPENTIN 100 MILLIGRAM(S): 400 CAPSULE ORAL at 13:05

## 2025-08-02 RX ADMIN — SULFASALAZINE 1000 MILLIGRAM(S): 500 TABLET ORAL at 13:06

## 2025-08-02 RX ADMIN — TAMSULOSIN HYDROCHLORIDE 0.4 MILLIGRAM(S): 0.4 CAPSULE ORAL at 21:25

## 2025-08-03 PROCEDURE — 99232 SBSQ HOSP IP/OBS MODERATE 35: CPT

## 2025-08-03 RX ORDER — MELATONIN 5 MG
5 TABLET ORAL AT BEDTIME
Refills: 0 | Status: DISCONTINUED | OUTPATIENT
Start: 2025-08-03 | End: 2025-08-04

## 2025-08-03 RX ADMIN — Medication 1 APPLICATION(S): at 06:01

## 2025-08-03 RX ADMIN — LOSARTAN POTASSIUM 100 MILLIGRAM(S): 100 TABLET, FILM COATED ORAL at 06:00

## 2025-08-03 RX ADMIN — GABAPENTIN 100 MILLIGRAM(S): 400 CAPSULE ORAL at 06:00

## 2025-08-03 RX ADMIN — SULFASALAZINE 1500 MILLIGRAM(S): 500 TABLET ORAL at 21:03

## 2025-08-03 RX ADMIN — GABAPENTIN 100 MILLIGRAM(S): 400 CAPSULE ORAL at 21:06

## 2025-08-03 RX ADMIN — METHOCARBAMOL 500 MILLIGRAM(S): 500 TABLET, FILM COATED ORAL at 21:03

## 2025-08-03 RX ADMIN — METHOCARBAMOL 500 MILLIGRAM(S): 500 TABLET, FILM COATED ORAL at 06:00

## 2025-08-03 RX ADMIN — Medication 1 DOSE(S): at 20:59

## 2025-08-03 RX ADMIN — FOLIC ACID 1 MILLIGRAM(S): 1 TABLET ORAL at 11:28

## 2025-08-03 RX ADMIN — METOPROLOL SUCCINATE 25 MILLIGRAM(S): 50 TABLET, EXTENDED RELEASE ORAL at 05:59

## 2025-08-03 RX ADMIN — METHOCARBAMOL 500 MILLIGRAM(S): 500 TABLET, FILM COATED ORAL at 13:29

## 2025-08-03 RX ADMIN — Medication 1 DOSE(S): at 08:09

## 2025-08-03 RX ADMIN — Medication 650 MILLIGRAM(S): at 06:00

## 2025-08-03 RX ADMIN — EZETIMIBE 10 MILLIGRAM(S): 10 TABLET ORAL at 11:28

## 2025-08-03 RX ADMIN — AMLODIPINE BESYLATE 10 MILLIGRAM(S): 10 TABLET ORAL at 06:01

## 2025-08-03 RX ADMIN — TAMSULOSIN HYDROCHLORIDE 0.4 MILLIGRAM(S): 0.4 CAPSULE ORAL at 21:03

## 2025-08-03 RX ADMIN — SULFASALAZINE 1000 MILLIGRAM(S): 500 TABLET ORAL at 11:28

## 2025-08-03 RX ADMIN — GABAPENTIN 100 MILLIGRAM(S): 400 CAPSULE ORAL at 13:28

## 2025-08-03 RX ADMIN — APIXABAN 5 MILLIGRAM(S): 5 TABLET, FILM COATED ORAL at 17:06

## 2025-08-03 RX ADMIN — APIXABAN 5 MILLIGRAM(S): 5 TABLET, FILM COATED ORAL at 06:00

## 2025-08-03 RX ADMIN — FINASTERIDE 5 MILLIGRAM(S): 1 TABLET, FILM COATED ORAL at 11:28

## 2025-08-03 RX ADMIN — Medication 650 MILLIGRAM(S): at 06:30

## 2025-08-04 ENCOUNTER — TRANSCRIPTION ENCOUNTER (OUTPATIENT)
Age: 70
End: 2025-08-04

## 2025-08-04 VITALS — HEART RATE: 70 BPM | DIASTOLIC BLOOD PRESSURE: 70 MMHG | SYSTOLIC BLOOD PRESSURE: 109 MMHG

## 2025-08-04 LAB
ALBUMIN SERPL ELPH-MCNC: 3.4 G/DL — LOW (ref 3.5–5.2)
ALP SERPL-CCNC: 59 U/L — SIGNIFICANT CHANGE UP (ref 30–115)
ALT FLD-CCNC: 10 U/L — SIGNIFICANT CHANGE UP (ref 0–41)
ANION GAP SERPL CALC-SCNC: 11 MMOL/L — SIGNIFICANT CHANGE UP (ref 7–14)
AST SERPL-CCNC: 13 U/L — SIGNIFICANT CHANGE UP (ref 0–41)
BASOPHILS # BLD AUTO: 0.02 K/UL — SIGNIFICANT CHANGE UP (ref 0–0.2)
BASOPHILS NFR BLD AUTO: 0.5 % — SIGNIFICANT CHANGE UP (ref 0–1)
BILIRUB SERPL-MCNC: <0.2 MG/DL — SIGNIFICANT CHANGE UP (ref 0.2–1.2)
BUN SERPL-MCNC: 44 MG/DL — HIGH (ref 10–20)
CALCIUM SERPL-MCNC: 8.7 MG/DL — SIGNIFICANT CHANGE UP (ref 8.4–10.5)
CHLORIDE SERPL-SCNC: 106 MMOL/L — SIGNIFICANT CHANGE UP (ref 98–110)
CO2 SERPL-SCNC: 20 MMOL/L — SIGNIFICANT CHANGE UP (ref 17–32)
CREAT SERPL-MCNC: 1.4 MG/DL — SIGNIFICANT CHANGE UP (ref 0.7–1.5)
EGFR: 54 ML/MIN/1.73M2 — LOW
EGFR: 54 ML/MIN/1.73M2 — LOW
EOSINOPHIL # BLD AUTO: 0.12 K/UL — SIGNIFICANT CHANGE UP (ref 0–0.7)
EOSINOPHIL NFR BLD AUTO: 3.1 % — SIGNIFICANT CHANGE UP (ref 0–8)
GLUCOSE SERPL-MCNC: 88 MG/DL — SIGNIFICANT CHANGE UP (ref 70–99)
HCT VFR BLD CALC: 34.5 % — LOW (ref 42–52)
HGB BLD-MCNC: 11.2 G/DL — LOW (ref 14–18)
IMM GRANULOCYTES NFR BLD AUTO: 0.8 % — HIGH (ref 0.1–0.3)
LYMPHOCYTES # BLD AUTO: 0.37 K/UL — LOW (ref 1.2–3.4)
LYMPHOCYTES # BLD AUTO: 9.5 % — LOW (ref 20.5–51.1)
MAGNESIUM SERPL-MCNC: 2 MG/DL — SIGNIFICANT CHANGE UP (ref 1.8–2.4)
MCHC RBC-ENTMCNC: 29.6 PG — SIGNIFICANT CHANGE UP (ref 27–31)
MCHC RBC-ENTMCNC: 32.5 G/DL — SIGNIFICANT CHANGE UP (ref 32–37)
MCV RBC AUTO: 91 FL — SIGNIFICANT CHANGE UP (ref 80–94)
MONOCYTES # BLD AUTO: 0.55 K/UL — SIGNIFICANT CHANGE UP (ref 0.1–0.6)
MONOCYTES NFR BLD AUTO: 14.1 % — HIGH (ref 1.7–9.3)
NEUTROPHILS # BLD AUTO: 2.8 K/UL — SIGNIFICANT CHANGE UP (ref 1.4–6.5)
NEUTROPHILS NFR BLD AUTO: 72 % — SIGNIFICANT CHANGE UP (ref 42.2–75.2)
NRBC BLD AUTO-RTO: 0 /100 WBCS — SIGNIFICANT CHANGE UP (ref 0–0)
PLATELET # BLD AUTO: 129 K/UL — LOW (ref 130–400)
PMV BLD: 10.7 FL — HIGH (ref 7.4–10.4)
POTASSIUM SERPL-MCNC: 4.5 MMOL/L — SIGNIFICANT CHANGE UP (ref 3.5–5)
POTASSIUM SERPL-SCNC: 4.5 MMOL/L — SIGNIFICANT CHANGE UP (ref 3.5–5)
PROT SERPL-MCNC: 6.7 G/DL — SIGNIFICANT CHANGE UP (ref 6–8)
RBC # BLD: 3.79 M/UL — LOW (ref 4.7–6.1)
RBC # FLD: 13.1 % — SIGNIFICANT CHANGE UP (ref 11.5–14.5)
SODIUM SERPL-SCNC: 137 MMOL/L — SIGNIFICANT CHANGE UP (ref 135–146)
WBC # BLD: 3.89 K/UL — LOW (ref 4.8–10.8)
WBC # FLD AUTO: 3.89 K/UL — LOW (ref 4.8–10.8)

## 2025-08-04 PROCEDURE — 99233 SBSQ HOSP IP/OBS HIGH 50: CPT

## 2025-08-04 RX ORDER — IPRATROPIUM BROMIDE AND ALBUTEROL SULFATE .5; 2.5 MG/3ML; MG/3ML
3 SOLUTION RESPIRATORY (INHALATION) EVERY 6 HOURS
Refills: 0 | Status: DISCONTINUED | OUTPATIENT
Start: 2025-08-04 | End: 2025-08-04

## 2025-08-04 RX ADMIN — METOPROLOL SUCCINATE 25 MILLIGRAM(S): 50 TABLET, EXTENDED RELEASE ORAL at 17:13

## 2025-08-04 RX ADMIN — METOPROLOL SUCCINATE 25 MILLIGRAM(S): 50 TABLET, EXTENDED RELEASE ORAL at 05:21

## 2025-08-04 RX ADMIN — Medication 1 APPLICATION(S): at 05:23

## 2025-08-04 RX ADMIN — Medication 1 DOSE(S): at 21:14

## 2025-08-04 RX ADMIN — APIXABAN 5 MILLIGRAM(S): 5 TABLET, FILM COATED ORAL at 05:21

## 2025-08-04 RX ADMIN — GABAPENTIN 100 MILLIGRAM(S): 400 CAPSULE ORAL at 05:21

## 2025-08-04 RX ADMIN — SULFASALAZINE 1000 MILLIGRAM(S): 500 TABLET ORAL at 11:14

## 2025-08-04 RX ADMIN — IPRATROPIUM BROMIDE AND ALBUTEROL SULFATE 3 MILLILITER(S): .5; 2.5 SOLUTION RESPIRATORY (INHALATION) at 13:07

## 2025-08-04 RX ADMIN — METHOCARBAMOL 500 MILLIGRAM(S): 500 TABLET, FILM COATED ORAL at 21:12

## 2025-08-04 RX ADMIN — FOLIC ACID 1 MILLIGRAM(S): 1 TABLET ORAL at 11:11

## 2025-08-04 RX ADMIN — GABAPENTIN 100 MILLIGRAM(S): 400 CAPSULE ORAL at 21:13

## 2025-08-04 RX ADMIN — APIXABAN 5 MILLIGRAM(S): 5 TABLET, FILM COATED ORAL at 17:12

## 2025-08-04 RX ADMIN — IPRATROPIUM BROMIDE AND ALBUTEROL SULFATE 3 MILLILITER(S): .5; 2.5 SOLUTION RESPIRATORY (INHALATION) at 20:04

## 2025-08-04 RX ADMIN — SULFASALAZINE 1500 MILLIGRAM(S): 500 TABLET ORAL at 21:13

## 2025-08-04 RX ADMIN — TAMSULOSIN HYDROCHLORIDE 0.4 MILLIGRAM(S): 0.4 CAPSULE ORAL at 21:13

## 2025-08-04 RX ADMIN — METHOCARBAMOL 500 MILLIGRAM(S): 500 TABLET, FILM COATED ORAL at 05:21

## 2025-08-04 RX ADMIN — EZETIMIBE 10 MILLIGRAM(S): 10 TABLET ORAL at 11:11

## 2025-08-04 RX ADMIN — METHOCARBAMOL 500 MILLIGRAM(S): 500 TABLET, FILM COATED ORAL at 13:18

## 2025-08-04 RX ADMIN — GABAPENTIN 100 MILLIGRAM(S): 400 CAPSULE ORAL at 13:19

## 2025-08-04 RX ADMIN — AMLODIPINE BESYLATE 10 MILLIGRAM(S): 10 TABLET ORAL at 05:21

## 2025-08-04 RX ADMIN — FINASTERIDE 5 MILLIGRAM(S): 1 TABLET, FILM COATED ORAL at 11:14

## 2025-08-04 RX ADMIN — LOSARTAN POTASSIUM 100 MILLIGRAM(S): 100 TABLET, FILM COATED ORAL at 05:22

## 2025-08-04 RX ADMIN — Medication 2 TABLET(S): at 21:13

## 2025-08-04 RX ADMIN — Medication 5 MILLIGRAM(S): at 00:03

## 2025-08-06 ENCOUNTER — EMERGENCY (EMERGENCY)
Facility: HOSPITAL | Age: 70
LOS: 0 days | Discharge: HOME | End: 2025-08-06
Attending: EMERGENCY MEDICINE
Payer: MEDICARE

## 2025-08-06 VITALS
RESPIRATION RATE: 22 BRPM | SYSTOLIC BLOOD PRESSURE: 93 MMHG | TEMPERATURE: 98 F | DIASTOLIC BLOOD PRESSURE: 59 MMHG | OXYGEN SATURATION: 96 % | HEART RATE: 61 BPM | HEIGHT: 69 IN

## 2025-08-06 VITALS
TEMPERATURE: 98 F | HEART RATE: 63 BPM | DIASTOLIC BLOOD PRESSURE: 73 MMHG | RESPIRATION RATE: 18 BRPM | OXYGEN SATURATION: 98 % | SYSTOLIC BLOOD PRESSURE: 110 MMHG

## 2025-08-06 DIAGNOSIS — Z88.6 ALLERGY STATUS TO ANALGESIC AGENT: ICD-10-CM

## 2025-08-06 DIAGNOSIS — R11.0 NAUSEA: ICD-10-CM

## 2025-08-06 DIAGNOSIS — I25.10 ATHEROSCLEROTIC HEART DISEASE OF NATIVE CORONARY ARTERY WITHOUT ANGINA PECTORIS: ICD-10-CM

## 2025-08-06 DIAGNOSIS — Z96.649 PRESENCE OF UNSPECIFIED ARTIFICIAL HIP JOINT: Chronic | ICD-10-CM

## 2025-08-06 DIAGNOSIS — Z90.2 ACQUIRED ABSENCE OF LUNG [PART OF]: Chronic | ICD-10-CM

## 2025-08-06 DIAGNOSIS — Z86.718 PERSONAL HISTORY OF OTHER VENOUS THROMBOSIS AND EMBOLISM: ICD-10-CM

## 2025-08-06 DIAGNOSIS — Z88.8 ALLERGY STATUS TO OTHER DRUGS, MEDICAMENTS AND BIOLOGICAL SUBSTANCES: ICD-10-CM

## 2025-08-06 DIAGNOSIS — Z85.118 PERSONAL HISTORY OF OTHER MALIGNANT NEOPLASM OF BRONCHUS AND LUNG: ICD-10-CM

## 2025-08-06 DIAGNOSIS — E78.5 HYPERLIPIDEMIA, UNSPECIFIED: ICD-10-CM

## 2025-08-06 DIAGNOSIS — I10 ESSENTIAL (PRIMARY) HYPERTENSION: ICD-10-CM

## 2025-08-06 DIAGNOSIS — R10.816 EPIGASTRIC ABDOMINAL TENDERNESS: ICD-10-CM

## 2025-08-06 DIAGNOSIS — Z95.1 PRESENCE OF AORTOCORONARY BYPASS GRAFT: Chronic | ICD-10-CM

## 2025-08-06 DIAGNOSIS — N40.0 BENIGN PROSTATIC HYPERPLASIA WITHOUT LOWER URINARY TRACT SYMPTOMS: ICD-10-CM

## 2025-08-06 DIAGNOSIS — Z95.1 PRESENCE OF AORTOCORONARY BYPASS GRAFT: ICD-10-CM

## 2025-08-06 DIAGNOSIS — Z79.01 LONG TERM (CURRENT) USE OF ANTICOAGULANTS: ICD-10-CM

## 2025-08-06 LAB
ALBUMIN SERPL ELPH-MCNC: 3.5 G/DL — SIGNIFICANT CHANGE UP (ref 3.5–5.2)
ALP SERPL-CCNC: 66 U/L — SIGNIFICANT CHANGE UP (ref 30–115)
ALT FLD-CCNC: 8 U/L — SIGNIFICANT CHANGE UP (ref 0–41)
ANION GAP SERPL CALC-SCNC: 11 MMOL/L — SIGNIFICANT CHANGE UP (ref 7–14)
APPEARANCE UR: CLEAR — SIGNIFICANT CHANGE UP
AST SERPL-CCNC: 16 U/L — SIGNIFICANT CHANGE UP (ref 0–41)
BASOPHILS # BLD AUTO: 0.03 K/UL — SIGNIFICANT CHANGE UP (ref 0–0.2)
BASOPHILS NFR BLD AUTO: 0.6 % — SIGNIFICANT CHANGE UP (ref 0–1)
BILIRUB SERPL-MCNC: 0.4 MG/DL — SIGNIFICANT CHANGE UP (ref 0.2–1.2)
BILIRUB UR-MCNC: NEGATIVE — SIGNIFICANT CHANGE UP
BUN SERPL-MCNC: 41 MG/DL — HIGH (ref 10–20)
CALCIUM SERPL-MCNC: 8.8 MG/DL — SIGNIFICANT CHANGE UP (ref 8.4–10.5)
CHLORIDE SERPL-SCNC: 103 MMOL/L — SIGNIFICANT CHANGE UP (ref 98–110)
CO2 SERPL-SCNC: 21 MMOL/L — SIGNIFICANT CHANGE UP (ref 17–32)
COLOR SPEC: YELLOW — SIGNIFICANT CHANGE UP
CREAT SERPL-MCNC: 1.5 MG/DL — SIGNIFICANT CHANGE UP (ref 0.7–1.5)
DIFF PNL FLD: NEGATIVE — SIGNIFICANT CHANGE UP
EGFR: 50 ML/MIN/1.73M2 — LOW
EGFR: 50 ML/MIN/1.73M2 — LOW
EOSINOPHIL # BLD AUTO: 0.08 K/UL — SIGNIFICANT CHANGE UP (ref 0–0.7)
EOSINOPHIL NFR BLD AUTO: 1.6 % — SIGNIFICANT CHANGE UP (ref 0–8)
GLUCOSE SERPL-MCNC: 84 MG/DL — SIGNIFICANT CHANGE UP (ref 70–99)
GLUCOSE UR QL: NEGATIVE MG/DL — SIGNIFICANT CHANGE UP
HCT VFR BLD CALC: 34 % — LOW (ref 42–52)
HGB BLD-MCNC: 11.1 G/DL — LOW (ref 14–18)
IMM GRANULOCYTES NFR BLD AUTO: 0.6 % — HIGH (ref 0.1–0.3)
KETONES UR QL: NEGATIVE MG/DL — SIGNIFICANT CHANGE UP
LACTATE SERPL-SCNC: 0.6 MMOL/L — LOW (ref 0.7–2)
LEUKOCYTE ESTERASE UR-ACNC: NEGATIVE — SIGNIFICANT CHANGE UP
LIDOCAIN IGE QN: 39 U/L — SIGNIFICANT CHANGE UP (ref 7–60)
LYMPHOCYTES # BLD AUTO: 0.46 K/UL — LOW (ref 1.2–3.4)
LYMPHOCYTES # BLD AUTO: 9.3 % — LOW (ref 20.5–51.1)
MCHC RBC-ENTMCNC: 29.8 PG — SIGNIFICANT CHANGE UP (ref 27–31)
MCHC RBC-ENTMCNC: 32.6 G/DL — SIGNIFICANT CHANGE UP (ref 32–37)
MCV RBC AUTO: 91.2 FL — SIGNIFICANT CHANGE UP (ref 80–94)
MONOCYTES # BLD AUTO: 0.73 K/UL — HIGH (ref 0.1–0.6)
MONOCYTES NFR BLD AUTO: 14.7 % — HIGH (ref 1.7–9.3)
NEUTROPHILS # BLD AUTO: 3.63 K/UL — SIGNIFICANT CHANGE UP (ref 1.4–6.5)
NEUTROPHILS NFR BLD AUTO: 73.2 % — SIGNIFICANT CHANGE UP (ref 42.2–75.2)
NITRITE UR-MCNC: NEGATIVE — SIGNIFICANT CHANGE UP
NRBC BLD AUTO-RTO: 0 /100 WBCS — SIGNIFICANT CHANGE UP (ref 0–0)
PH UR: 6 — SIGNIFICANT CHANGE UP (ref 5–8)
PLATELET # BLD AUTO: 134 K/UL — SIGNIFICANT CHANGE UP (ref 130–400)
PMV BLD: 10.7 FL — HIGH (ref 7.4–10.4)
POTASSIUM SERPL-MCNC: 4.5 MMOL/L — SIGNIFICANT CHANGE UP (ref 3.5–5)
POTASSIUM SERPL-SCNC: 4.5 MMOL/L — SIGNIFICANT CHANGE UP (ref 3.5–5)
PROT SERPL-MCNC: 7.1 G/DL — SIGNIFICANT CHANGE UP (ref 6–8)
PROT UR-MCNC: SIGNIFICANT CHANGE UP MG/DL
RBC # BLD: 3.73 M/UL — LOW (ref 4.7–6.1)
RBC # FLD: 13.1 % — SIGNIFICANT CHANGE UP (ref 11.5–14.5)
SODIUM SERPL-SCNC: 135 MMOL/L — SIGNIFICANT CHANGE UP (ref 135–146)
SP GR SPEC: 1.02 — SIGNIFICANT CHANGE UP (ref 1–1.03)
TROPONIN T, HIGH SENSITIVITY RESULT: 35 NG/L — HIGH (ref 6–21)
TROPONIN T, HIGH SENSITIVITY RESULT: 37 NG/L — HIGH (ref 6–21)
UROBILINOGEN FLD QL: 0.2 MG/DL — SIGNIFICANT CHANGE UP (ref 0.2–1)
WBC # BLD: 4.96 K/UL — SIGNIFICANT CHANGE UP (ref 4.8–10.8)
WBC # FLD AUTO: 4.96 K/UL — SIGNIFICANT CHANGE UP (ref 4.8–10.8)

## 2025-08-06 PROCEDURE — 93010 ELECTROCARDIOGRAM REPORT: CPT

## 2025-08-06 PROCEDURE — 74176 CT ABD & PELVIS W/O CONTRAST: CPT

## 2025-08-06 PROCEDURE — 80053 COMPREHEN METABOLIC PANEL: CPT

## 2025-08-06 PROCEDURE — 36415 COLL VENOUS BLD VENIPUNCTURE: CPT

## 2025-08-06 PROCEDURE — 71045 X-RAY EXAM CHEST 1 VIEW: CPT

## 2025-08-06 PROCEDURE — 74176 CT ABD & PELVIS W/O CONTRAST: CPT | Mod: 26

## 2025-08-06 PROCEDURE — 71045 X-RAY EXAM CHEST 1 VIEW: CPT | Mod: 26

## 2025-08-06 PROCEDURE — 99285 EMERGENCY DEPT VISIT HI MDM: CPT | Mod: 25

## 2025-08-06 PROCEDURE — 93005 ELECTROCARDIOGRAM TRACING: CPT

## 2025-08-06 PROCEDURE — 84484 ASSAY OF TROPONIN QUANT: CPT

## 2025-08-06 PROCEDURE — 83690 ASSAY OF LIPASE: CPT

## 2025-08-06 PROCEDURE — 83605 ASSAY OF LACTIC ACID: CPT

## 2025-08-06 PROCEDURE — 81003 URINALYSIS AUTO W/O SCOPE: CPT

## 2025-08-06 PROCEDURE — 99284 EMERGENCY DEPT VISIT MOD MDM: CPT | Mod: 25

## 2025-08-06 PROCEDURE — 96375 TX/PRO/DX INJ NEW DRUG ADDON: CPT

## 2025-08-06 PROCEDURE — 96374 THER/PROPH/DIAG INJ IV PUSH: CPT

## 2025-08-06 PROCEDURE — 85025 COMPLETE CBC W/AUTO DIFF WBC: CPT

## 2025-08-06 RX ORDER — MAGNESIUM, ALUMINUM HYDROXIDE 200-200 MG
30 TABLET,CHEWABLE ORAL EVERY 4 HOURS
Refills: 0 | Status: DISCONTINUED | OUTPATIENT
Start: 2025-08-06 | End: 2025-08-06

## 2025-08-06 RX ORDER — ONDANSETRON HCL/PF 4 MG/2 ML
4 VIAL (ML) INJECTION ONCE
Refills: 0 | Status: COMPLETED | OUTPATIENT
Start: 2025-08-06 | End: 2025-08-06

## 2025-08-06 RX ADMIN — Medication 30 MILLILITER(S): at 07:54

## 2025-08-06 RX ADMIN — Medication 20 MILLIGRAM(S): at 03:55

## 2025-08-06 RX ADMIN — Medication 4 MILLIGRAM(S): at 03:55

## 2025-08-07 ENCOUNTER — INPATIENT (INPATIENT)
Facility: HOSPITAL | Age: 70
LOS: 3 days | Discharge: HOME HEALTH | DRG: 194 | End: 2025-08-11
Attending: INTERNAL MEDICINE | Admitting: STUDENT IN AN ORGANIZED HEALTH CARE EDUCATION/TRAINING PROGRAM
Payer: MEDICARE

## 2025-08-07 VITALS
TEMPERATURE: 98 F | OXYGEN SATURATION: 97 % | WEIGHT: 160.06 LBS | RESPIRATION RATE: 18 BRPM | HEART RATE: 74 BPM | HEIGHT: 69 IN | DIASTOLIC BLOOD PRESSURE: 53 MMHG | SYSTOLIC BLOOD PRESSURE: 150 MMHG

## 2025-08-07 DIAGNOSIS — Z95.1 PRESENCE OF AORTOCORONARY BYPASS GRAFT: Chronic | ICD-10-CM

## 2025-08-07 DIAGNOSIS — J18.9 PNEUMONIA, UNSPECIFIED ORGANISM: ICD-10-CM

## 2025-08-07 DIAGNOSIS — Z96.649 PRESENCE OF UNSPECIFIED ARTIFICIAL HIP JOINT: Chronic | ICD-10-CM

## 2025-08-07 DIAGNOSIS — Z90.2 ACQUIRED ABSENCE OF LUNG [PART OF]: Chronic | ICD-10-CM

## 2025-08-07 LAB
ALBUMIN SERPL ELPH-MCNC: 3.9 G/DL — SIGNIFICANT CHANGE UP (ref 3.5–5.2)
ALP SERPL-CCNC: 76 U/L — SIGNIFICANT CHANGE UP (ref 30–115)
ALT FLD-CCNC: 9 U/L — SIGNIFICANT CHANGE UP (ref 0–41)
ANION GAP SERPL CALC-SCNC: 12 MMOL/L — SIGNIFICANT CHANGE UP (ref 7–14)
APTT BLD: 34.4 SEC — SIGNIFICANT CHANGE UP (ref 27–39.2)
AST SERPL-CCNC: 15 U/L — SIGNIFICANT CHANGE UP (ref 0–41)
BASOPHILS # BLD AUTO: 0.02 K/UL — SIGNIFICANT CHANGE UP (ref 0–0.2)
BASOPHILS NFR BLD AUTO: 0.5 % — SIGNIFICANT CHANGE UP (ref 0–1)
BILIRUB SERPL-MCNC: 0.4 MG/DL — SIGNIFICANT CHANGE UP (ref 0.2–1.2)
BUN SERPL-MCNC: 36 MG/DL — HIGH (ref 10–20)
CALCIUM SERPL-MCNC: 9.3 MG/DL — SIGNIFICANT CHANGE UP (ref 8.4–10.5)
CHLORIDE SERPL-SCNC: 104 MMOL/L — SIGNIFICANT CHANGE UP (ref 98–110)
CO2 SERPL-SCNC: 22 MMOL/L — SIGNIFICANT CHANGE UP (ref 17–32)
CREAT SERPL-MCNC: 1.4 MG/DL — SIGNIFICANT CHANGE UP (ref 0.7–1.5)
EGFR: 54 ML/MIN/1.73M2 — LOW
EGFR: 54 ML/MIN/1.73M2 — LOW
EOSINOPHIL # BLD AUTO: 0.08 K/UL — SIGNIFICANT CHANGE UP (ref 0–0.7)
EOSINOPHIL NFR BLD AUTO: 2 % — SIGNIFICANT CHANGE UP (ref 0–8)
GLUCOSE SERPL-MCNC: 81 MG/DL — SIGNIFICANT CHANGE UP (ref 70–99)
HCT VFR BLD CALC: 36.6 % — LOW (ref 42–52)
HGB BLD-MCNC: 12 G/DL — LOW (ref 14–18)
IMM GRANULOCYTES NFR BLD AUTO: 1.5 % — HIGH (ref 0.1–0.3)
INR BLD: 1.12 RATIO — SIGNIFICANT CHANGE UP (ref 0.65–1.3)
LYMPHOCYTES # BLD AUTO: 0.46 K/UL — LOW (ref 1.2–3.4)
LYMPHOCYTES # BLD AUTO: 11.4 % — LOW (ref 20.5–51.1)
MCHC RBC-ENTMCNC: 29.6 PG — SIGNIFICANT CHANGE UP (ref 27–31)
MCHC RBC-ENTMCNC: 32.8 G/DL — SIGNIFICANT CHANGE UP (ref 32–37)
MCV RBC AUTO: 90.1 FL — SIGNIFICANT CHANGE UP (ref 80–94)
MONOCYTES # BLD AUTO: 0.6 K/UL — SIGNIFICANT CHANGE UP (ref 0.1–0.6)
MONOCYTES NFR BLD AUTO: 14.9 % — HIGH (ref 1.7–9.3)
NEUTROPHILS # BLD AUTO: 2.82 K/UL — SIGNIFICANT CHANGE UP (ref 1.4–6.5)
NEUTROPHILS NFR BLD AUTO: 69.7 % — SIGNIFICANT CHANGE UP (ref 42.2–75.2)
NRBC BLD AUTO-RTO: 0 /100 WBCS — SIGNIFICANT CHANGE UP (ref 0–0)
PLATELET # BLD AUTO: 126 K/UL — LOW (ref 130–400)
PMV BLD: 11 FL — HIGH (ref 7.4–10.4)
POTASSIUM SERPL-MCNC: 4.6 MMOL/L — SIGNIFICANT CHANGE UP (ref 3.5–5)
POTASSIUM SERPL-SCNC: 4.6 MMOL/L — SIGNIFICANT CHANGE UP (ref 3.5–5)
PROT SERPL-MCNC: 8.1 G/DL — HIGH (ref 6–8)
PROTHROM AB SERPL-ACNC: 13.3 SEC — HIGH (ref 9.95–12.87)
RBC # BLD: 4.06 M/UL — LOW (ref 4.7–6.1)
RBC # FLD: 13 % — SIGNIFICANT CHANGE UP (ref 11.5–14.5)
SODIUM SERPL-SCNC: 138 MMOL/L — SIGNIFICANT CHANGE UP (ref 135–146)
TROPONIN T, HIGH SENSITIVITY RESULT: 24 NG/L — HIGH (ref 6–21)
WBC # BLD: 4.04 K/UL — LOW (ref 4.8–10.8)
WBC # FLD AUTO: 4.04 K/UL — LOW (ref 4.8–10.8)

## 2025-08-07 PROCEDURE — 71045 X-RAY EXAM CHEST 1 VIEW: CPT | Mod: 26

## 2025-08-07 PROCEDURE — 71275 CT ANGIOGRAPHY CHEST: CPT | Mod: 26

## 2025-08-07 PROCEDURE — 84145 PROCALCITONIN (PCT): CPT

## 2025-08-07 PROCEDURE — 87641 MR-STAPH DNA AMP PROBE: CPT

## 2025-08-07 PROCEDURE — 36415 COLL VENOUS BLD VENIPUNCTURE: CPT

## 2025-08-07 PROCEDURE — 87637 SARSCOV2&INF A&B&RSV AMP PRB: CPT

## 2025-08-07 PROCEDURE — 99223 1ST HOSP IP/OBS HIGH 75: CPT

## 2025-08-07 PROCEDURE — 71045 X-RAY EXAM CHEST 1 VIEW: CPT

## 2025-08-07 PROCEDURE — 85025 COMPLETE CBC W/AUTO DIFF WBC: CPT

## 2025-08-07 PROCEDURE — 97162 PT EVAL MOD COMPLEX 30 MIN: CPT | Mod: GP

## 2025-08-07 PROCEDURE — 83735 ASSAY OF MAGNESIUM: CPT

## 2025-08-07 PROCEDURE — 80048 BASIC METABOLIC PNL TOTAL CA: CPT

## 2025-08-07 PROCEDURE — 93005 ELECTROCARDIOGRAM TRACING: CPT

## 2025-08-07 PROCEDURE — 87070 CULTURE OTHR SPECIMN AEROBIC: CPT

## 2025-08-07 PROCEDURE — 99285 EMERGENCY DEPT VISIT HI MDM: CPT

## 2025-08-07 PROCEDURE — 87205 SMEAR GRAM STAIN: CPT

## 2025-08-07 PROCEDURE — 87640 STAPH A DNA AMP PROBE: CPT

## 2025-08-07 PROCEDURE — 93010 ELECTROCARDIOGRAM REPORT: CPT | Mod: 77

## 2025-08-07 PROCEDURE — 94640 AIRWAY INHALATION TREATMENT: CPT

## 2025-08-07 RX ORDER — FINASTERIDE 1 MG/1
5 TABLET, FILM COATED ORAL DAILY
Refills: 0 | Status: DISCONTINUED | OUTPATIENT
Start: 2025-08-07 | End: 2025-08-11

## 2025-08-07 RX ORDER — AMOXICILLIN AND CLAVULANATE POTASSIUM 500; 125 MG/1; MG/1
1 TABLET, FILM COATED ORAL
Qty: 14 | Refills: 0
Start: 2025-08-07 | End: 2025-08-13

## 2025-08-07 RX ORDER — EZETIMIBE 10 MG/1
10 TABLET ORAL DAILY
Refills: 0 | Status: DISCONTINUED | OUTPATIENT
Start: 2025-08-07 | End: 2025-08-11

## 2025-08-07 RX ORDER — DOXYCYCLINE HYCLATE 100 MG
1 TABLET ORAL
Qty: 14 | Refills: 0
Start: 2025-08-07 | End: 2025-08-13

## 2025-08-07 RX ORDER — POLYETHYLENE GLYCOL 3350 17 G/17G
17 POWDER, FOR SOLUTION ORAL
Refills: 0 | Status: DISCONTINUED | OUTPATIENT
Start: 2025-08-07 | End: 2025-08-11

## 2025-08-07 RX ORDER — AMOXICILLIN AND CLAVULANATE POTASSIUM 500; 125 MG/1; MG/1
1 TABLET, FILM COATED ORAL
Refills: 0 | Status: DISCONTINUED | OUTPATIENT
Start: 2025-08-08 | End: 2025-08-11

## 2025-08-07 RX ORDER — TAMSULOSIN HYDROCHLORIDE 0.4 MG/1
0.4 CAPSULE ORAL AT BEDTIME
Refills: 0 | Status: DISCONTINUED | OUTPATIENT
Start: 2025-08-07 | End: 2025-08-11

## 2025-08-07 RX ORDER — DOXYCYCLINE HYCLATE 100 MG
100 TABLET ORAL ONCE
Refills: 0 | Status: COMPLETED | OUTPATIENT
Start: 2025-08-07 | End: 2025-08-07

## 2025-08-07 RX ORDER — AMOXICILLIN AND CLAVULANATE POTASSIUM 500; 125 MG/1; MG/1
1 TABLET, FILM COATED ORAL
Refills: 0 | Status: DISCONTINUED | OUTPATIENT
Start: 2025-08-07 | End: 2025-08-07

## 2025-08-07 RX ORDER — AMOXICILLIN AND CLAVULANATE POTASSIUM 500; 125 MG/1; MG/1
1 TABLET, FILM COATED ORAL ONCE
Refills: 0 | Status: COMPLETED | OUTPATIENT
Start: 2025-08-07 | End: 2025-08-07

## 2025-08-07 RX ORDER — DOXYCYCLINE HYCLATE 100 MG
100 TABLET ORAL EVERY 12 HOURS
Refills: 0 | Status: DISCONTINUED | OUTPATIENT
Start: 2025-08-08 | End: 2025-08-11

## 2025-08-07 RX ORDER — CYCLOBENZAPRINE HYDROCHLORIDE 15 MG/1
5 CAPSULE, EXTENDED RELEASE ORAL DAILY
Refills: 0 | Status: DISCONTINUED | OUTPATIENT
Start: 2025-08-07 | End: 2025-08-11

## 2025-08-07 RX ORDER — ACETAMINOPHEN 500 MG/5ML
650 LIQUID (ML) ORAL EVERY 6 HOURS
Refills: 0 | Status: DISCONTINUED | OUTPATIENT
Start: 2025-08-07 | End: 2025-08-11

## 2025-08-07 RX ORDER — METHOCARBAMOL 500 MG/1
500 TABLET, FILM COATED ORAL THREE TIMES A DAY
Refills: 0 | Status: DISCONTINUED | OUTPATIENT
Start: 2025-08-07 | End: 2025-08-11

## 2025-08-07 RX ORDER — METOPROLOL SUCCINATE 50 MG/1
25 TABLET, EXTENDED RELEASE ORAL
Refills: 0 | Status: DISCONTINUED | OUTPATIENT
Start: 2025-08-07 | End: 2025-08-08

## 2025-08-07 RX ORDER — GABAPENTIN 400 MG/1
100 CAPSULE ORAL AT BEDTIME
Refills: 0 | Status: DISCONTINUED | OUTPATIENT
Start: 2025-08-07 | End: 2025-08-11

## 2025-08-07 RX ORDER — APIXABAN 5 MG/1
5 TABLET, FILM COATED ORAL EVERY 12 HOURS
Refills: 0 | Status: DISCONTINUED | OUTPATIENT
Start: 2025-08-07 | End: 2025-08-11

## 2025-08-07 RX ORDER — SENNA 187 MG
2 TABLET ORAL AT BEDTIME
Refills: 0 | Status: DISCONTINUED | OUTPATIENT
Start: 2025-08-07 | End: 2025-08-11

## 2025-08-07 RX ORDER — ALBUTEROL SULFATE 2.5 MG/3ML
2 VIAL, NEBULIZER (ML) INHALATION EVERY 6 HOURS
Refills: 0 | Status: DISCONTINUED | OUTPATIENT
Start: 2025-08-07 | End: 2025-08-11

## 2025-08-07 RX ORDER — LOSARTAN POTASSIUM 100 MG/1
100 TABLET, FILM COATED ORAL DAILY
Refills: 0 | Status: DISCONTINUED | OUTPATIENT
Start: 2025-08-07 | End: 2025-08-08

## 2025-08-07 RX ORDER — DOXYCYCLINE HYCLATE 100 MG
100 TABLET ORAL EVERY 12 HOURS
Refills: 0 | Status: DISCONTINUED | OUTPATIENT
Start: 2025-08-07 | End: 2025-08-07

## 2025-08-07 RX ADMIN — GABAPENTIN 100 MILLIGRAM(S): 400 CAPSULE ORAL at 21:52

## 2025-08-07 RX ADMIN — Medication 100 MILLIGRAM(S): at 16:55

## 2025-08-07 RX ADMIN — AMOXICILLIN AND CLAVULANATE POTASSIUM 1 TABLET(S): 500; 125 TABLET, FILM COATED ORAL at 16:55

## 2025-08-07 RX ADMIN — METHOCARBAMOL 500 MILLIGRAM(S): 500 TABLET, FILM COATED ORAL at 21:52

## 2025-08-07 RX ADMIN — TAMSULOSIN HYDROCHLORIDE 0.4 MILLIGRAM(S): 0.4 CAPSULE ORAL at 21:52

## 2025-08-07 RX ADMIN — Medication 2 TABLET(S): at 21:51

## 2025-08-08 LAB
ANION GAP SERPL CALC-SCNC: 11 MMOL/L — SIGNIFICANT CHANGE UP (ref 7–14)
BASOPHILS # BLD AUTO: 0.02 K/UL — SIGNIFICANT CHANGE UP (ref 0–0.2)
BASOPHILS NFR BLD AUTO: 0.4 % — SIGNIFICANT CHANGE UP (ref 0–1)
BUN SERPL-MCNC: 35 MG/DL — HIGH (ref 10–20)
CALCIUM SERPL-MCNC: 8.7 MG/DL — SIGNIFICANT CHANGE UP (ref 8.4–10.5)
CHLORIDE SERPL-SCNC: 99 MMOL/L — SIGNIFICANT CHANGE UP (ref 98–110)
CO2 SERPL-SCNC: 23 MMOL/L — SIGNIFICANT CHANGE UP (ref 17–32)
CREAT SERPL-MCNC: 1.5 MG/DL — SIGNIFICANT CHANGE UP (ref 0.7–1.5)
EGFR: 50 ML/MIN/1.73M2 — LOW
EGFR: 50 ML/MIN/1.73M2 — LOW
EOSINOPHIL # BLD AUTO: 0.11 K/UL — SIGNIFICANT CHANGE UP (ref 0–0.7)
EOSINOPHIL NFR BLD AUTO: 2.5 % — SIGNIFICANT CHANGE UP (ref 0–8)
FLUAV AG NPH QL: SIGNIFICANT CHANGE UP
FLUBV AG NPH QL: SIGNIFICANT CHANGE UP
GLUCOSE SERPL-MCNC: 82 MG/DL — SIGNIFICANT CHANGE UP (ref 70–99)
HCT VFR BLD CALC: 35 % — LOW (ref 42–52)
HGB BLD-MCNC: 11.3 G/DL — LOW (ref 14–18)
IMM GRANULOCYTES NFR BLD AUTO: 1.6 % — HIGH (ref 0.1–0.3)
LYMPHOCYTES # BLD AUTO: 0.55 K/UL — LOW (ref 1.2–3.4)
LYMPHOCYTES # BLD AUTO: 12.4 % — LOW (ref 20.5–51.1)
MCHC RBC-ENTMCNC: 29.5 PG — SIGNIFICANT CHANGE UP (ref 27–31)
MCHC RBC-ENTMCNC: 32.3 G/DL — SIGNIFICANT CHANGE UP (ref 32–37)
MCV RBC AUTO: 91.4 FL — SIGNIFICANT CHANGE UP (ref 80–94)
MONOCYTES # BLD AUTO: 0.67 K/UL — HIGH (ref 0.1–0.6)
MONOCYTES NFR BLD AUTO: 15.1 % — HIGH (ref 1.7–9.3)
MRSA PCR RESULT.: SIGNIFICANT CHANGE UP
NEUTROPHILS # BLD AUTO: 3.03 K/UL — SIGNIFICANT CHANGE UP (ref 1.4–6.5)
NEUTROPHILS NFR BLD AUTO: 68 % — SIGNIFICANT CHANGE UP (ref 42.2–75.2)
NRBC BLD AUTO-RTO: 0 /100 WBCS — SIGNIFICANT CHANGE UP (ref 0–0)
PLATELET # BLD AUTO: 141 K/UL — SIGNIFICANT CHANGE UP (ref 130–400)
PMV BLD: 10.8 FL — HIGH (ref 7.4–10.4)
POTASSIUM SERPL-MCNC: 4.5 MMOL/L — SIGNIFICANT CHANGE UP (ref 3.5–5)
POTASSIUM SERPL-SCNC: 4.5 MMOL/L — SIGNIFICANT CHANGE UP (ref 3.5–5)
PROCALCITONIN SERPL-MCNC: 0.11 NG/ML — HIGH (ref 0.02–0.1)
RBC # BLD: 3.83 M/UL — LOW (ref 4.7–6.1)
RBC # FLD: 13 % — SIGNIFICANT CHANGE UP (ref 11.5–14.5)
RSV RNA NPH QL NAA+NON-PROBE: SIGNIFICANT CHANGE UP
SARS-COV-2 RNA SPEC QL NAA+PROBE: SIGNIFICANT CHANGE UP
SODIUM SERPL-SCNC: 133 MMOL/L — LOW (ref 135–146)
SOURCE RESPIRATORY: SIGNIFICANT CHANGE UP
WBC # BLD: 4.45 K/UL — LOW (ref 4.8–10.8)
WBC # FLD AUTO: 4.45 K/UL — LOW (ref 4.8–10.8)

## 2025-08-08 PROCEDURE — 99233 SBSQ HOSP IP/OBS HIGH 50: CPT

## 2025-08-08 RX ORDER — IPRATROPIUM BROMIDE AND ALBUTEROL SULFATE .5; 2.5 MG/3ML; MG/3ML
3 SOLUTION RESPIRATORY (INHALATION) EVERY 6 HOURS
Refills: 0 | Status: DISCONTINUED | OUTPATIENT
Start: 2025-08-08 | End: 2025-08-11

## 2025-08-08 RX ORDER — METHOTREXATE 25 MG/ML
3 INJECTION, SOLUTION INTRA-ARTERIAL; INTRAMUSCULAR; INTRATHECAL; INTRAVENOUS
Qty: 13 | Refills: 0
Start: 2025-08-08 | End: 2025-09-06

## 2025-08-08 RX ORDER — SODIUM CHLORIDE 9 G/1000ML
1000 INJECTION, SOLUTION INTRAVENOUS ONCE
Refills: 0 | Status: COMPLETED | OUTPATIENT
Start: 2025-08-08 | End: 2025-08-08

## 2025-08-08 RX ORDER — METOPROLOL SUCCINATE 50 MG/1
25 TABLET, EXTENDED RELEASE ORAL
Refills: 0 | Status: DISCONTINUED | OUTPATIENT
Start: 2025-08-08 | End: 2025-08-11

## 2025-08-08 RX ADMIN — FINASTERIDE 5 MILLIGRAM(S): 1 TABLET, FILM COATED ORAL at 11:35

## 2025-08-08 RX ADMIN — AMOXICILLIN AND CLAVULANATE POTASSIUM 1 TABLET(S): 500; 125 TABLET, FILM COATED ORAL at 06:03

## 2025-08-08 RX ADMIN — Medication 650 MILLIGRAM(S): at 21:39

## 2025-08-08 RX ADMIN — SODIUM CHLORIDE 1000 MILLILITER(S): 9 INJECTION, SOLUTION INTRAVENOUS at 13:05

## 2025-08-08 RX ADMIN — Medication 650 MILLIGRAM(S): at 06:33

## 2025-08-08 RX ADMIN — APIXABAN 5 MILLIGRAM(S): 5 TABLET, FILM COATED ORAL at 05:24

## 2025-08-08 RX ADMIN — Medication 2 PUFF(S): at 08:39

## 2025-08-08 RX ADMIN — LOSARTAN POTASSIUM 100 MILLIGRAM(S): 100 TABLET, FILM COATED ORAL at 05:25

## 2025-08-08 RX ADMIN — EZETIMIBE 10 MILLIGRAM(S): 10 TABLET ORAL at 11:35

## 2025-08-08 RX ADMIN — Medication 650 MILLIGRAM(S): at 06:03

## 2025-08-08 RX ADMIN — Medication 650 MILLIGRAM(S): at 21:09

## 2025-08-08 RX ADMIN — APIXABAN 5 MILLIGRAM(S): 5 TABLET, FILM COATED ORAL at 17:10

## 2025-08-08 RX ADMIN — Medication 100 MILLIGRAM(S): at 17:10

## 2025-08-08 RX ADMIN — GABAPENTIN 100 MILLIGRAM(S): 400 CAPSULE ORAL at 21:08

## 2025-08-08 RX ADMIN — Medication 2 PUFF(S): at 21:46

## 2025-08-08 RX ADMIN — Medication 100 MILLIGRAM(S): at 06:02

## 2025-08-08 RX ADMIN — METHOCARBAMOL 500 MILLIGRAM(S): 500 TABLET, FILM COATED ORAL at 11:35

## 2025-08-08 RX ADMIN — METHOCARBAMOL 500 MILLIGRAM(S): 500 TABLET, FILM COATED ORAL at 05:25

## 2025-08-08 RX ADMIN — TAMSULOSIN HYDROCHLORIDE 0.4 MILLIGRAM(S): 0.4 CAPSULE ORAL at 21:09

## 2025-08-08 RX ADMIN — METHOCARBAMOL 500 MILLIGRAM(S): 500 TABLET, FILM COATED ORAL at 21:09

## 2025-08-08 RX ADMIN — AMOXICILLIN AND CLAVULANATE POTASSIUM 1 TABLET(S): 500; 125 TABLET, FILM COATED ORAL at 17:13

## 2025-08-08 RX ADMIN — METOPROLOL SUCCINATE 25 MILLIGRAM(S): 50 TABLET, EXTENDED RELEASE ORAL at 05:25

## 2025-08-08 RX ADMIN — Medication 40 MILLIGRAM(S): at 05:24

## 2025-08-09 ENCOUNTER — TRANSCRIPTION ENCOUNTER (OUTPATIENT)
Age: 70
End: 2025-08-09

## 2025-08-09 LAB
ANION GAP SERPL CALC-SCNC: 13 MMOL/L — SIGNIFICANT CHANGE UP (ref 7–14)
BASOPHILS # BLD AUTO: 0.02 K/UL — SIGNIFICANT CHANGE UP (ref 0–0.2)
BASOPHILS NFR BLD AUTO: 0.5 % — SIGNIFICANT CHANGE UP (ref 0–1)
BUN SERPL-MCNC: 38 MG/DL — HIGH (ref 10–20)
CALCIUM SERPL-MCNC: 8.6 MG/DL — SIGNIFICANT CHANGE UP (ref 8.4–10.5)
CHLORIDE SERPL-SCNC: 102 MMOL/L — SIGNIFICANT CHANGE UP (ref 98–110)
CO2 SERPL-SCNC: 22 MMOL/L — SIGNIFICANT CHANGE UP (ref 17–32)
CREAT SERPL-MCNC: 1.5 MG/DL — SIGNIFICANT CHANGE UP (ref 0.7–1.5)
EGFR: 50 ML/MIN/1.73M2 — LOW
EGFR: 50 ML/MIN/1.73M2 — LOW
EOSINOPHIL # BLD AUTO: 0.14 K/UL — SIGNIFICANT CHANGE UP (ref 0–0.7)
EOSINOPHIL NFR BLD AUTO: 3.6 % — SIGNIFICANT CHANGE UP (ref 0–8)
GLUCOSE SERPL-MCNC: 90 MG/DL — SIGNIFICANT CHANGE UP (ref 70–99)
GRAM STN FLD: ABNORMAL
HCT VFR BLD CALC: 32.8 % — LOW (ref 42–52)
HGB BLD-MCNC: 10.8 G/DL — LOW (ref 14–18)
IMM GRANULOCYTES NFR BLD AUTO: 2.3 % — HIGH (ref 0.1–0.3)
LYMPHOCYTES # BLD AUTO: 0.55 K/UL — LOW (ref 1.2–3.4)
LYMPHOCYTES # BLD AUTO: 14.1 % — LOW (ref 20.5–51.1)
MAGNESIUM SERPL-MCNC: 2 MG/DL — SIGNIFICANT CHANGE UP (ref 1.8–2.4)
MCHC RBC-ENTMCNC: 29.5 PG — SIGNIFICANT CHANGE UP (ref 27–31)
MCHC RBC-ENTMCNC: 32.9 G/DL — SIGNIFICANT CHANGE UP (ref 32–37)
MCV RBC AUTO: 89.6 FL — SIGNIFICANT CHANGE UP (ref 80–94)
MONOCYTES # BLD AUTO: 0.54 K/UL — SIGNIFICANT CHANGE UP (ref 0.1–0.6)
MONOCYTES NFR BLD AUTO: 13.9 % — HIGH (ref 1.7–9.3)
NEUTROPHILS # BLD AUTO: 2.55 K/UL — SIGNIFICANT CHANGE UP (ref 1.4–6.5)
NEUTROPHILS NFR BLD AUTO: 65.6 % — SIGNIFICANT CHANGE UP (ref 42.2–75.2)
NRBC BLD AUTO-RTO: 0 /100 WBCS — SIGNIFICANT CHANGE UP (ref 0–0)
PLATELET # BLD AUTO: 123 K/UL — LOW (ref 130–400)
PMV BLD: 10.8 FL — HIGH (ref 7.4–10.4)
POTASSIUM SERPL-MCNC: 4.5 MMOL/L — SIGNIFICANT CHANGE UP (ref 3.5–5)
POTASSIUM SERPL-SCNC: 4.5 MMOL/L — SIGNIFICANT CHANGE UP (ref 3.5–5)
RBC # BLD: 3.66 M/UL — LOW (ref 4.7–6.1)
RBC # FLD: 12.8 % — SIGNIFICANT CHANGE UP (ref 11.5–14.5)
SODIUM SERPL-SCNC: 137 MMOL/L — SIGNIFICANT CHANGE UP (ref 135–146)
SPECIMEN SOURCE: SIGNIFICANT CHANGE UP
WBC # BLD: 3.89 K/UL — LOW (ref 4.8–10.8)
WBC # FLD AUTO: 3.89 K/UL — LOW (ref 4.8–10.8)

## 2025-08-09 PROCEDURE — 71045 X-RAY EXAM CHEST 1 VIEW: CPT | Mod: 26

## 2025-08-09 PROCEDURE — 99232 SBSQ HOSP IP/OBS MODERATE 35: CPT

## 2025-08-09 RX ORDER — PREDNISONE 20 MG/1
40 TABLET ORAL DAILY
Refills: 0 | Status: DISCONTINUED | OUTPATIENT
Start: 2025-08-09 | End: 2025-08-11

## 2025-08-09 RX ADMIN — PREDNISONE 40 MILLIGRAM(S): 20 TABLET ORAL at 11:35

## 2025-08-09 RX ADMIN — Medication 650 MILLIGRAM(S): at 03:59

## 2025-08-09 RX ADMIN — AMOXICILLIN AND CLAVULANATE POTASSIUM 1 TABLET(S): 500; 125 TABLET, FILM COATED ORAL at 05:24

## 2025-08-09 RX ADMIN — Medication 100 MILLIGRAM(S): at 17:26

## 2025-08-09 RX ADMIN — APIXABAN 5 MILLIGRAM(S): 5 TABLET, FILM COATED ORAL at 17:27

## 2025-08-09 RX ADMIN — GABAPENTIN 100 MILLIGRAM(S): 400 CAPSULE ORAL at 21:16

## 2025-08-09 RX ADMIN — METHOCARBAMOL 500 MILLIGRAM(S): 500 TABLET, FILM COATED ORAL at 21:16

## 2025-08-09 RX ADMIN — AMOXICILLIN AND CLAVULANATE POTASSIUM 1 TABLET(S): 500; 125 TABLET, FILM COATED ORAL at 17:26

## 2025-08-09 RX ADMIN — Medication 650 MILLIGRAM(S): at 04:29

## 2025-08-09 RX ADMIN — METOPROLOL SUCCINATE 25 MILLIGRAM(S): 50 TABLET, EXTENDED RELEASE ORAL at 17:27

## 2025-08-09 RX ADMIN — TAMSULOSIN HYDROCHLORIDE 0.4 MILLIGRAM(S): 0.4 CAPSULE ORAL at 21:16

## 2025-08-09 RX ADMIN — Medication 100 MILLIGRAM(S): at 05:23

## 2025-08-09 RX ADMIN — METOPROLOL SUCCINATE 25 MILLIGRAM(S): 50 TABLET, EXTENDED RELEASE ORAL at 05:23

## 2025-08-09 RX ADMIN — METHOCARBAMOL 500 MILLIGRAM(S): 500 TABLET, FILM COATED ORAL at 05:23

## 2025-08-09 RX ADMIN — METHOCARBAMOL 500 MILLIGRAM(S): 500 TABLET, FILM COATED ORAL at 13:16

## 2025-08-09 RX ADMIN — EZETIMIBE 10 MILLIGRAM(S): 10 TABLET ORAL at 11:35

## 2025-08-09 RX ADMIN — APIXABAN 5 MILLIGRAM(S): 5 TABLET, FILM COATED ORAL at 05:24

## 2025-08-09 RX ADMIN — FINASTERIDE 5 MILLIGRAM(S): 1 TABLET, FILM COATED ORAL at 11:35

## 2025-08-09 RX ADMIN — Medication 40 MILLIGRAM(S): at 05:24

## 2025-08-10 LAB
CULTURE RESULTS: ABNORMAL
SPECIMEN SOURCE: SIGNIFICANT CHANGE UP

## 2025-08-10 PROCEDURE — 99239 HOSP IP/OBS DSCHRG MGMT >30: CPT | Mod: GC

## 2025-08-10 RX ORDER — PREDNISONE 20 MG/1
2 TABLET ORAL
Qty: 6 | Refills: 0
Start: 2025-08-10 | End: 2025-08-12

## 2025-08-10 RX ORDER — AMOXICILLIN AND CLAVULANATE POTASSIUM 500; 125 MG/1; MG/1
1 TABLET, FILM COATED ORAL
Qty: 6 | Refills: 0
Start: 2025-08-10 | End: 2025-08-12

## 2025-08-10 RX ADMIN — TAMSULOSIN HYDROCHLORIDE 0.4 MILLIGRAM(S): 0.4 CAPSULE ORAL at 21:03

## 2025-08-10 RX ADMIN — METHOCARBAMOL 500 MILLIGRAM(S): 500 TABLET, FILM COATED ORAL at 05:09

## 2025-08-10 RX ADMIN — APIXABAN 5 MILLIGRAM(S): 5 TABLET, FILM COATED ORAL at 05:09

## 2025-08-10 RX ADMIN — PREDNISONE 40 MILLIGRAM(S): 20 TABLET ORAL at 05:09

## 2025-08-10 RX ADMIN — METHOCARBAMOL 500 MILLIGRAM(S): 500 TABLET, FILM COATED ORAL at 13:11

## 2025-08-10 RX ADMIN — METHOCARBAMOL 500 MILLIGRAM(S): 500 TABLET, FILM COATED ORAL at 21:03

## 2025-08-10 RX ADMIN — AMOXICILLIN AND CLAVULANATE POTASSIUM 1 TABLET(S): 500; 125 TABLET, FILM COATED ORAL at 05:09

## 2025-08-10 RX ADMIN — APIXABAN 5 MILLIGRAM(S): 5 TABLET, FILM COATED ORAL at 17:55

## 2025-08-10 RX ADMIN — FINASTERIDE 5 MILLIGRAM(S): 1 TABLET, FILM COATED ORAL at 11:22

## 2025-08-10 RX ADMIN — AMOXICILLIN AND CLAVULANATE POTASSIUM 1 TABLET(S): 500; 125 TABLET, FILM COATED ORAL at 17:55

## 2025-08-10 RX ADMIN — METOPROLOL SUCCINATE 25 MILLIGRAM(S): 50 TABLET, EXTENDED RELEASE ORAL at 18:13

## 2025-08-10 RX ADMIN — GABAPENTIN 100 MILLIGRAM(S): 400 CAPSULE ORAL at 21:03

## 2025-08-10 RX ADMIN — METOPROLOL SUCCINATE 25 MILLIGRAM(S): 50 TABLET, EXTENDED RELEASE ORAL at 05:12

## 2025-08-10 RX ADMIN — EZETIMIBE 10 MILLIGRAM(S): 10 TABLET ORAL at 11:22

## 2025-08-10 RX ADMIN — Medication 40 MILLIGRAM(S): at 05:08

## 2025-08-10 RX ADMIN — Medication 100 MILLIGRAM(S): at 05:09

## 2025-08-10 RX ADMIN — Medication 100 MILLIGRAM(S): at 17:55

## 2025-08-11 ENCOUNTER — NON-APPOINTMENT (OUTPATIENT)
Age: 70
End: 2025-08-11

## 2025-08-11 VITALS
RESPIRATION RATE: 20 BRPM | HEART RATE: 60 BPM | OXYGEN SATURATION: 99 % | SYSTOLIC BLOOD PRESSURE: 130 MMHG | DIASTOLIC BLOOD PRESSURE: 83 MMHG | TEMPERATURE: 98 F

## 2025-08-11 PROCEDURE — 99239 HOSP IP/OBS DSCHRG MGMT >30: CPT

## 2025-08-11 RX ORDER — SULFASALAZINE 500 MG/1
1000 TABLET ORAL
Refills: 0 | Status: DISCONTINUED | OUTPATIENT
Start: 2025-08-11 | End: 2025-08-11

## 2025-08-11 RX ORDER — SULFASALAZINE 500 MG/1
1500 TABLET ORAL AT BEDTIME
Refills: 0 | Status: DISCONTINUED | OUTPATIENT
Start: 2025-08-11 | End: 2025-08-11

## 2025-08-11 RX ORDER — METHOTREXATE 25 MG/ML
7.5 INJECTION, SOLUTION INTRA-ARTERIAL; INTRAMUSCULAR; INTRATHECAL; INTRAVENOUS
Refills: 0 | Status: DISCONTINUED | OUTPATIENT
Start: 2025-08-11 | End: 2025-08-11

## 2025-08-11 RX ADMIN — APIXABAN 5 MILLIGRAM(S): 5 TABLET, FILM COATED ORAL at 05:29

## 2025-08-11 RX ADMIN — Medication 40 MILLIGRAM(S): at 05:29

## 2025-08-11 RX ADMIN — Medication 100 MILLIGRAM(S): at 05:30

## 2025-08-11 RX ADMIN — PREDNISONE 40 MILLIGRAM(S): 20 TABLET ORAL at 05:29

## 2025-08-11 RX ADMIN — METOPROLOL SUCCINATE 25 MILLIGRAM(S): 50 TABLET, EXTENDED RELEASE ORAL at 05:30

## 2025-08-11 RX ADMIN — AMOXICILLIN AND CLAVULANATE POTASSIUM 1 TABLET(S): 500; 125 TABLET, FILM COATED ORAL at 05:29

## 2025-08-11 RX ADMIN — FINASTERIDE 5 MILLIGRAM(S): 1 TABLET, FILM COATED ORAL at 12:07

## 2025-08-11 RX ADMIN — METHOCARBAMOL 500 MILLIGRAM(S): 500 TABLET, FILM COATED ORAL at 05:30

## 2025-08-11 RX ADMIN — METHOCARBAMOL 500 MILLIGRAM(S): 500 TABLET, FILM COATED ORAL at 13:14

## 2025-08-11 RX ADMIN — EZETIMIBE 10 MILLIGRAM(S): 10 TABLET ORAL at 12:07

## 2025-08-19 DIAGNOSIS — N31.9 NEUROMUSCULAR DYSFUNCTION OF BLADDER, UNSPECIFIED: ICD-10-CM

## 2025-08-19 DIAGNOSIS — Z86.718 PERSONAL HISTORY OF OTHER VENOUS THROMBOSIS AND EMBOLISM: ICD-10-CM

## 2025-08-19 DIAGNOSIS — Z85.118 PERSONAL HISTORY OF OTHER MALIGNANT NEOPLASM OF BRONCHUS AND LUNG: ICD-10-CM

## 2025-08-19 DIAGNOSIS — I10 ESSENTIAL (PRIMARY) HYPERTENSION: ICD-10-CM

## 2025-08-19 DIAGNOSIS — N40.0 BENIGN PROSTATIC HYPERPLASIA WITHOUT LOWER URINARY TRACT SYMPTOMS: ICD-10-CM

## 2025-08-19 DIAGNOSIS — Z11.52 ENCOUNTER FOR SCREENING FOR COVID-19: ICD-10-CM

## 2025-08-19 DIAGNOSIS — Z86.79 PERSONAL HISTORY OF OTHER DISEASES OF THE CIRCULATORY SYSTEM: ICD-10-CM

## 2025-08-19 DIAGNOSIS — Z91.81 HISTORY OF FALLING: ICD-10-CM

## 2025-08-19 DIAGNOSIS — Z79.01 LONG TERM (CURRENT) USE OF ANTICOAGULANTS: ICD-10-CM

## 2025-08-19 DIAGNOSIS — R63.4 ABNORMAL WEIGHT LOSS: ICD-10-CM

## 2025-08-19 DIAGNOSIS — E78.5 HYPERLIPIDEMIA, UNSPECIFIED: ICD-10-CM

## 2025-08-19 DIAGNOSIS — I25.10 ATHEROSCLEROTIC HEART DISEASE OF NATIVE CORONARY ARTERY WITHOUT ANGINA PECTORIS: ICD-10-CM

## 2025-08-19 DIAGNOSIS — Z90.2 ACQUIRED ABSENCE OF LUNG [PART OF]: ICD-10-CM

## 2025-08-19 DIAGNOSIS — R04.2 HEMOPTYSIS: ICD-10-CM

## 2025-08-19 DIAGNOSIS — J18.9 PNEUMONIA, UNSPECIFIED ORGANISM: ICD-10-CM

## 2025-08-19 DIAGNOSIS — Z96.649 PRESENCE OF UNSPECIFIED ARTIFICIAL HIP JOINT: ICD-10-CM

## 2025-08-19 DIAGNOSIS — R06.02 SHORTNESS OF BREATH: ICD-10-CM

## 2025-08-19 DIAGNOSIS — Z79.899 OTHER LONG TERM (CURRENT) DRUG THERAPY: ICD-10-CM

## 2025-08-19 DIAGNOSIS — Z95.1 PRESENCE OF AORTOCORONARY BYPASS GRAFT: ICD-10-CM

## 2025-09-09 ENCOUNTER — APPOINTMENT (OUTPATIENT)
Dept: VASCULAR SURGERY | Facility: CLINIC | Age: 70
End: 2025-09-09
Payer: MEDICARE

## 2025-09-09 VITALS — SYSTOLIC BLOOD PRESSURE: 104 MMHG | DIASTOLIC BLOOD PRESSURE: 61 MMHG

## 2025-09-09 VITALS — HEIGHT: 69 IN | BODY MASS INDEX: 20.44 KG/M2 | WEIGHT: 138 LBS

## 2025-09-09 DIAGNOSIS — I71.40 ABDOMINAL AORTIC ANEURYSM, WITHOUT RUPTURE, UNSPECIFIED: ICD-10-CM

## 2025-09-09 DIAGNOSIS — M79.89 OTHER SPECIFIED SOFT TISSUE DISORDERS: ICD-10-CM

## 2025-09-09 PROCEDURE — 93978 VASCULAR STUDY: CPT

## 2025-09-09 PROCEDURE — 99212 OFFICE O/P EST SF 10 MIN: CPT

## 2025-09-19 ENCOUNTER — EMERGENCY (EMERGENCY)
Facility: HOSPITAL | Age: 70
LOS: 0 days | Discharge: HOME | End: 2025-09-19
Attending: EMERGENCY MEDICINE
Payer: MEDICARE

## 2025-09-19 VITALS
WEIGHT: 151.9 LBS | TEMPERATURE: 98 F | SYSTOLIC BLOOD PRESSURE: 146 MMHG | HEART RATE: 60 BPM | RESPIRATION RATE: 18 BRPM | HEIGHT: 69 IN | OXYGEN SATURATION: 98 % | DIASTOLIC BLOOD PRESSURE: 85 MMHG

## 2025-09-19 DIAGNOSIS — Z90.2 ACQUIRED ABSENCE OF LUNG [PART OF]: Chronic | ICD-10-CM

## 2025-09-19 DIAGNOSIS — I69.334 MONOPLEGIA OF UPPER LIMB FOLLOWING CEREBRAL INFARCTION AFFECTING LEFT NON-DOMINANT SIDE: ICD-10-CM

## 2025-09-19 DIAGNOSIS — M25.562 PAIN IN LEFT KNEE: ICD-10-CM

## 2025-09-19 DIAGNOSIS — Z87.891 PERSONAL HISTORY OF NICOTINE DEPENDENCE: ICD-10-CM

## 2025-09-19 DIAGNOSIS — Z91.011 ALLERGY TO MILK PRODUCTS: ICD-10-CM

## 2025-09-19 DIAGNOSIS — Z79.01 LONG TERM (CURRENT) USE OF ANTICOAGULANTS: ICD-10-CM

## 2025-09-19 DIAGNOSIS — Z86.718 PERSONAL HISTORY OF OTHER VENOUS THROMBOSIS AND EMBOLISM: ICD-10-CM

## 2025-09-19 DIAGNOSIS — Z88.8 ALLERGY STATUS TO OTHER DRUGS, MEDICAMENTS AND BIOLOGICAL SUBSTANCES: ICD-10-CM

## 2025-09-19 DIAGNOSIS — I69.344 MONOPLEGIA OF LOWER LIMB FOLLOWING CEREBRAL INFARCTION AFFECTING LEFT NON-DOMINANT SIDE: ICD-10-CM

## 2025-09-19 DIAGNOSIS — Z86.711 PERSONAL HISTORY OF PULMONARY EMBOLISM: ICD-10-CM

## 2025-09-19 DIAGNOSIS — Z95.1 PRESENCE OF AORTOCORONARY BYPASS GRAFT: Chronic | ICD-10-CM

## 2025-09-19 DIAGNOSIS — I87.2 VENOUS INSUFFICIENCY (CHRONIC) (PERIPHERAL): ICD-10-CM

## 2025-09-19 PROCEDURE — 99284 EMERGENCY DEPT VISIT MOD MDM: CPT | Mod: 25

## 2025-09-19 PROCEDURE — 73562 X-RAY EXAM OF KNEE 3: CPT | Mod: 26,LT

## 2025-09-19 PROCEDURE — 99284 EMERGENCY DEPT VISIT MOD MDM: CPT

## 2025-09-19 PROCEDURE — 73562 X-RAY EXAM OF KNEE 3: CPT | Mod: LT

## 2025-09-19 PROCEDURE — 93970 EXTREMITY STUDY: CPT

## 2025-09-19 PROCEDURE — 93970 EXTREMITY STUDY: CPT | Mod: 26
